# Patient Record
Sex: FEMALE | Race: WHITE | NOT HISPANIC OR LATINO | Employment: FULL TIME | URBAN - METROPOLITAN AREA
[De-identification: names, ages, dates, MRNs, and addresses within clinical notes are randomized per-mention and may not be internally consistent; named-entity substitution may affect disease eponyms.]

---

## 2017-01-20 ENCOUNTER — TELEPHONE (OUTPATIENT)
Dept: OBGYN | Facility: CLINIC | Age: 22
End: 2017-01-20

## 2017-01-20 NOTE — TELEPHONE ENCOUNTER
"Spoke to Patient.      \"I had Sinus tachycardia and an irregular heartbeat. I was seen at the  for that... I was last seen about a year ago-maybe 9 months ago?..\"     I asked patient when she last saw Cardiologist or contacted them and she stated: \"I don't know-isn't it in my chart?..\" (Last seen by Cardiology on 8- per chart review)    \"It is more my BP that they monitored that I am concerned about..\" \"No one does anything about my BP or my Pulse and it is constantly racing today-I feel like I am going to pass out daily and my heart is racing and I shouldn't always have to say that I have this and I constantly have to bring this up. I shouldn't have an elevated BP when I am PG... I am frustrated because I am always seeing a different Doctor every time I am in.. What should I do?... \"    Patient has not been seen in clinic since 12-20-16 for Prenatal evaluation.     \"If I go into Pre term labor because of Preeclampsia-it's all on you guys-\"    Patient was very anxious. Advised if she is having racing heart symptoms and feeling like she is going to pass out, she needs to be seen now and advised to be seen now in ER.     Patient verbalized understanding. Lia Peck RN      "

## 2017-01-20 NOTE — TELEPHONE ENCOUNTER
"Reason for call:  Patient reporting a symptom    Symptom or request: States \"I can feel that my BP is high, my heart is racing, I have a heart condition, nobody has brought it up at my appt's and it just feels like it is getting worse\" 29 wks pregnant.  States she checked BP over the weekend it was like 142/91 palse was 109.    Duration (how long have symptoms been present): on and off because she states she has a heart condition in general - gotten worse over the last 3 weeks, almost every day now.    Have you been treated for this before?     Additional comments:     Phone Number patient can be reached at:  Home number on file [phone] (home)    Best Time:  any    Can we leave a detailed message on this number:  YES    Call taken on 1/20/2017 at 12:41 PM by Yudy Boyle    "

## 2017-01-27 PROBLEM — Z34.80 PRENATAL CARE, SUBSEQUENT PREGNANCY: Status: ACTIVE | Noted: 2017-01-27

## 2017-01-30 ENCOUNTER — PRENATAL OFFICE VISIT (OUTPATIENT)
Dept: OBGYN | Facility: CLINIC | Age: 22
End: 2017-01-30
Payer: COMMERCIAL

## 2017-01-30 VITALS
HEART RATE: 113 BPM | SYSTOLIC BLOOD PRESSURE: 126 MMHG | BODY MASS INDEX: 27.62 KG/M2 | WEIGHT: 176 LBS | DIASTOLIC BLOOD PRESSURE: 75 MMHG | HEIGHT: 67 IN

## 2017-01-30 DIAGNOSIS — E55.9 VITAMIN D DEFICIENCY: ICD-10-CM

## 2017-01-30 DIAGNOSIS — Z34.03 PRENATAL CARE, FIRST PREGNANCY, THIRD TRIMESTER: Primary | ICD-10-CM

## 2017-01-30 DIAGNOSIS — F41.9 ANXIETY: ICD-10-CM

## 2017-01-30 DIAGNOSIS — K21.9 GASTROESOPHAGEAL REFLUX DISEASE, ESOPHAGITIS PRESENCE NOT SPECIFIED: ICD-10-CM

## 2017-01-30 PROCEDURE — 99207 ZZC PRENATAL VISIT: CPT | Performed by: OBSTETRICS & GYNECOLOGY

## 2017-01-30 RX ORDER — CHOLECALCIFEROL (VITAMIN D3) 50 MCG
2000 TABLET ORAL DAILY
Qty: 100 TABLET | Refills: 3 | Status: SHIPPED | OUTPATIENT
Start: 2017-01-30 | End: 2018-04-05

## 2017-01-30 RX ORDER — FLUOXETINE 10 MG/1
10 CAPSULE ORAL DAILY
Qty: 30 CAPSULE | Refills: 1 | Status: SHIPPED | OUTPATIENT
Start: 2017-01-30 | End: 2017-03-20

## 2017-01-30 ASSESSMENT — ANXIETY QUESTIONNAIRES
IF YOU CHECKED OFF ANY PROBLEMS ON THIS QUESTIONNAIRE, HOW DIFFICULT HAVE THESE PROBLEMS MADE IT FOR YOU TO DO YOUR WORK, TAKE CARE OF THINGS AT HOME, OR GET ALONG WITH OTHER PEOPLE: VERY DIFFICULT
1. FEELING NERVOUS, ANXIOUS, OR ON EDGE: NEARLY EVERY DAY
5. BEING SO RESTLESS THAT IT IS HARD TO SIT STILL: NEARLY EVERY DAY
3. WORRYING TOO MUCH ABOUT DIFFERENT THINGS: MORE THAN HALF THE DAYS
2. NOT BEING ABLE TO STOP OR CONTROL WORRYING: MORE THAN HALF THE DAYS
6. BECOMING EASILY ANNOYED OR IRRITABLE: MORE THAN HALF THE DAYS
7. FEELING AFRAID AS IF SOMETHING AWFUL MIGHT HAPPEN: SEVERAL DAYS
GAD7 TOTAL SCORE: 16

## 2017-01-30 ASSESSMENT — PATIENT HEALTH QUESTIONNAIRE - PHQ9: 5. POOR APPETITE OR OVEREATING: NEARLY EVERY DAY

## 2017-01-30 NOTE — PROGRESS NOTES
"CC: Here for routine prenatal visit @ 30w0d   HPI: + FM, no ctx, no LOF, no VB.  Having terrible heartburn and increased anxiety.  Cannot tolerate tums.  Atarax made her sleepy but she felt hungover.  Would like to try what her sister is taking (prozac)    PE: /75 mmHg  Pulse 113  Ht 5' 6.5\" (1.689 m)  Wt 176 lb (79.833 kg)  BMI 27.98 kg/m2  LMP 07/04/2016  Breastfeeding? No   See OB flowsheet    A/P G1 @ 30w0d normal pregnancy    1. Routine prenatal care  2. Anxiety: discussed trial of SSRI to treat underlying problem.  Has had bad side effects in the past with buspar, celexa, and Zoloft.  Amenable to trial of prozac.  Advised against benzodiazepines in pregnancy.  3. GERD: discussed trial of H2 blocker.  Patient amenable  4. Hx vitamin D deficiency: would like level checked.  just finished her 50,000 U per week.  Advised 2000 IU per day    RTC 2 weeks.      Sol Wooten M.D.    "

## 2017-01-31 ENCOUNTER — CARE COORDINATION (OUTPATIENT)
Dept: CARE COORDINATION | Facility: CLINIC | Age: 22
End: 2017-01-31

## 2017-01-31 ASSESSMENT — ANXIETY QUESTIONNAIRES: GAD7 TOTAL SCORE: 16

## 2017-01-31 NOTE — PROGRESS NOTES
Clinic Care Coordination Contact  Care Team Conversations    Pt reporting she is doing ok.  She moved into her permanent home in [place] and will need to update the new county of her transition.  Encouraged her to also call [facility] to notify as they can assist with transition as well.      Pt had no needs and did ask for the [facility] phone number to be emailed to her.  Her email address is [email or web address].    Pt was ok with closing to care coordination as she felt she was doing good and had no concerns.  Reminded pt she can call  at any time with questions/concerns.     MAME Jamil, Clinic Care Coordinator 1/31/2017   9:51 AM  [phone]

## 2017-02-15 ENCOUNTER — HOSPITAL ENCOUNTER (OUTPATIENT)
Dept: ULTRASOUND IMAGING | Facility: CLINIC | Age: 22
Discharge: HOME | End: 2017-02-15
Attending: OBSTETRICS & GYNECOLOGY | Admitting: OBSTETRICS & GYNECOLOGY
Payer: COMMERCIAL

## 2017-02-15 ENCOUNTER — PRENATAL OFFICE VISIT (OUTPATIENT)
Dept: OBGYN | Facility: CLINIC | Age: 22
End: 2017-02-15
Payer: COMMERCIAL

## 2017-02-15 VITALS
HEART RATE: 97 BPM | SYSTOLIC BLOOD PRESSURE: 140 MMHG | DIASTOLIC BLOOD PRESSURE: 72 MMHG | WEIGHT: 182 LBS | HEIGHT: 67 IN | BODY MASS INDEX: 28.56 KG/M2

## 2017-02-15 DIAGNOSIS — Z34.03 PRENATAL CARE, FIRST PREGNANCY, THIRD TRIMESTER: ICD-10-CM

## 2017-02-15 DIAGNOSIS — O16.3 ELEVATED BLOOD PRESSURE AFFECTING PREGNANCY IN THIRD TRIMESTER, ANTEPARTUM: ICD-10-CM

## 2017-02-15 DIAGNOSIS — Z34.03 PRENATAL CARE, FIRST PREGNANCY, THIRD TRIMESTER: Primary | ICD-10-CM

## 2017-02-15 DIAGNOSIS — O47.03 PRETERM UTERINE CONTRACTIONS IN THIRD TRIMESTER, ANTEPARTUM: ICD-10-CM

## 2017-02-15 LAB
ALT SERPL W P-5'-P-CCNC: 16 U/L (ref 0–50)
AST SERPL W P-5'-P-CCNC: 15 U/L (ref 0–45)
CREAT SERPL-MCNC: 0.56 MG/DL (ref 0.52–1.04)
CREAT UR-MCNC: 46 MG/DL
ERYTHROCYTE [DISTWIDTH] IN BLOOD BY AUTOMATED COUNT: 12 % (ref 10–15)
FIBRONECTIN FETAL VAG QL: NORMAL
GFR SERPL CREATININE-BSD FRML MDRD: NORMAL ML/MIN/1.7M2
HCT VFR BLD AUTO: 36 % (ref 35–47)
HGB BLD-MCNC: 11.8 G/DL (ref 11.7–15.7)
MCH RBC QN AUTO: 29 PG (ref 26.5–33)
MCHC RBC AUTO-ENTMCNC: 32.8 G/DL (ref 31.5–36.5)
MCV RBC AUTO: 89 FL (ref 78–100)
PLATELET # BLD AUTO: 321 10E9/L (ref 150–450)
PROT UR-MCNC: <0.05 G/L
PROT/CREAT 24H UR: NORMAL G/G CR (ref 0–0.2)
RBC # BLD AUTO: 4.07 10E12/L (ref 3.8–5.2)
URATE SERPL-MCNC: 3.3 MG/DL (ref 2.6–6)
WBC # BLD AUTO: 16.7 10E9/L (ref 4–11)

## 2017-02-15 PROCEDURE — 84550 ASSAY OF BLOOD/URIC ACID: CPT | Performed by: OBSTETRICS & GYNECOLOGY

## 2017-02-15 PROCEDURE — 82731 ASSAY OF FETAL FIBRONECTIN: CPT | Performed by: OBSTETRICS & GYNECOLOGY

## 2017-02-15 PROCEDURE — 84156 ASSAY OF PROTEIN URINE: CPT | Performed by: OBSTETRICS & GYNECOLOGY

## 2017-02-15 PROCEDURE — 84450 TRANSFERASE (AST) (SGOT): CPT | Performed by: OBSTETRICS & GYNECOLOGY

## 2017-02-15 PROCEDURE — 59025 FETAL NON-STRESS TEST: CPT | Performed by: OBSTETRICS & GYNECOLOGY

## 2017-02-15 PROCEDURE — 84460 ALANINE AMINO (ALT) (SGPT): CPT | Performed by: OBSTETRICS & GYNECOLOGY

## 2017-02-15 PROCEDURE — 85027 COMPLETE CBC AUTOMATED: CPT | Performed by: OBSTETRICS & GYNECOLOGY

## 2017-02-15 PROCEDURE — 36415 COLL VENOUS BLD VENIPUNCTURE: CPT | Performed by: OBSTETRICS & GYNECOLOGY

## 2017-02-15 PROCEDURE — 76819 FETAL BIOPHYS PROFIL W/O NST: CPT

## 2017-02-15 PROCEDURE — 82565 ASSAY OF CREATININE: CPT | Performed by: OBSTETRICS & GYNECOLOGY

## 2017-02-15 PROCEDURE — 99207 ZZC PRENATAL VISIT: CPT | Performed by: OBSTETRICS & GYNECOLOGY

## 2017-02-15 NOTE — PROGRESS NOTES
"CC: Here for routine prenatal visit @ 32w2d   HPI: + FM, no LOF, no VB.   Having cramping.  Denies recent intercourse.  Known hx of single horn uterus    PE: /72 (BP Location: Right arm, Patient Position: Chair, Cuff Size: Adult Regular)  Pulse 97  Ht 5' 6.5\" (1.689 m)  Wt 182 lb (82.6 kg)  LMP 2016  Breastfeeding? No  BMI 28.94 kg/m2   See OB flowsheet    Cervix visually closed.  Minimal discharge.  FFN obtained    A/P G1 @ 32w2d normal pregnancy, elevated BP in pregnancy,  contractions    1. Routine prenatal care  2. BP: plan PIH labs.  Discussed need to perform growth scans, BPPs and reassess weekly.  Plan BPP today with labwork.   3.  contractions: FFN pending.  Cervix reassuring.  Reviewed  labor precautions.     RTC 1 week    Sol Wootne M.D.    "

## 2017-02-15 NOTE — PROGRESS NOTES
Patient returned to clinic  BPP 6/8, pts off for fetal breathing.   Reactive NST in clinic  Fetal fibronectin return negative.   HELLP labs pending.     Shaista Bedolla MD  [facility]

## 2017-02-15 NOTE — NURSING NOTE
"Chief Complaint   Patient presents with     Prenatal Care     experiencing some cramping       Initial /72 (BP Location: Right arm, Patient Position: Chair, Cuff Size: Adult Regular)  Pulse 97  Ht 5' 6.5\" (1.689 m)  Wt 182 lb (82.6 kg)  LMP 07/04/2016  Breastfeeding? No  BMI 28.94 kg/m2 Estimated body mass index is 28.94 kg/(m^2) as calculated from the following:    Height as of this encounter: 5' 6.5\" (1.689 m).    Weight as of this encounter: 182 lb (82.6 kg).  Medication Reconciliation: complete   Sasha Conley, MATILDE      "

## 2017-02-15 NOTE — MR AVS SNAPSHOT
After Visit Summary   2/15/2017    Zacarias Adam    MRN: [id number]           Patient Information     Date Of Birth          1995        Visit Information        Provider Department      2/15/2017 2:45 PM Sol Wooten MD [facility]        Today's Diagnoses     Prenatal care, first pregnancy, third trimester    -  1     uterine contractions in third trimester, antepartum        Elevated blood pressure affecting pregnancy in third trimester, antepartum           Follow-ups after your visit        Your next 10 appointments already scheduled     Mar 03, 2017  3:30 PM CST   ESTABLISHED PRENATAL with Shaista Bedolla MD   [facility]    [address] [place] [address]   [phone]            Mar 17, 2017  3:30 PM CDT   ESTABLISHED PRENATAL with Shaista Bedolla MD   [facility] ([facility]    [address] [place] [address]   [phone]            Mar 24, 2017  3:00 PM CDT   ESTABLISHED PRENATAL with Sol Wooten MD   [facility] ([facility])    [address] [place] [address]   [phone]            Mar 31, 2017  3:00 PM CDT   ESTABLISHED PRENATAL with Sol Wooten MD   [facility] ([facility])    [address] [place] [address]   [phone]              Future tests that were ordered for you today     Open Standing Orders        Priority Remaining Interval Expires Ordered    US Fetal Biophys Prof w/o Non Stress Test Routine 6/6  2/15/2018 2/15/2017          Open Future Orders        Priority Expected Expires Ordered    US OB Ltd One Or More Fetus FU/Repeat Routine 2017 2/15/2018 2/15/2017    US Fetal Biophys Prof w/o Non Stress Test STAT 2017 2/15/2018 2/15/2017            Who to contact     If you have questions or need follow up information about  "today's clinic visit or your schedule please contact [facility] directly at [phone].  Normal or non-critical lab and imaging results will be communicated to you by MyChart, letter or phone within 4 business days after the clinic has received the results. If you do not hear from us within 7 days, please contact the clinic through [employer] or phone. If you have a critical or abnormal lab result, we will notify you by phone as soon as possible.  Submit refill requests through [employer] or call your pharmacy and they will forward the refill request to us. Please allow 3 business days for your refill to be completed.          Additional Information About Your Visit        [employer] Information     [employer] lets you send messages to your doctor, view your test results, renew your prescriptions, schedule appointments and more. To sign up, go to [email or web address] . Click on \"Log in\" on the left side of the screen, which will take you to the Welcome page. Then click on \"Sign up Now\" on the right side of the page.     You will be asked to enter the access code listed below, as well as some personal information. Please follow the directions to create your username and password.     Your access code is: [email or web address]  Expires: 2017  5:10 PM     Your access code will  in 90 days. If you need help or a new code, please call your [place] clinic or [phone].        Care EveryWhere ID     This is your Care EveryWhere ID. This could be used by other organizations to access your [place] medical records  [id number]        Your Vitals Were     Pulse Height Last Period Breastfeeding? BMI (Body Mass Index)       97 5' 6.5\" (1.689 m) 2016 No 28.94 kg/m2        Blood Pressure from Last 3 Encounters:   02/15/17 140/72   17 126/75   17 130/74    Weight from Last 3 Encounters:   02/15/17 182 lb (82.6 kg)   17 176 lb (79.8 kg)   16 169 lb (76.7 kg)              We Performed the " Following     ALT     AST     CBC with platelets     Creatinine     Fetal fibronectin     Protein  random urine     Uric acid        Primary Care Provider Office Phone # Fax #    Yudi Sauceda [phone]       [facility] [address] [address]        Thank you!     Thank you for choosing [facility]  for your care. Our goal is always to provide you with excellent care. Hearing back from our patients is one way we can continue to improve our services. Please take a few minutes to complete the written survey that you may receive in the mail after your visit with us. Thank you!             Your Updated Medication List - Protect others around you: Learn how to safely use, store and throw away your medicines at www.disposemymeds.org.          This list is accurate as of: 2/15/17  3:59 PM.  Always use your most recent med list.                   Brand Name Dispense Instructions for use    albuterol 108 (90 BASE) MCG/ACT Inhaler    VENTOLIN HFA    2 Inhaler    Inhale 2 puffs into the lungs every 4 hours as needed       doxylamine 25 MG Tabs tablet    UNISOM    14 each    Take 1 tablet (25 mg) by mouth At Bedtime       FLUoxetine 10 MG capsule    PROzac    30 capsule    Take 1 capsule (10 mg) by mouth daily       hydrOXYzine 50 MG tablet    ATARAX    30 tablet    Take 1-2 tablets ( mg) by mouth every 6 hours as needed for itching       ranitidine 150 MG tablet    ZANTAC    60 tablet    Take 1 tablet (150 mg) by mouth 2 times daily       vitamin D 2000 UNITS tablet     100 tablet    Take 2,000 Units by mouth daily

## 2017-02-17 ENCOUNTER — TELEPHONE (OUTPATIENT)
Dept: OBGYN | Facility: CLINIC | Age: 22
End: 2017-02-17

## 2017-02-17 NOTE — TELEPHONE ENCOUNTER
Patient notified of normal lab results per Dr. Wooten.  Patient reports understanding.    Johnna Fairbanks   Ob[facility]Gyn Clinic  RN

## 2017-02-17 NOTE — TELEPHONE ENCOUNTER
Reason for Call:  Request for results:    Name of test or procedure: lab    Date of test of procedure: 2/15    Location of the test or procedure: [place]    OK to leave the result message on voice mail or with a family member? YES    Phone number Patient can be reached at:  Home number on file [phone] (home)    Additional comments: pt calling for results    Call taken on 2/17/2017 at 1:59 PM by Jaylyn Preston

## 2017-02-22 ENCOUNTER — HOSPITAL ENCOUNTER (OUTPATIENT)
Dept: ULTRASOUND IMAGING | Facility: CLINIC | Age: 22
Discharge: HOME | End: 2017-02-22
Attending: OBSTETRICS & GYNECOLOGY | Admitting: OBSTETRICS & GYNECOLOGY
Payer: COMMERCIAL

## 2017-02-22 ENCOUNTER — PRENATAL OFFICE VISIT (OUTPATIENT)
Dept: OBGYN | Facility: CLINIC | Age: 22
End: 2017-02-22
Payer: COMMERCIAL

## 2017-02-22 VITALS
SYSTOLIC BLOOD PRESSURE: 129 MMHG | BODY MASS INDEX: 28.88 KG/M2 | HEIGHT: 67 IN | DIASTOLIC BLOOD PRESSURE: 75 MMHG | WEIGHT: 184 LBS | HEART RATE: 108 BPM

## 2017-02-22 DIAGNOSIS — Z34.03 PRENATAL CARE, FIRST PREGNANCY, THIRD TRIMESTER: Primary | ICD-10-CM

## 2017-02-22 DIAGNOSIS — O16.3 ELEVATED BLOOD PRESSURE AFFECTING PREGNANCY IN THIRD TRIMESTER, ANTEPARTUM: ICD-10-CM

## 2017-02-22 DIAGNOSIS — Q51.9 CONGENITAL UTERINE ANOMALY: ICD-10-CM

## 2017-02-22 PROCEDURE — 76819 FETAL BIOPHYS PROFIL W/O NST: CPT

## 2017-02-22 PROCEDURE — 99207 ZZC PRENATAL VISIT: CPT | Performed by: OBSTETRICS & GYNECOLOGY

## 2017-02-22 PROCEDURE — 59025 FETAL NON-STRESS TEST: CPT | Performed by: OBSTETRICS & GYNECOLOGY

## 2017-02-22 NOTE — PROGRESS NOTES
"CC; prenatal visit  Uterine anomaly-pregnancy in left horn  Elevated blood pressure  S:  Feels pelvic aches. No ctx.  She was evaluated last week.  No headaches/visual changes.  Good fm.  No lof/vb/dc.    /75 (BP Location: Left arm, Patient Position: Chair, Cuff Size: Adult Regular)  Pulse 108  Ht 5' 6.5\" (1.689 m)  Wt 184 lb (83.5 kg)  LMP 2016  BMI 29.25 kg/m2  Prior labs reviewed-normal  FFN negative  Gestational age by current ultrasound measurement: 34 weeks and 1 day,  corresponding to an FLEX of 2017.     Gestational age based on the reported previously established due date:  33 weeks and 2 days, corresponding to an FLEX of 4/10/2017.     Estimated fetal weight: 2,183 grams, corresponding to the 60th  percentile based on the reported previously established due date.          IMPRESSION:   1. Single live intrauterine pregnancy of 34 weeks and 1 day gestation  by current ultrasound measurement. Fetal growth is 6 days more  advanced than what is expected from the reported previously  established due date.  2. Estimated fetal weight is at the 60th percentile.  3. Biophysical profile score is 6/8 (fetal breathing movement scores  are 0/2).     SMITA MARIE MD  A/P BPP 6/8-NST  FFN negative, no ctx- labor precautions  Uterine anomaly-good growth, vertex.    F/u 1 week  BP normal today with normal labs  Routine precautions  Had declined the tdap and flu vaccine prior and still declines  Amanda Lauren MD    "

## 2017-02-22 NOTE — MR AVS SNAPSHOT
After Visit Summary   2/22/2017    Zacarias Adam    MRN: [id number]           Patient Information     Date Of Birth          1995        Visit Information        Provider Department      2/22/2017 9:45 AM Amanda Lauren MD [facility]        Today's Diagnoses     Prenatal care, first pregnancy, third trimester    -  1    Congenital uterine anomaly           Follow-ups after your visit        Your next 10 appointments already scheduled     Mar 01, 2017  2:15 PM CST   ESTABLISHED PRENATAL with Moises Hays MD   [facility]    [address] [address]   [phone]            Mar 01, 2017  3:30 PM CST   US FETAL BIOPHYS PROFILE W/O NON STRESS TEST with WYUS1   [facility]ound ([facility])    [address] [address]   [phone]           Please bring a list of your medicines (including vitamins, minerals and over-the-counter drugs). Also, tell your doctor about any allergies you may have. Wear comfortable clothes and leave your valuables at home.  If you re less than 20 weeks drink four 8-ounce glasses of fluid an hour before your exam. If you need to empty your bladder before your exam, try to release only a little urine. Then, drink another glass of fluid.  You may have up to two family members in the exam room. If you bring a small child, an adult must be there to care for him or her.  Please call the Imaging Department at your exam site with any questions.            Mar 08, 2017  2:30 PM CST   ESTABLISHED PRENATAL with Moises Hays MD   [facility]    [address] [address]   [phone]            Mar 08, 2017  3:30 PM CST   US FETAL BIOPHYS PROFILE W/O NON STRESS TEST with WYUS2   [facility]ound ([facility])    [address]  [address]   [phone]           Please bring a list of your medicines (including vitamins, minerals and over-the-counter drugs). Also, tell your doctor about any allergies you may have. Wear comfortable clothes and leave your valuables at home.  If you re less than 20 weeks drink four 8-ounce glasses of fluid an hour before your exam. If you need to empty your bladder before your exam, try to release only a little urine. Then, drink another glass of fluid.  You may have up to two family members in the exam room. If you bring a small child, an adult must be there to care for him or her.  Please call the Imaging Department at your exam site with any questions.            Mar 15, 2017  3:00 PM CDT   ESTABLISHED PRENATAL with Moises Hays MD   [facility] ([facility]    [address] [address]   [phone]            Mar 15, 2017  3:30 PM CDT   US FETAL BIOPHYS PROFILE W/O NON STRESS TEST with WYUS2   [facility]ound ([facility])    [address] [address]   [phone]           Please bring a list of your medicines (including vitamins, minerals and over-the-counter drugs). Also, tell your doctor about any allergies you may have. Wear comfortable clothes and leave your valuables at home.  If you re less than 20 weeks drink four 8-ounce glasses of fluid an hour before your exam. If you need to empty your bladder before your exam, try to release only a little urine. Then, drink another glass of fluid.  You may have up to two family members in the exam room. If you bring a small child, an adult must be there to care for him or her.  Please call the Imaging Department at your exam site with any questions.            Mar 24, 2017  2:00 PM CDT   US FETAL BIOPHYS PROFILE W/O NON STRESS TEST with WYUS1   [facility] Ultrasound ([facility])    [address] [address]    [phone]           Please bring a list of your medicines (including vitamins, minerals and over-the-counter drugs). Also, tell your doctor about any allergies you may have. Wear comfortable clothes and leave your valuables at home.  If you re less than 20 weeks drink four 8-ounce glasses of fluid an hour before your exam. If you need to empty your bladder before your exam, try to release only a little urine. Then, drink another glass of fluid.  You may have up to two family members in the exam room. If you bring a small child, an adult must be there to care for him or her.  Please call the Imaging Department at your exam site with any questions.            Mar 24, 2017  3:00 PM CDT   ESTABLISHED PRENATAL with Sol Wooten MD   [facility]    [address] [address] [phone]            Mar 31, 2017  2:00 PM CDT   US FETAL BIOPHYS PROFILE W/O NON STRESS TEST with WYUS[address] [place] [address] Ultrasound ([facility])    [address] [address] [phone]           Please bring a list of your medicines (including vitamins, minerals and over-the-counter drugs). Also, tell your doctor about any allergies you may have. Wear comfortable clothes and leave your valuables at home.  If you re less than 20 weeks drink four 8-ounce glasses of fluid an hour before your exam. If you need to empty your bladder before your exam, try to release only a little urine. Then, drink another glass of fluid.  You may have up to two family members in the exam room. If you bring a small child, an adult must be there to care for him or her.  Please call the Imaging Department at your exam site with any questions.            Mar 31, 2017  3:00 PM CDT   ESTABLISHED PRENATAL with Sol Wooetn MD   [facility] ([facility])    [address] [address] [phone]             "  Future tests that were ordered for you today     Open Future Orders        Priority Expected Expires Ordered    US OB Ltd One Or More Fetus FU/Repeat Routine 2017            Who to contact     If you have questions or need follow up information about today's clinic visit or your schedule please contact [facility] directly at [phone].  Normal or non-critical lab and imaging results will be communicated to you by MyChart, letter or phone within 4 business days after the clinic has received the results. If you do not hear from us within 7 days, please contact the clinic through [employer] or phone. If you have a critical or abnormal lab result, we will notify you by phone as soon as possible.  Submit refill requests through [facility] or call your pharmacy and they will forward the refill request to us. Please allow 3 business days for your refill to be completed.          Additional Information About Your Visit        [employer] Information     [facility] lets you send messages to your doctor, view your test results, renew your prescriptions, schedule appointments and more. To sign up, go to [email or web address] . Click on \"Log in\" on the left side of the screen, which will take you to the Welcome page. Then click on \"Sign up Now\" on the right side of the page.     You will be asked to enter the access code listed below, as well as some personal information. Please follow the directions to create your username and password.     Your access code is: [email or web address]  Expires: 2017  5:10 PM     Your access code will  in 90 days. If you need help or a new code, please call your [place] clinic or [phone].        Care EveryWhere ID     This is your Care EveryWhere ID. This could be used by other organizations to access your [place] medical records  [id number]        Your Vitals Were     Pulse Height Last Period BMI (Body Mass Index)          108 5' 6.5\" (1.689 m) " 07/04/2016 29.25 kg/m2         Blood Pressure from Last 3 Encounters:   02/22/17 129/75   02/15/17 140/72   01/30/17 126/75    Weight from Last 3 Encounters:   02/22/17 184 lb (83.5 kg)   02/15/17 182 lb (82.6 kg)   01/30/17 176 lb (79.8 kg)              We Performed the Following     FETAL NON-STRESS TEST        Primary Care Provider Office Phone # Fax #    Yudi Sauceda [phone]       [facility] [address] [address]        Thank you!     Thank you for choosing [facility]  for your care. Our goal is always to provide you with excellent care. Hearing back from our patients is one way we can continue to improve our services. Please take a few minutes to complete the written survey that you may receive in the mail after your visit with us. Thank you!             Your Updated Medication List - Protect others around you: Learn how to safely use, store and throw away your medicines at www.disposemymeds.org.          This list is accurate as of: 2/22/17 10:50 AM.  Always use your most recent med list.                   Brand Name Dispense Instructions for use    albuterol 108 (90 BASE) MCG/ACT Inhaler    VENTOLIN HFA    2 Inhaler    Inhale 2 puffs into the lungs every 4 hours as needed       doxylamine 25 MG Tabs tablet    UNISOM    14 each    Take 1 tablet (25 mg) by mouth At Bedtime       FLUoxetine 10 MG capsule    PROzac    30 capsule    Take 1 capsule (10 mg) by mouth daily       hydrOXYzine 50 MG tablet    ATARAX    30 tablet    Take 1-2 tablets ( mg) by mouth every 6 hours as needed for itching       ranitidine 150 MG tablet    ZANTAC    60 tablet    Take 1 tablet (150 mg) by mouth 2 times daily       vitamin D 2000 UNITS tablet     100 tablet    Take 2,000 Units by mouth daily

## 2017-02-22 NOTE — NURSING NOTE
"Chief Complaint   Patient presents with     Prenatal Care     Pelvic pressure and sharp pains       Initial /75 (BP Location: Left arm, Patient Position: Chair, Cuff Size: Adult Regular)  Pulse 108  Ht 5' 6.5\" (1.689 m)  Wt 184 lb (83.5 kg)  LMP 07/04/2016  BMI 29.25 kg/m2 Estimated body mass index is 29.25 kg/(m^2) as calculated from the following:    Height as of this encounter: 5' 6.5\" (1.689 m).    Weight as of this encounter: 184 lb (83.5 kg).  Medication Reconciliation: complete     Genet Lindsay LPN      "

## 2017-02-27 ENCOUNTER — HOSPITAL ENCOUNTER (OUTPATIENT)
Facility: CLINIC | Age: 22
Discharge: HOME | End: 2017-02-27
Attending: OBSTETRICS & GYNECOLOGY | Admitting: OBSTETRICS & GYNECOLOGY
Payer: COMMERCIAL

## 2017-02-27 VITALS
SYSTOLIC BLOOD PRESSURE: 136 MMHG | HEART RATE: 101 BPM | RESPIRATION RATE: 16 BRPM | DIASTOLIC BLOOD PRESSURE: 80 MMHG | TEMPERATURE: 98.3 F

## 2017-02-27 PROBLEM — Z36.89 ENCOUNTER FOR TRIAGE IN PREGNANT PATIENT: Status: ACTIVE | Noted: 2017-02-27

## 2017-02-27 LAB
ALBUMIN UR-MCNC: NEGATIVE MG/DL
APPEARANCE UR: CLEAR
BILIRUB UR QL STRIP: NEGATIVE
COLOR UR AUTO: ABNORMAL
GLUCOSE UR STRIP-MCNC: NEGATIVE MG/DL
HGB UR QL STRIP: NEGATIVE
KETONES UR STRIP-MCNC: NEGATIVE MG/DL
LEUKOCYTE ESTERASE UR QL STRIP: NEGATIVE
MUCOUS THREADS #/AREA URNS LPF: PRESENT /LPF
NITRATE UR QL: NEGATIVE
PH UR STRIP: 6 PH (ref 5–7)
RBC #/AREA URNS AUTO: <1 /HPF (ref 0–2)
SP GR UR STRIP: 1.01 (ref 1–1.03)
SQUAMOUS #/AREA URNS AUTO: 2 /HPF (ref 0–1)
URN SPEC COLLECT METH UR: ABNORMAL
UROBILINOGEN UR STRIP-MCNC: NORMAL MG/DL (ref 0–2)
WBC #/AREA URNS AUTO: 3 /HPF (ref 0–2)

## 2017-02-27 PROCEDURE — 81001 URINALYSIS AUTO W/SCOPE: CPT | Performed by: OBSTETRICS & GYNECOLOGY

## 2017-02-27 PROCEDURE — 99213 OFFICE O/P EST LOW 20 MIN: CPT

## 2017-02-27 PROCEDURE — 99214 OFFICE O/P EST MOD 30 MIN: CPT

## 2017-02-27 NOTE — DISCHARGE INSTRUCTIONS
Discharge Instructions for Undelivered Patients  [facility] Phone # [phone]     Diet:  * Drink 8 to 12 glasses of liquids (milk, juice, water) every day  * You may eat meals and snacks.    Activity:  * Count fetal kicks every day (see handout).  * Call your doctor or nurse midwife if your baby is moving less than usual.    Call your provider if you notice:  * Swelling in your face or increased swelling in your hands or legs.  * Headaches that are not relieved by Tylenol (acetaminophen).  * Changes in your vision (blurring; seeing spots or stars).  * Nausea (sick to your stomach) and vomiting (throwing up).  * Weight gain of 5 pounds per week.  * Heartburn that doesn't go away.  * Signs of bladder infection: Pain when you urinate (use the toilet), needing to go more often or more urgently.  * The bag of ponce (membrane) breaks, or you notice leaking in your underwear.  * Bright red blood in your underwear.  * Abdominal (lower belly) or stomach pain.  * For first baby: Contractions (tightenings) less than 5 minutes apart for one hour or more.  * Second (plus) baby: Contractions (tightenings) less than 10 minutes apart and getting stronger.  * Increase or change in vaginal discharge (note the color and amount).

## 2017-02-27 NOTE — PLAN OF CARE
Problem: Goal Outcome Summary  Goal: Goal Outcome Summary  Outcome: Improving  Pt arrived ambulatory at appx 1430.  Pt placed in Triage room #1.  Pt orientated to room, call light in reach.  Pt in gown and on EFM.  Pt states +FM, denies LOF, HA, epigastric pain, nausea, vomiting and visual disturbances.  Pt stats she has been having regular abdominal tightening for the last few weeks and more recently starting this past Friday.  Pt states she is unsure if they are actual contractions or dave moralez.  Pt states with contractions she occasionally feels sharp pain down from her lower abdomen to her vagina/toward her urethra area.  Pt advised she just voided.       Orders placed,  Pt will provide urine sample when able to void.

## 2017-02-27 NOTE — PROGRESS NOTES
S: Discharge from triage  A: Vital Signs  Temp: 98.3  F (36.8  C)  Temp src: Oral  Resp: 16  Pulse: 101  BP: 136/80  Cervical Exam  Dilation: Closed  Station: -3  Cervical Consistency: medium  Cervical Position: Posterior  FHR  Monitor: External US  Variability: Moderate  Baseline Rate (Fetus A): 130 bpm  Baseline Classification: Normal  Accelerations: Present  Decelerations: None  FHTs are category 1. Strip reviewed by Nara Raymond RN.  Uterine Activity  Monitor: [place]  Contraction Frequency (minutes): irritability, occassional  Contraction Quality: Cramps  Resting Tone Palpated: Soft  Pain/Comfort  Pain Location: Abdomen  Pain Orientation: Lower  Pain Descriptors: Sharp  Admission on 2017   Component Date Value     Color Urine 2017 Light Yellow      Appearance Urine 2017 Clear      Glucose Urine 2017 Negative      Bilirubin Urine 2017 Negative      Ketones Urine 2017 Negative      Specific Gravity Urine 2017 1.010      Blood Urine 2017 Negative      pH Urine 2017 6.0      Protein Albumin Urine 2017 Negative      Urobilinogen mg/dL 2017 Normal      Nitrite Urine 2017 Negative      Leukocyte Esterase Urine 2017 Negative      Source 2017 Midstream Urine      WBC Urine 2017 3*     RBC Urine 2017 <1      Squamous Epithelial /HPF* 2017 2*     Mucous Urine 2017 Present*     Dr. CALI Hays informed of above by Nara GARRIDO RN and discharge order received.   R: Plan includes:  labor instuctions given Follow up clinic appointment: as scheduled Patient instructed to report any recurrence of above concerns to her primary care provider during clinic hours or [facility] at any other time. Patient verbalized understanding of education and agreement with plan. Agrees to call for any problems, questions or concerns.  Discharged undelivered via ambulatory  in stable condition with all belongings. Accompanied by  self .

## 2017-02-27 NOTE — PROGRESS NOTES
Pt discharged home ambulatory at 1630 hours.  Discharge instructions reviewed.  Abdominal binder obtained and given/applied to pt.  Questions answered.

## 2017-02-27 NOTE — IP AVS SNAPSHOT
[facility]    [address] [address]    Phone:  [phone]    Fax:  [phone]                                       After Visit Summary   2/27/2017    Zacarias Adam    MRN: [id number]           After Visit Summary Signature Page     I have received my discharge instructions, and my questions have been answered. I have discussed any challenges I see with this plan with the nurse or doctor.    ..........................................................................................................................................  Patient/Patient Representative Signature      ..........................................................................................................................................  Patient Representative Print Name and Relationship to Patient    ..................................................               ................................................  Date                                            Time    ..........................................................................................................................................  Reviewed by Signature/Title    ...................................................              ..............................................  Date                                                            Time

## 2017-02-27 NOTE — IP AVS SNAPSHOT
MRN:[id number]                      After Visit Summary   2/27/2017    Zacarias Adam    MRN: [id number]           Thank you!     Thank you for choosing [facility] for your care. Our goal is always to provide you with excellent care. Hearing back from our patients is one way we can continue to improve our services. Please take a few minutes to complete the written survey that you may receive in the mail after you visit with us. Thank you!        Patient Information     Date Of Birth          1995        About your hospital stay     You were admitted on:  February 27, 2017 You last received care in the:  [facility]    You were discharged on:  February 27, 2017       Who to Call     For medical emergencies, please call 911.  For non-urgent questions about your medical care, please call your primary care provider or clinic, [phone]          Attending Provider     Provider Specialty    Shaista Bedolla MD OB/Gyn    Moises Hays MD OB/Gyn       Primary Care Provider Office Phone # Fax #    Yudi Anshul Sauceda [phone]       [address] [address]        Your next 10 appointments already scheduled     Mar 01, 2017  2:15 PM CST   [facility]ABL[facility]    [address] [facility] [id number] [phone]            Mar 01, 2017  3:30 PM CST   US FETAL BIOPHYS PROFILE W/O NON STRESS TEST with [address] Ultrasound ([facility])    [address] [facility] [address] [phone]           Please bring a list of your medicines (including vitamins, minerals and over-the-counter drugs). Also, tell your doctor about any allergies you may have. Wear comfortable clothes and leave your valuables at home.  If you re less than 20 weeks drink four 8-ounce glasses of fluid an hour before  your exam. If you need to empty your bladder before your exam, try to release only a little urine. Then, drink another glass of fluid.  You may have up to two family members in the exam room. If you bring a small child, an adult must be there to care for him or her.  Please call the Imaging Department at your exam site with any questions.            Mar 08, 2017  2:30 PM CST   ESTABLISHED PRENATAL with Moises Hays MD   [facility]    [address] [facility] [address]   [phone]            Mar 08, 2017  3:30 PM CST   US FETAL BIOPHYS PROFILE W/O NON STRESS TEST with [address] Ultrasound ([facility])    [address] [facility] [address]   [phone]           Please bring a list of your medicines (including vitamins, minerals and over-the-counter drugs). Also, tell your doctor about any allergies you may have. Wear comfortable clothes and leave your valuables at home.  If you re less than 20 weeks drink four 8-ounce glasses of fluid an hour before your exam. If you need to empty your bladder before your exam, try to release only a little urine. Then, drink another glass of fluid.  You may have up to two family members in the exam room. If you bring a small child, an adult must be there to care for him or her.  Please call the Imaging Department at your exam site with any questions.            Mar 15, 2017  3:00 PM CDT   ESTABLISHED PRENATAL with Moises Hays MD   [facility]    [address] [facility] [address]   [phone]            Mar 15, 2017  3:30 PM CDT   US FETAL BIOPHYS PROFILE W/O NON STRESS TEST with [address] Ultrasound ([facility])    [address] [facility] [address]   [phone]           Please bring a list of your medicines (including vitamins, minerals and over-the-counter drugs). Also,  tell your doctor about any allergies you may have. Wear comfortable clothes and leave your valuables at home.  If you re less than 20 weeks drink four 8-ounce glasses of fluid an hour before your exam. If you need to empty your bladder before your exam, try to release only a little urine. Then, drink another glass of fluid.  You may have up to two family members in the exam room. If you bring a small child, an adult must be there to care for him or her.  Please call the Imaging Department at your exam site with any questions.            Mar 24, 2017  2:00 PM CDT   US FETAL BIOPHYS PROFILE W/O NON STRESS TEST with WYUS1   [facility] Ultrasound ([facility])    [address] [facility] [address]   [phone]           Please bring a list of your medicines (including vitamins, minerals and over-the-counter drugs). Also, tell your doctor about any allergies you may have. Wear comfortable clothes and leave your valuables at home.  If you re less than 20 weeks drink four 8-ounce glasses of fluid an hour before your exam. If you need to empty your bladder before your exam, try to release only a little urine. Then, drink another glass of fluid.  You may have up to two family members in the exam room. If you bring a small child, an adult must be there to care for him or her.  Please call the Imaging Department at your exam site with any questions.            Mar 24, 2017  3:00 PM CDT   ESTABLISHED PRENATAL with Sol Wooten MD   [facility]    [address] [facility] [address]   [phone]            Mar 31, 2017  2:00 PM CDT   US FETAL BIOPHYS PROFILE W/O NON STRESS TEST with WYUS1   [facility]ound ([facility])    [address] [facility] [address]   [phone]           Please bring a list of your medicines (including vitamins, minerals and over-the-counter drugs). Also, tell your doctor about  any allergies you may have. Wear comfortable clothes and leave your valuables at home.  If you re less than 20 weeks drink four 8-ounce glasses of fluid an hour before your exam. If you need to empty your bladder before your exam, try to release only a little urine. Then, drink another glass of fluid.  You may have up to two family members in the exam room. If you bring a small child, an adult must be there to care for him or her.  Please call the Imaging Department at your exam site with any questions.            Mar 31, 2017  3:00 PM CDT   ESTABLISHED PRENATAL with Sol Wooten MD   [facility]    [address] [facility] [address] [phone]              Further instructions from your care team       Discharge Instructions for Undelivered Patients  [facility] Phone # [phone]     Diet:  * Drink 8 to 12 glasses of liquids (milk, juice, water) every day  * You may eat meals and snacks.    Activity:  * Count fetal kicks every day (see handout).  * Call your doctor or nurse midwife if your baby is moving less than usual.    Call your provider if you notice:  * Swelling in your face or increased swelling in your hands or legs.  * Headaches that are not relieved by Tylenol (acetaminophen).  * Changes in your vision (blurring; seeing spots or stars).  * Nausea (sick to your stomach) and vomiting (throwing up).  * Weight gain of 5 pounds per week.  * Heartburn that doesn't go away.  * Signs of bladder infection: Pain when you urinate (use the toilet), needing to go more often or more urgently.  * The bag of ponce (membrane) breaks, or you notice leaking in your underwear.  * Bright red blood in your underwear.  * Abdominal (lower belly) or stomach pain.  * For first baby: Contractions (tightenings) less than 5 minutes apart for one hour or more.  * Second (plus) baby: Contractions (tightenings) less than 10 minutes apart and getting stronger.  * Increase  "or change in vaginal discharge (note the color and amount).          Pending Results     No orders found from 2017 to 2017.            Admission Information     Date & Time Provider Department Dept. Phone    2017 [facility] [phone]      Your Vitals Were     Blood Pressure Pulse Temperature Respirations Last Period       136/80 101 98.3  F (36.8  C) (Oral) 16 2016       MyChart Information     [employer] lets you send messages to your doctor, view your test results, renew your prescriptions, schedule appointments and more. To sign up, go to [email or web address] . Click on \"Log in\" on the left side of the screen, which will take you to the Welcome page. Then click on \"Sign up Now\" on the right side of the page.     You will be asked to enter the access code listed below, as well as some personal information. Please follow the directions to create your username and password.     Your access code is: [email or web address]  Expires: 2017  5:10 PM     Your access code will  in 90 days. If you need help or a new code, please call your [facility] clinic or [phone].        Care EveryWhere ID     This is your Care EveryWhere ID. This could be used by other organizations to access your [facility] medical records  [id number]           Review of your medicines      UNREVIEWED medicines. Ask your doctor about these medicines        Dose / Directions    albuterol 108 (90 BASE) MCG/ACT Inhaler   Commonly known as:  VENTOLIN HFA   Used for:  Cough        Dose:  2 puff   Inhale 2 puffs into the lungs every 4 hours as needed   Quantity:  2 Inhaler   Refills:  1       doxylamine 25 MG Tabs tablet   Commonly known as:  UNISOM   Used for:  Anxiety        Dose:  25 mg   Take 1 tablet (25 mg) by mouth At Bedtime   Quantity:  14 each   Refills:  0       FLUoxetine 10 MG capsule   Commonly known as:  PROzac   Used for:  Anxiety        Dose:  10 mg   Take 1 capsule (10 mg) " by mouth daily   Quantity:  30 capsule   Refills:  1       hydrOXYzine 50 MG tablet   Commonly known as:  ATARAX   Used for:  Anxiety        Dose:   mg   Take 1-2 tablets ( mg) by mouth every 6 hours as needed for itching   Quantity:  30 tablet   Refills:  1       ranitidine 150 MG tablet   Commonly known as:  ZANTAC   Used for:  Gastroesophageal reflux disease, esophagitis presence not specified        Dose:  150 mg   Take 1 tablet (150 mg) by mouth 2 times daily   Quantity:  60 tablet   Refills:  1       vitamin D 2000 UNITS tablet   Used for:  Vitamin D deficiency        Dose:  2000 Units   Take 2,000 Units by mouth daily   Quantity:  100 tablet   Refills:  3                Protect others around you: Learn how to safely use, store and throw away your medicines at www.disposemymeds.org.             Medication List: This is a list of all your medications and when to take them. Check marks below indicate your daily home schedule. Keep this list as a reference.      Medications           Morning Afternoon Evening Bedtime As Needed    albuterol 108 (90 BASE) MCG/ACT Inhaler   Commonly known as:  VENTOLIN HFA   Inhale 2 puffs into the lungs every 4 hours as needed                                doxylamine 25 MG Tabs tablet   Commonly known as:  UNISOM   Take 1 tablet (25 mg) by mouth At Bedtime                                FLUoxetine 10 MG capsule   Commonly known as:  PROzac   Take 1 capsule (10 mg) by mouth daily                                hydrOXYzine 50 MG tablet   Commonly known as:  ATARAX   Take 1-2 tablets ( mg) by mouth every 6 hours as needed for itching                                ranitidine 150 MG tablet   Commonly known as:  ZANTAC   Take 1 tablet (150 mg) by mouth 2 times daily                                vitamin D 2000 UNITS tablet   Take 2,000 Units by mouth daily                                          More Information        False Labor    If your pregnancy is at 37 weeks  or longer and you are having contractions that are not true labor, you are having false labor. This means that it is not time yet to give birth to your baby.  True labor contractions can start unevenly but soon take on a regular pattern. As time goes on, the contractions will get stronger. Also, the intervals between the contractions will get shorter. Even at the onset, these contractions last at least 30 seconds and may increase to a minute. True labor contractions often start in the back and then move to the front.  False labor contractions can be strong, frequent, and painful, but there is no regular pattern. The intensity can vary from strong to mild to strong again. False labor contractions are most often felt in the front. While true labor contractions don t stop no matter what you are doing, false labor contractions may stop on their own or when you rest or move around.  False labor contractions might make you feel anxious or lose sleep. However, they don t mean that you are sick or that anything is wrong with your baby. You don t need to take any medicine for false labor.  Sometimes, it may be too hard to tell false labor from true labor. In such cases, you may need to have a vaginal exam. This allows your healthcare provider to check for changes in the cervix that only occur with true labor.  Home care    It may help to drink plenty of water and take warm baths. Do what you can ahead of time to prepare for giving birth so you ll have less to worry about later.    Keep a record of your contractions. Write down what time each one starts and how long it lasts. A stopwatch is helpful. Look for the pattern of regularly spaced out contractions with a gradual increase in the time each one lasts.    Don t be embarrassed about going to the hospital with a  false alarm.  Think of it as good practice for the real thing.    Follow-up care  Follow up with your healthcare provider, or as advised. If you are very worried,  confused, can t eat or sleep, or have questions about your health or pregnancy, schedule an appointment with your provider.  When to seek medical advice  Call your healthcare provider right away if any of these occur:    You are fewer than 37 weeks along in your pregnancy and you re having contractions.    You have contractions that are regular, getting longer, stronger, and closer together.    Your water breaks.    You have vaginal bleeding.    You feel a decrease in your baby s movement or any other unusual changes.     You re not sure if you are having false or true labor.       7439-7125 [employer]. [address] [place] [address]. All rights reserved. This information is not intended as a substitute for professional medical care. Always follow your healthcare professional's instructions.                Understanding  Labor  Going into labor before your 37th week of pregnancy is called  labor.  labor can cause your baby to be born too soon early. This can lead to a number of health problems that may affect your baby.     Before labor, the cervix is thick and closed.       In  labor, the cervix begins to efface (thin) and dilate (open).      Symptoms of  labor  If you believe you re having  labor, get medical help right away. Contractions alone don t mean you re in  labor. What matters more are changes in your cervix (the lower end of the uterus). Symptoms of  labor include:    Four or more contractions per hour    Strong contractions    Constant menstrual-like cramping    Low-back pain    Mucous or bloody vaginal discharge    Bleeding or spotting in the second or third trimester     A pelvic exam can help your doctor find out whether your cervix has thinned and opened.   Evaluating  labor  Your health care provider will try to find out whether you re in  labor or whether you re just having contractions. He or she may  watch you for a few hours. The following tests may be done:    Pelvic exam to see if your cervix has effaced (thinned) and dilated (opened)    Uterine activity monitoring to detect contractions    Fetal monitoring to check the health of your baby    Ultrasound to check your baby s size and position    Amniocentesis to check how mature your baby s lungs are  Caring for yourself at home  If you have  contractions, but your cervix is still thick and closed, your doctor may ask you to do the following at home:    Drink plenty of water.    Do fewer activities.    Rest in bed on your side.    Avoid intercourse and nipple stimulation.  When to call your health care provider  Call your health care provider if you notice any of these:    Four or more contractions per hour    Bag of water breaks    Bleeding or spotting   If you need hospital care   labor often requires that you have hospital care and complete bed rest. You may have an IV (intravenous) line to get fluids. You may be given pills or injections to help prevent contractions. Finally, you may receive medication (corticosteroids) that helps your baby s lungs mature more rapidly.  Are you at risk?  Any pregnant woman can have  labor. It may start for no reason. But these risk factors can increase your chances:    Past  labor or past early birth    Smoking, drug, or alcohol use during pregnancy    Multiple fetuses (twins or more)    Problems with the shape of the uterus    Bleeding during the pregnancy  The dangers of  birth  A baby born too soon may have health problems. This is because the baby didn t have enough time to mature. Some of the risks for your baby include:    Not breastfeeding well    Having immature lungs    Bleeding in the brain    Dying  Reaching term  Your goal is to get as close to term as you can before giving birth. The closer you get to term, the higher your chance of having a healthy baby. Work with your  health care provider. Together, you can take steps that may keep you from giving birth too early.    1819-0630 [employer]. [address] [place] [address], [place] [address]. All rights reserved. This information is not intended as a substitute for professional medical care. Always follow your healthcare professional's instructions.                Premature Labor    Premature labor (also called   labor ) is when symptoms of labor occur before 37 weeks of pregnancy. (This is 3 weeks before your due date.) Premature labor can lead to premature delivery. This means giving birth to your baby early. Babies need at least 37 weeks of pregnancy for all the organs to develop normally. The earlier the delivery, the greater the risks to the baby.  In most cases, the cause of premature labor is unknown. But certain factors may make the problem more likely. These include:    History of premature labor with other pregnancies    Smoking    Alcohol or substance abuse    Low pre-pregnancy weight or weight gain during pregnancy    Short time period between pregnancies    Being pregnant with twins, triplets, or more    History of certain types of surgery on the cervix or uterus    Having a short cervix    Certain infections  There are a number of other risk factors. Ask your healthcare provider to help you understand the risk factors specific to your case. Then find out what you can do to control or reduce them.  Contractions are one of the main signs of premature labor. A contraction is different from cramping. It may feel painful and the belly (abdomen) may get hard. It can last from a few seconds to a few minutes. Some women may feel only a sense of pressure in the belly, thighs, rectum, or vagina. Some may feel only the hardening of the uterus without pain or pressure. Or there may be a constant pain the lower back, which spreads forward toward the belly.    Premature labor can often be treated with medicines. A  hospital stay will likely be needed. If labor is stopped successfully and you and your baby are both healthy, you may be discharged to continue care at home.  Home care    Ask your provider any questions you have. Be certain you understand how to care for yourself at home. Also follow all recommendations given by your healthcare providers.    Learn the signs of premature labor. Watch for these signs when you get home.    Limit or restrict activities as advised. This may include stopping certain physical activities and cutting back hours at work.    Avoid doing any strenuous work. Ask family and friends for help with tasks and support at home, if needed.    Don t smoke, drink alcohol, or use other harmful substances.    Take steps to reduce stress.    Report any unusual symptoms to your provider.  Follow-up care  Follow up with your healthcare provider, or as directed. Weekly visits with your provider may be needed.  When to seek medical advice  Call your healthcare provider right away if any of these occur:    Regular or frequent contractions, whether they are painful or not    Pressure in the pelvis    Pressure in the lower belly or mild cramping in your belly with or without diarrhea    Constant low, dull backache    Gush or slow leaking of water from your vagina    Change in vaginal discharge (watery, mucus, or bloody)    Any vaginal bleeding    Decreased movement of your baby    9376-8520 [employer]. [address] [place] [address], [place] [address]. All rights reserved. This information is not intended as a substitute for professional medical care. Always follow your healthcare professional's instructions.

## 2017-02-27 NOTE — PROGRESS NOTES
S:Patient presents due to  cramping, pelvic pressure and UTI symptoms.  B:34w0d   Allergies: Crabs [crustaceans]; Nuts; Soybean oil; Vicodin [hydrocodone-acetaminophen]; Chicken allergy; Morphine; Soy allergy; Tomato; and Wheat  A:Vital Signs  Temp: 98.3  F (36.8  C)  Temp src: Oral  Resp: 16  Pulse: 101  BP: 136/80     FHR  Monitor: External US  FHTs are category 1.  Uterine Activity  Monitor: [employer]  Pain/Comfort  Pain Location: Abdomen  Pain Orientation: Lower  Pain Descriptors: Sharp  No visits with results within 1 Day(s) from this visit.  Latest known visit with results is:    Prenatal Office Visit on 02/15/2017   Component Date Value     Fetal Fibronectin 02/15/2017                      Value:Negative   After 24 weeks gestation, a negative fetal fibronectin is reported to be a   predictor of low term risk for  labor in the next 14 days       Protein Random Urine 02/15/2017 <0.05      Protein Total Urine g/gr* 02/15/2017 Unable to calculate due to low value      Uric Acid 02/15/2017 3.3      AST 02/15/2017 15      ALT 02/15/2017 16      Creatinine 02/15/2017 0.56      GFR Estimate 02/15/2017                      Value:>90  Non  GFR Calc       GFR Estimate If Black 02/15/2017                      Value:>90   GFR Calc       WBC 02/15/2017 16.7*     RBC Count 02/15/2017 4.07      Hemoglobin 02/15/2017 11.8      Hematocrit 02/15/2017 36.0      MCV 02/15/2017 89      MCH 02/15/2017 29.0      MCHC 02/15/2017 32.8      RDW 02/15/2017 12.0      Platelet Count 02/15/2017 321      Creatinine Urine 02/15/2017 46      Dr. CALI Hays informed of above and orders received.  Plan includes; Encourage po fluids and Labs . Reviewed with patient and she agrees with plan.   Bill of Rights given & questions discussed?: Yes (declined)  Patient and/or the legally designated decision maker (if patient lacks capacity) provided and informed about Honoring Choices-Your Rights handout?:  Yes    Prenatal Breastfeeding Education Toolkit provided for patient to review,helping her to make an informed decision on a feeding choice for her baby. Patient accepted. Questions answered.    Oriented to room and call light.

## 2017-03-01 ENCOUNTER — PRENATAL OFFICE VISIT (OUTPATIENT)
Dept: OBGYN | Facility: CLINIC | Age: 22
End: 2017-03-01
Payer: COMMERCIAL

## 2017-03-01 ENCOUNTER — HOSPITAL ENCOUNTER (OUTPATIENT)
Dept: ULTRASOUND IMAGING | Facility: CLINIC | Age: 22
Discharge: HOME | End: 2017-03-01
Attending: OBSTETRICS & GYNECOLOGY | Admitting: OBSTETRICS & GYNECOLOGY
Payer: COMMERCIAL

## 2017-03-01 VITALS
DIASTOLIC BLOOD PRESSURE: 76 MMHG | BODY MASS INDEX: 29.57 KG/M2 | HEIGHT: 67 IN | SYSTOLIC BLOOD PRESSURE: 131 MMHG | WEIGHT: 188.4 LBS | HEART RATE: 99 BPM

## 2017-03-01 DIAGNOSIS — O16.3 ELEVATED BLOOD PRESSURE AFFECTING PREGNANCY IN THIRD TRIMESTER, ANTEPARTUM: ICD-10-CM

## 2017-03-01 DIAGNOSIS — Z34.03 PRENATAL CARE, FIRST PREGNANCY, THIRD TRIMESTER: Primary | ICD-10-CM

## 2017-03-01 DIAGNOSIS — G56.01 CARPAL TUNNEL SYNDROME OF RIGHT WRIST: ICD-10-CM

## 2017-03-01 PROCEDURE — 99207 ZZC PRENATAL VISIT: CPT | Performed by: OBSTETRICS & GYNECOLOGY

## 2017-03-01 PROCEDURE — 59025 FETAL NON-STRESS TEST: CPT | Performed by: OBSTETRICS & GYNECOLOGY

## 2017-03-01 PROCEDURE — 76819 FETAL BIOPHYS PROFIL W/O NST: CPT

## 2017-03-01 NOTE — PROGRESS NOTES
"CC: Prenatal visit    S: seen [facility] with contractions on Monday \  UA negative for protein  She is having weekly BPP  Noted increased LE edema over the last 2 days  Mild HA without visual changes    O: /85 (BP Location: Right arm, Patient Position: Chair, Cuff Size: Adult Large)  Pulse 118  Ht 5' 6.5\" (1.689 m)  Wt 188 lb 6.4 oz (85.5 kg)  LMP 07/04/2016  BMI 29.95 kg/m2  See above table  DTR- 1+/4+  Edema 1+ (LE)    A: IUP @ 34+2 week EGA  Elevated BP  New onset of LE edema  Weight gain 4 # over the last week  Right carpal tunnel  P RTC 1 weeks  NST  BPP  Pre eclampsia precautions  Moises Hays      "

## 2017-03-01 NOTE — MR AVS SNAPSHOT
After Visit Summary   3/1/2017    Zacarias Adam    MRN: [id number]           Patient Information     Date Of Birth          1995        Visit Information        Provider Department      3/1/2017 2:15 PM Moises Hays MD [facility]        Today's Diagnoses     Prenatal care, first pregnancy, third trimester    -  1    Carpal tunnel syndrome of right wrist        Elevated blood pressure affecting pregnancy in third trimester, antepartum           Follow-ups after your visit        Follow-up notes from your care team     Return in about 1 week (around 3/8/2017).      Your next 10 appointments already scheduled     Mar 01, 2017  3:30 PM CST   US FETAL BIOPHYS PROFILE W/O NON STRESS TEST with WYUS1   [facility] Ultrasound ([facility])    [address] [address] [phone]           Please bring a list of your medicines (including vitamins, minerals and over-the-counter drugs). Also, tell your doctor about any allergies you may have. Wear comfortable clothes and leave your valuables at home.  If you re less than 20 weeks drink four 8-ounce glasses of fluid an hour before your exam. If you need to empty your bladder before your exam, try to release only a little urine. Then, drink another glass of fluid.  You may have up to two family members in the exam room. If you bring a small child, an adult must be there to care for him or her.  Please call the Imaging Department at your exam site with any questions.            Mar 08, 2017  2:30 PM CST   ESTABLISHED PRENATAL with Moises Hays MD   [facility] ([facility])    [address] [address]   [phone]            Mar 08, 2017  3:30 PM CST   US FETAL BIOPHYS PROFILE W/O NON STRESS TEST with WYUS2   [facility] Ultrasound ([facility])    [address] [address] [phone]           Please  bring a list of your medicines (including vitamins, minerals and over-the-counter drugs). Also, tell your doctor about any allergies you may have. Wear comfortable clothes and leave your valuables at home.  If you re less than 20 weeks drink four 8-ounce glasses of fluid an hour before your exam. If you need to empty your bladder before your exam, try to release only a little urine. Then, drink another glass of fluid.  You may have up to two family members in the exam room. If you bring a small child, an adult must be there to care for him or her.  Please call the Imaging Department at your exam site with any questions.            Mar 15, 2017  3:00 PM CDT   ESTABLISHED PRENATAL with Moises Hays MD   [facility] ([facility])    [address] [address]   [phone]            Mar 15, 2017  3:30 PM CDT   US FETAL BIOPHYS PROFILE W/O NON STRESS TEST with WYUS2   [facility] ([facility])    [address] [address]   [phone]           Please bring a list of your medicines (including vitamins, minerals and over-the-counter drugs). Also, tell your doctor about any allergies you may have. Wear comfortable clothes and leave your valuables at home.  If you re less than 20 weeks drink four 8-ounce glasses of fluid an hour before your exam. If you need to empty your bladder before your exam, try to release only a little urine. Then, drink another glass of fluid.  You may have up to two family members in the exam room. If you bring a small child, an adult must be there to care for him or her.  Please call the Imaging Department at your exam site with any questions.            Mar 24, 2017  2:00 PM CDT   US FETAL BIOPHYS PROFILE W/O NON STRESS TEST with WYUS1   [facility] ([facility])    [address] [address]   [phone]           Please bring a list of  your medicines (including vitamins, minerals and over-the-counter drugs). Also, tell your doctor about any allergies you may have. Wear comfortable clothes and leave your valuables at home.  If you re less than 20 weeks drink four 8-ounce glasses of fluid an hour before your exam. If you need to empty your bladder before your exam, try to release only a little urine. Then, drink another glass of fluid.  You may have up to two family members in the exam room. If you bring a small child, an adult must be there to care for him or her.  Please call the Imaging Department at your exam site with any questions.            Mar 24, 2017  3:00 PM CDT   ESTABLISHED PRENATAL with Sol Wooten MD   [facility] ([facility])    [address] [address]   [phone]            Mar 31, 2017  2:00 PM CDT   US FETAL BIOPHYS PROFILE W/O NON STRESS TEST with [address] Ultrasound ([facility])    [address] [address]   [phone]           Please bring a list of your medicines (including vitamins, minerals and over-the-counter drugs). Also, tell your doctor about any allergies you may have. Wear comfortable clothes and leave your valuables at home.  If you re less than 20 weeks drink four 8-ounce glasses of fluid an hour before your exam. If you need to empty your bladder before your exam, try to release only a little urine. Then, drink another glass of fluid.  You may have up to two family members in the exam room. If you bring a small child, an adult must be there to care for him or her.  Please call the Imaging Department at your exam site with any questions.            Mar 31, 2017  3:00 PM CDT   ESTABLISHED PRENATAL with Sol Wooten MD   [facility] ([facility])    [address] [address]   [phone]            Apr 05, 2017  3:30 PM CDT   US FETAL BIOPHYS PROFILE  "W/O NON STRESS TEST with WYUS2   [facility] Ultrasound ([facility])    [address]  [facility] [address] [phone]           Please bring a list of your medicines (including vitamins, minerals and over-the-counter drugs). Also, tell your doctor about any allergies you may have. Wear comfortable clothes and leave your valuables at home.  If you re less than 20 weeks drink four 8-ounce glasses of fluid an hour before your exam. If you need to empty your bladder before your exam, try to release only a little urine. Then, drink another glass of fluid.  You may have up to two family members in the exam room. If you bring a small child, an adult must be there to care for him or her.  Please call the Imaging Department at your exam site with any questions.              Who to contact     If you have questions or need follow up information about today's clinic visit or your schedule please contact [facility] directly at [phone].  Normal or non-critical lab and imaging results will be communicated to you by [employer], letter or phone within 4 business days after the clinic has received the results. If you do not hear from us within 7 days, please contact the clinic through Public[employer] or phone. If you have a critical or abnormal lab result, we will notify you by phone as soon as possible.  Submit refill requests through NutraMed or call your pharmacy and they will forward the refill request to us. Please allow 3 business days for your refill to be completed.          Additional Information About Your Visit        N[employer]raMed Information     N[employer] lets you send messages to your doctor, view your test results, renew your prescriptions, schedule appointments and more. To sign up, go to [email or web address] . Click on \"Log in\" on the left side of the screen, which will take you to the Welcome page. Then click on \"Sign up Now\" on the right side of the page.     You will be asked " "to enter the access code listed below, as well as some personal information. Please follow the directions to create your username and password.     Your access code is: [email or web address]  Expires: 2017  5:10 PM     Your access code will  in 90 days. If you need help or a new code, please call your [place] clinic or [phone].        Care EveryWhere ID     This is your Care EveryWhere ID. This could be used by other organizations to access your [place] medical records  [id number]        Your Vitals Were     Pulse Height Last Period BMI (Body Mass Index)          118 5' 6.5\" (1.689 m) 2016 29.95 kg/m2         Blood Pressure from Last 3 Encounters:   17 135/85   17 136/80   17 129/75    Weight from Last 3 Encounters:   17 188 lb 6.4 oz (85.5 kg)   17 184 lb (83.5 kg)   02/15/17 182 lb (82.6 kg)              We Performed the Following     FETAL NON-STRESS TEST          Today's Medication Changes          These changes are accurate as of: 3/1/17  2:49 PM.  If you have any questions, ask your nurse or doctor.               Stop taking these medicines if you haven't already. Please contact your care team if you have questions.     doxylamine 25 MG Tabs tablet   Commonly known as:  UNISOM   Stopped by:  Moises Hays MD                    Primary Care Provider Office Phone # Fax #    Yudi Sauceda [phone]       [facility] [address] [place] [address]        Thank you!     Thank you for choosing [facility]  for your care. Our goal is always to provide you with excellent care. Hearing back from our patients is one way we can continue to improve our services. Please take a few minutes to complete the written survey that you may receive in the mail after your visit with us. Thank you!             Your Updated Medication List - Protect others around you: Learn how to safely use, store and throw away your " medicines at www.disposemymeds.org.          This list is accurate as of: 3/1/17  2:49 PM.  Always use your most recent med list.                   Brand Name Dispense Instructions for use    albuterol 108 (90 BASE) MCG/ACT Inhaler    VENTOLIN HFA    2 Inhaler    Inhale 2 puffs into the lungs every 4 hours as needed       FLUoxetine 10 MG capsule    PROzac    30 capsule    Take 1 capsule (10 mg) by mouth daily       hydrOXYzine 50 MG tablet    ATARAX    30 tablet    Take 1-2 tablets ( mg) by mouth every 6 hours as needed for itching       ranitidine 150 MG tablet    ZANTAC    60 tablet    Take 1 tablet (150 mg) by mouth 2 times daily       vitamin D 2000 UNITS tablet     100 tablet    Take 2,000 Units by mouth daily

## 2017-03-01 NOTE — NURSING NOTE
"Initial /85 (BP Location: Right arm, Patient Position: Chair, Cuff Size: Adult Large)  Pulse 118  Ht 5' 6.5\" (1.689 m)  Wt 188 lb 6.4 oz (85.5 kg)  LMP 07/04/2016  BMI 29.95 kg/m2 Estimated body mass index is 29.95 kg/(m^2) as calculated from the following:    Height as of this encounter: 5' 6.5\" (1.689 m).    Weight as of this encounter: 188 lb 6.4 oz (85.5 kg). .      "

## 2017-03-06 ENCOUNTER — PRENATAL OFFICE VISIT (OUTPATIENT)
Dept: OBGYN | Facility: CLINIC | Age: 22
End: 2017-03-06
Payer: COMMERCIAL

## 2017-03-06 VITALS
DIASTOLIC BLOOD PRESSURE: 77 MMHG | WEIGHT: 187 LBS | BODY MASS INDEX: 29.35 KG/M2 | HEIGHT: 67 IN | HEART RATE: 116 BPM | SYSTOLIC BLOOD PRESSURE: 145 MMHG

## 2017-03-06 DIAGNOSIS — Z34.03 PRENATAL CARE, FIRST PREGNANCY, THIRD TRIMESTER: Primary | ICD-10-CM

## 2017-03-06 DIAGNOSIS — O16.3 ELEVATED BLOOD PRESSURE AFFECTING PREGNANCY IN THIRD TRIMESTER, ANTEPARTUM: ICD-10-CM

## 2017-03-06 PROCEDURE — 99207 ZZC PRENATAL VISIT: CPT | Performed by: OBSTETRICS & GYNECOLOGY

## 2017-03-06 NOTE — MR AVS SNAPSHOT
After Visit Summary   3/6/2017    Zacarias Adam    MRN: [id number]           Patient Information     Date Of Birth          1995        Visit Information        Provider Department      3/6/2017 2:30 PM Moises Hays MD [facility]        Today's Diagnoses     Prenatal care, first pregnancy, third trimester    -  1    Elevated blood pressure affecting pregnancy in third trimester, antepartum          Care Instructions    Return in one week.        Follow-ups after your visit        Follow-up notes from your care team     Return in about 1 week (around 3/13/2017).      Your next 10 appointments already scheduled     Mar 08, 2017  3:30 PM CST   US FETAL BIOPHYS PROFILE W/O NON STRESS TEST with [address] Ultrasound ([facility])    [address] [address]   [phone]           Please bring a list of your medicines (including vitamins, minerals and over-the-counter drugs). Also, tell your doctor about any allergies you may have. Wear comfortable clothes and leave your valuables at home.  If you re less than 20 weeks drink four 8-ounce glasses of fluid an hour before your exam. If you need to empty your bladder before your exam, try to release only a little urine. Then, drink another glass of fluid.  You may have up to two family members in the exam room. If you bring a small child, an adult must be there to care for him or her.  Please call the Imaging Department at your exam site with any questions.            Mar 15, 2017  3:00 PM CDT   ESTABLISHED PRENATAL with Moises Hays MD   [facility] ([facility])    [address] [address]   [phone]            Mar 15, 2017  3:30 PM CDT   US FETAL BIOPHYS PROFILE W/O NON STRESS TEST with WYUS2   [facility] Ultrasound ([facility])    [address] [address]   [phone]            Please bring a list of your medicines (including vitamins, minerals and over-the-counter drugs). Also, tell your doctor about any allergies you may have. Wear comfortable clothes and leave your valuables at home.  If you re less than 20 weeks drink four 8-ounce glasses of fluid an hour before your exam. If you need to empty your bladder before your exam, try to release only a little urine. Then, drink another glass of fluid.  You may have up to two family members in the exam room. If you bring a small child, an adult must be there to care for him or her.  Please call the Imaging Department at your exam site with any questions.            Mar 24, 2017  2:00 PM CDT   US FETAL BIOPHYS PROFILE W/O NON STRESS TEST with WYUS1   [facility] Ultrasound ([facility])    [address] [facility] [address] [phone]           Please bring a list of your medicines (including vitamins, minerals and over-the-counter drugs). Also, tell your doctor about any allergies you may have. Wear comfortable clothes and leave your valuables at home.  If you re less than 20 weeks drink four 8-ounce glasses of fluid an hour before your exam. If you need to empty your bladder before your exam, try to release only a little urine. Then, drink another glass of fluid.  You may have up to two family members in the exam room. If you bring a small child, an adult must be there to care for him or her.  Please call the Imaging Department at your exam site with any questions.            Mar 24, 2017  3:00 PM CDT   ESTABLISHED PRENATAL with Sol Wooten MD   [facility]    [address] [facility] [address]   [phone]            Mar 31, 2017  2:00 PM CDT   US FETAL BIOPHYS PROFILE W/O NON STRESS TEST with WYUS1   [facility] Ultrasound ([facility])    [address] [facility] [address] [facility] [address] [phone]           Please bring a list of  your medicines (including vitamins, minerals and over-the-counter drugs). Also, tell your doctor about any allergies you may have. Wear comfortable clothes and leave your valuables at home.  If you re less than 20 weeks drink four 8-ounce glasses of fluid an hour before your exam. If you need to empty your bladder before your exam, try to release only a little urine. Then, drink another glass of fluid.  You may have up to two family members in the exam room. If you bring a small child, an adult must be there to care for him or her.  Please call the Imaging Department at your exam site with any questions.            Mar 31, 2017  3:00 PM CDT   ESTABLISHED PRENATAL with Sol Wooten MD   [facility]    [address] [facility] [address] [phone]            Apr 05, 2017  3:30 PM CDT   US FETAL BIOPHYS PROFILE W/O NON STRESS TEST with [address] Ultrasound ([facility])    [address] [facility] [address] [phone]           Please bring a list of your medicines (including vitamins, minerals and over-the-counter drugs). Also, tell your doctor about any allergies you may have. Wear comfortable clothes and leave your valuables at home.  If you re less than 20 weeks drink four 8-ounce glasses of fluid an hour before your exam. If you need to empty your bladder before your exam, try to release only a little urine. Then, drink another glass of fluid.  You may have up to two family members in the exam room. If you bring a small child, an adult must be there to care for him or her.  Please call the Imaging Department at your exam site with any questions.              Who to contact     If you have questions or need follow up information about today's clinic visit or your schedule please contact [facility] directly at [phone].  Normal or non-critical lab and imaging results will be communicated to you  "by [employer], letter or phone within 4 business days after the clinic has received the results. If you do not hear from us within 7 days, please contact the clinic through [employer] or phone. If you have a critical or abnormal lab result, we will notify you by phone as soon as possible.  Submit refill requests through PlexPress or call your pharmacy and they will forward the refill request to us. Please allow 3 business days for your refill to be completed.          Additional Information About Your Visit        [employer] AdventuresBackus HospitalJaman Information     P[employer]Press lets you send messages to your doctor, view your test results, renew your prescriptions, schedule appointments and more. To sign up, go to [email or web address] . Click on \"Log in\" on the left side of the screen, which will take you to the Welcome page. Then click on \"Sign up Now\" on the right side of the page.     You will be asked to enter the access code listed below, as well as some personal information. Please follow the directions to create your username and password.     Your access code is: [email or web address]  Expires: 2017  5:10 PM     Your access code will  in 90 days. If you need help or a new code, please call your [place] clinic or [phone].        Care EveryWhere ID     This is your Care EveryWhere ID. This could be used by other organizations to access your [place] medical records  [id number]        Your Vitals Were     Pulse Height Last Period BMI (Body Mass Index)          116 5' 6.5\" (1.689 m) 2016 29.73 kg/m2         Blood Pressure from Last 3 Encounters:   17 145/77   17 131/76   17 136/80    Weight from Last 3 Encounters:   17 187 lb (84.8 kg)   17 188 lb 6.4 oz (85.5 kg)   17 184 lb (83.5 kg)              Today, you had the following     No orders found for display       Primary Care Provider Office Phone # Fax #    Yudi Sauceda [phone]       [place] [address] " [address] [place] [address]        Thank you!     Thank you for choosing [facility]  for your care. Our goal is always to provide you with excellent care. Hearing back from our patients is one way we can continue to improve our services. Please take a few minutes to complete the written survey that you may receive in the mail after your visit with us. Thank you!             Your Updated Medication List - Protect others around you: Learn how to safely use, store and throw away your medicines at www.disposemymeds.org.          This list is accurate as of: 3/6/17  3:48 PM.  Always use your most recent med list.                   Brand Name Dispense Instructions for use    albuterol 108 (90 BASE) MCG/ACT Inhaler    VENTOLIN HFA    2 Inhaler    Inhale 2 puffs into the lungs every 4 hours as needed       FLUoxetine 10 MG capsule    PROzac    30 capsule    Take 1 capsule (10 mg) by mouth daily       hydrOXYzine 50 MG tablet    ATARAX    30 tablet    Take 1-2 tablets ( mg) by mouth every 6 hours as needed for itching       ranitidine 150 MG tablet    ZANTAC    60 tablet    Take 1 tablet (150 mg) by mouth 2 times daily       vitamin D 2000 UNITS tablet     100 tablet    Take 2,000 Units by mouth daily

## 2017-03-06 NOTE — PROGRESS NOTES
"CC: Prenatal visit    S: Having persistent LE edema   Feels like the baby has dropped  She has had dena occasional HA no visual changes  No LOF  NO Vb  Some increased DC  O: /77 (BP Location: Right arm, Patient Position: Chair, Cuff Size: Adult Large)  Pulse 116  Ht 5' 6.5\" (1.689 m)  Wt 187 lb (84.8 kg)  LMP 2016  BMI 29.73 kg/m2  See above table    A: IUP @ 35 week EGA  Elevated BP   No evidence of pre eclampsia    P RTC 1 weeks   labor and pre eclampsia precautions  Moises Hays      "

## 2017-03-06 NOTE — NURSING NOTE
"Initial /77 (BP Location: Right arm, Patient Position: Chair, Cuff Size: Adult Large)  Pulse 116  Ht 5' 6.5\" (1.689 m)  Wt 187 lb (84.8 kg)  LMP 07/04/2016  BMI 29.73 kg/m2 Estimated body mass index is 29.73 kg/(m^2) as calculated from the following:    Height as of this encounter: 5' 6.5\" (1.689 m).    Weight as of this encounter: 187 lb (84.8 kg). .      "

## 2017-03-08 ENCOUNTER — HOSPITAL ENCOUNTER (OUTPATIENT)
Facility: CLINIC | Age: 22
Discharge: HOME | End: 2017-03-08
Attending: OBSTETRICS & GYNECOLOGY | Admitting: OBSTETRICS & GYNECOLOGY
Payer: COMMERCIAL

## 2017-03-08 ENCOUNTER — HOSPITAL ENCOUNTER (OUTPATIENT)
Dept: ULTRASOUND IMAGING | Facility: CLINIC | Age: 22
Discharge: HOME | End: 2017-03-08
Attending: OBSTETRICS & GYNECOLOGY | Admitting: OBSTETRICS & GYNECOLOGY
Payer: COMMERCIAL

## 2017-03-08 VITALS — DIASTOLIC BLOOD PRESSURE: 78 MMHG | RESPIRATION RATE: 20 BRPM | SYSTOLIC BLOOD PRESSURE: 128 MMHG | HEART RATE: 118 BPM

## 2017-03-08 DIAGNOSIS — O16.3 ELEVATED BLOOD PRESSURE AFFECTING PREGNANCY IN THIRD TRIMESTER, ANTEPARTUM: ICD-10-CM

## 2017-03-08 PROBLEM — Z34.00 SUPERVISION OF NORMAL FIRST PREGNANCY: Status: ACTIVE | Noted: 2017-03-08

## 2017-03-08 LAB
ALBUMIN UR-MCNC: NEGATIVE MG/DL
APPEARANCE UR: CLEAR
BILIRUB UR QL STRIP: NEGATIVE
COLOR UR AUTO: YELLOW
GLUCOSE UR STRIP-MCNC: NEGATIVE MG/DL
HGB UR QL STRIP: NEGATIVE
KETONES UR STRIP-MCNC: NEGATIVE MG/DL
LEUKOCYTE ESTERASE UR QL STRIP: NEGATIVE
NITRATE UR QL: NEGATIVE
PH UR STRIP: 6.5 PH (ref 5–7)
SP GR UR STRIP: 1.01 (ref 1–1.03)
URN SPEC COLLECT METH UR: NORMAL
UROBILINOGEN UR STRIP-MCNC: NORMAL MG/DL (ref 0–2)

## 2017-03-08 PROCEDURE — 99214 OFFICE O/P EST MOD 30 MIN: CPT

## 2017-03-08 PROCEDURE — 59025 FETAL NON-STRESS TEST: CPT

## 2017-03-08 PROCEDURE — 87653 STREP B DNA AMP PROBE: CPT | Performed by: OBSTETRICS & GYNECOLOGY

## 2017-03-08 PROCEDURE — 81003 URINALYSIS AUTO W/O SCOPE: CPT | Performed by: OBSTETRICS & GYNECOLOGY

## 2017-03-08 RX ORDER — ONDANSETRON 2 MG/ML
4 INJECTION INTRAMUSCULAR; INTRAVENOUS EVERY 6 HOURS PRN
Status: DISCONTINUED | OUTPATIENT
Start: 2017-03-08 | End: 2017-03-08 | Stop reason: HOSPADM

## 2017-03-08 NOTE — PROVIDER NOTIFICATION
03/08/17 1345   Provider Notification   Provider Name/Title Dr [place]   Method of Notification Phone   Request Evaluate - Remote   Notification Reason Patient Arrived;Status Update;Other (Comment)   Pt in to rule out PTL.  Occassional contractions, palpate moderate.  NST reactive. Do SVE and GBS swab.  Ok to bump up US if possible.  Discharge if SVE ok

## 2017-03-08 NOTE — IP AVS SNAPSHOT
MRN:[id number]                      After Visit Summary   3/8/2017    Zacarias Adam    MRN: [id number]           Thank you!     Thank you for choosing [facility] for your care. Our goal is always to provide you with excellent care. Hearing back from our patients is one way we can continue to improve our services. Please take a few minutes to complete the written survey that you may receive in the mail after you visit with us. Thank you!        Patient Information     Date Of Birth          1995        About your hospital stay     You were admitted on:  March 8, 2017 You last received care in the:  [facility]    You were discharged on:  March 8, 2017       Who to Call     For medical emergencies, please call 911.  For non-urgent questions about your medical care, please call your primary care provider or clinic, [phone]          Attending Provider     Provider Specialty    Kacie Boston MD OB/Gyn       Primary Care Provider Office Phone # Fax #    Yudi Sauceda [phone]       [facility] [address] [place] [id number]        Your next 10 appointments already scheduled     Mar 08, 2017  3:30 PM CST   US FETAL BIOPHYS PROFILE W/O NON STRESS TEST with [address] Ultrasound ([facility])    [address] [facility] [address] [phone]           Please bring a list of your medicines (including vitamins, minerals and over-the-counter drugs). Also, tell your doctor about any allergies you may have. Wear comfortable clothes and leave your valuables at home.  If you re less than 20 weeks drink four 8-ounce glasses of fluid an hour before your exam. If you need to empty your bladder before your exam, try to release only a little urine. Then, drink another glass of fluid.  You may have up to two family members in the exam room. If you bring a small child, an adult must be there to care for  him or her.  Please call the Imaging Department at your exam site with any questions.            Mar 15, 2017  3:00 PM CDT   ESTABLISHED PRENATAL with Moises Hays MD   [facility] ([facility]    [address] [facility] [address]   [phone]            Mar 15, 2017  3:30 PM CDT   US FETAL BIOPHYS PROFILE W/O NON STRESS TEST with WYUS2   [facility] Ultrasound ([facility])    [address] [facility] [address]   [phone]           Please bring a list of your medicines (including vitamins, minerals and over-the-counter drugs). Also, tell your doctor about any allergies you may have. Wear comfortable clothes and leave your valuables at home.  If you re less than 20 weeks drink four 8-ounce glasses of fluid an hour before your exam. If you need to empty your bladder before your exam, try to release only a little urine. Then, drink another glass of fluid.  You may have up to two family members in the exam room. If you bring a small child, an adult must be there to care for him or her.  Please call the Imaging Department at your exam site with any questions.            Mar 24, 2017  2:00 PM CDT   US FETAL BIOPHYS PROFILE W/O NON STRESS TEST with WYUS1   [facility] Ultrasound ([facility])    [address] [facility] [address]   [phone]           Please bring a list of your medicines (including vitamins, minerals and over-the-counter drugs). Also, tell your doctor about any allergies you may have. Wear comfortable clothes and leave your valuables at home.  If you re less than 20 weeks drink four 8-ounce glasses of fluid an hour before your exam. If you need to empty your bladder before your exam, try to release only a little urine. Then, drink another glass of fluid.  You may have up to two family members in the exam room. If you bring a small child, an adult must be there to care for him or her.  Please  call the Imaging Department at your exam site with any questions.            Mar 24, 2017  3:00 PM CDT   ESTABLISHED PRENATAL with Sol Wooten MD   [facility] ([facility])    [address] [facility] [address]   [phone]            Mar 31, 2017  2:00 PM CDT   US FETAL BIOPHYS PROFILE W/O NON STRESS TEST with WYUS1   [facility]trasound ([facility])    [address] [facility] [address]   [phone]           Please bring a list of your medicines (including vitamins, minerals and over-the-counter drugs). Also, tell your doctor about any allergies you may have. Wear comfortable clothes and leave your valuables at home.  If you re less than 20 weeks drink four 8-ounce glasses of fluid an hour before your exam. If you need to empty your bladder before your exam, try to release only a little urine. Then, drink another glass of fluid.  You may have up to two family members in the exam room. If you bring a small child, an adult must be there to care for him or her.  Please call the Imaging Department at your exam site with any questions.            Mar 31, 2017  3:00 PM CDT   ESTABLISHED PRENATAL with Sol Wooten MD   [facility] ([facility])    [address] [facility] [address]   [phone]            Apr 05, 2017  3:30 PM CDT   US FETAL BIOPHYS PROFILE W/O NON STRESS TEST with WYUS2   [facility]ound ([facility])    [address] [facility] [address]   [phone]           Please bring a list of your medicines (including vitamins, minerals and over-the-counter drugs). Also, tell your doctor about any allergies you may have. Wear comfortable clothes and leave your valuables at home.  If you re less than 20 weeks drink four 8-ounce glasses of fluid an hour before your exam. If you need to empty your bladder before your exam, try to release only a little  urine. Then, drink another glass of fluid.  You may have up to two family members in the exam room. If you bring a small child, an adult must be there to care for him or her.  Please call the Imaging Department at your exam site with any questions.              Further instructions from your care team       Discharge Instruction for Undelivered Patients      You were seen for: Labor Assessment  We Consulted:Dr Boston  You had (Test or Medicine):UA, and GBS+     Diet:   Drink 8 to 12 glasses of liquids (milk, juice, water) every day.  You may eat meals and snacks.     Activity:  Rest the pelvic area. No sex. Do not stimulate breasts or nipples.  Count fetal kicks everyday (see handout)  Call your doctor or nurse midwife if your baby is moving less than usual.     Call your provider if you notice:  Swelling in your face or increased swelling in your hands or legs.  Headaches that are not relieved by Tylenol (acetaminophen).  Changes in your vision (blurring: seeing spots or stars.)  Nausea (sick to your stomach) and vomiting (throwing up).   Weight gain of 5 pounds or more per week.  Heartburn that doesn't go away.  Signs of bladder infection: pain when you urinate (use the toilet), need to go more often and more urgently.  The bag of ponce (rupture of membranes) breaks, or you notice leaking in your underwear.  Bright red blood in your underwear.  Abdominal (lower belly) or stomach pain.  For first baby: Contractions (tightening) less than 5 minutes apart for one hour or more.  Second (plus) baby: Contractions (tightening) less than 10 minutes apart and getting stronger.  *If less than 34 weeks: Contractions (tightenings) more than 6 times in one hour.  Increase or change in vaginal discharge (note the color and amount)  Other: ***    Follow-up:  As scheduled in the clinic    If you have concerns/questions after returning home, contact the nurse triage line [phone] or your primary care clinic during clinic hours  ", or the [facility] at [phone].        Pending Results     Date and Time Order Name Status Description    3/8/2017 1457 US Fetal Biophys Prof w/o Non Stress Test In process     3/8/2017 1358 Group B strep PCR In process             Admission Information     Date & Time Provider Department Dept. Phone    3/8/2017 Kacie Boston MD [facility] [phone]      Your Vitals Were     Blood Pressure Pulse Respirations Last Period          132/77 118 20 2016        [facility]viary Information     [facility]ary lets you send messages to your doctor, view your test results, renew your prescriptions, schedule appointments and more. To sign up, go to [email or web address] . Click on \"Log in\" on the left side of the screen, which will take you to the Welcome page. Then click on \"Sign up Now\" on the right side of the page.     You will be asked to enter the access code listed below, as well as some personal information. Please follow the directions to create your username and password.     Your access code is: [email or web address]  Expires: 2017  5:10 PM     Your access code will  in 90 days. If you need help or a new code, please call your [place] clinic or [phone].        Care EveryWhere ID     This is your Care EveryWhere ID. This could be used by other organizations to access your [place] medical records  [id number]           Review of your medicines      UNREVIEWED medicines. Ask your doctor about these medicines        Dose / Directions    albuterol 108 (90 BASE) MCG/ACT Inhaler   Commonly known as:  VENTOLIN HFA   Used for:  Cough        Dose:  2 puff   Inhale 2 puffs into the lungs every 4 hours as needed   Quantity:  2 Inhaler   Refills:  1       FLUoxetine 10 MG capsule   Commonly known as:  PROzac   Used for:  Anxiety        Dose:  10 mg   Take 1 capsule (10 mg) by mouth daily   Quantity:  30 capsule   Refills:  1       hydrOXYzine 50 MG tablet   Commonly known as:  ATARAX   Used for:  " Anxiety        Dose:   mg   Take 1-2 tablets ( mg) by mouth every 6 hours as needed for itching   Quantity:  30 tablet   Refills:  1       ranitidine 150 MG tablet   Commonly known as:  ZANTAC   Used for:  Gastroesophageal reflux disease, esophagitis presence not specified        Dose:  150 mg   Take 1 tablet (150 mg) by mouth 2 times daily   Quantity:  60 tablet   Refills:  1       vitamin D 2000 UNITS tablet   Used for:  Vitamin D deficiency        Dose:  2000 Units   Take 2,000 Units by mouth daily   Quantity:  100 tablet   Refills:  3                Protect others around you: Learn how to safely use, store and throw away your medicines at www.disposemymeds.org.             Medication List: This is a list of all your medications and when to take them. Check marks below indicate your daily home schedule. Keep this list as a reference.      Medications           Morning Afternoon Evening Bedtime As Needed    albuterol 108 (90 BASE) MCG/ACT Inhaler   Commonly known as:  VENTOLIN HFA   Inhale 2 puffs into the lungs every 4 hours as needed                                FLUoxetine 10 MG capsule   Commonly known as:  PROzac   Take 1 capsule (10 mg) by mouth daily                                hydrOXYzine 50 MG tablet   Commonly known as:  ATARAX   Take 1-2 tablets ( mg) by mouth every 6 hours as needed for itching                                ranitidine 150 MG tablet   Commonly known as:  ZANTAC   Take 1 tablet (150 mg) by mouth 2 times daily                                vitamin D 2000 UNITS tablet   Take 2,000 Units by mouth daily

## 2017-03-08 NOTE — PROGRESS NOTES
" 35+2 [facility] the [facility] to rule out PTL.  Has been having low back cramping and lots of pressure \"all the way from the top to my bottom\" .  Also, reports several \"BM's today and it seems kind of odd\".  Has a headache but refuses to take tylenol \"unless I have to\".  No LOF.  No VB.  +FM.  Placed on EFM.  Oriented already to it.  FHT's in the low 140's.  Continue to monitor.    "

## 2017-03-08 NOTE — IP AVS SNAPSHOT
[facility]    [address] [facility] [address]    Phone:  [phone]    Fax:  [phone]                                       After Visit Summary   3/8/2017    Zacarias Adam    MRN: [id number]           After Visit Summary Signature Page     I have received my discharge instructions, and my questions have been answered. I have discussed any challenges I see with this plan with the nurse or doctor.    ..........................................................................................................................................  Patient/Patient Representative Signature      ..........................................................................................................................................  Patient Representative Print Name and Relationship to Patient    ..................................................               ................................................  Date                                            Time    ..........................................................................................................................................  Reviewed by Signature/Title    ...................................................              ..............................................  Date                                                            Time

## 2017-03-08 NOTE — PROGRESS NOTES
Pt's UA was normal.  Discharged to US for BPP that was prescheduled today.  Reviewed with pt the reasons to notify the [facility] ie: VB, LOF, decreased fetal movement or a regular contraction pattern of less than 5 min apart. Pt verbalized understanding. Pt was able to ambulate to the ultrasound department accompanied by NST.  Copy of AVS sent to diagnostics.

## 2017-03-08 NOTE — DISCHARGE INSTRUCTIONS
Discharge Instruction for Undelivered Patients      You were seen for: Labor Assessment  We Consulted:Dr Boston  You had (Test or Medicine):UA, and GBS+     Diet:   Drink 8 to 12 glasses of liquids (milk, juice, water) every day.  You may eat meals and snacks.     Activity:  Rest the pelvic area. No sex. Do not stimulate breasts or nipples.  Count fetal kicks everyday (see handout)  Call your doctor or nurse midwife if your baby is moving less than usual.     Call your provider if you notice:  Swelling in your face or increased swelling in your hands or legs.  Headaches that are not relieved by Tylenol (acetaminophen).  Changes in your vision (blurring: seeing spots or stars.)  Nausea (sick to your stomach) and vomiting (throwing up).   Weight gain of 5 pounds or more per week.  Heartburn that doesn't go away.  Signs of bladder infection: pain when you urinate (use the toilet), need to go more often and more urgently.  The bag of ponce (rupture of membranes) breaks, or you notice leaking in your underwear.  Bright red blood in your underwear.  Abdominal (lower belly) or stomach pain.  For first baby: Contractions (tightening) less than 5 minutes apart for one hour or more.  Second (plus) baby: Contractions (tightening) less than 10 minutes apart and getting stronger.  *If less than 34 weeks: Contractions (tightenings) more than 6 times in one hour.  Increase or change in vaginal discharge (note the color and amount)  Other: ***    Follow-up:  As scheduled in the clinic    If you have concerns/questions after returning home, contact the nurse triage line [phone] or your primary care clinic during clinic hours , or the [facility] at [phone].

## 2017-03-09 ENCOUNTER — TELEPHONE (OUTPATIENT)
Dept: OBGYN | Facility: CLINIC | Age: 22
End: 2017-03-09

## 2017-03-09 LAB
GP B STREP DNA SPEC QL NAA+PROBE: NORMAL
SPECIMEN SOURCE: NORMAL

## 2017-03-09 NOTE — TELEPHONE ENCOUNTER
"Called and spoke with pt regarding note below.     S-(situation): 35 wks 3 days, seen yesterday in OB for same complaint. Pt having increase in pelvic and hip pain (all over my body), increases each day, feels \"weak\" in her legs, and having SOA with intense pain. Is having increased pain regardless of what she is doing.     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    A-(assessment): Denies urinary difficulties, has decrease in contractions from yesterdays visit, has had decrease in fetal movement for the last week, but \"he is very active\" per pt. Denies bleeding, fluid or discharge. Afebrile.     R-(recommendations): Verbally understands when she needs to be seen in OB emergently. Will inform MD and await recommendations.    Please review and advise.   Arlet GONZALES RN  [facility]ty [employer]      "

## 2017-03-09 NOTE — TELEPHONE ENCOUNTER
Reason for call:  Patient reporting a symptom    Symptom or request: 35 wks and 3-4 days.  Having a lot of hip and pelvic pain (states can only carry him on her left side)  Can't walk or sit at her desk.  Has been having mild contractions, staged at a -1, pain unbearable.    Duration (how long have symptoms been present): 2 wks - gets worse every single day.    Have you been treated for this before? No    Additional comments:     Phone Number patient can be reached at:  Home number on file [phone] (home)    Best Time:  Any- states she feels ok to wait for a call back from the nurse.    Can we leave a detailed message on this number:  YES    Call taken on 3/9/2017 at 9:54 AM by Yudy Boyle

## 2017-03-09 NOTE — TELEPHONE ENCOUNTER
Unfortunately, aches and pains of pregnancy are very normal at this point of pregnancy.  Recommend ice/heat for areas of discomfort.  Tylenol as well.  Warm showers with stretching afterwards.      If she wants work note, we can do that, but that does not mean she will get paid for her time off work.      Sol Wooten M.D.

## 2017-03-09 NOTE — TELEPHONE ENCOUNTER
Called and spoke with pt regarding note below.  No further questions or concerns. Will call if in need of work note.   Arlet GONZALES RN  [employer]ty [employer]

## 2017-03-14 ENCOUNTER — HOSPITAL ENCOUNTER (OUTPATIENT)
Facility: CLINIC | Age: 22
Discharge: HOME | End: 2017-03-14
Attending: OBSTETRICS & GYNECOLOGY | Admitting: OBSTETRICS & GYNECOLOGY
Payer: COMMERCIAL

## 2017-03-14 ENCOUNTER — PRENATAL OFFICE VISIT (OUTPATIENT)
Dept: OBGYN | Facility: CLINIC | Age: 22
End: 2017-03-14
Payer: COMMERCIAL

## 2017-03-14 VITALS
WEIGHT: 190 LBS | DIASTOLIC BLOOD PRESSURE: 83 MMHG | BODY MASS INDEX: 30.21 KG/M2 | HEART RATE: 117 BPM | SYSTOLIC BLOOD PRESSURE: 162 MMHG

## 2017-03-14 VITALS
TEMPERATURE: 98.4 F | HEART RATE: 102 BPM | RESPIRATION RATE: 16 BRPM | DIASTOLIC BLOOD PRESSURE: 81 MMHG | SYSTOLIC BLOOD PRESSURE: 124 MMHG

## 2017-03-14 DIAGNOSIS — O16.3 ELEVATED BLOOD PRESSURE AFFECTING PREGNANCY IN THIRD TRIMESTER, ANTEPARTUM: ICD-10-CM

## 2017-03-14 DIAGNOSIS — Z34.03 ENCOUNTER FOR SUPERVISION OF NORMAL FIRST PREGNANCY IN THIRD TRIMESTER: Primary | ICD-10-CM

## 2017-03-14 PROBLEM — O13.3 PREGNANCY-INDUCED HYPERTENSION IN THIRD TRIMESTER: Status: ACTIVE | Noted: 2017-03-14

## 2017-03-14 LAB
ALT SERPL W P-5'-P-CCNC: 14 U/L (ref 0–50)
AST SERPL W P-5'-P-CCNC: 13 U/L (ref 0–45)
CREAT SERPL-MCNC: 0.51 MG/DL (ref 0.52–1.04)
CREAT UR-MCNC: 192 MG/DL
ERYTHROCYTE [DISTWIDTH] IN BLOOD BY AUTOMATED COUNT: 12.9 % (ref 10–15)
GFR SERPL CREATININE-BSD FRML MDRD: ABNORMAL ML/MIN/1.7M2
HCT VFR BLD AUTO: 31.6 % (ref 35–47)
HGB BLD-MCNC: 10.2 G/DL (ref 11.7–15.7)
MCH RBC QN AUTO: 27.7 PG (ref 26.5–33)
MCHC RBC AUTO-ENTMCNC: 32.3 G/DL (ref 31.5–36.5)
MCV RBC AUTO: 86 FL (ref 78–100)
PLATELET # BLD AUTO: 302 10E9/L (ref 150–450)
PROT UR-MCNC: 0.31 G/L
PROT/CREAT 24H UR: 0.16 G/G CR (ref 0–0.2)
RBC # BLD AUTO: 3.68 10E12/L (ref 3.8–5.2)
URATE SERPL-MCNC: 4.4 MG/DL (ref 2.6–6)
WBC # BLD AUTO: 11.7 10E9/L (ref 4–11)

## 2017-03-14 PROCEDURE — 84156 ASSAY OF PROTEIN URINE: CPT | Performed by: OBSTETRICS & GYNECOLOGY

## 2017-03-14 PROCEDURE — 99207 ZZC PRENATAL VISIT: CPT | Performed by: OBSTETRICS & GYNECOLOGY

## 2017-03-14 PROCEDURE — 84450 TRANSFERASE (AST) (SGOT): CPT | Performed by: OBSTETRICS & GYNECOLOGY

## 2017-03-14 PROCEDURE — 82565 ASSAY OF CREATININE: CPT | Performed by: OBSTETRICS & GYNECOLOGY

## 2017-03-14 PROCEDURE — 99214 OFFICE O/P EST MOD 30 MIN: CPT

## 2017-03-14 PROCEDURE — 84550 ASSAY OF BLOOD/URIC ACID: CPT | Performed by: OBSTETRICS & GYNECOLOGY

## 2017-03-14 PROCEDURE — 84460 ALANINE AMINO (ALT) (SGPT): CPT | Performed by: OBSTETRICS & GYNECOLOGY

## 2017-03-14 PROCEDURE — 36415 COLL VENOUS BLD VENIPUNCTURE: CPT | Performed by: OBSTETRICS & GYNECOLOGY

## 2017-03-14 PROCEDURE — 85027 COMPLETE CBC AUTOMATED: CPT | Performed by: OBSTETRICS & GYNECOLOGY

## 2017-03-14 NOTE — MR AVS SNAPSHOT
After Visit Summary   3/14/2017    Zacarias Adam    MRN: [id number]           Patient Information     Date Of Birth          1995        Visit Information        Provider Department      3/14/2017 11:15 AM Shaista Bedolla MD [facility]        Today's Diagnoses     Encounter for supervision of normal first pregnancy in third trimester    -  1    Elevated blood pressure affecting pregnancy in third trimester, antepartum           Follow-ups after your visit        Your next 10 appointments already scheduled     Mar 15, 2017  3:30 PM CDT   US FETAL BIOPHYS PROFILE W/O NON STRESS TEST with WYUS2   [facility] ([facility])    [address] [address]   [phone]           Please bring a list of your medicines (including vitamins, minerals and over-the-counter drugs). Also, tell your doctor about any allergies you may have. Wear comfortable clothes and leave your valuables at home.  If you re less than 20 weeks drink four 8-ounce glasses of fluid an hour before your exam. If you need to empty your bladder before your exam, try to release only a little urine. Then, drink another glass of fluid.  You may have up to two family members in the exam room. If you bring a small child, an adult must be there to care for him or her.  Please call the Imaging Department at your exam site with any questions.            Mar 24, 2017  2:00 PM CDT   US FETAL BIOPHYS PROFILE W/O NON STRESS TEST with WYUS1   [facility] ([facility])    [address] [address]   [phone]           Please bring a list of your medicines (including vitamins, minerals and over-the-counter drugs). Also, tell your doctor about any allergies you may have. Wear comfortable clothes and leave your valuables at home.  If you re less than 20 weeks drink four 8-ounce glasses of fluid an hour before your exam. If you need  to empty your bladder before your exam, try to release only a little urine. Then, drink another glass of fluid.  You may have up to two family members in the exam room. If you bring a small child, an adult must be there to care for him or her.  Please call the Imaging Department at your exam site with any questions.            Mar 24, 2017  3:00 PM CDT   ESTABLISHED PRENATAL with Sol Wooten MD   [facility] ([facility]    [address] [address]   [phone]            Mar 31, 2017  2:00 PM CDT   US FETAL BIOPHYS PROFILE W/O NON STRESS TEST with WYUS1   [facility] ([facility])    [address] [address]   [phone]           Please bring a list of your medicines (including vitamins, minerals and over-the-counter drugs). Also, tell your doctor about any allergies you may have. Wear comfortable clothes and leave your valuables at home.  If you re less than 20 weeks drink four 8-ounce glasses of fluid an hour before your exam. If you need to empty your bladder before your exam, try to release only a little urine. Then, drink another glass of fluid.  You may have up to two family members in the exam room. If you bring a small child, an adult must be there to care for him or her.  Please call the Imaging Department at your exam site with any questions.            Mar 31, 2017  3:00 PM CDT   ESTABLISHED PRENATAL with Sol Wooten MD   [facility]    [address] [address]   [phone]            Apr 05, 2017  3:30 PM CDT   US FETAL BIOPHYS PROFILE W/O NON STRESS TEST with WYUS2   [facility] ([facility])    [address] [address]   [phone]           Please bring a list of your medicines (including vitamins, minerals and over-the-counter drugs). Also, tell your doctor about any  "allergies you may have. Wear comfortable clothes and leave your valuables at home.  If you re less than 20 weeks drink four 8-ounce glasses of fluid an hour before your exam. If you need to empty your bladder before your exam, try to release only a little urine. Then, drink another glass of fluid.  You may have up to two family members in the exam room. If you bring a small child, an adult must be there to care for him or her.  Please call the Imaging Department at your exam site with any questions.              Who to contact     If you have questions or need follow up information about today's clinic visit or your schedule please contact [facility] directly at [phone].  Normal or non-critical lab and imaging results will be communicated to you by [employer]hart, letter or phone within 4 business days after the clinic has received the results. If you do not hear from us within 7 days, please contact the clinic through [employer]hart or phone. If you have a critical or abnormal lab result, we will notify you by phone as soon as possible.  Submit refill requests through [employer] or call your pharmacy and they will forward the refill request to us. Please allow 3 business days for your refill to be completed.          Additional Information About Your Visit        [employer] Information     [employer] lets you send messages to your doctor, view your test results, renew your prescriptions, schedule appointments and more. To sign up, go to [email or web address] . Click on \"Log in\" on the left side of the screen, which will take you to the Welcome page. Then click on \"Sign up Now\" on the right side of the page.     You will be asked to enter the access code listed below, as well as some personal information. Please follow the directions to create your username and password.     Your access code is: [email or web address]  Expires: 2017  6:10 PM     Your access code will  in 90 days. If you need help or a new code, " please call your [facility] clinic or [phone].        Care EveryWhere ID     This is your Care EveryWhere ID. This could be used by other organizations to access your [facility] medical records  [id number]        Your Vitals Were     Pulse Last Period Breastfeeding? BMI (Body Mass Index)          117 07/04/2016 No 30.21 kg/m2         Blood Pressure from Last 3 Encounters:   03/14/17 162/83   03/08/17 128/78   03/06/17 145/77    Weight from Last 3 Encounters:   03/14/17 190 lb (86.2 kg)   03/06/17 187 lb (84.8 kg)   03/01/17 188 lb 6.4 oz (85.5 kg)              We Performed the Following     Protein  random urine        Primary Care Provider Office Phone # Fax #    Yudi Sauceda [phone]       [facility] [address] [address]        Thank you!     Thank you for choosing [facility]  for your care. Our goal is always to provide you with excellent care. Hearing back from our patients is one way we can continue to improve our services. Please take a few minutes to complete the written survey that you may receive in the mail after your visit with us. Thank you!             Your Updated Medication List - Protect others around you: Learn how to safely use, store and throw away your medicines at www.disposemymeds.org.          This list is accurate as of: 3/14/17 11:56 AM.  Always use your most recent med list.                   Brand Name Dispense Instructions for use    albuterol 108 (90 BASE) MCG/ACT Inhaler    VENTOLIN HFA    2 Inhaler    Inhale 2 puffs into the lungs every 4 hours as needed       FLUoxetine 10 MG capsule    PROzac    30 capsule    Take 1 capsule (10 mg) by mouth daily       hydrOXYzine 50 MG tablet    ATARAX    30 tablet    Take 1-2 tablets ( mg) by mouth every 6 hours as needed for itching       ranitidine 150 MG tablet    ZANTAC    60 tablet    Take 1 tablet (150 mg) by mouth 2 times daily       vitamin D 2000 UNITS  tablet     100 tablet    Take 2,000 Units by mouth daily

## 2017-03-14 NOTE — IP AVS SNAPSHOT
[facility]    [address] [address]    Phone:  [phone]    Fax:  [phone]                                       After Visit Summary   3/14/2017    Zacarias Adam    MRN: [id number]           After Visit Summary Signature Page     I have received my discharge instructions, and my questions have been answered. I have discussed any challenges I see with this plan with the nurse or doctor.    ..........................................................................................................................................  Patient/Patient Representative Signature      ..........................................................................................................................................  Patient Representative Print Name and Relationship to Patient    ..................................................               ................................................  Date                                            Time    ..........................................................................................................................................  Reviewed by Signature/Title    ...................................................              ..............................................  Date                                                            Time

## 2017-03-14 NOTE — NURSING NOTE
"Initial /90 (BP Location: Right arm, Patient Position: Chair, Cuff Size: Adult Large)  Pulse 117  Wt 190 lb (86.2 kg)  LMP 07/04/2016  Breastfeeding? No  BMI 30.21 kg/m2 Estimated body mass index is 30.21 kg/(m^2) as calculated from the following:    Height as of 3/6/17: 5' 6.5\" (1.689 m).    Weight as of this encounter: 190 lb (86.2 kg). .    Heaven Blankenship    "

## 2017-03-14 NOTE — LETTER
[facility]  [address] [address]  Phone: [phone]  Fax: [phone]     March 14, 2017      Zacarias Adam  [address] [address]        To whom it may concern,    Zacarias Adam was seen in our clinic and hospital today on Tuesday 3/14/2017 for medical reasons related to her pregnancy. Given this medical reason, it is advised that she be off of work until further notice.        Sincerely,          Shaista Bedolla MD  [facility]

## 2017-03-14 NOTE — PROGRESS NOTES
Doing well.   Feels miserable. C/o of low pelvic pressure and pain especially when she is seated. C/o worsening lower and upper extremity swelling.  Does endorse headache that is pressure like; Tylenol PO given her mild improvement making it not as intense  Does report one brief episode of bright spots in her left eye    Denies VB, LOF. +FM  /90 (BP Location: Right arm, Patient Position: Chair, Cuff Size: Adult Large)  Pulse 117  Wt 190 lb (86.2 kg)  LMP 2016  Breastfeeding? No  BMI 30.21 kg/m2   /83 (BP Location: Right arm, Patient Position: Chair, Cuff Size: Adult Large)  Pulse 117  Wt 190 lb (86.2 kg)  LMP 2016  Breastfeeding? No  BMI 30.21 kg/m2  General Appearance: NAD  Abdomen: Gravid, NT  Refer to flow sheet above.   A/P: 21 year old  at 36w1d  -- reviewed GBS negative status  -- reviewed BPP  from US performed on 3/8  -- gHTN: based on persistent mild range BPs over the span of several visits; she did demonstrate  one severe range BP reading today on repeat; will send to birth center for continued blood pressure monitoring; if BPs remain in the severe range, then treat with IV antihypertensives and induce; if BPs remain mild range, then defer induction for 37 weeks gestation; HELLP labs ordered  -- labor precautions reviewed  RTC in 1 week    Shaista Bedolla MD  [facility]

## 2017-03-14 NOTE — DISCHARGE INSTRUCTIONS
Discharge Instructions for Undelivered Patients    Diet:    Drink 8 to 12 glasses of liquids (milk, juice, water) every day.    You may eat meals and snacks.        Activity:            Count fetal kicks every day.    Call your doctor if your baby is moving less than usual.    Medicines:    My care team has reviewed my medicines with me.    My care team has given me a list of my medicines.    My care team has prescribed a new medicine. They have either sent it home with me or ordered it from the pharmacy.    Call your provider if you notice:    Swelling in your face or increased swelling in your hands or legs.    Headaches that are not relieved bu Tylenol (acetaminophen).    Changes in your vision (blurring; seeing spots or stars).    Nausea (sick to your stomach) and vomiting (throwing up).    Weight gain of 5 pounds per week.    Heartburn that doesn't go away.    Signs of bladder infection: pain when you urinate (use the toilet), needing to go more often or more urgently.    The bag of ponce (membranes) breaks, or you notice leaking in your underwear.    Bright red blood in your underwear.    Abdominal (lower belly) or stomach pain.    For first baby: Contractions (tightenings) less than 5 minutes apart for one hour or more.    For Second (plus) baby: Contractions (tightenings) less than 10 minutes apart and getting stronger.    Increase or change in vaginal discharge (note the color and amount).        Follow up with your provider:  Call for appointment on Monday

## 2017-03-15 ENCOUNTER — HOSPITAL ENCOUNTER (OUTPATIENT)
Dept: ULTRASOUND IMAGING | Facility: CLINIC | Age: 22
Discharge: HOME | End: 2017-03-15
Attending: OBSTETRICS & GYNECOLOGY | Admitting: OBSTETRICS & GYNECOLOGY
Payer: COMMERCIAL

## 2017-03-15 ENCOUNTER — HOSPITAL ENCOUNTER (OUTPATIENT)
Facility: CLINIC | Age: 22
Discharge: HOME | End: 2017-03-15
Attending: OBSTETRICS & GYNECOLOGY | Admitting: OBSTETRICS & GYNECOLOGY
Payer: COMMERCIAL

## 2017-03-15 ENCOUNTER — TELEPHONE (OUTPATIENT)
Dept: OBGYN | Facility: CLINIC | Age: 22
End: 2017-03-15

## 2017-03-15 VITALS
TEMPERATURE: 98.4 F | DIASTOLIC BLOOD PRESSURE: 83 MMHG | HEART RATE: 113 BPM | WEIGHT: 189 LBS | RESPIRATION RATE: 16 BRPM | SYSTOLIC BLOOD PRESSURE: 139 MMHG | BODY MASS INDEX: 30.05 KG/M2

## 2017-03-15 DIAGNOSIS — O16.3 ELEVATED BLOOD PRESSURE AFFECTING PREGNANCY IN THIRD TRIMESTER, ANTEPARTUM: ICD-10-CM

## 2017-03-15 PROBLEM — I10 ESSENTIAL HYPERTENSION: Status: ACTIVE | Noted: 2017-03-15

## 2017-03-15 PROCEDURE — 76819 FETAL BIOPHYS PROFIL W/O NST: CPT

## 2017-03-15 RX ORDER — ONDANSETRON 2 MG/ML
4 INJECTION INTRAMUSCULAR; INTRAVENOUS EVERY 6 HOURS PRN
Status: DISCONTINUED | OUTPATIENT
Start: 2017-03-15 | End: 2017-03-15 | Stop reason: HOSPADM

## 2017-03-15 NOTE — IP AVS SNAPSHOT
MRN:[id number]                      After Visit Summary   3/15/2017    Zacarias Adam    MRN: [id number]           Thank you!     Thank you for choosing [facility] for your care. Our goal is always to provide you with excellent care. Hearing back from our patients is one way we can continue to improve our services. Please take a few minutes to complete the written survey that you may receive in the mail after you visit with us. Thank you!        Patient Information     Date Of Birth          1995        About your hospital stay     You were admitted on:  March 15, 2017 You last received care in the:  [facility]    You were discharged on:  March 15, 2017       Who to Call     For medical emergencies, please call 911.  For non-urgent questions about your medical care, please call your primary care provider or clinic, [phone]          Attending Provider     Provider Specialty    Kacie Boston MD OB/Gyn       Primary Care Provider Office Phone # Fax #    Yudi Sauceda [phone]       [facility] [address] [place] [address]        Your next 10 appointments already scheduled     Mar 20, 2017 10:15 AM CDT   ESTABLISHED PRENATAL with Shaista Bedolla MD   [facility] ([facility])    [address] [facility] [address] [phone]            Mar 24, 2017  2:00 PM CDT   US FETAL BIOPHYS PROFILE W/O NON STRESS TEST with [address] Ultrasound ([facility])    [address] [facility] [address]   [phone]           Please bring a list of your medicines (including vitamins, minerals and over-the-counter drugs). Also, tell your doctor about any allergies you may have. Wear comfortable clothes and leave your valuables at home.  If you re less than 20 weeks drink four 8-ounce glasses of fluid an hour before your exam. If you need to empty your  bladder before your exam, try to release only a little urine. Then, drink another glass of fluid.  You may have up to two family members in the exam room. If you bring a small child, an adult must be there to care for him or her.  Please call the Imaging Department at your exam site with any questions.            Mar 24, 2017  3:00 PM CDT   ESTABLISHED PRENATAL with Sol Wooten MD   [facility] ([facility])    [address] [facility] [address]   [phone]            Mar 31, 2017  2:00 PM CDT   US FETAL BIOPHYS PROFILE W/O NON STRESS TEST with WYUS1   [facility] Ultrasound ([facility])    [address] [facility] [address]   [phone]           Please bring a list of your medicines (including vitamins, minerals and over-the-counter drugs). Also, tell your doctor about any allergies you may have. Wear comfortable clothes and leave your valuables at home.  If you re less than 20 weeks drink four 8-ounce glasses of fluid an hour before your exam. If you need to empty your bladder before your exam, try to release only a little urine. Then, drink another glass of fluid.  You may have up to two family members in the exam room. If you bring a small child, an adult must be there to care for him or her.  Please call the Imaging Department at your exam site with any questions.            Mar 31, 2017  3:00 PM CDT   ESTABLISHED PRENATAL with Sol Wooten MD   [facility] ([facility])    [address] [facility] [address]   [phone]            Apr 05, 2017  3:30 PM CDT   US FETAL BIOPHYS PROFILE W/O NON STRESS TEST with WYUS2   [facility] Ultrasound ([facility])    [address] [facility] [address]   [phone]           Please bring a list of your medicines (including vitamins, minerals and over-the-counter drugs). Also, tell your doctor about any allergies you may  "have. Wear comfortable clothes and leave your valuables at home.  If you re less than 20 weeks drink four 8-ounce glasses of fluid an hour before your exam. If you need to empty your bladder before your exam, try to release only a little urine. Then, drink another glass of fluid.  You may have up to two family members in the exam room. If you bring a small child, an adult must be there to care for him or her.  Please call the Imaging Department at your exam site with any questions.              Further instructions from your care team       Follow up with Dr Goss 3/16 or 3/17     Call with concerns.     Pending Results     Date and Time Order Name Status Description    3/15/2017 1510 US Fetal Biophys Prof w/o Non Stress Test In process             Admission Information     Date & Time Provider Department Dept. Phone    3/15/2017 [facility] [phone]      Your Vitals Were     Blood Pressure Pulse Temperature Respirations Weight Last Period    139/83 113 98.4  F (36.9  C) (Oral) 16 85.7 kg (189 lb) 2016    BMI (Body Mass Index)                   30.05 kg/m2           MyChart Information     [employer] lets you send messages to your doctor, view your test results, renew your prescriptions, schedule appointments and more. To sign up, go to [email or web address] . Click on \"Log in\" on the left side of the screen, which will take you to the Welcome page. Then click on \"Sign up Now\" on the right side of the page.     You will be asked to enter the access code listed below, as well as some personal information. Please follow the directions to create your username and password.     Your access code is: [email or web address]  Expires: 2017  6:10 PM     Your access code will  in 90 days. If you need help or a new code, please call your [facility] or [phone].        Care EveryWhere ID     This is your Care EveryWhere ID. This could be used by other organizations to " access your [facility] medical records  [id number]           Review of your medicines      UNREVIEWED medicines. Ask your doctor about these medicines        Dose / Directions    albuterol 108 (90 BASE) MCG/ACT Inhaler   Commonly known as:  VENTOLIN HFA   Used for:  Cough        Dose:  2 puff   Inhale 2 puffs into the lungs every 4 hours as needed   Quantity:  2 Inhaler   Refills:  1       FLUoxetine 10 MG capsule   Commonly known as:  PROzac   Used for:  Anxiety        Dose:  10 mg   Take 1 capsule (10 mg) by mouth daily   Quantity:  30 capsule   Refills:  1       hydrOXYzine 50 MG tablet   Commonly known as:  ATARAX   Used for:  Anxiety        Dose:   mg   Take 1-2 tablets ( mg) by mouth every 6 hours as needed for itching   Quantity:  30 tablet   Refills:  1       ranitidine 150 MG tablet   Commonly known as:  ZANTAC   Used for:  Gastroesophageal reflux disease, esophagitis presence not specified        Dose:  150 mg   Take 1 tablet (150 mg) by mouth 2 times daily   Quantity:  60 tablet   Refills:  1       vitamin D 2000 UNITS tablet   Used for:  Vitamin D deficiency        Dose:  2000 Units   Take 2,000 Units by mouth daily   Quantity:  100 tablet   Refills:  3                Protect others around you: Learn how to safely use, store and throw away your medicines at www.disposemymeds.org.             Medication List: This is a list of all your medications and when to take them. Check marks below indicate your daily home schedule. Keep this list as a reference.      Medications           Morning Afternoon Evening Bedtime As Needed    albuterol 108 (90 BASE) MCG/ACT Inhaler   Commonly known as:  VENTOLIN HFA   Inhale 2 puffs into the lungs every 4 hours as needed                                FLUoxetine 10 MG capsule   Commonly known as:  PROzac   Take 1 capsule (10 mg) by mouth daily                                hydrOXYzine 50 MG tablet   Commonly known as:  ATARAX   Take 1-2 tablets ( mg) by  mouth every 6 hours as needed for itching                                ranitidine 150 MG tablet   Commonly known as:  ZANTAC   Take 1 tablet (150 mg) by mouth 2 times daily                                vitamin D 2000 UNITS tablet   Take 2,000 Units by mouth daily                                          More Information        Understanding Preeclampsia  Preeclampsia is pregnancy-related hypertension that develops after 20 weeks' gestation. It can lead to health risks for you and your baby. No one knows what causes preeclampsia. But it is known that the only cure is delivery.     Your blood pressure will be monitored regularly throughout your pregnancy to help check for preeclampsia.   Signs and symptoms  A common sign of preeclampsia is high blood pressure. Other signs and symptoms include:    Rapid weight gain    Protein in your urine    Headache    Abdominal pain on your right side    Vision problems (flashes or spots)    Edema (swelling) in your face or hands (this also commonly happens near the end of normal pregnancies, even without preeclampsia)  Tests you may have  Your doctor will want to check your blood pressure throughout your pregnancy. If your blood pressure is high, you may have the following tests:    Urine tests to look for protein    Blood tests to confirm preeclampsia    Fetal monitoring to ensure that your baby is healthy  Treating preeclampsia  A daily low dose of aspirin may be prescribed to those at risk for preeclampsia. Preeclampsia almost always ends soon after you give birth. Until then, your health care provider can help manage your condition. If your symptoms are mild, you may need bed rest at home. If your symptoms are severe, you will be hospitalized. Hospital treatment includes:    Complete bed rest to help control blood pressure    Magnesium IV (intravenous) drip during labor to prevent seizures    Induced labor or surgical delivery by  section  When to call your health  care provider  Call your health care provider if swelling, weight gain, or other symptoms come on quickly or are severe. Some cases of preeclampsia are more severe than others. Your signs and symptoms also may change or worsen as you get closer to your due date.  Who s at risk?  Preeclampsia can occur in any pregnant woman. Factors that increase the risk include:    Previous pregnancies. Preeclampsia, intrauterine growth retardation (IUGR),   birth, placental abruption, or fetal death    Medical history of mother. Diabetes, high blood pressure, obesity, kidney disease, autoimmune disease (for example lupus), or family history of preeclampsia    Current pregnancy. First pregnancy, multiple fetuses, over the age of 40 years, or in vitro fertilization  Dangers of preeclampsia  If not treated, preeclampsia can cause problems for you and your baby. The placenta (organ that nourishes your baby) may tear away from the uterine wall. This can lead to fetal distress (the baby is at risk for health problems) and premature delivery. Preeclampsia can also cause these health problems:    Kidney failure or other organ damage    Seizures    Stroke  Once you give birth  In most cases, preeclampsia goes away on its own soon after you give birth. Within days of delivery, your blood pressure, swelling, and other signs should decrease.    4583-1007 [employer]. [address] [place] [address], [place] [address]. All rights reserved. This information is not intended as a substitute for professional medical care. Always follow your healthcare professional's instructions.

## 2017-03-15 NOTE — PROGRESS NOTES
Updated Dr Boston on pt's status.  BPP 8/8.  Reactive NST.  Orders to dc pt to home & follow up 3/16 or 3/17 with Dr Moseley.

## 2017-03-15 NOTE — TELEPHONE ENCOUNTER
Spoke with patient on the phone.  Patient reports experiencing more swelling today and having tingling in her hands and fingers.  Patient reports she is having a more difficult time with breathing today.  Concerned with BP.    Patient in US now for BPP.  Advised patient to [facility] ([phone]) for further evaluation after US.    [facility] ([phone]) notified.    Johnna Fairbanks   Ob/Gyn Clinic  RN

## 2017-03-15 NOTE — TELEPHONE ENCOUNTER
Reason for Call:  Other     Detailed comments: Pt is being monitored currently in L&D - was told she needs to be seen 03/16 or 03/17 - Dr. Noonan told her she should be double-booked if no appt available.     Phone Number Patient can be reached at: Home number on file [phone] (home)    Best Time: Any    Can we leave a detailed message on this number? YES    Call taken on 3/15/2017 at 4:53 PM by Denise Behrendt

## 2017-03-15 NOTE — IP AVS SNAPSHOT
[facility]    [address] [facility] [address]    Phone:  [phone]    Fax:  [phone]                                       After Visit Summary   3/15/2017    Zacarias Adam    MRN: [id number]           After Visit Summary Signature Page     I have received my discharge instructions, and my questions have been answered. I have discussed any challenges I see with this plan with the nurse or doctor.    ..........................................................................................................................................  Patient/Patient Representative Signature      ..........................................................................................................................................  Patient Representative Print Name and Relationship to Patient    ..................................................               ................................................  Date                                            Time    ..........................................................................................................................................  Reviewed by Signature/Title    ...................................................              ..............................................  Date                                                            Time

## 2017-03-15 NOTE — TELEPHONE ENCOUNTER
Reason for Call:  Other - 36 weeks 3 days    Detailed comments: Pt was seen yesterday. She has had high BP. She is scheduled to be induced on Monday, 3/20. Today she is having a hard time breathing, tingling in her fingers, increased swelling, and some cramping. Dr. Noonan told her to call if having any of these sxs.     She is scheduled for an ULS today at 3:30 - will be checking in shortly    Phone Number Patient can be reached at: Cell number on file:    Telephone Information:   Mobile [phone]       Best Time: Any    Can we leave a detailed message on this number? YES    Call taken on 3/15/2017 at 2:57 PM by Denise Behrendt

## 2017-03-15 NOTE — PROGRESS NOTES
Pt arrived to ob unit after BPP for BP check per Dr Cruz.  /83.  Put pt on Eum/Us.  Pt states same issues from yesterday which include Swelling & numb/tingly fingers & occl dizziness.  Denies ha or visual disturbances.  Trace dependant edema noted.  Will monitor pt & update Dr Boston on pt's status.

## 2017-03-16 ENCOUNTER — TELEPHONE (OUTPATIENT)
Dept: OBGYN | Facility: CLINIC | Age: 22
End: 2017-03-16

## 2017-03-16 ENCOUNTER — PRENATAL OFFICE VISIT (OUTPATIENT)
Dept: OBGYN | Facility: CLINIC | Age: 22
End: 2017-03-16
Payer: COMMERCIAL

## 2017-03-16 VITALS
SYSTOLIC BLOOD PRESSURE: 148 MMHG | OXYGEN SATURATION: 98 % | HEIGHT: 67 IN | WEIGHT: 193 LBS | HEART RATE: 139 BPM | TEMPERATURE: 98 F | BODY MASS INDEX: 30.29 KG/M2 | DIASTOLIC BLOOD PRESSURE: 88 MMHG

## 2017-03-16 DIAGNOSIS — I10 ESSENTIAL HYPERTENSION: ICD-10-CM

## 2017-03-16 DIAGNOSIS — Z34.03 PRENATAL CARE, FIRST PREGNANCY, THIRD TRIMESTER: Primary | ICD-10-CM

## 2017-03-16 DIAGNOSIS — Z34.00 SUPERVISION OF NORMAL FIRST PREGNANCY: Primary | ICD-10-CM

## 2017-03-16 PROBLEM — O13.3 PREGNANCY-INDUCED HYPERTENSION IN THIRD TRIMESTER: Status: RESOLVED | Noted: 2017-03-14 | Resolved: 2017-03-16

## 2017-03-16 PROCEDURE — 99207 ZZC PRENATAL VISIT: CPT | Performed by: OBSTETRICS & GYNECOLOGY

## 2017-03-16 NOTE — MR AVS SNAPSHOT
After Visit Summary   3/16/2017    Zacarias Adam    MRN: [id number]           Patient Information     Date Of Birth          1995        Visit Information        Provider Department      3/16/2017 1:00 PM Shaista Bedolla MD [facility]        Today's Diagnoses     Prenatal care, first pregnancy, third trimester    -  1    Essential hypertension           Follow-ups after your visit        Your next 10 appointments already scheduled     Mar 20, 2017 10:15 AM CDT   ESTABLISHED PRENATAL with Shaista Bedolla MD   [facility] ([facility])    [address] [facility] [address]   [phone]            Mar 24, 2017  2:00 PM CDT   US FETAL BIOPHYS PROFILE W/O NON STRESS TEST with [address] [place] [address] Ultrasound ([facility] [place] [facility])    [address] [facility] [address]   [phone]           Please bring a list of your medicines (including vitamins, minerals and over-the-counter drugs). Also, tell your doctor about any allergies you may have. Wear comfortable clothes and leave your valuables at home.  If you re less than 20 weeks drink four 8-ounce glasses of fluid an hour before your exam. If you need to empty your bladder before your exam, try to release only a little urine. Then, drink another glass of fluid.  You may have up to two family members in the exam room. If you bring a small child, an adult must be there to care for him or her.  Please call the Imaging Department at your exam site with any questions.            Mar 24, 2017  3:00 PM CDT   ESTABLISHED PRENATAL with Sol Wooten MD   [facility] ([facility])    [address] [facility] [address]   [phone]            Mar 31, 2017  2:00 PM CDT   US FETAL BIOPHYS PROFILE W/O NON STRESS TEST with [address] [place] [address] Ultrasound ([facility] [place] [facility])    [address] [facility] [address]    [phone]           Please bring a list of your medicines (including vitamins, minerals and over-the-counter drugs). Also, tell your doctor about any allergies you may have. Wear comfortable clothes and leave your valuables at home.  If you re less than 20 weeks drink four 8-ounce glasses of fluid an hour before your exam. If you need to empty your bladder before your exam, try to release only a little urine. Then, drink another glass of fluid.  You may have up to two family members in the exam room. If you bring a small child, an adult must be there to care for him or her.  Please call the Imaging Department at your exam site with any questions.            Mar 31, 2017  3:00 PM CDT   ESTABLISHED PRENATAL with Sol Wooten MD   [facility] ([facility])    [address] [facility] [id number] [phone]            Apr 05, 2017  3:30 PM CDT   US FETAL BIOPHYS PROFILE W/O NON STRESS TEST with [address] Ultrasound ([facility])    [address] [facility] [address] [phone]           Please bring a list of your medicines (including vitamins, minerals and over-the-counter drugs). Also, tell your doctor about any allergies you may have. Wear comfortable clothes and leave your valuables at home.  If you re less than 20 weeks drink four 8-ounce glasses of fluid an hour before your exam. If you need to empty your bladder before your exam, try to release only a little urine. Then, drink another glass of fluid.  You may have up to two family members in the exam room. If you bring a small child, an adult must be there to care for him or her.  Please call the Imaging Department at your exam site with any questions.              Who to contact     If you have questions or need follow up information about today's clinic visit or your schedule please contact [facility] directly at [phone].  Normal or non-critical lab  "and imaging results will be communicated to you by MyChart, letter or phone within 4 business days after the clinic has received the results. If you do not hear from us within 7 days, please contact the clinic through [employer] or phone. If you have a critical or abnormal lab result, we will notify you by phone as soon as possible.  Submit refill requests through [employer] or call your pharmacy and they will forward the refill request to us. Please allow 3 business days for your refill to be completed.          Additional Information About Your Visit        [email or web address] Information     [employer] lets you send messages to your doctor, view your test results, renew your prescriptions, schedule appointments and more. To sign up, go to [email or web address] . Click on \"Log in\" on the left side of the screen, which will take you to the Welcome page. Then click on \"Sign up Now\" on the right side of the page.     You will be asked to enter the access code listed below, as well as some personal information. Please follow the directions to create your username and password.     Your access code is: [email or web address]  Expires: 2017  6:10 PM     Your access code will  in 90 days. If you need help or a new code, please call your [facility] clinic or [phone].        Care EveryWhere ID     This is your Care EveryWhere ID. This could be used by other organizations to access your [facility] medical records  [id number]        Your Vitals Were     Pulse Temperature Height Last Period Pulse Oximetry Breastfeeding?    139 98  F (36.7  C) 5' 7\" (1.702 m) 2016 98% Unknown    BMI (Body Mass Index)                   30.23 kg/m2            Blood Pressure from Last 3 Encounters:   17 148/88   03/15/17 139/83   17 124/81    Weight from Last 3 Encounters:   17 193 lb (87.5 kg)   03/15/17 189 lb (85.7 kg)   17 190 lb (86.2 kg)              Today, you had the following     No orders found for display       Primary " Care Provider Office Phone # Fax #    Yudi Sauceda [phone]       [facility] [address] [place] [address]        Thank you!     Thank you for choosing [facility]  for your care. Our goal is always to provide you with excellent care. Hearing back from our patients is one way we can continue to improve our services. Please take a few minutes to complete the written survey that you may receive in the mail after your visit with us. Thank you!             Your Updated Medication List - Protect others around you: Learn how to safely use, store and throw away your medicines at www.disposemymeds.org.          This list is accurate as of: 3/16/17  1:21 PM.  Always use your most recent med list.                   Brand Name Dispense Instructions for use    albuterol 108 (90 BASE) MCG/ACT Inhaler    VENTOLIN HFA    2 Inhaler    Inhale 2 puffs into the lungs every 4 hours as needed       FLUoxetine 10 MG capsule    PROzac    30 capsule    Take 1 capsule (10 mg) by mouth daily       hydrOXYzine 50 MG tablet    ATARAX    30 tablet    Take 1-2 tablets ( mg) by mouth every 6 hours as needed for itching       ranitidine 150 MG tablet    ZANTAC    60 tablet    Take 1 tablet (150 mg) by mouth 2 times daily       vitamin D 2000 UNITS tablet     100 tablet    Take 2,000 Units by mouth daily

## 2017-03-16 NOTE — PROGRESS NOTES
Normal protein creatinine urine. Will review at next follow-up visit.   Will also discuss that upon review of her medical records that she appears to be a chronic hypertensive. Problem list is updated.     Shaista Bedolla MD  [facility]

## 2017-03-16 NOTE — TELEPHONE ENCOUNTER
Pt calling to get a breast pump order.  Pt would like to get it here at the supply store.    Please advise -    Alicia Olmedoton  [facility] [place]

## 2017-03-16 NOTE — PROGRESS NOTES
"Doing well.   Denies VB, ctx, LOF. +FM  /88 (BP Location: Left arm, Patient Position: Chair, Cuff Size: Adult Large)  Pulse 139  Temp 98  F (36.7  C)  Ht 5' 7\" (1.702 m)  Wt 193 lb (87.5 kg)  LMP 2016  SpO2 98%  Breastfeeding? Unknown  BMI 30.23 kg/m2  General Appearance: NAD  Abdomen: Gravid, NT  Refer to flow sheet above.   A/P: 21 year old  at 36w3d  -- reviewed BP flowsheets from prior to pregnancy and she has had hypertensive BPs; as such, she is a chronic hypertensive on no meds; IOL recommended between 38-39 weeks gestation; informed patient of this diagnosis; pre-E precautions reviewed  -- BPP scheduled for Friday 3/24  -- labor precautions reviewed  RTC in 1 week    Shaista Bedolla MD  [facility]            "

## 2017-03-16 NOTE — TELEPHONE ENCOUNTER
Reason for Call:  Other call back    Detailed comments: Pt states Dr. Noonan discussed that she could have induction for next Monday 3-20-17 -at 37 weeks - now postponing to 38 wks - not yet scheduled or moved.  States Dr. Noonan took her out of work as of March 14 due to high bp.  - states we will be receiving request form disability looking to confirm reason for being taking out of work early.  This is just a FYI - no need to contact pt back.    Phone Number Patient can be reached at: Home number on file [phone] (home)    Best Time: any    Can we leave a detailed message on this number? YES    Call taken on 3/16/2017 at 1:27 PM by Yudy Boyle

## 2017-03-20 ENCOUNTER — PRENATAL OFFICE VISIT (OUTPATIENT)
Dept: OBGYN | Facility: CLINIC | Age: 22
End: 2017-03-20
Payer: COMMERCIAL

## 2017-03-20 VITALS
SYSTOLIC BLOOD PRESSURE: 140 MMHG | HEART RATE: 110 BPM | BODY MASS INDEX: 29.82 KG/M2 | HEIGHT: 67 IN | DIASTOLIC BLOOD PRESSURE: 91 MMHG | WEIGHT: 190 LBS

## 2017-03-20 DIAGNOSIS — F41.9 ANXIETY: ICD-10-CM

## 2017-03-20 DIAGNOSIS — Z34.03 PRENATAL CARE, FIRST PREGNANCY, THIRD TRIMESTER: Primary | ICD-10-CM

## 2017-03-20 PROCEDURE — 99207 ZZC PRENATAL VISIT: CPT | Performed by: OBSTETRICS & GYNECOLOGY

## 2017-03-20 RX ORDER — LIDOCAINE 40 MG/G
CREAM TOPICAL
Status: CANCELLED | OUTPATIENT
Start: 2017-03-20

## 2017-03-20 RX ORDER — HYDROXYZINE HYDROCHLORIDE 50 MG/1
50-100 TABLET, FILM COATED ORAL EVERY 6 HOURS PRN
Qty: 30 TABLET | Refills: 1 | Status: ON HOLD | OUTPATIENT
Start: 2017-03-20 | End: 2017-03-28

## 2017-03-20 RX ORDER — OXYTOCIN/0.9 % SODIUM CHLORIDE 30/500 ML
1-24 PLASTIC BAG, INJECTION (ML) INTRAVENOUS CONTINUOUS
Status: CANCELLED | OUTPATIENT
Start: 2017-03-20

## 2017-03-20 RX ORDER — FLUOXETINE 10 MG/1
10 CAPSULE ORAL DAILY
Qty: 30 CAPSULE | Refills: 1 | Status: SHIPPED | OUTPATIENT
Start: 2017-03-20 | End: 2018-04-05

## 2017-03-20 NOTE — NURSING NOTE
"Initial BP (!) 140/91 (BP Location: Right arm, Patient Position: Chair, Cuff Size: Adult Large)  Pulse 110  Ht 5' 7\" (1.702 m)  Wt 190 lb (86.2 kg)  LMP 07/04/2016  BMI 29.76 kg/m2 Estimated body mass index is 29.76 kg/(m^2) as calculated from the following:    Height as of this encounter: 5' 7\" (1.702 m).    Weight as of this encounter: 190 lb (86.2 kg). .      "

## 2017-03-20 NOTE — MR AVS SNAPSHOT
After Visit Summary   3/20/2017    Zacarias Adam    MRN: [id number]           Patient Information     Date Of Birth          1995        Visit Information        Provider Department      3/20/2017 10:15 AM Shaista Bedolla MD [facility]        Today's Diagnoses     Prenatal care, first pregnancy, third trimester    -  1    Anxiety           Follow-ups after your visit        Your next 10 appointments already scheduled     Mar 24, 2017  1:15 PM CDT   US OB SINGLE FOLLOW UP REPEAT with WYUS2   [facility] Ultrasound ([facility])    [address] [address]   [phone]           Please bring a list of your medicines (including vitamins, minerals and over-the-counter drugs). Also, tell your doctor about any allergies you may have. Wear comfortable clothes and leave your valuables at home.  If you re less than 20 weeks drink four 8-ounce glasses of fluid an hour before your exam. If you need to empty your bladder before your exam, try to release only a little urine. Then, drink another glass of fluid.  You may have up to two family members in the exam room. If you bring a small child, an adult must be there to care for him or her.  Please call the Imaging Department at your exam site with any questions.            Mar 24, 2017  2:00 PM CDT   US FETAL BIOPHYS PROFILE W/O NON STRESS TEST with WYUS1   [facility] Ultrasound ([facility])    [address] [address]   [phone]           Please bring a list of your medicines (including vitamins, minerals and over-the-counter drugs). Also, tell your doctor about any allergies you may have. Wear comfortable clothes and leave your valuables at home.  If you re less than 20 weeks drink four 8-ounce glasses of fluid an hour before your exam. If you need to empty your bladder before your exam, try to release only a little urine. Then, drink another glass of  fluid.  You may have up to two family members in the exam room. If you bring a small child, an adult must be there to care for him or her.  Please call the Imaging Department at your exam site with any questions.            Mar 24, 2017  3:00 PM CDT   ESTABLISHED PRENATAL with Sol Wooten MD   [facility]    [address] [facility] [address]   [phone]            Mar 31, 2017  2:00 PM CDT   US FETAL BIOPHYS PROFILE W/O NON STRESS TEST with WYUS1   [facility] Ultrasound ([facility])    [address] [address] [facility] [address] [address]   [phone]           Please bring a list of your medicines (including vitamins, minerals and over-the-counter drugs). Also, tell your doctor about any allergies you may have. Wear comfortable clothes and leave your valuables at home.  If you re less than 20 weeks drink four 8-ounce glasses of fluid an hour before your exam. If you need to empty your bladder before your exam, try to release only a little urine. Then, drink another glass of fluid.  You may have up to two family members in the exam room. If you bring a small child, an adult must be there to care for him or her.  Please call the Imaging Department at your exam site with any questions.            Mar 31, 2017  3:00 PM CDT   ESTABLISHED PRENATAL with Sol Wooten MD   [facility]    [address] [facility] [address]   [phone]            Apr 05, 2017  3:30 PM CDT   US FETAL BIOPHYS PROFILE W/O NON STRESS TEST with WYUS2   [facility]trasound ([facility])    [address] [facility] [address]   [phone]           Please bring a list of your medicines (including vitamins, minerals and over-the-counter drugs). Also, tell your doctor about any allergies you may have. Wear comfortable clothes and leave your valuables at home.  If you re less than 20  "weeks drink four 8-ounce glasses of fluid an hour before your exam. If you need to empty your bladder before your exam, try to release only a little urine. Then, drink another glass of fluid.  You may have up to two family members in the exam room. If you bring a small child, an adult must be there to care for him or her.  Please call the Imaging Department at your exam site with any questions.              Future tests that were ordered for you today     Open Future Orders        Priority Expected Expires Ordered    US OB Single Follow Up Repeat Routine 6/18/2017 3/20/2018 3/20/2017            Who to contact     If you have questions or need follow up information about today's clinic visit or your schedule please contact [facility] directly at [phone].  Normal or non-critical lab and imaging results will be communicated to you by MyChart, letter or phone within 4 business days after the clinic has received the results. If you do not hear from us within 7 days, please contact the clinic through [employer] or phone. If you have a critical or abnormal lab result, we will notify you by phone as soon as possible.  Submit refill requests through [employer] or call your pharmacy and they will forward the refill request to us. Please allow 3 business days for your refill to be completed.          Additional Information About Your Visit        [employer] Information     [employer] lets you send messages to your doctor, view your test results, renew your prescriptions, schedule appointments and more. To sign up, go to [email or web address] . Click on \"Log in\" on the left side of the screen, which will take you to the Welcome page. Then click on \"Sign up Now\" on the right side of the page.     You will be asked to enter the access code listed below, as well as some personal information. Please follow the directions to create your username and password.     Your access code is: [email or web address]  Expires: 4/27/2017  6:10 " "PM     Your access code will  in 90 days. If you need help or a new code, please call your [place] clinic or [phone].        Care EveryWhere ID     This is your Care EveryWhere ID. This could be used by other organizations to access your [place] medical records  [id number]        Your Vitals Were     Pulse Height Last Period BMI (Body Mass Index)          110 5' 7\" (1.702 m) 2016 29.76 kg/m2         Blood Pressure from Last 3 Encounters:   17 (!) 140/91   17 148/88   03/15/17 139/83    Weight from Last 3 Encounters:   17 190 lb (86.2 kg)   17 193 lb (87.5 kg)   03/15/17 189 lb (85.7 kg)                 Where to get your medicines      These medications were sent to [facility] [address] [place], [place] [address] [facility] AT [address]  [address] [facility] [address]     Phone:  [phone]     FLUoxetine 10 MG capsule    hydrOXYzine 50 MG tablet          Primary Care Provider Office Phone # Fax #    Yudi Sauceda [phone]       [facility] [address] [place] [address]        Thank you!     Thank you for choosing [facility]  for your care. Our goal is always to provide you with excellent care. Hearing back from our patients is one way we can continue to improve our services. Please take a few minutes to complete the written survey that you may receive in the mail after your visit with us. Thank you!             Your Updated Medication List - Protect others around you: Learn how to safely use, store and throw away your medicines at www.disposemymeds.org.          This list is accurate as of: 3/20/17 10:34 AM.  Always use your most recent med list.                   Brand Name Dispense Instructions for use    albuterol 108 (90 BASE) MCG/ACT Inhaler    VENTOLIN HFA    2 Inhaler    Inhale 2 puffs into the lungs every 4 hours as needed       FLUoxetine 10 MG capsule    PROzac "    30 capsule    Take 1 capsule (10 mg) by mouth daily       hydrOXYzine 50 MG tablet    ATARAX    30 tablet    Take 1-2 tablets ( mg) by mouth every 6 hours as needed for itching       order for DME     1 Device    Equipment being ordered: Breast Pump       ranitidine 150 MG tablet    ZANTAC    60 tablet    Take 1 tablet (150 mg) by mouth 2 times daily       vitamin D 2000 UNITS tablet     100 tablet    Take 2,000 Units by mouth daily

## 2017-03-20 NOTE — PROGRESS NOTES
"Doing well.   Reports more bladder pressure since she has been using the yoga ball this past week.   Denies VB, ctx, LOF. +FM  BP (!) 140/91 (BP Location: Right arm, Patient Position: Chair, Cuff Size: Adult Large)  Pulse 110  Ht 5' 7\" (1.702 m)  Wt 190 lb (86.2 kg)  LMP 2016  BMI 29.76 kg/m2  General Appearance: NAD  Abdomen: Gravid, NT  Refer to flow sheet above.   A/P: 21 year old  at 37w0d  -- vistaril and Prozac refilled as per patient request  -- chronic HTN with no medications: Discussed IOL 38-39 weeks depending on balance between cervix favorable/unfavorable status and control of BP; patient elects to proceed with IOL at 38 weeks regardless given her discomfort with pregnancy; BPP scheduled for this Friday 3/24; will order growth US with it  -- IOL scheduled for 3/26 PM at 1700 with cervidil at 37w6d; Discussed with Zacarias Adam, the following; indications; the agents and methods of labor augmentation, including risks, benefits, and alternative approaches; and the possible need for  birth. EFW is AGA. The Labor Induction:what you need to know information sheet was made available to her. Questions and concerns were addressed and patient agrees to above if necessary during the course of her labor.  -- labor precautions reviewed    Shaista Bedolla MD  [facility]            "

## 2017-03-24 ENCOUNTER — PRENATAL OFFICE VISIT (OUTPATIENT)
Dept: OBGYN | Facility: CLINIC | Age: 22
End: 2017-03-24
Payer: COMMERCIAL

## 2017-03-24 ENCOUNTER — HOSPITAL ENCOUNTER (OUTPATIENT)
Dept: ULTRASOUND IMAGING | Facility: CLINIC | Age: 22
Discharge: HOME | End: 2017-03-24
Attending: OBSTETRICS & GYNECOLOGY | Admitting: OBSTETRICS & GYNECOLOGY
Payer: COMMERCIAL

## 2017-03-24 VITALS
WEIGHT: 192.8 LBS | HEART RATE: 111 BPM | BODY MASS INDEX: 30.26 KG/M2 | SYSTOLIC BLOOD PRESSURE: 137 MMHG | HEIGHT: 67 IN | DIASTOLIC BLOOD PRESSURE: 84 MMHG

## 2017-03-24 DIAGNOSIS — Z34.03 PRENATAL CARE, FIRST PREGNANCY, THIRD TRIMESTER: ICD-10-CM

## 2017-03-24 DIAGNOSIS — Z34.03 ENCOUNTER FOR SUPERVISION OF NORMAL FIRST PREGNANCY IN THIRD TRIMESTER: Primary | ICD-10-CM

## 2017-03-24 DIAGNOSIS — J30.89 SEASONAL ALLERGIC RHINITIS DUE TO OTHER ALLERGIC TRIGGER: ICD-10-CM

## 2017-03-24 DIAGNOSIS — I10 ESSENTIAL HYPERTENSION: ICD-10-CM

## 2017-03-24 PROBLEM — K64.4 EXTERNAL HEMORRHOIDS: Status: ACTIVE | Noted: 2017-03-24

## 2017-03-24 PROCEDURE — 99207 ZZC PRENATAL VISIT: CPT | Performed by: OBSTETRICS & GYNECOLOGY

## 2017-03-24 PROCEDURE — 59426 ANTEPARTUM CARE ONLY: CPT | Performed by: OBSTETRICS & GYNECOLOGY

## 2017-03-24 PROCEDURE — 76819 FETAL BIOPHYS PROFIL W/O NST: CPT

## 2017-03-24 PROCEDURE — 59025 FETAL NON-STRESS TEST: CPT | Performed by: OBSTETRICS & GYNECOLOGY

## 2017-03-24 RX ORDER — CETIRIZINE HYDROCHLORIDE 10 MG/1
10 TABLET ORAL EVERY EVENING
Qty: 90 TABLET | Refills: 1 | Status: SHIPPED | OUTPATIENT
Start: 2017-03-24 | End: 2017-05-12

## 2017-03-24 NOTE — MR AVS SNAPSHOT
After Visit Summary   3/24/2017    Zacarias Adam    MRN: [id number]           Patient Information     Date Of Birth          1995        Visit Information        Provider Department      3/24/2017 3:00 PM Sol Wooten MD [facility]        Today's Diagnoses     Encounter for supervision of normal first pregnancy in third trimester    -  1    Seasonal allergic rhinitis due to other allergic trigger           Follow-ups after your visit        Your next 10 appointments already scheduled     Mar 31, 2017  2:00 PM CDT   US FETAL BIOPHYS PROFILE W/O NON STRESS TEST with WYUS1   [facility]trasound ([facility])    [address] [facility] [address]   [phone]           Please bring a list of your medicines (including vitamins, minerals and over-the-counter drugs). Also, tell your doctor about any allergies you may have. Wear comfortable clothes and leave your valuables at home.  If you re less than 20 weeks drink four 8-ounce glasses of fluid an hour before your exam. If you need to empty your bladder before your exam, try to release only a little urine. Then, drink another glass of fluid.  You may have up to two family members in the exam room. If you bring a small child, an adult must be there to care for him or her.  Please call the Imaging Department at your exam site with any questions.            Mar 31, 2017  3:00 PM CDT   ESTABLISHED PRENATAL with Sol Wooten MD   [facility] ([facility])    [address] [facility] [address]   [phone]            Apr 05, 2017  3:30 PM CDT   US FETAL BIOPHYS PROFILE W/O NON STRESS TEST with WYUS2   [facility]trasound ([facility])    [address] [facility] [address]   [phone]           Please bring a list of your medicines (including vitamins, minerals and over-the-counter drugs). Also, tell your doctor about any  "allergies you may have. Wear comfortable clothes and leave your valuables at home.  If you re less than 20 weeks drink four 8-ounce glasses of fluid an hour before your exam. If you need to empty your bladder before your exam, try to release only a little urine. Then, drink another glass of fluid.  You may have up to two family members in the exam room. If you bring a small child, an adult must be there to care for him or her.  Please call the Imaging Department at your exam site with any questions.              Who to contact     If you have questions or need follow up information about today's clinic visit or your schedule please contact [facility] directly at [phone].  Normal or non-critical lab and imaging results will be communicated to you by [employer], letter or phone within 4 business days after the clinic has received the results. If you do not hear from us within 7 days, please contact the clinic through [employer] or phone. If you have a critical or abnormal lab result, we will notify you by phone as soon as possible.  Submit refill requests through [employer] or call your pharmacy and they will forward the refill request to us. Please allow 3 business days for your refill to be completed.          Additional Information About Your Visit        [employer] Information     [employer] lets you send messages to your doctor, view your test results, renew your prescriptions, schedule appointments and more. To sign up, go to [email or web address] . Click on \"Log in\" on the left side of the screen, which will take you to the Welcome page. Then click on \"Sign up Now\" on the right side of the page.     You will be asked to enter the access code listed below, as well as some personal information. Please follow the directions to create your username and password.     Your access code is: [email or web address]  Expires: 2017  6:10 PM     Your access code will  in 90 days. If you need help or a new code, " "please call your [place] clinic or [phone].        Care EveryWhere ID     This is your Care EveryWhere ID. This could be used by other organizations to access your [place] medical records  [id number]        Your Vitals Were     Pulse Height Last Period BMI (Body Mass Index)          111 5' 6.5\" (1.689 m) 07/04/2016 30.65 kg/m2         Blood Pressure from Last 3 Encounters:   03/24/17 137/84   03/20/17 (!) 140/91   03/16/17 148/88    Weight from Last 3 Encounters:   03/24/17 192 lb 12.8 oz (87.5 kg)   03/20/17 190 lb (86.2 kg)   03/16/17 193 lb (87.5 kg)              We Performed the Following     FETAL NON-STRESS TEST          Today's Medication Changes          These changes are accurate as of: 3/24/17  3:20 PM.  If you have any questions, ask your nurse or doctor.               Start taking these medicines.        Dose/Directions    cetirizine 10 MG tablet   Commonly known as:  zyrTEC   Used for:  Seasonal allergic rhinitis due to other allergic trigger   Started by:  Sol Wooten MD        Dose:  10 mg   Take 1 tablet (10 mg) by mouth every evening   Quantity:  90 tablet   Refills:  1            Where to get your medicines      These medications were sent to [facility] [address] [place] [address] AT [facility] OF [address] [place] [address]  [address] [facility] [address]     Phone:  [phone]     cetirizine 10 MG tablet                Primary Care Provider Office Phone # Fax #    Yudi Sauceda [phone]       [facility] [address] [place] [address]        Thank you!     Thank you for choosing [facility]  for your care. Our goal is always to provide you with excellent care. Hearing back from our patients is one way we can continue to improve our services. Please take a few minutes to complete the written survey that you may receive in the mail after your visit with us. Thank you!             Your Updated " Medication List - Protect others around you: Learn how to safely use, store and throw away your medicines at www.disposemymeds.org.          This list is accurate as of: 3/24/17  3:20 PM.  Always use your most recent med list.                   Brand Name Dispense Instructions for use    albuterol 108 (90 BASE) MCG/ACT Inhaler    VENTOLIN HFA    2 Inhaler    Inhale 2 puffs into the lungs every 4 hours as needed       cetirizine 10 MG tablet    zyrTEC    90 tablet    Take 1 tablet (10 mg) by mouth every evening       FLUoxetine 10 MG capsule    PROzac    30 capsule    Take 1 capsule (10 mg) by mouth daily       hydrOXYzine 50 MG tablet    ATARAX    30 tablet    Take 1-2 tablets ( mg) by mouth every 6 hours as needed for itching       order for DME     1 Device    Equipment being ordered: Breast Pump       ranitidine 150 MG tablet    ZANTAC    60 tablet    Take 1 tablet (150 mg) by mouth 2 times daily       vitamin D 2000 UNITS tablet     100 tablet    Take 2,000 Units by mouth daily

## 2017-03-24 NOTE — PROGRESS NOTES
"CC: Here for routine prenatal visit @ 37w4d   HPI: + FM, no ctx, no LOF, no VB.  No complaints.     PE: /84 (BP Location: Right arm, Patient Position: Chair, Cuff Size: Adult Large)  Pulse 111  Ht 5' 6.5\" (1.689 m)  Wt 192 lb 12.8 oz (87.5 kg)  LMP 07/04/2016  BMI 30.65 kg/m2   See OB flowsheet    BPP 8/10 (6/8 with NST reactive)    A/P G1 @ 37w4d normal pregnancy, cHTN    1. Routine prenatal care  2. CHTN: continue with BPPs.  NST to complete BPP score today.    RTC 1 week    Sol Wooten M.D.    "

## 2017-03-24 NOTE — NURSING NOTE
"Initial /84 (BP Location: Right arm, Patient Position: Chair, Cuff Size: Adult Large)  Pulse 111  Ht 5' 6.5\" (1.689 m)  Wt 192 lb 12.8 oz (87.5 kg)  LMP 07/04/2016  BMI 30.65 kg/m2 Estimated body mass index is 30.65 kg/(m^2) as calculated from the following:    Height as of this encounter: 5' 6.5\" (1.689 m).    Weight as of this encounter: 192 lb 12.8 oz (87.5 kg). .      "

## 2017-03-26 ENCOUNTER — HOSPITAL ENCOUNTER (INPATIENT)
Facility: CLINIC | Age: 22
LOS: 3 days | Discharge: HOME | End: 2017-03-29
Attending: OBSTETRICS & GYNECOLOGY | Admitting: OBSTETRICS & GYNECOLOGY
Payer: COMMERCIAL

## 2017-03-26 PROCEDURE — 25000132 ZZH RX MED GY IP 250 OP 250 PS 637: Performed by: OBSTETRICS & GYNECOLOGY

## 2017-03-26 PROCEDURE — 12000027 ZZH R&B OB

## 2017-03-26 PROCEDURE — 3E0P7GC INTRODUCTION OF OTHER THERAPEUTIC SUBSTANCE INTO FEMALE REPRODUCTIVE, VIA NATURAL OR ARTIFICIAL OPENING: ICD-10-PCS | Performed by: OBSTETRICS & GYNECOLOGY

## 2017-03-26 RX ORDER — HYDROXYZINE HYDROCHLORIDE 50 MG/1
100 TABLET, FILM COATED ORAL
Status: DISCONTINUED | OUTPATIENT
Start: 2017-03-26 | End: 2017-03-26

## 2017-03-26 RX ORDER — HYDROXYZINE HYDROCHLORIDE 25 MG/1
25 TABLET, FILM COATED ORAL EVERY 6 HOURS PRN
Status: DISCONTINUED | OUTPATIENT
Start: 2017-03-26 | End: 2017-03-26 | Stop reason: CLARIF

## 2017-03-26 RX ORDER — HYDROXYZINE HYDROCHLORIDE 25 MG/1
25-50 TABLET, FILM COATED ORAL EVERY 6 HOURS PRN
Status: DISCONTINUED | OUTPATIENT
Start: 2017-03-26 | End: 2017-03-26

## 2017-03-26 RX ORDER — HYDROXYZINE HYDROCHLORIDE 50 MG/1
50-100 TABLET, FILM COATED ORAL
Status: DISCONTINUED | OUTPATIENT
Start: 2017-03-26 | End: 2017-03-28

## 2017-03-26 RX ORDER — HYDROXYZINE HYDROCHLORIDE 50 MG/1
50 TABLET, FILM COATED ORAL EVERY 6 HOURS PRN
Status: DISCONTINUED | OUTPATIENT
Start: 2017-03-26 | End: 2017-03-26 | Stop reason: CLARIF

## 2017-03-26 RX ORDER — OXYTOCIN/0.9 % SODIUM CHLORIDE 30/500 ML
1-24 PLASTIC BAG, INJECTION (ML) INTRAVENOUS CONTINUOUS
Status: DISCONTINUED | OUTPATIENT
Start: 2017-03-26 | End: 2017-03-28 | Stop reason: CLARIF

## 2017-03-26 RX ORDER — FLUOXETINE 10 MG/1
10 CAPSULE ORAL DAILY
Status: DISCONTINUED | OUTPATIENT
Start: 2017-03-26 | End: 2017-03-29 | Stop reason: HOSPADM

## 2017-03-26 RX ORDER — LIDOCAINE 40 MG/G
CREAM TOPICAL
Status: DISCONTINUED | OUTPATIENT
Start: 2017-03-26 | End: 2017-03-28 | Stop reason: CLARIF

## 2017-03-26 RX ORDER — HYDROXYZINE HYDROCHLORIDE 50 MG/1
50-100 TABLET, FILM COATED ORAL
Status: DISCONTINUED | OUTPATIENT
Start: 2017-03-26 | End: 2017-03-26 | Stop reason: CLARIF

## 2017-03-26 RX ORDER — HYDROXYZINE HYDROCHLORIDE 25 MG/1
25-50 TABLET, FILM COATED ORAL EVERY 6 HOURS PRN
Status: DISCONTINUED | OUTPATIENT
Start: 2017-03-26 | End: 2017-03-28

## 2017-03-26 RX ADMIN — FLUOXETINE 10 MG: 10 CAPSULE ORAL at 19:39

## 2017-03-26 RX ADMIN — DINOPROSTONE 10 MG: 10 INSERT VAGINAL at 18:53

## 2017-03-26 RX ADMIN — HYDROXYZINE HYDROCHLORIDE 100 MG: 50 TABLET, FILM COATED ORAL at 22:25

## 2017-03-26 NOTE — IP AVS SNAPSHOT
[facility]    [address] [facility] [address]    Phone:  [phone]    Fax:  [phone]                                       After Visit Summary   3/26/2017    Zacarias Adam    MRN: [id number]           After Visit Summary Signature Page     I have received my discharge instructions, and my questions have been answered. I have discussed any challenges I see with this plan with the nurse or doctor.    ..........................................................................................................................................  Patient/Patient Representative Signature      ..........................................................................................................................................  Patient Representative Print Name and Relationship to Patient    ..................................................               ................................................  Date                                            Time    ..........................................................................................................................................  Reviewed by Signature/Title    ...................................................              ..............................................  Date                                                            Time

## 2017-03-26 NOTE — IP AVS SNAPSHOT
MRN:[id number]                      After Visit Summary   3/26/2017    Zacarias Adam    MRN: [id number]           Thank you!     Thank you for choosing [facility] for your care. Our goal is always to provide you with excellent care. Hearing back from our patients is one way we can continue to improve our services. Please take a few minutes to complete the written survey that you may receive in the mail after you visit with us. Thank you!        Patient Information     Date Of Birth          1995        About your hospital stay     You were admitted on:  March 26, 2017 You last received care in the:  [facility]    You were discharged on:  March 29, 2017       Who to Call     For medical emergencies, please call 911.  For non-urgent questions about your medical care, please call your primary care provider or clinic, [phone]          Attending Provider     Provider Kacie Cosme MD OB/Gyn    Shaista Bedolla MD OB/Gyn    Sol Wooten MD OB/Gyn       Primary Care Provider Office Phone # Fax #    Yudi Anshul Sauceda [phone]       [facility] [address] [place] [id number]        Further instructions from your care team       Postpartum Vaginal Delivery Instructions    Activity       Ask family and friends for help when you need it.    Do not place anything in your vagina for 6 weeks.    You are not restricted on other activities, but take it easy for a few weeks to allow your body to recover from delivery.  You are able to do any activities you feel up to that point.    No driving until you have stopped taking your pain medications (usually two weeks after delivery).     Call your health care provider if you have any of these symptoms:       Increased pain, swelling, redness, or fluid around your stiches from an episiotomy or perineal tear.    A fever above 100.4 F (38 C) with or without chills when  "placing a thermometer under your tongue.    You soak a sanitary pad with blood within 1 hour, or you see blood clots larger than a golf ball.    Bleeding that lasts more than 6 weeks.    Vaginal discharge that smells bad.    Severe pain, cramping or tenderness in your lower belly area.    A need to urinate more frequently (use the toilet more often), more urgently (use the toilet very quickly), or it burns when you urinate.    Nausea and vomiting.    Redness, swelling or pain around a vein in your leg.    Problems breastfeeding or a red or painful area on your breast.    Chest pain and cough or are gasping for air.    Problems coping with sadness, anxiety, or depression.  If you have any concerns about hurting yourself or the baby, call your provider immediately.     You have questions or concerns after you return home.     Keep your hands clean:  Always wash your hands before touching your perineal area and stitches.  This helps reduce your risk of infection.  If your hands aren't dirty, you may use an alcohol hand-rub to clean your hands. Keep your nails clean and short.        Pending Results     No orders found from 3/24/2017 to 3/27/2017.            Admission Information     Date & Time Provider Department Dept. Phone    3/26/2017 Sol Wooten MD [facility] [phone]      Your Vitals Were     Blood Pressure Pulse Temperature Respirations Last Period Pulse Oximetry    137/85 98 98  F (36.7  C) (Oral) 16 07/04/2016 99%      MyChart Information     [employer] lets you send messages to your doctor, view your test results, renew your prescriptions, schedule appointments and more. To sign up, go to [email or web address] . Click on \"Log in\" on the left side of the screen, which will take you to the Welcome page. Then click on \"Sign up Now\" on the right side of the page.     You will be asked to enter the access code listed below, as well as some personal information. Please follow the " directions to create your username and password.     Your access code is: [email or web address]  Expires: 2017  6:10 PM     Your access code will  in 90 days. If you need help or a new code, please call your [facility] clinic or [phone].        Care EveryWhere ID     This is your Care EveryWhere ID. This could be used by other organizations to access your [facility] medical records  [id number]           Review of your medicines      UNREVIEWED medicines. Ask your doctor about these medicines        Dose / Directions    albuterol 108 (90 BASE) MCG/ACT Inhaler   Commonly known as:  VENTOLIN HFA   Used for:  Cough        Dose:  2 puff   Inhale 2 puffs into the lungs every 4 hours as needed   Quantity:  2 Inhaler   Refills:  1       cetirizine 10 MG tablet   Commonly known as:  zyrTEC   Used for:  Seasonal allergic rhinitis due to other allergic trigger        Dose:  10 mg   Take 1 tablet (10 mg) by mouth every evening   Quantity:  90 tablet   Refills:  1       FLUoxetine 10 MG capsule   Commonly known as:  PROzac   Used for:  Anxiety        Dose:  10 mg   Take 1 capsule (10 mg) by mouth daily   Quantity:  30 capsule   Refills:  1       vitamin D 2000 UNITS tablet   Used for:  Vitamin D deficiency        Dose:  2000 Units   Take 2,000 Units by mouth daily   Quantity:  100 tablet   Refills:  3         CONTINUE these medicines which have NOT CHANGED        Dose / Directions    order for DME   Used for:  Supervision of normal first pregnancy        Equipment being ordered: Breast Pump   Quantity:  1 Device   Refills:  0                Protect others around you: Learn how to safely use, store and throw away your medicines at www.disposemymeds.org.             Medication List: This is a list of all your medications and when to take them. Check marks below indicate your daily home schedule. Keep this list as a reference.      Medications           Morning Afternoon Evening Bedtime As Needed    albuterol 108 (90 BASE)  MCG/ACT Inhaler   Commonly known as:  VENTOLIN HFA   Inhale 2 puffs into the lungs every 4 hours as needed                                cetirizine 10 MG tablet   Commonly known as:  zyrTEC   Take 1 tablet (10 mg) by mouth every evening                                FLUoxetine 10 MG capsule   Commonly known as:  PROzac   Take 1 capsule (10 mg) by mouth daily   Last time this was given:  10 mg on 3/28/2017 11:06 PM                                order for DME   Equipment being ordered: Breast Pump                                vitamin D 2000 UNITS tablet   Take 2,000 Units by mouth daily

## 2017-03-26 NOTE — PROGRESS NOTES
Pt arrives ambulatory with support person present at 1700.  Pt placed in room, orientated to room, call light in reach.  Assessment wnl, vss.  Pt advised +FM, occassional contractions.  Pt denies LOF, HA, visual disturbances, nausea, vomiting.  Plan:  Cervidil per induction order

## 2017-03-27 ENCOUNTER — TELEPHONE (OUTPATIENT)
Dept: OBGYN | Facility: CLINIC | Age: 22
End: 2017-03-27

## 2017-03-27 ENCOUNTER — ANESTHESIA (OUTPATIENT)
Dept: OBGYN | Facility: CLINIC | Age: 22
End: 2017-03-27
Payer: COMMERCIAL

## 2017-03-27 ENCOUNTER — ANESTHESIA EVENT (OUTPATIENT)
Dept: OBGYN | Facility: CLINIC | Age: 22
End: 2017-03-27
Payer: COMMERCIAL

## 2017-03-27 PROBLEM — O13.9 PREGNANCY INDUCED HYPERTENSION: Status: ACTIVE | Noted: 2017-03-27

## 2017-03-27 LAB
ABO + RH BLD: NORMAL
ABO + RH BLD: NORMAL
SPECIMEN EXP DATE BLD: NORMAL

## 2017-03-27 PROCEDURE — 36415 COLL VENOUS BLD VENIPUNCTURE: CPT | Performed by: OBSTETRICS & GYNECOLOGY

## 2017-03-27 PROCEDURE — 86780 TREPONEMA PALLIDUM: CPT | Performed by: OBSTETRICS & GYNECOLOGY

## 2017-03-27 PROCEDURE — 12000031 ZZH R&B OB CRITICAL

## 2017-03-27 PROCEDURE — 25000125 ZZHC RX 250: Performed by: NURSE ANESTHETIST, CERTIFIED REGISTERED

## 2017-03-27 PROCEDURE — 10907ZC DRAINAGE OF AMNIOTIC FLUID, THERAPEUTIC FROM PRODUCTS OF CONCEPTION, VIA NATURAL OR ARTIFICIAL OPENING: ICD-10-PCS | Performed by: OBSTETRICS & GYNECOLOGY

## 2017-03-27 PROCEDURE — 86901 BLOOD TYPING SEROLOGIC RH(D): CPT | Performed by: OBSTETRICS & GYNECOLOGY

## 2017-03-27 PROCEDURE — 25800025 ZZH RX 258: Performed by: OBSTETRICS & GYNECOLOGY

## 2017-03-27 PROCEDURE — 37000011 ZZH ANESTHESIA WARD SERVICE: Performed by: NURSE ANESTHETIST, CERTIFIED REGISTERED

## 2017-03-27 PROCEDURE — 59025 FETAL NON-STRESS TEST: CPT | Mod: 26 | Performed by: OBSTETRICS & GYNECOLOGY

## 2017-03-27 PROCEDURE — 3E0R3CZ INTRODUCTION OF REGIONAL ANESTHETIC INTO SPINAL CANAL, PERCUTANEOUS APPROACH: ICD-10-PCS | Performed by: NURSE ANESTHETIST, CERTIFIED REGISTERED

## 2017-03-27 PROCEDURE — 86900 BLOOD TYPING SEROLOGIC ABO: CPT | Performed by: OBSTETRICS & GYNECOLOGY

## 2017-03-27 PROCEDURE — 40000671 ZZH STATISTIC ANESTHESIA CASE

## 2017-03-27 PROCEDURE — 25000128 H RX IP 250 OP 636: Performed by: NURSE ANESTHETIST, CERTIFIED REGISTERED

## 2017-03-27 PROCEDURE — 00HU33Z INSERTION OF INFUSION DEVICE INTO SPINAL CANAL, PERCUTANEOUS APPROACH: ICD-10-PCS | Performed by: NURSE ANESTHETIST, CERTIFIED REGISTERED

## 2017-03-27 PROCEDURE — 25000132 ZZH RX MED GY IP 250 OP 250 PS 637: Performed by: OBSTETRICS & GYNECOLOGY

## 2017-03-27 PROCEDURE — 25000125 ZZHC RX 250: Performed by: OBSTETRICS & GYNECOLOGY

## 2017-03-27 PROCEDURE — 25800025 ZZH RX 258: Performed by: NURSE ANESTHETIST, CERTIFIED REGISTERED

## 2017-03-27 RX ORDER — NALOXONE HYDROCHLORIDE 0.4 MG/ML
.1-.4 INJECTION, SOLUTION INTRAMUSCULAR; INTRAVENOUS; SUBCUTANEOUS
Status: DISCONTINUED | OUTPATIENT
Start: 2017-03-27 | End: 2017-03-27

## 2017-03-27 RX ORDER — ACETAMINOPHEN 325 MG/1
650 TABLET ORAL EVERY 4 HOURS PRN
Status: DISCONTINUED | OUTPATIENT
Start: 2017-03-27 | End: 2017-03-28

## 2017-03-27 RX ORDER — ONDANSETRON 4 MG/1
4 TABLET, ORALLY DISINTEGRATING ORAL EVERY 6 HOURS PRN
Status: DISCONTINUED | OUTPATIENT
Start: 2017-03-27 | End: 2017-03-28 | Stop reason: CLARIF

## 2017-03-27 RX ORDER — SODIUM CHLORIDE, SODIUM LACTATE, POTASSIUM CHLORIDE, CALCIUM CHLORIDE 600; 310; 30; 20 MG/100ML; MG/100ML; MG/100ML; MG/100ML
INJECTION, SOLUTION INTRAVENOUS CONTINUOUS
Status: DISCONTINUED | OUTPATIENT
Start: 2017-03-27 | End: 2017-03-28

## 2017-03-27 RX ORDER — HYDROMORPHONE HYDROCHLORIDE 2 MG/1
2 TABLET ORAL EVERY 6 HOURS PRN
Status: DISCONTINUED | OUTPATIENT
Start: 2017-03-27 | End: 2017-03-28

## 2017-03-27 RX ORDER — IBUPROFEN 800 MG/1
800 TABLET, FILM COATED ORAL
Status: COMPLETED | OUTPATIENT
Start: 2017-03-27 | End: 2017-03-28

## 2017-03-27 RX ORDER — ONDANSETRON 2 MG/ML
4 INJECTION INTRAMUSCULAR; INTRAVENOUS EVERY 6 HOURS PRN
Status: DISCONTINUED | OUTPATIENT
Start: 2017-03-27 | End: 2017-03-28

## 2017-03-27 RX ORDER — OXYTOCIN/0.9 % SODIUM CHLORIDE 30/500 ML
100-340 PLASTIC BAG, INJECTION (ML) INTRAVENOUS CONTINUOUS PRN
Status: COMPLETED | OUTPATIENT
Start: 2017-03-27 | End: 2017-03-28

## 2017-03-27 RX ORDER — NALOXONE HYDROCHLORIDE 0.4 MG/ML
.1-.4 INJECTION, SOLUTION INTRAMUSCULAR; INTRAVENOUS; SUBCUTANEOUS
Status: DISCONTINUED | OUTPATIENT
Start: 2017-03-27 | End: 2017-03-28 | Stop reason: CLARIF

## 2017-03-27 RX ORDER — DIPHENHYDRAMINE HYDROCHLORIDE 50 MG/ML
12.5-25 INJECTION INTRAMUSCULAR; INTRAVENOUS EVERY 4 HOURS PRN
Status: DISCONTINUED | OUTPATIENT
Start: 2017-03-27 | End: 2017-03-28 | Stop reason: CLARIF

## 2017-03-27 RX ORDER — OXYTOCIN 10 [USP'U]/ML
10 INJECTION, SOLUTION INTRAMUSCULAR; INTRAVENOUS
Status: DISCONTINUED | OUTPATIENT
Start: 2017-03-27 | End: 2017-03-28

## 2017-03-27 RX ORDER — FENTANYL CITRATE 50 UG/ML
50-100 INJECTION, SOLUTION INTRAMUSCULAR; INTRAVENOUS
Status: DISCONTINUED | OUTPATIENT
Start: 2017-03-27 | End: 2017-03-28 | Stop reason: CLARIF

## 2017-03-27 RX ORDER — LIDOCAINE HYDROCHLORIDE 10 MG/ML
INJECTION, SOLUTION INFILTRATION; PERINEURAL PRN
Status: DISCONTINUED | OUTPATIENT
Start: 2017-03-27 | End: 2017-03-29

## 2017-03-27 RX ORDER — METHYLERGONOVINE MALEATE 0.2 MG/ML
200 INJECTION INTRAVENOUS
Status: DISCONTINUED | OUTPATIENT
Start: 2017-03-27 | End: 2017-03-28 | Stop reason: CLARIF

## 2017-03-27 RX ORDER — CARBOPROST TROMETHAMINE 250 UG/ML
250 INJECTION, SOLUTION INTRAMUSCULAR
Status: DISCONTINUED | OUTPATIENT
Start: 2017-03-27 | End: 2017-03-28

## 2017-03-27 RX ORDER — ONDANSETRON 2 MG/ML
4 INJECTION INTRAMUSCULAR; INTRAVENOUS EVERY 6 HOURS PRN
Status: DISCONTINUED | OUTPATIENT
Start: 2017-03-27 | End: 2017-03-28 | Stop reason: CLARIF

## 2017-03-27 RX ORDER — SODIUM CHLORIDE, SODIUM LACTATE, POTASSIUM CHLORIDE, CALCIUM CHLORIDE 600; 310; 30; 20 MG/100ML; MG/100ML; MG/100ML; MG/100ML
INJECTION, SOLUTION INTRAVENOUS CONTINUOUS
Status: DISCONTINUED | OUTPATIENT
Start: 2017-03-27 | End: 2017-03-28 | Stop reason: CLARIF

## 2017-03-27 RX ORDER — EPHEDRINE SULFATE 50 MG/ML
5 INJECTION, SOLUTION INTRAMUSCULAR; INTRAVENOUS; SUBCUTANEOUS
Status: DISCONTINUED | OUTPATIENT
Start: 2017-03-27 | End: 2017-03-28 | Stop reason: CLARIF

## 2017-03-27 RX ORDER — FENTANYL CITRATE 50 UG/ML
INJECTION, SOLUTION INTRAMUSCULAR; INTRAVENOUS PRN
Status: DISCONTINUED | OUTPATIENT
Start: 2017-03-27 | End: 2017-03-29

## 2017-03-27 RX ORDER — LIDOCAINE HYDROCHLORIDE AND EPINEPHRINE 15; 5 MG/ML; UG/ML
INJECTION, SOLUTION EPIDURAL PRN
Status: DISCONTINUED | OUTPATIENT
Start: 2017-03-27 | End: 2017-03-29

## 2017-03-27 RX ADMIN — LIDOCAINE HYDROCHLORIDE,EPINEPHRINE BITARTRATE 30 MG: 15; .005 INJECTION, SOLUTION EPIDURAL; INFILTRATION; INTRACAUDAL; PERINEURAL at 17:22

## 2017-03-27 RX ADMIN — FENTANYL CITRATE 100 MCG: 50 INJECTION, SOLUTION INTRAMUSCULAR; INTRAVENOUS at 17:20

## 2017-03-27 RX ADMIN — HYDROMORPHONE HYDROCHLORIDE 2 MG: 2 TABLET ORAL at 00:52

## 2017-03-27 RX ADMIN — RANITIDINE HYDROCHLORIDE 150 MG: 150 TABLET, FILM COATED ORAL at 10:54

## 2017-03-27 RX ADMIN — OXYTOCIN-SODIUM CHLORIDE 0.9% IV SOLN 30 UNIT/500ML 1 MILLI-UNITS/MIN: 30-0.9/5 SOLUTION at 08:09

## 2017-03-27 RX ADMIN — FENTANYL CITRATE 100 MCG: 50 INJECTION INTRAMUSCULAR; INTRAVENOUS at 09:48

## 2017-03-27 RX ADMIN — HYDROXYZINE HYDROCHLORIDE 25 MG: 25 TABLET ORAL at 17:49

## 2017-03-27 RX ADMIN — FENTANYL CITRATE 50 MCG: 50 INJECTION INTRAMUSCULAR; INTRAVENOUS at 08:25

## 2017-03-27 RX ADMIN — ACETAMINOPHEN 650 MG: 325 TABLET, FILM COATED ORAL at 21:36

## 2017-03-27 RX ADMIN — FENTANYL CITRATE 100 MCG: 50 INJECTION INTRAMUSCULAR; INTRAVENOUS at 11:53

## 2017-03-27 RX ADMIN — SODIUM CHLORIDE, POTASSIUM CHLORIDE, SODIUM LACTATE AND CALCIUM CHLORIDE 1000 ML: 600; 310; 30; 20 INJECTION, SOLUTION INTRAVENOUS at 17:03

## 2017-03-27 RX ADMIN — LIDOCAINE HYDROCHLORIDE 60 MG: 10 INJECTION, SOLUTION INFILTRATION; PERINEURAL at 16:01

## 2017-03-27 RX ADMIN — Medication 4 ML/HR: at 17:24

## 2017-03-27 RX ADMIN — SODIUM BICARBONATE 2 MEQ: 84 INJECTION, SOLUTION INTRAVENOUS at 16:01

## 2017-03-27 RX ADMIN — LIDOCAINE HYDROCHLORIDE,EPINEPHRINE BITARTRATE 45 MG: 15; .005 INJECTION, SOLUTION EPIDURAL; INFILTRATION; INTRACAUDAL; PERINEURAL at 17:20

## 2017-03-27 RX ADMIN — ACETAMINOPHEN 650 MG: 325 TABLET, FILM COATED ORAL at 15:32

## 2017-03-27 RX ADMIN — FLUOXETINE 10 MG: 10 CAPSULE ORAL at 10:54

## 2017-03-27 RX ADMIN — SODIUM CHLORIDE, POTASSIUM CHLORIDE, SODIUM LACTATE AND CALCIUM CHLORIDE: 600; 310; 30; 20 INJECTION, SOLUTION INTRAVENOUS at 08:05

## 2017-03-27 ASSESSMENT — LIFESTYLE VARIABLES: TOBACCO_USE: 1

## 2017-03-27 NOTE — PLAN OF CARE
Problem: Individualization  Goal: Patient Preferences  Outcome: Improving  Pt made a small cervical change. 1.5/80/-1. Rivas is now a 8. Pt sister is here to support her through the labor and delivery process. Pt declined a shower before starting pitocin. Call light in reach. Will cont to monitor.
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
## 2017-03-27 NOTE — TELEPHONE ENCOUNTER
Forms were signed and faxed then sent to be scanned and copy at .    Alicia Espinosa  Clinic [facility]

## 2017-03-27 NOTE — PROGRESS NOTES
Pt resting comfortably at this time.  Pt states she is starting to feel intermittent cramps near her back.  Category 1 tracing, will continue to monitor.

## 2017-03-27 NOTE — PLAN OF CARE
Problem: Individualization  Goal: Patient Preferences  Outcome: Improving  Pt up to the bathroom. States she was able to fall asleep after the dilaudid. VSS and afebrile. Call light in reach. Encouraged pt to try and sleep, pt states she will try. Will cont to monitor.

## 2017-03-27 NOTE — PROGRESS NOTES
Pt feeling anxious and having pain in back and upper abdomen with contractions.  Pt encouraged to change position frequently.  100mg vistaril given per order and pt request for sleep/anxiety.

## 2017-03-27 NOTE — ANESTHESIA PROCEDURE NOTES
Peripheral nerve/Neuraxial procedure note : epidural catheter  Pre-Procedure  Performed by  ALEXSANDRA RAINES   Location: OB    Procedure Times:3/27/2017 3:51 PM and 3/27/2017 4:15 PM  Pre-Anesthestic Checklist: patient identified, IV checked, risks and benefits discussed, informed consent, monitors and equipment checked, pre-op evaluation and at physician/surgeon's request    Timeout  Correct Patient: Yes   Correct Procedure: Yes   Correct Site: Yes   Correct Laterality: N/A   Correct Position: Yes   Site Marked: N/A   .   Procedure Documentation    Diagnosis:pain.    Procedure:    Epidural catheter.  Insertion Site:L3-4  (midline approach) Injection technique: LORT saline   Local skin infiltrated with 6 mL of 1% lidocaine.       Patient Prep;mask, sterile gloves, patient draped.  .  Needle: Touhy needle (17 G. 3.5 in). # of attempts: 1. # of redirects:.  Spinal Needle: . . . Catheter: 19 G . .  Catheter threaded easily  4 cm epidural space.  .   .    Assessment/Narrative  Paresthesias: No.  .  .  Aspiration negative for heme or CSF  . Test dose of 3 mL lidocaine 1.5% w/ 1:200,000 epinephrine at 17:20.  Test dose negative for signs of intravascular, subdural or intrathecal injection. Comments:  9181-placement of epidural catheter. Dosing TBD    1715-VAS pain score prior to epidural: 8    VAS pain score after epidural:0    Pt. Tolerated well, FHR stable.

## 2017-03-27 NOTE — PLAN OF CARE
Problem: Labor (Cervical Ripen, Induct, Augment) (Adult,Obstetrics,Pediatric)  Goal: Signs and Symptoms of Listed Potential Problems Will be Absent or Manageable (Labor)  Signs and symptoms of listed potential problems will be absent or manageable by discharge/transition of care (reference Labor (Cervical Ripen, Induct, Augment) (Adult,Obstetrics,Pediatric) CPG).   Epidural test dose given.
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
## 2017-03-27 NOTE — PLAN OF CARE
Problem: Labor (Cervical Ripen, Induct, Augment) (Adult,Obstetrics,Pediatric)  Goal: Signs and Symptoms of Listed Potential Problems Will be Absent or Manageable (Labor)  Signs and symptoms of listed potential problems will be absent or manageable by discharge/transition of care (reference Labor (Cervical Ripen, Induct, Augment) (Adult,Obstetrics,Pediatric) CPG).   Outcome: No Change  D: Vss, afebrile.FHT's are reassuring and reactive Pt states labor discomfort is manageable at the moment. Pt medicated with Fentanyl earlier in the day for discomfort and to promote rest. Pt has slept most of the day.P: Continue with oxytocin IOL. Pt may have epidural when requested.

## 2017-03-27 NOTE — PLAN OF CARE
Problem: Individualization  Goal: Patient Preferences  Outcome: Improving  Pt complaining of pain in her back from the contractions. Pt is asking for something more than vistaril for pain. Talked with Dr. Boston and received and order for PO 2mg of dilaudid every 6hrs. Pt declines a warm pack. Med given. Will cont to monitor.

## 2017-03-27 NOTE — ANESTHESIA PREPROCEDURE EVALUATION
Anesthesia Evaluation       history and physical reviewed . Pt has had prior anesthetic. Type: General and MAC    No history of anesthetic complications          ROS/MED HX    ENT/Pulmonary:     (+)tobacco use, Past use Intermittent asthma Treatment: Inhaler prn,  , . .    Neurologic:  - neg neurologic ROS   (+)migraines, other neuro fibromyalgia    Cardiovascular: Comment: IOL r/t HTN    (+) hypertension----. : . . . :. .       METS/Exercise Tolerance:     Hematologic:         Musculoskeletal:         GI/Hepatic:     (+) GERD Asymptomatic on medication,       Renal/Genitourinary:         Endo:     (+) Obesity, .      Psychiatric:     (+) psychiatric history other (comment) and anxiety      Infectious Disease:         Malignancy:         Other:    (+) Possibly pregnant C-spine cleared: N/A, H/O Chronic Pain,H/O chronic opiod use , no other significant disability                    Physical Exam  Normal systems: cardiovascular, pulmonary and dental    Airway   Mallampati: II  TM distance: > 3 FB  Neck ROM: full  Mouth opening: > 3 cm    Dental     Cardiovascular       Pulmonary           neg OB ROS                 Anesthesia Plan      History & Physical Review      ASA Status:  .  OB Epidural Asa: 2            Postoperative Care      Consents  Anesthetic plan, risks, benefits and alternatives discussed with:  Patient..                          .

## 2017-03-27 NOTE — TELEPHONE ENCOUNTER
Reason for Call:  Form, our goal is to have forms completed with 72 hours, however, some forms may require a visit or additional information.    Type of letter, form or note:  disability    Who is the form from?: Insurance comp    Where did the form come from: form was faxed in    What clinic location was the form placed at?: [facility]    Where the form was placed: Given to physician    What number is listed as a contact on the form?: [phone]         Additional comments: na      Call taken on 3/27/2017 at 10:48 AM by Alicia Espinosa

## 2017-03-27 NOTE — PLAN OF CARE
Problem: Labor (Cervical Ripen, Induct, Augment) (Adult,Obstetrics,Pediatric)  Goal: Signs and Symptoms of Listed Potential Problems Will be Absent or Manageable (Labor)  Signs and symptoms of listed potential problems will be absent or manageable by discharge/transition of care (reference Labor (Cervical Ripen, Induct, Augment) (Adult,Obstetrics,Pediatric) CPG).   Dr Bedolla at bedside and AROM performed at 1630 with small amount of clear, odorless fluid. Patient having increased pain afterward and small amount of bloody show.

## 2017-03-28 LAB — T PALLIDUM IGG+IGM SER QL: NEGATIVE

## 2017-03-28 PROCEDURE — 72200001 ZZH LABOR CARE VAGINAL DELIVERY SINGLE

## 2017-03-28 PROCEDURE — 25000132 ZZH RX MED GY IP 250 OP 250 PS 637: Performed by: OBSTETRICS & GYNECOLOGY

## 2017-03-28 PROCEDURE — 25000128 H RX IP 250 OP 636

## 2017-03-28 PROCEDURE — 25000128 H RX IP 250 OP 636: Performed by: NURSE ANESTHETIST, CERTIFIED REGISTERED

## 2017-03-28 PROCEDURE — 12000031 ZZH R&B OB CRITICAL

## 2017-03-28 PROCEDURE — 59410 OBSTETRICAL CARE: CPT | Performed by: OBSTETRICS & GYNECOLOGY

## 2017-03-28 PROCEDURE — 25000125 ZZHC RX 250: Performed by: OBSTETRICS & GYNECOLOGY

## 2017-03-28 PROCEDURE — 0HQ9XZZ REPAIR PERINEUM SKIN, EXTERNAL APPROACH: ICD-10-PCS | Performed by: OBSTETRICS & GYNECOLOGY

## 2017-03-28 PROCEDURE — 25800025 ZZH RX 258: Performed by: OBSTETRICS & GYNECOLOGY

## 2017-03-28 RX ORDER — ACETAMINOPHEN 325 MG/1
650 TABLET ORAL EVERY 4 HOURS PRN
Status: DISCONTINUED | OUTPATIENT
Start: 2017-03-28 | End: 2017-03-29 | Stop reason: HOSPADM

## 2017-03-28 RX ORDER — OXYTOCIN/0.9 % SODIUM CHLORIDE 30/500 ML
340 PLASTIC BAG, INJECTION (ML) INTRAVENOUS CONTINUOUS PRN
Status: DISCONTINUED | OUTPATIENT
Start: 2017-03-28 | End: 2017-03-29 | Stop reason: HOSPADM

## 2017-03-28 RX ORDER — AMOXICILLIN 250 MG
1-2 CAPSULE ORAL 2 TIMES DAILY
Status: DISCONTINUED | OUTPATIENT
Start: 2017-03-28 | End: 2017-03-29 | Stop reason: HOSPADM

## 2017-03-28 RX ORDER — LANOLIN 100 %
OINTMENT (GRAM) TOPICAL
Status: DISCONTINUED | OUTPATIENT
Start: 2017-03-28 | End: 2017-03-29 | Stop reason: HOSPADM

## 2017-03-28 RX ORDER — HYDROCORTISONE 2.5 %
CREAM (GRAM) TOPICAL 3 TIMES DAILY PRN
Status: DISCONTINUED | OUTPATIENT
Start: 2017-03-28 | End: 2017-03-29 | Stop reason: HOSPADM

## 2017-03-28 RX ORDER — NALOXONE HYDROCHLORIDE 0.4 MG/ML
.1-.4 INJECTION, SOLUTION INTRAMUSCULAR; INTRAVENOUS; SUBCUTANEOUS
Status: DISCONTINUED | OUTPATIENT
Start: 2017-03-28 | End: 2017-03-29 | Stop reason: HOSPADM

## 2017-03-28 RX ORDER — OXYTOCIN 10 [USP'U]/ML
10 INJECTION, SOLUTION INTRAMUSCULAR; INTRAVENOUS
Status: DISCONTINUED | OUTPATIENT
Start: 2017-03-28 | End: 2017-03-29 | Stop reason: HOSPADM

## 2017-03-28 RX ORDER — HYDROMORPHONE HYDROCHLORIDE 2 MG/1
2-4 TABLET ORAL
Status: DISCONTINUED | OUTPATIENT
Start: 2017-03-28 | End: 2017-03-29 | Stop reason: HOSPADM

## 2017-03-28 RX ORDER — MISOPROSTOL 200 UG/1
400 TABLET ORAL
Status: DISCONTINUED | OUTPATIENT
Start: 2017-03-28 | End: 2017-03-29 | Stop reason: HOSPADM

## 2017-03-28 RX ORDER — HYDROMORPHONE HYDROCHLORIDE 1 MG/ML
INJECTION, SOLUTION INTRAMUSCULAR; INTRAVENOUS; SUBCUTANEOUS
Status: COMPLETED
Start: 2017-03-28 | End: 2017-03-28

## 2017-03-28 RX ORDER — BISACODYL 10 MG
10 SUPPOSITORY, RECTAL RECTAL DAILY PRN
Status: DISCONTINUED | OUTPATIENT
Start: 2017-03-30 | End: 2017-03-29 | Stop reason: HOSPADM

## 2017-03-28 RX ORDER — OXYTOCIN/0.9 % SODIUM CHLORIDE 30/500 ML
100 PLASTIC BAG, INJECTION (ML) INTRAVENOUS CONTINUOUS
Status: DISCONTINUED | OUTPATIENT
Start: 2017-03-28 | End: 2017-03-29 | Stop reason: HOSPADM

## 2017-03-28 RX ORDER — IBUPROFEN 400 MG/1
400-800 TABLET, FILM COATED ORAL EVERY 6 HOURS PRN
Status: DISCONTINUED | OUTPATIENT
Start: 2017-03-28 | End: 2017-03-29 | Stop reason: HOSPADM

## 2017-03-28 RX ORDER — OXYCODONE HYDROCHLORIDE 5 MG/1
5-10 TABLET ORAL
Status: DISCONTINUED | OUTPATIENT
Start: 2017-03-28 | End: 2017-03-28

## 2017-03-28 RX ADMIN — SODIUM CHLORIDE, POTASSIUM CHLORIDE, SODIUM LACTATE AND CALCIUM CHLORIDE: 600; 310; 30; 20 INJECTION, SOLUTION INTRAVENOUS at 01:40

## 2017-03-28 RX ADMIN — Medication: at 08:05

## 2017-03-28 RX ADMIN — IBUPROFEN 800 MG: 800 TABLET ORAL at 11:36

## 2017-03-28 RX ADMIN — ACETAMINOPHEN 650 MG: 325 TABLET, FILM COATED ORAL at 01:41

## 2017-03-28 RX ADMIN — ACETAMINOPHEN 650 MG: 325 TABLET, FILM COATED ORAL at 20:01

## 2017-03-28 RX ADMIN — IBUPROFEN 800 MG: 400 TABLET ORAL at 17:55

## 2017-03-28 RX ADMIN — SENNOSIDES AND DOCUSATE SODIUM 1 TABLET: 8.6; 5 TABLET ORAL at 20:01

## 2017-03-28 RX ADMIN — FLUOXETINE 10 MG: 10 CAPSULE ORAL at 23:06

## 2017-03-28 RX ADMIN — HYDROMORPHONE HYDROCHLORIDE 2 MG: 2 TABLET ORAL at 21:07

## 2017-03-28 RX ADMIN — HYDROMORPHONE HYDROCHLORIDE 0.5 MG: 1 INJECTION, SOLUTION INTRAMUSCULAR; INTRAVENOUS; SUBCUTANEOUS at 10:42

## 2017-03-28 RX ADMIN — ACETAMINOPHEN 650 MG: 325 TABLET, FILM COATED ORAL at 07:37

## 2017-03-28 RX ADMIN — ONDANSETRON 4 MG: 2 INJECTION INTRAMUSCULAR; INTRAVENOUS at 01:29

## 2017-03-28 RX ADMIN — OXYTOCIN-SODIUM CHLORIDE 0.9% IV SOLN 30 UNIT/500ML 340 ML/HR: 30-0.9/5 SOLUTION at 11:22

## 2017-03-28 RX ADMIN — ACETAMINOPHEN 650 MG: 325 TABLET, FILM COATED ORAL at 14:06

## 2017-03-28 RX ADMIN — MISOPROSTOL 800 MCG: 200 TABLET ORAL at 10:33

## 2017-03-28 RX ADMIN — HYDROXYZINE HYDROCHLORIDE 50 MG: 25 TABLET ORAL at 00:58

## 2017-03-28 NOTE — PROVIDER NOTIFICATION
03/27/17 2030   Cervical Exam   Dilation 5   Effacement (%) 95   Station -1   Patient denies pain with epidural in place. Having contractions every 2-3 min and lasting  sec. FHT category 1, baseline 130. Patient turning side to side with peanut ball in place. Patient c/o anxiety at times, 1 dose vistaril given with some relief. Patient states she feels shaky and light headed like her blood sugar is low. Has been sipping on juice and chicken broth and now feeling less light headed. Mother and sister at bedside and supportive.

## 2017-03-28 NOTE — PROGRESS NOTES
Comfortable with epidural.  Starting to push  /78  Pulse 94  Temp 98.4  F (36.9  C) (Oral)  Resp 18  LMP 07/04/2016  SpO2 99%  TOCO: q 2-4 Pit @ 11  FHT: 140 Tier 1  SVE: c/c/+1 per RN    A/P 38w1d IOL cHTN, Stage 2    1. Begin pushing efforts as tolerated    Sol Wooten M.D.

## 2017-03-28 NOTE — PLAN OF CARE
Problem: Labor (Cervical Ripen, Induct, Augment) (Adult,Obstetrics,Pediatric)  Goal: Signs and Symptoms of Listed Potential Problems Will be Absent or Manageable (Labor)  Signs and symptoms of listed potential problems will be absent or manageable by discharge/transition of care (reference Labor (Cervical Ripen, Induct, Augment) (Adult,Obstetrics,Pediatric) CPG).   Outcome: Improving  Patient began to feel pressure at 0715 today.  Began to push at 0835.  Pushing epidural button.  FHR tracing continues to be reassuring  Category 1

## 2017-03-28 NOTE — L&D DELIVERY NOTE
21 year old  presented @ 38w1d to [facility] for IOL due to cHTN.     Stage 1: s/p cervidil and pitocin induction.  Normal labor progress .  Epidural for analgesia  Stage 2: (2h20m) excellent maternal effort.     of viable male, 7#2, Apgars 6/9  Placenta with 3vc: manual removal.  Uterus swept.  Mild bogginess of the lower uterine segment--misoprostol 800 mcg NM x 1  Lacerations: left primo-urethral into the left labia minora repaired with 3-0 vicryl  Mother and infant stable.    Sol Wooten M.D.    Delivery Summary    Zacarias Adam MRN# [id number]   Age: 21 year old YOB: 1995       Labor Event Times    Labor onset date:  3/27/17 Onset time:  10:40 PM   Dilation complete date:  3/28/17 Complete time:   8:00 AM   Start pushing date/time:  3/28/2017 0835            Labor Length    1st Stage (hrs):  9 (min):  20   2nd Stage (hrs):  2 (min):  20   3rd Stage (hrs):  0 (min):  7      Labor Events     labor?:  No   Labor Type:  Induction   Predominate monitoring during 1st stage:  continuous electronic fetal monitoring      Antibiotics received during labor?:  No      Rupture identifier:  Rupture 1   Rupture date/time: 3/27/17 1630   Rupture type:  Artificial Rupture of Membranes   Fluid color:  Clear      Induction:  Cervidil, Oxytocin, AROM   Induction date/time:     Cervical ripening date/time: 3/26/17    Indications for induction:  Hypertension      Delivery/Placenta Date and Time    Delivery Date:  3/28/17 Delivery Time:  10:20 AM   Placenta Date/Time:  3/28/2017 10:27 AM   Oxytocin given at the time of delivery:  after delivery of baby      Vaginal Counts    Initial count performed by 2 team members:   Two Team Members   DOMINICK Scott RN          [employer]   Initial counts 2  5    Added to count  1     Final counts 2 1 5       Placed during labor Accounted for at the end of labor   No No   No NA   Yes Yes      Final count performed by 2 team members:   Two Team  "Members   Sugar Villalpando         Final count correct?:  Yes         Apgars    Living status:  Yes    1 Minute 5 Minute 10 Minute 15 Minute 20 Minute   Skin color: 1  1       Heart rate: 2  2       Reflex irritability: 2  2       Muscle tone: 1  2       Respiratory effort: 0  2       Total: 6  9          Apgars assigned by:  ARCENIO BELL      Cord    Vessels:  3 Vessels Complications:  None   Cord Blood Disposition:  Lab Gases Sent?:  No         [place] Resuscitation    Methods:  Palomo Puff, NCPAP       Care at Delivery:  [place] for less than 30 seconds followed by 60 seconds of cpap   Output in Delivery Room:  Stool      [place] Measurements    Weight:  114.3 oz Length:  19\"   Head circumference:  0.343 m       Skin to Skin and Feeding Plan    Skin to skin initiation date/time: 3/28/17 1045   Skin to skin with:  Mother   Skin to skin end date/time: 3/28/17 1130   Reason skin to skin not initiated:  [place] Acuity   How do you plan to feed your baby:  Breastfeeding      Labor Events and Shoulder Dystocia    Fetal Tracing Prior to Delivery:  Category 1   Shoulder dystocia present?:  Neg            Delivery (Maternal) (Provider to Complete) (127590)    Episiotomy:  None   Perineal lacerations:  None    Periurethral laceration:  left Repaired?:  Yes   Vaginal laceration?:  No    Cervical laceration?:  No    Vaginal delivery est. blood loss (mL):  100         Mother's Information  Mother: Zacarias Adam #[id number]    Start of Mother's Information     IO Blood Loss  17 2240 - 17 1546    Mom's I/O Activity            End of Mother's Information  Mother: Zacarias Adam #[id number]            Delivery - Provider to Complete ([id number])    Delivering clinician:  JAQUELIN ORTIZ   Attempted Delivery Types (Choose all that apply):  Spontaneous Vaginal Delivery   Delivery Type (Choose the 1 that will go to the Birth History):  Vaginal, Spontaneous Delivery                     Other personnel:   Provider Role "   ARCENIO BELL Delivery Nurse   COTY PULIDO Charge Nurse            Placenta    Delayed Cord Clamping:  Done   Date/Time:  3/28/2017 10:27 AM   Removal:  Manual Removal   Comments:  avulsed cord.  needed misoprostol 800 mcg pr x 1   Disposition:  Hospital disposal      Anesthesia    Method:  Epidural   Cervical dilation at placement:  0-3         Presentation and Position    Presentation:  Vertex                    Sol Wooten MD

## 2017-03-28 NOTE — PROVIDER NOTIFICATION
03/28/17 0130   Provider Notification   Provider Name/Title Dr. Bedolla   Method of Notification In Department   Notification Reason Status Update     Pt 6-7cm, 85% effaced and 0 station, slight caput noted. Pt repositioned on right side with peanut ball. Category 1 FHT currently 130 moderate variability +accels and no decels noted, pt continues to contract every 3-4 minutes lasting 60-90 seconds palpating moderate. Pitocin continues to infuse, just turned up to 6mu. Pt is comfortable with her epidural, but she does have a persistent headache that started yesterday. Pt has been receiving tylenol for her headache. Vistaril and zofran given. Pt also had some jello, popsicle and more water along with a cool cloth for her head. Will reasses and keep MD updated.

## 2017-03-28 NOTE — PLAN OF CARE
Problem: Goal Outcome Summary  Goal: Goal Outcome Summary  Outcome: Improving  Pt's headache is now gone from the caffeine. Vitals remain stable. Pitocin up to 11 mu, pt SVE now 8-9cm, 95% effaced and +1 station. Category 1 FHT pt continues to contract every 2.5-4.5 minutes lasting 60 -110 seconds palpating moderate. Pt is comfortable with her epidural, she is having some lower back and perineum pressure. Pt has been pushing her epidural PCA button most of this shift.

## 2017-03-28 NOTE — PLAN OF CARE
Problem: Goal Outcome Summary  Goal: Goal Outcome Summary  Outcome: Improving  Pt resting comfortably and sleeping, declines turning at this point. Headache is gone after previous interventions. This writer continues to increase pitocin as able.

## 2017-03-28 NOTE — PLAN OF CARE
Problem: Labor (Cervical Ripen, Induct, Augment) (Adult,Obstetrics,Pediatric)  Goal: Signs and Symptoms of Listed Potential Problems Will be Absent or Manageable (Labor)  Signs and symptoms of listed potential problems will be absent or manageable by discharge/transition of care (reference Labor (Cervical Ripen, Induct, Augment) (Adult,Obstetrics,Pediatric) CPG).   Outcome: Completed Date Met:  03/28/17  Patient delivered vaginally today at 1020  She received cytotec rectally after delivery for heavy bleeding  Her uterus has remained firm and flow has been minimal.was was transferred to room 2048 today at 1230.  Providing skin to skin and is planning to breast feed her baby.  Understands log

## 2017-03-29 VITALS
SYSTOLIC BLOOD PRESSURE: 137 MMHG | OXYGEN SATURATION: 99 % | TEMPERATURE: 98 F | DIASTOLIC BLOOD PRESSURE: 85 MMHG | HEART RATE: 98 BPM | RESPIRATION RATE: 16 BRPM

## 2017-03-29 PROBLEM — D62 ACUTE BLOOD LOSS ANEMIA: Status: ACTIVE | Noted: 2017-03-29

## 2017-03-29 LAB — HGB BLD-MCNC: 8 G/DL (ref 11.7–15.7)

## 2017-03-29 PROCEDURE — 36415 COLL VENOUS BLD VENIPUNCTURE: CPT | Performed by: OBSTETRICS & GYNECOLOGY

## 2017-03-29 PROCEDURE — 85018 HEMOGLOBIN: CPT | Performed by: OBSTETRICS & GYNECOLOGY

## 2017-03-29 PROCEDURE — 25000132 ZZH RX MED GY IP 250 OP 250 PS 637: Performed by: OBSTETRICS & GYNECOLOGY

## 2017-03-29 RX ORDER — FERROUS GLUCONATE 324(38)MG
324 TABLET ORAL
Status: DISCONTINUED | OUTPATIENT
Start: 2017-03-29 | End: 2017-03-29 | Stop reason: HOSPADM

## 2017-03-29 RX ADMIN — FERROUS GLUCONATE 324 MG: 324 TABLET ORAL at 11:55

## 2017-03-29 RX ADMIN — IBUPROFEN 800 MG: 400 TABLET ORAL at 08:14

## 2017-03-29 RX ADMIN — SENNOSIDES AND DOCUSATE SODIUM 1 TABLET: 8.6; 5 TABLET ORAL at 08:14

## 2017-03-29 RX ADMIN — IBUPROFEN 800 MG: 400 TABLET ORAL at 00:54

## 2017-03-29 RX ADMIN — IBUPROFEN 800 MG: 400 TABLET ORAL at 14:55

## 2017-03-29 NOTE — PLAN OF CARE
Problem: Postpartum, Vaginal Delivery (Adult)  Goal: Signs and Symptoms of Listed Potential Problems Will be Absent or Manageable (Postpartum, Vaginal Delivery)  Signs and symptoms of listed potential problems will be absent or manageable by discharge/transition of care (reference Postpartum, Vaginal Delivery (Adult) CPG).   Outcome: Improving  Wants to go home  Education complete

## 2017-03-29 NOTE — PLAN OF CARE
Problem: Goal Outcome Summary  Goal: Goal Outcome Summary  Outcome: Adequate for Discharge Date Met:  03/29/17  Patient discharged per ambulatory with infant in car seat. Mother verified that her band matches her infant's band by comparing the infant's  MR#.  Discharge instructions given. Encouraged to call for any problems, questions or concerns.

## 2017-03-29 NOTE — PLAN OF CARE
Problem: Postpartum, Vaginal Delivery (Adult)  Goal: Signs and Symptoms of Listed Potential Problems Will be Absent or Manageable (Postpartum, Vaginal Delivery)  Signs and symptoms of listed potential problems will be absent or manageable by discharge/transition of care (reference Postpartum, Vaginal Delivery (Adult) CPG).   Outcome: Improving  Patient reporting that she is light headed and does not feel well today at 1000.  She has been worried baby has not nursed well yet and feels light headed and then her anxiety makes it worse.  She feels that she would benefit from taking an iron pill and increasing the stool softeners to 2 BID as this help her heal quicker and she will better be able to care for her baby  Also is overwhelmed and would benefit from someone watching her baby so she can rest  Iron pill ordered,discussed foods for higher iron  Family here now will hold baby so she can rest.  Encouragement provided  Will continue to support emotional needs and education

## 2017-03-29 NOTE — LACTATION NOTE
This note was copied from a baby's chart.  Mother requests to be discharged tonight. Tsb at 24 hours is in low intermediate risk. Baby still having difficulty latching or staying latched at breast.  Discussed with mother the importance of the frequency of feedings and milk removal.  Mother is to attempt to feed baby every 1-3 hours, if baby unable to latch, supplement with EBM. Hand out with amounts given to mother and she has been removing increasing amounts of colostrum.  Mother to record feedings on feeding log and bring to clinic tomorrow.

## 2017-03-29 NOTE — PLAN OF CARE
Problem: Postpartum, Vaginal Delivery (Adult)  Goal: Signs and Symptoms of Listed Potential Problems Will be Absent or Manageable (Postpartum, Vaginal Delivery)  Signs and symptoms of listed potential problems will be absent or manageable by discharge/transition of care (reference Postpartum, Vaginal Delivery (Adult) CPG).   Data: Vital signs within normal limits. Postpartum checks within normal limits - see flow record. Patient eating and drinking normally. Patient able to empty bladder independently and is up ambulating. No apparent signs of infection. Perineum healing well. Patient performing self cares and is able to care for infant.  Action: Patient medicated during the shift for pain and cramping. See MAR. Patient reassessed within 1 hour after each medication and pain was slightly improved - patient reluctant to take narcotic pain medication but was willing to take PO dilaudid, order placed. Patient has had bad reactions to other narcotics in the past including oxycodone and vicodin. Patient education done about  cares, breastfeeding,  expectations, pain management. See flow record. Infant having breastfeeding difficulties, is sleepy and not latching well. Mother advised to start pumping/hand expressing and was able to express 1cc of colostrum and fed to infant. Patient becoming teary eyed at times and feeling anxious. Emotional support offered.   Response: Positive attachment behaviors observed with infant. Support persons 1 present.   Plan: Anticipate discharge on 3/30/17.

## 2017-03-29 NOTE — PROGRESS NOTES
PPD # 1    S: pain cotrnolled, voiding freely.  O: /83  Pulse 98  Temp 98.2  F (36.8  C) (Oral)  Resp 18  LMP 2016  SpO2 99%  Breastfeeding? Unknown   NAD  Abd: soft, nontender, fundus firm  Ext: calves nontender    Hemoglobin   Date Value Ref Range Status   2017 8.0 (L) 11.7 - 15.7 g/dL Final     A/P PPD # 1 s/p , acute on chronic anemia    Routine PP care.  Encouraged high iron diet.     Sol Wooten M.D.

## 2017-03-29 NOTE — PLAN OF CARE
Problem: Breastfeeding (Adult)  Intervention: Support Exclusive Breastfeeding Success  VSS. Pain well controlled with oral medications. Patient had some nausea this shift which improved when eating snack. Patient breastfeeding baby with moderate assistance from staff. Baby will latch but only for a few sucks. Mom pumping after breastfeeding attempts and finger feeding baby. Encouraging mom to sleep when baby sleeps this shift. Mom is attentive and loving to baby.

## 2017-03-29 NOTE — LACTATION NOTE
This note was copied from a baby's chart.  Assisted with positioning and latch.  Baby still sleepy, able to get shallows latches.  Mother able to hand express drops at breast.  Baby has voided and stooled, weight loss at 4%. Will continue to attempt feedings every 2-3 hours and on cue, and hand express and dropper feed if baby unable to latch. Will continue to assist as needed.

## 2017-04-04 ENCOUNTER — TELEPHONE (OUTPATIENT)
Dept: OBGYN | Facility: CLINIC | Age: 22
End: 2017-04-04

## 2017-04-04 NOTE — DISCHARGE SUMMARY
[facility] Discharge Summary    Zacarias Adam MRN# [id number]   Age: 21 year old YOB: 1995     Date of Admission:  3/26/2017  Date of Discharge::  2017  Admitting Physician:  Sol Wooten MD  Discharge Physician:  Sol Wooten MD     Home clinic: [facility]          Admission Diagnoses:   Intrauterine pregnancy at 38w1d  weeks gestation  Chronic hypertension          Discharge Diagnosis:   Normal spontaneous vaginal delivery  Intrauterine pregnancy at 38w1d  weeks gestation  Chronic hypertension          Procedures:   Procedure(s): No additional procedures performed       No other procedures performed during this admission           Medications Prior to Admission:     No prescriptions prior to admission.             Discharge Medications:     Discharge Medication List as of 3/29/2017  4:03 PM      CONTINUE these medications which have NOT CHANGED    Details   cetirizine (ZYRTEC) 10 MG tablet Take 1 tablet (10 mg) by mouth every evening, Disp-90 tablet, R-1, E-Prescribe      FLUoxetine (PROZAC) 10 MG capsule Take 1 capsule (10 mg) by mouth daily, Disp-30 capsule, R-1, E-Prescribe      order for DME Equipment being ordered: Breast PumpDisp-1 Device, R-0, Local Print      Cholecalciferol (VITAMIN D) 2000 UNITS tablet Take 2,000 Units by mouth daily, Disp-100 tablet, R-3, E-Prescribe      albuterol (VENTOLIN HFA) 108 (90 BASE) MCG/ACT inhaler Inhale 2 puffs into the lungs every 4 hours as needed, Disp-2 Inhaler, R-1, E-Prescribe                   Consultations:   No consultations were requested during this admission          Brief History of Labor:   21 year old  presented @ 38w1d to [facility] for IOL due to cHTN.      Stage 1: s/p cervidil and pitocin induction. Normal labor progress . Epidural for analgesia  Stage 2: (2h20m) excellent maternal effort.      of viable male, 7#2, Apgars 6/9  Placenta with 3vc: manual removal. Uterus swept. Mild bogginess of the lower  uterine segment--misoprostol 800 mcg MA x 1  Lacerations: left rpimo-urethral into the left labia minora repaired with 3-0 vicryl  Mother and infant stable.           Hospital Course:   The patient's hospital course was unremarkable.  On discharge, her pain was well controlled. Vaginal bleeding is similar to peak menstrual flow.  Voiding without difficulty.  Ambulating well and tolerating a normal diet.  No fever.  Breastfeeding well.  Infant is stable.  No bowel movement yet.*  She was discharged on post-partum day #1.    Post-partum hemoglobin:   Hemoglobin   Date Value Ref Range Status   03/29/2017 8.0 (L) 11.7 - 15.7 g/dL Final             Discharge Instructions and Follow-Up:   Discharge diet: Regular   Discharge activity: Activity as tolerated   Discharge follow-up: Follow up with OB clinic in 6 weeks   Wound care: Drink plenty of fluids  Ice to area for comfort           Discharge Disposition:   Discharged to home      Attestation:  I have reviewed today's vital signs, notes, medications, labs and imaging.    Sol Wooten MD

## 2017-04-05 ENCOUNTER — OFFICE VISIT (OUTPATIENT)
Dept: OBGYN | Facility: CLINIC | Age: 22
End: 2017-04-05
Payer: COMMERCIAL

## 2017-04-05 VITALS
DIASTOLIC BLOOD PRESSURE: 75 MMHG | SYSTOLIC BLOOD PRESSURE: 137 MMHG | HEIGHT: 67 IN | TEMPERATURE: 98.6 F | WEIGHT: 173.8 LBS | HEART RATE: 111 BPM | BODY MASS INDEX: 27.28 KG/M2

## 2017-04-05 DIAGNOSIS — K64.4 EXTERNAL HEMORRHOIDS: Primary | ICD-10-CM

## 2017-04-05 PROCEDURE — 99207 ZZC NO BILLABLE SERVICE THIS VISIT: CPT | Performed by: OBSTETRICS & GYNECOLOGY

## 2017-04-05 NOTE — MR AVS SNAPSHOT
"              After Visit Summary   4/5/2017    Zacarias Adam    MRN: [id number]           Patient Information     Date Of Birth          1995        Visit Information        Provider Department      4/5/2017 1:30 PM Sol Wooten MD [facility]        Today's Diagnoses     External hemorrhoids    -  1       Follow-ups after your visit        Your next 10 appointments already scheduled     May 12, 2017  1:30 PM CDT   Post Partum with Sol Wooten MD   [facility]    [address] [address] [phone]              Who to contact     If you have questions or need follow up information about today's clinic visit or your schedule please contact [facility] directly at [phone].  Normal or non-critical lab and imaging results will be communicated to you by MyChart, letter or phone within 4 business days after the clinic has received the results. If you do not hear from us within 7 days, please contact the clinic through MyChart or phone. If you have a critical or abnormal lab result, we will notify you by phone as soon as possible.  Submit refill requests through [employer] or call your pharmacy and they will forward the refill request to us. Please allow 3 business days for your refill to be completed.          Additional Information About Your Visit        MyChart Information     [employer] lets you send messages to your doctor, view your test results, renew your prescriptions, schedule appointments and more. To sign up, go to [email or web address] . Click on \"Log in\" on the left side of the screen, which will take you to the Welcome page. Then click on \"Sign up Now\" on the right side of the page.     You will be asked to enter the access code listed below, as well as some personal information. Please follow the directions to create your username and password.     Your access code is: [email or web address]  Expires: " "2017  6:10 PM     Your access code will  in 90 days. If you need help or a new code, please call your [facility] or [phone].        Care EveryWhere ID     This is your Care EveryWhere ID. This could be used by other organizations to access your [place] medical records  [id number]        Your Vitals Were     Pulse Temperature Height Last Period Breastfeeding? BMI (Body Mass Index)    111 98.6  F (37  C) (Oral) 5' 6.5\" (1.689 m) 2016 Yes 27.63 kg/m2       Blood Pressure from Last 3 Encounters:   17 137/75   17 137/85   17 137/84    Weight from Last 3 Encounters:   17 173 lb 12.8 oz (78.8 kg)   17 192 lb 12.8 oz (87.5 kg)   17 190 lb (86.2 kg)              Today, you had the following     No orders found for display         Today's Medication Changes          These changes are accurate as of: 17  2:01 PM.  If you have any questions, ask your nurse or doctor.               Start taking these medicines.        Dose/Directions    hydrocortisone 2.5 % cream   Commonly known as:  ANUSOL-HC   Used for:  External hemorrhoids   Started by:  Sol Wooten MD        Place rectally 2 times daily   Quantity:  30 g   Refills:  0            Where to get your medicines      These medications were sent to [facility] [address] [place] [address] AT [facility] OF [address] [place] [address]  [address] [facility] [address]     Phone:  [phone]     hydrocortisone 2.5 % cream                Primary Care Provider Office Phone # Fax #    Yudi Sauceda [phone]       [address] [address]        Thank you!     Thank you for choosing [facility]  for your care. Our goal is always to provide you with excellent care. Hearing back from our patients is one way we can continue to improve our services. Please take a few minutes to complete the written survey that you " may receive in the mail after your visit with us. Thank you!             Your Updated Medication List - Protect others around you: Learn how to safely use, store and throw away your medicines at www.disposemymeds.org.          This list is accurate as of: 4/5/17  2:01 PM.  Always use your most recent med list.                   Brand Name Dispense Instructions for use    albuterol 108 (90 BASE) MCG/ACT Inhaler    VENTOLIN HFA    2 Inhaler    Inhale 2 puffs into the lungs every 4 hours as needed       cetirizine 10 MG tablet    zyrTEC    90 tablet    Take 1 tablet (10 mg) by mouth every evening       FLUoxetine 10 MG capsule    PROzac    30 capsule    Take 1 capsule (10 mg) by mouth daily       hydrocortisone 2.5 % cream    ANUSOL-HC    30 g    Place rectally 2 times daily       order for DME     1 Device    Equipment being ordered: Breast Pump       vitamin D 2000 UNITS tablet     100 tablet    Take 2,000 Units by mouth daily

## 2017-04-05 NOTE — Clinical Note
Please don't charge for this visit.  I only prescribed hemorrhoid cream, which I would have over the phone.

## 2017-04-05 NOTE — NURSING NOTE
"Chief Complaint   Patient presents with     Post Partum Exam     hemorroids, delievered 3/28/17       Initial /75 (BP Location: Left arm, Patient Position: Chair, Cuff Size: Adult Regular)  Pulse 111  Temp 98.6  F (37  C) (Oral)  Ht 5' 6.5\" (1.689 m)  Wt 173 lb 12.8 oz (78.8 kg)  LMP 07/04/2016  Breastfeeding? Yes  BMI 27.63 kg/m2 Estimated body mass index is 27.63 kg/(m^2) as calculated from the following:    Height as of this encounter: 5' 6.5\" (1.689 m).    Weight as of this encounter: 173 lb 12.8 oz (78.8 kg).  Medication Reconciliation: complete   Sasha Conley, [facility]      "

## 2017-04-06 ASSESSMENT — PATIENT HEALTH QUESTIONNAIRE - PHQ9: SUM OF ALL RESPONSES TO PHQ QUESTIONS 1-9: 1

## 2017-05-12 ENCOUNTER — PRENATAL OFFICE VISIT (OUTPATIENT)
Dept: OBGYN | Facility: CLINIC | Age: 22
End: 2017-05-12
Payer: COMMERCIAL

## 2017-05-12 VITALS
HEIGHT: 67 IN | BODY MASS INDEX: 26.53 KG/M2 | DIASTOLIC BLOOD PRESSURE: 72 MMHG | HEART RATE: 102 BPM | SYSTOLIC BLOOD PRESSURE: 134 MMHG | WEIGHT: 169 LBS

## 2017-05-12 PROBLEM — O13.9 PREGNANCY INDUCED HYPERTENSION: Status: RESOLVED | Noted: 2017-03-27 | Resolved: 2017-05-12

## 2017-05-12 PROBLEM — D62 ACUTE BLOOD LOSS ANEMIA: Status: RESOLVED | Noted: 2017-03-29 | Resolved: 2017-05-12

## 2017-05-12 PROBLEM — G56.01 CARPAL TUNNEL SYNDROME OF RIGHT WRIST: Status: RESOLVED | Noted: 2017-03-01 | Resolved: 2017-05-12

## 2017-05-12 PROCEDURE — 99207 ZZC POST PARTUM EXAM: CPT | Performed by: OBSTETRICS & GYNECOLOGY

## 2017-05-12 NOTE — NURSING NOTE
"Initial /72 (BP Location: Left arm, Patient Position: Chair, Cuff Size: Adult Regular)  Pulse 102  Ht 5' 6.5\" (1.689 m)  Wt 169 lb (76.7 kg)  LMP 07/04/2016  Breastfeeding? Yes  BMI 26.87 kg/m2 Estimated body mass index is 26.87 kg/(m^2) as calculated from the following:    Height as of this encounter: 5' 6.5\" (1.689 m).    Weight as of this encounter: 169 lb (76.7 kg). .    Heaven Blankenship    "

## 2017-05-12 NOTE — MR AVS SNAPSHOT
"              After Visit Summary   2017    Zacarias Adam    MRN: [id number]           Patient Information     Date Of Birth          1995        Visit Information        Provider Department      2017 1:30 PM Sol Wooten MD [facility]        Today's Diagnoses     Routine postpartum follow-up    -  1       Follow-ups after your visit        Who to contact     If you have questions or need follow up information about today's clinic visit or your schedule please contact [facility] directly at [phone].  Normal or non-critical lab and imaging results will be communicated to you by Hakiahart, letter or phone within 4 business days after the clinic has received the results. If you do not hear from us within 7 days, please contact the clinic through Hakiahart or phone. If you have a critical or abnormal lab result, we will notify you by phone as soon as possible.  Submit refill requests through [employer] or call your pharmacy and they will forward the refill request to us. Please allow 3 business days for your refill to be completed.          Additional Information About Your Visit        MyChart Information     [employer] lets you send messages to your doctor, view your test results, renew your prescriptions, schedule appointments and more. To sign up, go to [email or web address] . Click on \"Log in\" on the left side of the screen, which will take you to the Welcome page. Then click on \"Sign up Now\" on the right side of the page.     You will be asked to enter the access code listed below, as well as some personal information. Please follow the directions to create your username and password.     Your access code is: [email or web address]  Expires: 8/10/2017  2:01 PM     Your access code will  in 90 days. If you need help or a new code, please call your [facility] or [phone].        Care EveryWhere ID     This is your Care EveryWhere ID. This could be used by other " "organizations to access your [place] medical records  [id number]        Your Vitals Were     Pulse Height Last Period Breastfeeding? BMI (Body Mass Index)       102 5' 6.5\" (1.689 m) 07/04/2016 Yes 26.87 kg/m2        Blood Pressure from Last 3 Encounters:   05/12/17 134/72   04/05/17 137/75   03/29/17 137/85    Weight from Last 3 Encounters:   05/12/17 169 lb (76.7 kg)   04/05/17 173 lb 12.8 oz (78.8 kg)   03/24/17 192 lb 12.8 oz (87.5 kg)              Today, you had the following     No orders found for display       Primary Care Provider Office Phone # Fax #    Yudi Sauceda [phone]       [facility] [address] [place] [address]        Thank you!     Thank you for choosing [facility]  for your care. Our goal is always to provide you with excellent care. Hearing back from our patients is one way we can continue to improve our services. Please take a few minutes to complete the written survey that you may receive in the mail after your visit with us. Thank you!             Your Updated Medication List - Protect others around you: Learn how to safely use, store and throw away your medicines at www.disposemymeds.org.          This list is accurate as of: 5/12/17  2:01 PM.  Always use your most recent med list.                   Brand Name Dispense Instructions for use    albuterol 108 (90 BASE) MCG/ACT Inhaler    VENTOLIN HFA    2 Inhaler    Inhale 2 puffs into the lungs every 4 hours as needed       FLUoxetine 10 MG capsule    PROzac    30 capsule    Take 1 capsule (10 mg) by mouth daily       hydrocortisone 2.5 % cream    ANUSOL-HC    30 g    Place rectally 2 times daily       order for DME     1 Device    Equipment being ordered: Breast Pump       vitamin D 2000 UNITS tablet     100 tablet    Take 2,000 Units by mouth daily         "
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
## 2017-05-13 ASSESSMENT — PATIENT HEALTH QUESTIONNAIRE - PHQ9: SUM OF ALL RESPONSES TO PHQ QUESTIONS 1-9: 1

## 2017-05-19 ENCOUNTER — TELEPHONE (OUTPATIENT)
Dept: OBGYN | Facility: CLINIC | Age: 22
End: 2017-05-19

## 2017-05-19 DIAGNOSIS — Z30.011 BCP (BIRTH CONTROL PILLS) INITIATION: Primary | ICD-10-CM

## 2017-05-19 DIAGNOSIS — N80.9 ENDOMETRIOSIS: ICD-10-CM

## 2017-05-19 NOTE — TELEPHONE ENCOUNTER
Pt calling to get a RX for birth control.  Pt recently delivered and at her 6 week postpartum visit she was undecided what she wanted to do for birth control and she would like the pills.    Please advise -    Alicia Espinosa  Clinic Station

## 2017-05-19 NOTE — TELEPHONE ENCOUNTER
Medication ordered for patient.  I used what she was on before.  It was previously discontinued due to her migraines, but I do not normally discontinue oral contraceptive pills due to migraine history.  If patient desires different oral contraceptive pills from the one I prescribed, let me know    Sol Wooten M.D.

## 2017-05-22 NOTE — TELEPHONE ENCOUNTER
Called only phone number listed and message indicates the mail box is full.      Carol Ann Rice  [facility] RN

## 2017-05-23 NOTE — TELEPHONE ENCOUNTER
Pt notified of below.  Pt reports understanding.  Pt does not have further questions or concerns.    Johnna Fairbanks   [facility]  RN

## 2017-09-16 ENCOUNTER — HEALTH MAINTENANCE LETTER (OUTPATIENT)
Age: 22
End: 2017-09-16

## 2017-11-27 ENCOUNTER — HOSPITAL ENCOUNTER (EMERGENCY)
Facility: CLINIC | Age: 22
Discharge: HOME | End: 2017-11-27
Attending: PHYSICIAN ASSISTANT | Admitting: PHYSICIAN ASSISTANT

## 2017-11-27 VITALS
HEART RATE: 109 BPM | SYSTOLIC BLOOD PRESSURE: 143 MMHG | DIASTOLIC BLOOD PRESSURE: 92 MMHG | OXYGEN SATURATION: 99 % | TEMPERATURE: 99.1 F

## 2017-11-27 DIAGNOSIS — J06.9 VIRAL URI WITH COUGH: ICD-10-CM

## 2017-11-27 DIAGNOSIS — R07.0 THROAT PAIN: ICD-10-CM

## 2017-11-27 LAB
INTERNAL QC OK POCT: YES
S PYO AG THROAT QL IA.RAPID: NEGATIVE

## 2017-11-27 PROCEDURE — 87880 STREP A ASSAY W/OPTIC: CPT | Performed by: PHYSICIAN ASSISTANT

## 2017-11-27 PROCEDURE — 87081 CULTURE SCREEN ONLY: CPT | Performed by: PHYSICIAN ASSISTANT

## 2017-11-27 PROCEDURE — 99213 OFFICE O/P EST LOW 20 MIN: CPT | Performed by: PHYSICIAN ASSISTANT

## 2017-11-27 PROCEDURE — 99213 OFFICE O/P EST LOW 20 MIN: CPT

## 2017-11-27 ASSESSMENT — ENCOUNTER SYMPTOMS
ACTIVITY CHANGE: 0
WHEEZING: 0
EYE PAIN: 0
NECK STIFFNESS: 0
CONSTIPATION: 0
SORE THROAT: 1
APPETITE CHANGE: 1
MYALGIAS: 1
COUGH: 1
NAUSEA: 0
ABDOMINAL PAIN: 0
PALPITATIONS: 0
CHILLS: 1
HEADACHES: 1
SINUS PAIN: 1
DIARRHEA: 0
NECK PAIN: 0
FATIGUE: 1
FEVER: 0

## 2017-11-27 NOTE — DISCHARGE INSTRUCTIONS
Reassurance given to patient.  No evidence for bacterial etiology.  Patient informed to rest, warm compresses over sinuses as needed, stay hydrated, cool humidifier, salt water gargles.  Lots of rest and fluids.  Tylenol or ibuprofen as needed.  OTC cough/cold product of patient's choice PRN and fluids and rest  Throat culture pending.   Return if any worsening symptoms or if not improving.  Go to Emergency Room if SOB, chest pain, painful breathing, worst headache of life, active abdominal pain, or fevers > 104F occur.     Patient voiced understanding of instructions given.
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
## 2017-11-27 NOTE — ED AVS SNAPSHOT
[facility] Emergency Department    [address] [address]    Phone:  [phone]    Fax:  [phone]                                       Zacarias Adam   MRN: [id number]    Department:  [facility] Emergency Department   Date of Visit:  11/27/2017           After Visit Summary Signature Page     I have received my discharge instructions, and my questions have been answered. I have discussed any challenges I see with this plan with the nurse or doctor.    ..........................................................................................................................................  Patient/Patient Representative Signature      ..........................................................................................................................................  Patient Representative Print Name and Relationship to Patient    ..................................................               ................................................  Date                                            Time    ..........................................................................................................................................  Reviewed by Signature/Title    ...................................................              ..............................................  Date                                                            Time

## 2017-11-27 NOTE — ED AVS SNAPSHOT
[facility] Emergency Department    [address] [address]    Phone:  [phone]    Fax:  [phone]                                       Zacarias Adam   MRN: [id number]    Department:  [facility] Emergency Department   Date of Visit:  11/27/2017           Patient Information     Date Of Birth          1995        Your diagnoses for this visit were:     Viral URI with cough     Throat pain        You were seen by Jaquelin Muñiz PA-C.      Follow-up Information     Please follow up.    Why:  As needed, If symptoms worsen        Discharge Instructions       Reassurance given to patient.  No evidence for bacterial etiology.  Patient informed to rest, warm compresses over sinuses as needed, stay hydrated, cool humidifier, salt water gargles.  Lots of rest and fluids.  Tylenol or ibuprofen as needed.  OTC cough/cold product of patient's choice PRN and fluids and rest  Throat culture pending.   Return if any worsening symptoms or if not improving.  Go to Emergency Room if SOB, chest pain, painful breathing, worst headache of life, active abdominal pain, or fevers > 104F occur.     Patient voiced understanding of instructions given.         24 Hour Appointment Hotline       To make an appointment at any [facility], call [phone] ([phone]). If you don't have a family doctor or clinic, we will help you find one. [place] clinics are conveniently located to serve the needs of you and your family.             Review of your medicines      Our records show that you are taking the medicines listed below. If these are incorrect, please call your family doctor or clinic.        Dose / Directions Last dose taken    albuterol 108 (90 BASE) MCG/ACT Inhaler   Commonly known as:  VENTOLIN HFA   Dose:  2 puff   Quantity:  2 Inhaler        Inhale 2 puffs into the lungs every 4 hours as needed   Refills:  1        FLUoxetine 10 MG capsule   Commonly known as:  PROzac   Dose:   10 mg   Quantity:  30 capsule        Take 1 capsule (10 mg) by mouth daily   Refills:  1        hydrocortisone 2.5 % cream   Commonly known as:  ANUSOL-HC   Quantity:  30 g        Place rectally 2 times daily   Refills:  0        norgestrel-ethinyl estradiol 0.3-30 MG-MCG per tablet   Commonly known as:  LO/OVRAL   Dose:  1 tablet   Quantity:  84 tablet        Take 1 tablet by mouth daily Take continuously without taking placebo week   Refills:  3        order for DME   Quantity:  1 Device        Equipment being ordered: Breast Pump   Refills:  0        vitamin D 2000 UNITS tablet   Dose:  2000 Units   Quantity:  100 tablet        Take 2,000 Units by mouth daily   Refills:  3                Procedures and tests performed during your visit     Beta strep group A r/o culture    Rapid strep group A screen POCT      Orders Needing Specimen Collection     None      Pending Results     No orders found from 11/25/2017 to 11/28/2017.            Pending Culture Results     No orders found from 11/25/2017 to 11/28/2017.            Pending Results Instructions     If you had any lab results that were not finalized at the time of your Discharge, you can call the ED Lab Result RN at [phone]. You will be contacted by this team for any positive Lab results or changes in treatment. The nurses are available 7 days a week from 10A to 6:30P.  You can leave a message 24 hours per day and they will return your call.        Test Results From Your Hospital Stay        11/27/2017  5:27 PM      Component Results     Component Value Ref Range & Units Status    Rapid Strep A Screen NEGATIVE neg Final    Internal QC OK Yes  Final                Thank you for choosing [facility]       Thank you for choosing [facility] for your care. Our goal is always to provide you with excellent care. Hearing back from our patients is one way we can continue to improve our services. Please take a few minutes to complete the written survey that you may  "receive in the mail after you visit with us. Thank you!        [employer] Information     [employer] lets you send messages to your doctor, view your test results, renew your prescriptions, schedule appointments and more. To sign up, go to [email or web address] . Click on \"Log in\" on the left side of the screen, which will take you to the Welcome page. Then click on \"Sign up Now\" on the right side of the page.     You will be asked to enter the access code listed below, as well as some personal information. Please follow the directions to create your username and password.     Your access code is: [email or web address]  Expires: 2018  5:48 PM     Your access code will  in 90 days. If you need help or a new code, please call your [place] clinic or [phone].        Care EveryWhere ID     This is your Care EveryWhere ID. This could be used by other organizations to access your [place] medical records  [id number]        Equal Access to Services     [facility] : Hadii aad ku hadasho Sojenaroali, waaxda luqadaha, qaybta kaalmada adeegyadestini, flora gr . So [place] [phone].    ATENCIÓN: Si habla español, tiene a aleman disposición servicios gratuitos de asistencia lingüística. Llame al [phone].    We comply with applicable federal civil rights laws and [place] laws. We do not discriminate on the basis of race, color, national origin, age, disability, sex, sexual orientation, or gender identity.            After Visit Summary       This is your record. Keep this with you and show to your community pharmacist(s) and doctor(s) at your next visit.                  "

## 2017-11-29 LAB
BACTERIA SPEC CULT: NORMAL
SPECIMEN SOURCE: NORMAL

## 2018-04-05 ENCOUNTER — APPOINTMENT (OUTPATIENT)
Dept: OBGYN | Facility: CLINIC | Age: 23
End: 2018-04-05

## 2018-04-05 ENCOUNTER — PRENATAL OFFICE VISIT (OUTPATIENT)
Dept: OBGYN | Facility: CLINIC | Age: 23
End: 2018-04-05

## 2018-04-05 DIAGNOSIS — Z34.80 PRENATAL CARE, SUBSEQUENT PREGNANCY: Primary | ICD-10-CM

## 2018-04-05 PROCEDURE — 99207 ZZC NO CHARGE NURSE ONLY: CPT | Performed by: OBSTETRICS & GYNECOLOGY

## 2018-04-05 RX ORDER — PRENATAL VIT/IRON FUM/FOLIC AC 27MG-0.8MG
1 TABLET ORAL DAILY
COMMUNITY
Start: 2018-03-29 | End: 2018-11-21

## 2018-04-05 NOTE — MR AVS SNAPSHOT
"              After Visit Summary   4/5/2018    Zacarias Adam    MRN: [id number]           Patient Information     Date Of Birth          1995        Visit Information        Provider Department      4/5/2018 6:14 PM Shaista Bedolla MD [facility]        Today's Diagnoses     Prenatal care, subsequent pregnancy    -  1       Follow-ups after your visit        Your next 10 appointments already scheduled     Apr 20, 2018 10:00 AM CDT   New Prenatal with Shaista Bedolla MD, [facility] 1   [facility] ([facility])    [address] [address]   [phone]              Who to contact     If you have questions or need follow up information about today's clinic visit or your schedule please contact [facility] directly at [phone].  Normal or non-critical lab and imaging results will be communicated to you by MyChart, letter or phone within 4 business days after the clinic has received the results. If you do not hear from us within 7 days, please contact the clinic through MyChart or phone. If you have a critical or abnormal lab result, we will notify you by phone as soon as possible.  Submit refill requests through Fridge or call your pharmacy and they will forward the refill request to us. Please allow 3 business days for your refill to be completed.          Additional Information About Your Visit        MyChart Information     Fridge lets you send messages to your doctor, view your test results, renew your prescriptions, schedule appointments and more. To sign up, go to [email or web address] . Click on \"Log in\" on the left side of the screen, which will take you to the Welcome page. Then click on \"Sign up Now\" on the right side of the page.     You will be asked to enter the access code listed below, as well as some personal information. Please follow the directions to create your username and password.   "   Your access code is: [email or web address]  Expires: 2018  6:41 PM     Your access code will  in 90 days. If you need help or a new code, please call your [place] clinic or [phone].        Care EveryWhere ID     This is your Care EveryWhere ID. This could be used by other organizations to access your [place] medical records  [id number]        Your Vitals Were     Last Period                   2018            Blood Pressure from Last 3 Encounters:   17 (!) 143/92   17 134/72   17 137/75    Weight from Last 3 Encounters:   17 76.7 kg (169 lb)   17 78.8 kg (173 lb 12.8 oz)   17 87.5 kg (192 lb 12.8 oz)              Today, you had the following     No orders found for display       Primary Care Provider Office Phone # Fax #    Yudi Sauceda [phone] [address] [place], [place] [id number]        Equal Access to Services     [facility]: Hadii aad ku hadasho Soomaali, waaxda luqadaha, qaybta kaalmada adeegyadestini, flora gr . So [facility] [phone].    ATENCIÓN: Si habla español, tiene a aleman disposición servicios gratuitos de asistencia lingüística. Llame al [phone].    We comply with applicable federal civil rights laws and [place] laws. We do not discriminate on the basis of race, color, national origin, age, disability, sex, sexual orientation, or gender identity.            Thank you!     Thank you for choosing [facility]  for your care. Our goal is always to provide you with excellent care. Hearing back from our patients is one way we can continue to improve our services. Please take a few minutes to complete the written survey that you may receive in the mail after your visit with us. Thank you!             Your Updated Medication List - Protect others around you: Learn how to safely use, store and throw away your medicines at www.disposemymeds.org.          This list is accurate as of  4/5/18  6:41 PM.  Always use your most recent med list.                   Brand Name Dispense Instructions for use Diagnosis    prenatal multivitamin plus iron 27-0.8 MG Tabs per tablet      Take 1 tablet by mouth daily

## 2018-04-25 ENCOUNTER — HOSPITAL ENCOUNTER (EMERGENCY)
Facility: CLINIC | Age: 23
Discharge: HOME | End: 2018-04-25
Attending: NURSE PRACTITIONER | Admitting: NURSE PRACTITIONER
Payer: MEDICAID

## 2018-04-25 VITALS
RESPIRATION RATE: 16 BRPM | TEMPERATURE: 98 F | WEIGHT: 177 LBS | OXYGEN SATURATION: 99 % | SYSTOLIC BLOOD PRESSURE: 114 MMHG | HEIGHT: 67 IN | DIASTOLIC BLOOD PRESSURE: 65 MMHG | BODY MASS INDEX: 27.78 KG/M2

## 2018-04-25 DIAGNOSIS — R55 NEAR SYNCOPE: ICD-10-CM

## 2018-04-25 LAB
ALBUMIN SERPL-MCNC: 4 G/DL (ref 3.4–5)
ALBUMIN UR-MCNC: NEGATIVE MG/DL
ALP SERPL-CCNC: 85 U/L (ref 40–150)
ALT SERPL W P-5'-P-CCNC: 22 U/L (ref 0–50)
ANION GAP SERPL CALCULATED.3IONS-SCNC: 6 MMOL/L (ref 3–14)
APPEARANCE UR: CLEAR
AST SERPL W P-5'-P-CCNC: 9 U/L (ref 0–45)
BASOPHILS # BLD AUTO: 0 10E9/L (ref 0–0.2)
BASOPHILS NFR BLD AUTO: 0.2 %
BILIRUB SERPL-MCNC: 0.2 MG/DL (ref 0.2–1.3)
BILIRUB UR QL STRIP: NEGATIVE
BUN SERPL-MCNC: 9 MG/DL (ref 7–30)
CALCIUM SERPL-MCNC: 8.8 MG/DL (ref 8.5–10.1)
CHLORIDE SERPL-SCNC: 104 MMOL/L (ref 94–109)
CO2 SERPL-SCNC: 27 MMOL/L (ref 20–32)
COLOR UR AUTO: NORMAL
CREAT SERPL-MCNC: 0.53 MG/DL (ref 0.52–1.04)
DIFFERENTIAL METHOD BLD: ABNORMAL
EOSINOPHIL # BLD AUTO: 0.1 10E9/L (ref 0–0.7)
EOSINOPHIL NFR BLD AUTO: 0.4 %
ERYTHROCYTE [DISTWIDTH] IN BLOOD BY AUTOMATED COUNT: 15 % (ref 10–15)
GFR SERPL CREATININE-BSD FRML MDRD: >90 ML/MIN/1.7M2
GLUCOSE SERPL-MCNC: 84 MG/DL (ref 70–99)
GLUCOSE UR STRIP-MCNC: NEGATIVE MG/DL
HCT VFR BLD AUTO: 37.4 % (ref 35–47)
HGB BLD-MCNC: 12.6 G/DL (ref 11.7–15.7)
HGB UR QL STRIP: NEGATIVE
IMM GRANULOCYTES # BLD: 0.1 10E9/L (ref 0–0.4)
IMM GRANULOCYTES NFR BLD: 0.6 %
KETONES UR STRIP-MCNC: NEGATIVE MG/DL
LEUKOCYTE ESTERASE UR QL STRIP: NEGATIVE
LYMPHOCYTES # BLD AUTO: 2.2 10E9/L (ref 0.8–5.3)
LYMPHOCYTES NFR BLD AUTO: 16.2 %
MCH RBC QN AUTO: 27.9 PG (ref 26.5–33)
MCHC RBC AUTO-ENTMCNC: 33.7 G/DL (ref 31.5–36.5)
MCV RBC AUTO: 83 FL (ref 78–100)
MONOCYTES # BLD AUTO: 0.7 10E9/L (ref 0–1.3)
MONOCYTES NFR BLD AUTO: 5 %
NEUTROPHILS # BLD AUTO: 10.5 10E9/L (ref 1.6–8.3)
NEUTROPHILS NFR BLD AUTO: 77.6 %
NITRATE UR QL: NEGATIVE
PH UR STRIP: 6 PH (ref 5–7)
PLATELET # BLD AUTO: 296 10E9/L (ref 150–450)
POTASSIUM SERPL-SCNC: 3.8 MMOL/L (ref 3.4–5.3)
PROT SERPL-MCNC: 7.4 G/DL (ref 6.8–8.8)
RBC # BLD AUTO: 4.52 10E12/L (ref 3.8–5.2)
SODIUM SERPL-SCNC: 137 MMOL/L (ref 133–144)
SOURCE: NORMAL
SP GR UR STRIP: 1 (ref 1–1.03)
UROBILINOGEN UR STRIP-MCNC: 0 MG/DL (ref 0–2)
WBC # BLD AUTO: 13.5 10E9/L (ref 4–11)

## 2018-04-25 PROCEDURE — 81003 URINALYSIS AUTO W/O SCOPE: CPT | Performed by: NURSE PRACTITIONER

## 2018-04-25 PROCEDURE — 85025 COMPLETE CBC W/AUTO DIFF WBC: CPT | Performed by: NURSE PRACTITIONER

## 2018-04-25 PROCEDURE — 80053 COMPREHEN METABOLIC PANEL: CPT | Performed by: NURSE PRACTITIONER

## 2018-04-25 PROCEDURE — 93010 ELECTROCARDIOGRAM REPORT: CPT | Mod: Z6 | Performed by: NURSE PRACTITIONER

## 2018-04-25 PROCEDURE — 96374 THER/PROPH/DIAG INJ IV PUSH: CPT | Performed by: NURSE PRACTITIONER

## 2018-04-25 PROCEDURE — 25000128 H RX IP 250 OP 636: Performed by: NURSE PRACTITIONER

## 2018-04-25 PROCEDURE — 96361 HYDRATE IV INFUSION ADD-ON: CPT | Performed by: NURSE PRACTITIONER

## 2018-04-25 PROCEDURE — 93005 ELECTROCARDIOGRAM TRACING: CPT | Performed by: NURSE PRACTITIONER

## 2018-04-25 PROCEDURE — 99284 EMERGENCY DEPT VISIT MOD MDM: CPT | Mod: 25 | Performed by: NURSE PRACTITIONER

## 2018-04-25 RX ORDER — ONDANSETRON 2 MG/ML
4 INJECTION INTRAMUSCULAR; INTRAVENOUS ONCE
Status: COMPLETED | OUTPATIENT
Start: 2018-04-25 | End: 2018-04-25

## 2018-04-25 RX ADMIN — ONDANSETRON 4 MG: 2 INJECTION, SOLUTION INTRAMUSCULAR; INTRAVENOUS at 15:33

## 2018-04-25 RX ADMIN — SODIUM CHLORIDE, POTASSIUM CHLORIDE, SODIUM LACTATE AND CALCIUM CHLORIDE 1000 ML: 600; 310; 30; 20 INJECTION, SOLUTION INTRAVENOUS at 15:34

## 2018-04-25 ASSESSMENT — ENCOUNTER SYMPTOMS
DYSURIA: 0
BACK PAIN: 0
DIZZINESS: 1
LIGHT-HEADEDNESS: 1
CONSTIPATION: 0
MYALGIAS: 0
ABDOMINAL PAIN: 0
FATIGUE: 0
NAUSEA: 1
COUGH: 0
APPETITE CHANGE: 1
HEADACHES: 1
FEVER: 0
VOMITING: 1
SHORTNESS OF BREATH: 0
FREQUENCY: 0
DIARRHEA: 0
CHILLS: 0

## 2018-04-25 NOTE — ED AVS SNAPSHOT
[facility]    [address] [place] [address]    Phone:  [phone]    Fax:  [phone]                                       Zacarias Adam   MRN: [id number]    Department:  [facility]   Date of Visit:  4/25/2018           Patient Information     Date Of Birth          1995        Your diagnoses for this visit were:     Near syncope        You were seen by Estefani Mason APRN CNP.      Follow-up Information     Follow up with Yudi Sauceda MD.    Specialty:  Family Practice    Why:  As needed    Contact information:    [address] [place] [address]  [phone]          Discharge Instructions         Follow-up with your regular doctor as scheduled next Thursday for recheck.  Stay well hydrated.  Change positions slowly.  Dizziness or Fainting During Pregnancy  Feeling dizzy or faint is very common during pregnancy. It generally does not mean something is wrong. It is most common during the first trimester, but it can happen anytime during pregnancy. Dizziness and fainting (syncope) are often caused by a drop in blood pressure. This is from the hormones released during pregnancy that relax the body s blood vessels. Too little blood is then pumped up to the brain. When this happens, you lose consciousness (faint). Fainting is not dangerous to you or your baby unless you fall and hurt yourself.     If you must stand for long periods, compression stockings can help keep your blood moving.   Home care  To help prevent dizziness and fainting:    When you get up from sitting or lying down, do so slowly. Clench and release your leg muscles before and during standing.    Avoid standing for too long. If you must stand for prolonged periods of time, wear compression stockings. These are special stockings that help keep blood flowing toward the heart. Most medical supply stores sell these stockings.    Avoid long breaks between  meals. Keep snacks handy in case you get hungry.    Avoid hot showers or baths. Use warm water. Be careful to stand up from a bath slowly.    After the first trimester, avoid lying on your back.    Try to eat every few hours. Instead of eating 3 larger meals a day, try eating 6 smaller meals. Snack on healthy foods that contain some protein like nuts and peanut butter.    If you have been told you are anemic, eating iron-rich foods may be helpful.  If you feel dizzy:    Sit or lie down. If you sit, put your head between your knees. If you lie down, try to get your feet higher than your head.    Take deep breaths. Breathe out slowly.    Move toward fresh or circulating air.    Loosen tight clothing.    Do not eat or drink anything while you feel dizzy  Follow-up care  Follow up with your healthcare provider, or as advised. Arrange for prenatal care if you haven t already. Go to all your prenatal appointments. Get prenatal tests as instructed.  When to seek medical advice  Call your healthcare provider right away if you have any of the following symptoms. These may mean a more severe cause for dizziness or fainting:    Vaginal bleeding    Pain in your abdomen    Less movement of the baby than usual    Unusually pale skin    Dizziness or fainting with blurred vision, headaches, confusion, palpitations, or fast heartbeat    You have fainted and hurt yourself or fallen hard on your abdomen  Date Last Reviewed: 6/1/2016 2000-2017 [employer]. [address] [place] [address]. All rights reserved. This information is not intended as a substitute for professional medical care. Always follow your healthcare professional's instructions.          Your next 10 appointments already scheduled     May 03, 2018  1:30 PM CDT   New Prenatal with Shaista Bedolla MD, [facility]    [address] [facility] [address]   [phone]               24 Hour Appointment Hotline       To make an appointment at any [facility], call [phone] ([phone]). If you don't have a family doctor or clinic, we will help you find one. [facility] clinics are conveniently located to serve the needs of you and your family.             Review of your medicines      Our records show that you are taking the medicines listed below. If these are incorrect, please call your family doctor or clinic.        Dose / Directions Last dose taken    prenatal multivitamin plus iron 27-0.8 MG Tabs per tablet   Dose:  1 tablet        Take 1 tablet by mouth daily   Refills:  0                Procedures and tests performed during your visit     CBC with platelets differential    Comprehensive metabolic panel    EKG 12 lead    Orthostatic blood pressure and pulse    UA reflex to Microscopic      Orders Needing Specimen Collection     None      Pending Results     No orders found from 4/23/2018 to 4/26/2018.            Pending Culture Results     No orders found from 4/23/2018 to 4/26/2018.            Pending Results Instructions     If you had any lab results that were not finalized at the time of your Discharge, you can call the ED Lab Result RN at [phone]. You will be contacted by this team for any positive Lab results or changes in treatment. The nurses are available 7 days a week from 10A to 6:30P.  You can leave a message 24 hours per day and they will return your call.        Test Results From Your Hospital Stay        4/25/2018  4:15 PM      Component Results     Component Value Ref Range & Units Status    Sodium 137 133 - 144 mmol/L Final    Potassium 3.8 3.4 - 5.3 mmol/L Final    Chloride 104 94 - 109 mmol/L Final    Carbon Dioxide 27 20 - 32 mmol/L Final    Anion Gap 6 3 - 14 mmol/L Final    Glucose 84 70 - 99 mg/dL Final    Urea Nitrogen 9 7 - 30 mg/dL Final    Creatinine 0.53 0.52 - 1.04 mg/dL Final    GFR Estimate >90 >60 mL/min/1.7m2 Final    Non   GFR Calc    GFR Estimate If Black >90 >60 mL/min/1.7m2 Final    African American GFR Calc    Calcium 8.8 8.5 - 10.1 mg/dL Final    Bilirubin Total 0.2 0.2 - 1.3 mg/dL Final    Albumin 4.0 3.4 - 5.0 g/dL Final    Protein Total 7.4 6.8 - 8.8 g/dL Final    Alkaline Phosphatase 85 40 - 150 U/L Final    ALT 22 0 - 50 U/L Final    AST 9 0 - 45 U/L Final         4/25/2018  4:02 PM      Component Results     Component Value Ref Range & Units Status    WBC 13.5 (H) 4.0 - 11.0 10e9/L Final    RBC Count 4.52 3.8 - 5.2 10e12/L Final    Hemoglobin 12.6 11.7 - 15.7 g/dL Final    Hematocrit 37.4 35.0 - 47.0 % Final    MCV 83 78 - 100 fl Final    MCH 27.9 26.5 - 33.0 pg Final    MCHC 33.7 31.5 - 36.5 g/dL Final    RDW 15.0 10.0 - 15.0 % Final    Platelet Count 296 150 - 450 10e9/L Final    Diff Method Automated Method  Final    % Neutrophils 77.6 % Final    % Lymphocytes 16.2 % Final    % Monocytes 5.0 % Final    % Eosinophils 0.4 % Final    % Basophils 0.2 % Final    % Immature Granulocytes 0.6 % Final    Absolute Neutrophil 10.5 (H) 1.6 - 8.3 10e9/L Final    Absolute Lymphocytes 2.2 0.8 - 5.3 10e9/L Final    Absolute Monocytes 0.7 0.0 - 1.3 10e9/L Final    Absolute Eosinophils 0.1 0.0 - 0.7 10e9/L Final    Absolute Basophils 0.0 0.0 - 0.2 10e9/L Final    Abs Immature Granulocytes 0.1 0 - 0.4 10e9/L Final         4/25/2018  4:40 PM      Component Results     Component Value Ref Range & Units Status    Color Urine Straw  Final    Appearance Urine Clear  Final    Glucose Urine Negative NEG^Negative mg/dL Final    Bilirubin Urine Negative NEG^Negative Final    Ketones Urine Negative NEG^Negative mg/dL Final    Specific Gravity Urine 1.003 1.003 - 1.035 Final    Blood Urine Negative NEG^Negative Final    pH Urine 6.0 5.0 - 7.0 pH Final    Protein Albumin Urine Negative NEG^Negative mg/dL Final    Urobilinogen mg/dL 0.0 0.0 - 2.0 mg/dL Final    Nitrite Urine Negative NEG^Negative Final    Leukocyte Esterase Urine  "Negative NEG^Negative Final    Source Midstream Urine  Final                Thank you for choosing [facility]       Thank you for choosing [facility] for your care. Our goal is always to provide you with excellent care. Hearing back from our patients is one way we can continue to improve our services. Please take a few minutes to complete the written survey that you may receive in the mail after you visit with us. Thank you!        [email or web address] Information     [employer] lets you send messages to your doctor, view your test results, renew your prescriptions, schedule appointments and more. To sign up, go to [email or web address] . Click on \"Log in\" on the left side of the screen, which will take you to the Welcome page. Then click on \"Sign up Now\" on the right side of the page.     You will be asked to enter the access code listed below, as well as some personal information. Please follow the directions to create your username and password.     Your access code is: [email or web address]  Expires: 2018  6:41 PM     Your access code will  in 90 days. If you need help or a new code, please call your [place] clinic or [phone].        Care EveryWhere ID     This is your Care EveryWhere ID. This could be used by other organizations to access your [facility] medical records  [id number]        Equal Access to Services     [facility]: Hadii alton patelo Solaila, waaxda luqadaha, qaybta kaalmada adebritta, flora álvarez. So [facility] [phone].    ATENCIÓN: Si habla español, tiene a aleman disposición servicios gratuitos de asistencia lingüística. Llame al [phone].    We comply with applicable federal civil rights laws and [place] laws. We do not discriminate on the basis of race, color, national origin, age, disability, sex, sexual orientation, or gender identity.            After Visit Summary       This is your record. Keep this with you and show to your community pharmacist(s) and doctor(s) " at your next visit.

## 2018-04-25 NOTE — ED AVS SNAPSHOT
[facility]    [address] [facility] [address]    Phone:  [phone]    Fax:  [phone]                                       Zacarias Adam   MRN: [id number]    Department:  [facility]   Date of Visit:  4/25/2018           After Visit Summary Signature Page     I have received my discharge instructions, and my questions have been answered. I have discussed any challenges I see with this plan with the nurse or doctor.    ..........................................................................................................................................  Patient/Patient Representative Signature      ..........................................................................................................................................  Patient Representative Print Name and Relationship to Patient    ..................................................               ................................................  Date                                            Time    ..........................................................................................................................................  Reviewed by Signature/Title    ...................................................              ..............................................  Date                                                            Time

## 2018-04-25 NOTE — ED NOTES
Pt is 8 weeks pregnant, having morning sickness off and on with pregnancy.  Ate half of an omelette this morning along with juice around 0930.  1.5 hour ago, pt felt hearing go out, everything went black.  Able to sit down, felt like she passed out briefly, pt alone at the time.  Pt having mild nausea now.  Mild headache.  Wondering if she is slightly dehydrated.  Feeling exhausted after episode.  Has history of sinus tachycardia.
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
## 2018-04-25 NOTE — DISCHARGE INSTRUCTIONS
Follow-up with your regular doctor as scheduled next Thursday for recheck.  Stay well hydrated.  Change positions slowly.  Dizziness or Fainting During Pregnancy  Feeling dizzy or faint is very common during pregnancy. It generally does not mean something is wrong. It is most common during the first trimester, but it can happen anytime during pregnancy. Dizziness and fainting (syncope) are often caused by a drop in blood pressure. This is from the hormones released during pregnancy that relax the body s blood vessels. Too little blood is then pumped up to the brain. When this happens, you lose consciousness (faint). Fainting is not dangerous to you or your baby unless you fall and hurt yourself.     If you must stand for long periods, compression stockings can help keep your blood moving.   Home care  To help prevent dizziness and fainting:    When you get up from sitting or lying down, do so slowly. Clench and release your leg muscles before and during standing.    Avoid standing for too long. If you must stand for prolonged periods of time, wear compression stockings. These are special stockings that help keep blood flowing toward the heart. Most medical supply stores sell these stockings.    Avoid long breaks between meals. Keep snacks handy in case you get hungry.    Avoid hot showers or baths. Use warm water. Be careful to stand up from a bath slowly.    After the first trimester, avoid lying on your back.    Try to eat every few hours. Instead of eating 3 larger meals a day, try eating 6 smaller meals. Snack on healthy foods that contain some protein like nuts and peanut butter.    If you have been told you are anemic, eating iron-rich foods may be helpful.  If you feel dizzy:    Sit or lie down. If you sit, put your head between your knees. If you lie down, try to get your feet higher than your head.    Take deep breaths. Breathe out slowly.    Move toward fresh or circulating air.    Loosen tight  clothing.    Do not eat or drink anything while you feel dizzy  Follow-up care  Follow up with your healthcare provider, or as advised. Arrange for prenatal care if you haven t already. Go to all your prenatal appointments. Get prenatal tests as instructed.  When to seek medical advice  Call your healthcare provider right away if you have any of the following symptoms. These may mean a more severe cause for dizziness or fainting:    Vaginal bleeding    Pain in your abdomen    Less movement of the baby than usual    Unusually pale skin    Dizziness or fainting with blurred vision, headaches, confusion, palpitations, or fast heartbeat    You have fainted and hurt yourself or fallen hard on your abdomen  Date Last Reviewed: 6/1/2016 2000-2017 [employer]. [address] [place] [address], [place] [address]. All rights reserved. This information is not intended as a substitute for professional medical care. Always follow your healthcare professional's instructions.

## 2018-05-03 ENCOUNTER — PRENATAL OFFICE VISIT (OUTPATIENT)
Dept: OBGYN | Facility: CLINIC | Age: 23
End: 2018-05-03
Payer: MEDICAID

## 2018-05-03 VITALS
BODY MASS INDEX: 28.04 KG/M2 | TEMPERATURE: 98.7 F | HEART RATE: 111 BPM | WEIGHT: 179 LBS | SYSTOLIC BLOOD PRESSURE: 139 MMHG | DIASTOLIC BLOOD PRESSURE: 78 MMHG

## 2018-05-03 DIAGNOSIS — Z34.81 PRENATAL CARE, SUBSEQUENT PREGNANCY IN FIRST TRIMESTER: Primary | ICD-10-CM

## 2018-05-03 LAB
ABO + RH BLD: NORMAL
ABO + RH BLD: NORMAL
ALBUMIN UR-MCNC: NEGATIVE MG/DL
APPEARANCE UR: CLEAR
BILIRUB UR QL STRIP: NEGATIVE
BLD GP AB SCN SERPL QL: NORMAL
BLOOD BANK CMNT PATIENT-IMP: NORMAL
COLOR UR AUTO: YELLOW
ERYTHROCYTE [DISTWIDTH] IN BLOOD BY AUTOMATED COUNT: 15 % (ref 10–15)
GLUCOSE UR STRIP-MCNC: NEGATIVE MG/DL
HCT VFR BLD AUTO: 37 % (ref 35–47)
HGB BLD-MCNC: 12.3 G/DL (ref 11.7–15.7)
HGB UR QL STRIP: NEGATIVE
KETONES UR STRIP-MCNC: NEGATIVE MG/DL
LEUKOCYTE ESTERASE UR QL STRIP: NEGATIVE
MCH RBC QN AUTO: 28.6 PG (ref 26.5–33)
MCHC RBC AUTO-ENTMCNC: 33.2 G/DL (ref 31.5–36.5)
MCV RBC AUTO: 86 FL (ref 78–100)
NITRATE UR QL: NEGATIVE
PH UR STRIP: 6 PH (ref 5–7)
PLATELET # BLD AUTO: 243 10E9/L (ref 150–450)
RBC # BLD AUTO: 4.3 10E12/L (ref 3.8–5.2)
SOURCE: NORMAL
SP GR UR STRIP: >1.03 (ref 1–1.03)
SPECIMEN EXP DATE BLD: NORMAL
UROBILINOGEN UR STRIP-ACNC: 1 EU/DL (ref 0.2–1)
WBC # BLD AUTO: 11.8 10E9/L (ref 4–11)

## 2018-05-03 PROCEDURE — 86762 RUBELLA ANTIBODY: CPT | Performed by: OBSTETRICS & GYNECOLOGY

## 2018-05-03 PROCEDURE — 87591 N.GONORRHOEAE DNA AMP PROB: CPT | Performed by: OBSTETRICS & GYNECOLOGY

## 2018-05-03 PROCEDURE — 87086 URINE CULTURE/COLONY COUNT: CPT | Performed by: OBSTETRICS & GYNECOLOGY

## 2018-05-03 PROCEDURE — 86901 BLOOD TYPING SEROLOGIC RH(D): CPT | Performed by: OBSTETRICS & GYNECOLOGY

## 2018-05-03 PROCEDURE — 85027 COMPLETE CBC AUTOMATED: CPT | Performed by: OBSTETRICS & GYNECOLOGY

## 2018-05-03 PROCEDURE — 87491 CHLMYD TRACH DNA AMP PROBE: CPT | Performed by: OBSTETRICS & GYNECOLOGY

## 2018-05-03 PROCEDURE — 87389 HIV-1 AG W/HIV-1&-2 AB AG IA: CPT | Performed by: OBSTETRICS & GYNECOLOGY

## 2018-05-03 PROCEDURE — 76817 TRANSVAGINAL US OBSTETRIC: CPT | Performed by: OBSTETRICS & GYNECOLOGY

## 2018-05-03 PROCEDURE — 86900 BLOOD TYPING SEROLOGIC ABO: CPT | Performed by: OBSTETRICS & GYNECOLOGY

## 2018-05-03 PROCEDURE — 86780 TREPONEMA PALLIDUM: CPT | Performed by: OBSTETRICS & GYNECOLOGY

## 2018-05-03 PROCEDURE — G0145 SCR C/V CYTO,THINLAYER,RESCR: HCPCS | Performed by: OBSTETRICS & GYNECOLOGY

## 2018-05-03 PROCEDURE — 86850 RBC ANTIBODY SCREEN: CPT | Performed by: OBSTETRICS & GYNECOLOGY

## 2018-05-03 PROCEDURE — 99207 ZZC FIRST OB VISIT: CPT | Performed by: OBSTETRICS & GYNECOLOGY

## 2018-05-03 PROCEDURE — 81003 URINALYSIS AUTO W/O SCOPE: CPT | Performed by: OBSTETRICS & GYNECOLOGY

## 2018-05-03 PROCEDURE — 36415 COLL VENOUS BLD VENIPUNCTURE: CPT | Performed by: OBSTETRICS & GYNECOLOGY

## 2018-05-03 PROCEDURE — 87340 HEPATITIS B SURFACE AG IA: CPT | Performed by: OBSTETRICS & GYNECOLOGY
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
## 2018-05-03 NOTE — NURSING NOTE
"Initial /78 (BP Location: Left arm, Patient Position: Chair, Cuff Size: Adult Regular)  Pulse 111  Temp 98.7  F (37.1  C) (Tympanic)  Wt 179 lb (81.2 kg)  LMP 02/25/2018  Breastfeeding? No  BMI 28.04 kg/m2 Estimated body mass index is 28.04 kg/(m^2) as calculated from the following:    Height as of 4/25/18: 5' 7\" (1.702 m).    Weight as of this encounter: 179 lb (81.2 kg). .    Heaven Blankenship    "

## 2018-05-03 NOTE — LETTER
May 9, 2018      Zacarias Adam  [address] [facility] [address]    Dear ,      I am happy to inform you that your recent cervical cancer screening test (PAP smear) was normal.      Preventative screenings such as this help to ensure your health for years to come. You should repeat a pap smear in 3 years, unless otherwise directed.      You will still need to return to the clinic every year for your annual exam and other preventive tests.     Please contact the clinic at [phone] if you have further questions.       Sincerely,      Shaista Bedolla MD/haether

## 2018-05-03 NOTE — MR AVS SNAPSHOT
"              After Visit Summary   5/3/2018    Zacarias Adam    MRN: [id number]           Patient Information     Date Of Birth          1995        Visit Information        Provider Department      5/3/2018 1:30 PM Shaista Bedolla MD; [facility]        Today's Diagnoses     Prenatal care, subsequent pregnancy in first trimester    -  1       Follow-ups after your visit        Your next 10 appointments already scheduled     May 31, 2018  9:15 AM CDT   ESTABLISHED PRENATAL with Shaista Bedolla MD   [facility] ([facility]    [address] [address]   [phone]              Who to contact     If you have questions or need follow up information about today's clinic visit or your schedule please contact [facility] directly at [phone].  Normal or non-critical lab and imaging results will be communicated to you by MyChart, letter or phone within 4 business days after the clinic has received the results. If you do not hear from us within 7 days, please contact the clinic through MyChart or phone. If you have a critical or abnormal lab result, we will notify you by phone as soon as possible.  Submit refill requests through BeanJockey or call your pharmacy and they will forward the refill request to us. Please allow 3 business days for your refill to be completed.          Additional Information About Your Visit        MyChart Information     BeanJockey lets you send messages to your doctor, view your test results, renew your prescriptions, schedule appointments and more. To sign up, go to [email or web address] . Click on \"Log in\" on the left side of the screen, which will take you to the Welcome page. Then click on \"Sign up Now\" on the right side of the page.     You will be asked to enter the access code listed below, as well as some personal information. Please follow the directions to create your " username and password.     Your access code is: [email or web address]  Expires: 2018  6:41 PM     Your access code will  in 90 days. If you need help or a new code, please call your [place] clinic or [phone].        Care EveryWhere ID     This is your Care EveryWhere ID. This could be used by other organizations to access your [place] medical records  [id number]        Your Vitals Were     Pulse Temperature Last Period Breastfeeding? BMI (Body Mass Index)       111 98.7  F (37.1  C) (Tympanic) 2018 No 28.04 kg/m2        Blood Pressure from Last 3 Encounters:   18 139/78   18 114/65   17 (!) 143/92    Weight from Last 3 Encounters:   18 179 lb (81.2 kg)   18 177 lb (80.3 kg)   17 169 lb (76.7 kg)              We Performed the Following     *UA reflex to Microscopic     ABO/Rh type and screen     CBC with platelets     Chlamydia trachomatis PCR     Hepatitis B surface antigen     HIV Antigen Antibody Combo     Neisseria gonorrhoeae PCR     Pap imaged thin layer screen only - recommended age 21 - 24 years     Rubella Antibody IgG Quantitative     Treponema Abs w Reflex to RPR and Titer     Urine Culture Aerobic Bacterial     US OB <14 Weeks w Transvaginal Single        Primary Care Provider Office Phone # Fax #    Yudi Anshul Sauceda [phone] [address] [place] [address]        Equal Access to Services     BRIDGETTE LOPEZ AH: Hadii aad ku hadasho Soomaali, waaxda luqadaha, qaybta kaalmada adamyadestini, waxay allan gr . So [facility] [phone].    ATENCIÓN: Si habla español, tiene a aleman disposición servicios gratuitos de asistencia lingüística. Llame al [phone].    We comply with applicable federal civil rights laws and [place] laws. We do not discriminate on the basis of race, color, national origin, age, disability, sex, sexual orientation, or gender identity.            Thank you!     Thank you for choosing  [facility]  for your care. Our goal is always to provide you with excellent care. Hearing back from our patients is one way we can continue to improve our services. Please take a few minutes to complete the written survey that you may receive in the mail after your visit with us. Thank you!             Your Updated Medication List - Protect others around you: Learn how to safely use, store and throw away your medicines at www.disposemymeds.org.          This list is accurate as of 5/3/18  2:00 PM.  Always use your most recent med list.                   Brand Name Dispense Instructions for use Diagnosis    prenatal multivitamin plus iron 27-0.8 MG Tabs per tablet      Take 1 tablet by mouth daily

## 2018-05-04 LAB
BACTERIA SPEC CULT: NO GROWTH
C TRACH DNA SPEC QL NAA+PROBE: NEGATIVE
HBV SURFACE AG SERPL QL IA: NONREACTIVE
HIV 1+2 AB+HIV1 P24 AG SERPL QL IA: NONREACTIVE
Lab: NORMAL
N GONORRHOEA DNA SPEC QL NAA+PROBE: NEGATIVE
RUBV IGG SERPL IA-ACNC: 15 IU/ML
SPECIMEN SOURCE: NORMAL
T PALLIDUM AB SER QL: NONREACTIVE

## 2018-05-07 LAB
COPATH REPORT: NORMAL
PAP: NORMAL

## 2018-05-07 NOTE — PROGRESS NOTES
New prenatal OB labs are normal. Pap smear is still pending.     Shaista Bedolla MD  [facility]

## 2018-05-21 ENCOUNTER — PRENATAL OFFICE VISIT (OUTPATIENT)
Dept: OBGYN | Facility: CLINIC | Age: 23
End: 2018-05-21
Payer: MEDICAID

## 2018-05-21 VITALS
DIASTOLIC BLOOD PRESSURE: 71 MMHG | SYSTOLIC BLOOD PRESSURE: 133 MMHG | BODY MASS INDEX: 27.47 KG/M2 | WEIGHT: 175 LBS | RESPIRATION RATE: 18 BRPM | TEMPERATURE: 99 F | HEART RATE: 84 BPM | HEIGHT: 67 IN

## 2018-05-21 DIAGNOSIS — Z34.81 PRENATAL CARE, SUBSEQUENT PREGNANCY IN FIRST TRIMESTER: Primary | ICD-10-CM

## 2018-05-21 DIAGNOSIS — I10 ESSENTIAL HYPERTENSION: ICD-10-CM

## 2018-05-21 LAB
ALT SERPL W P-5'-P-CCNC: 13 U/L (ref 0–50)
AST SERPL W P-5'-P-CCNC: 11 U/L (ref 0–45)
CREAT SERPL-MCNC: 0.56 MG/DL (ref 0.52–1.04)
ERYTHROCYTE [DISTWIDTH] IN BLOOD BY AUTOMATED COUNT: 14.4 % (ref 10–15)
GFR SERPL CREATININE-BSD FRML MDRD: >90 ML/MIN/1.7M2
HCT VFR BLD AUTO: 34.5 % (ref 35–47)
HGB BLD-MCNC: 11.9 G/DL (ref 11.7–15.7)
MCH RBC QN AUTO: 29.5 PG (ref 26.5–33)
MCHC RBC AUTO-ENTMCNC: 34.5 G/DL (ref 31.5–36.5)
MCV RBC AUTO: 85 FL (ref 78–100)
PLATELET # BLD AUTO: 271 10E9/L (ref 150–450)
RBC # BLD AUTO: 4.04 10E12/L (ref 3.8–5.2)
URATE SERPL-MCNC: 3.7 MG/DL (ref 2.6–6)
WBC # BLD AUTO: 9.8 10E9/L (ref 4–11)

## 2018-05-21 PROCEDURE — 84450 TRANSFERASE (AST) (SGOT): CPT | Performed by: OBSTETRICS & GYNECOLOGY

## 2018-05-21 PROCEDURE — 84460 ALANINE AMINO (ALT) (SGPT): CPT | Performed by: OBSTETRICS & GYNECOLOGY

## 2018-05-21 PROCEDURE — 36415 COLL VENOUS BLD VENIPUNCTURE: CPT | Performed by: OBSTETRICS & GYNECOLOGY

## 2018-05-21 PROCEDURE — 82565 ASSAY OF CREATININE: CPT | Performed by: OBSTETRICS & GYNECOLOGY

## 2018-05-21 PROCEDURE — 99207 ZZC PRENATAL VISIT: CPT | Performed by: OBSTETRICS & GYNECOLOGY

## 2018-05-21 PROCEDURE — 85027 COMPLETE CBC AUTOMATED: CPT | Performed by: OBSTETRICS & GYNECOLOGY

## 2018-05-21 PROCEDURE — 84550 ASSAY OF BLOOD/URIC ACID: CPT | Performed by: OBSTETRICS & GYNECOLOGY

## 2018-05-21 NOTE — MR AVS SNAPSHOT
"              After Visit Summary   5/21/2018    Zacarias Adam    MRN: [id number]           Patient Information     Date Of Birth          1995        Visit Information        Provider Department      5/21/2018 3:30 PM Shaista Bedolla MD [facility]        Today's Diagnoses     Prenatal care, subsequent pregnancy in first trimester    -  1    Essential hypertension           Follow-ups after your visit        Your next 10 appointments already scheduled     May 21, 2018  3:30 PM CDT   ESTABLISHED PRENATAL with Shaista Bedolla MD   [facility] ([facility]    [address] [address]   [phone]            Jun 19, 2018  3:45 PM CDT   ESTABLISHED PRENATAL with Shaista Bedolla MD   [facility] ([facility]    [address] [address]   [phone]              Who to contact     If you have questions or need follow up information about today's clinic visit or your schedule please contact [facility] directly at [phone].  Normal or non-critical lab and imaging results will be communicated to you by MyChart, letter or phone within 4 business days after the clinic has received the results. If you do not hear from us within 7 days, please contact the clinic through [employer]ion Solutions[employer] or phone. If you have a critical or abnormal lab result, we will notify you by phone as soon as possible.  Submit refill requests through MyVR or call your pharmacy and they will forward the refill request to us. Please allow 3 business days for your refill to be completed.          Additional Information About Your Visit        MyChart Information     MyVR lets you send messages to your doctor, view your test results, renew your prescriptions, schedule appointments and more. To sign up, go to [email or web address] . Click on \"Log in\" on the left side of the screen, which will take " "you to the Welcome page. Then click on \"Sign up Now\" on the right side of the page.     You will be asked to enter the access code listed below, as well as some personal information. Please follow the directions to create your username and password.     Your access code is: [email or web address]  Expires: 2018  6:41 PM     Your access code will  in 90 days. If you need help or a new code, please call your [place] clinic or [phone].        Care EveryWhere ID     This is your Care EveryWhere ID. This could be used by other organizations to access your [place] medical records  [id number]        Your Vitals Were     Pulse Temperature Respirations Height Last Period BMI (Body Mass Index)    84 99  F (37.2  C) (Tympanic) 18 5' 7\" (1.702 m) 2018 27.41 kg/m2       Blood Pressure from Last 3 Encounters:   18 133/71   18 139/78   18 114/65    Weight from Last 3 Encounters:   18 175 lb (79.4 kg)   18 179 lb (81.2 kg)   18 177 lb (80.3 kg)              We Performed the Following     ALT     AST     CBC with platelets     Creatinine     Uric acid        Primary Care Provider Office Phone # Fax #    Yudi Sauceda [phone] [address] [place], [place] [address]        Equal Access to Services     TARA LOPEZ AH: Hadii alton patelo Solaila, waaxda luqadaha, qaybta kaalmada neri, flora álvarez. So [facility] [phone].    ATENCIÓN: Si habla español, tiene a aleman disposición servicios gratuitos de asistencia lingüística. Llqueta al [phone].    We comply with applicable federal civil rights laws and [place] laws. We do not discriminate on the basis of race, color, national origin, age, disability, sex, sexual orientation, or gender identity.            Thank you!     Thank you for choosing [facility]  for your care. Our goal is always to provide you with excellent care. Hearing back from our patients is " one way we can continue to improve our services. Please take a few minutes to complete the written survey that you may receive in the mail after your visit with us. Thank you!             Your Updated Medication List - Protect others around you: Learn how to safely use, store and throw away your medicines at www.disposemymeds.org.          This list is accurate as of 5/21/18  3:29 PM.  Always use your most recent med list.                   Brand Name Dispense Instructions for use Diagnosis    prenatal multivitamin plus iron 27-0.8 MG Tabs per tablet      Take 1 tablet by mouth daily

## 2018-05-21 NOTE — PROGRESS NOTES
"Doing well.  No complaints.   Denies VB, cramping.   /71 (BP Location: Right arm, Patient Position: Chair, Cuff Size: Adult Small)  Pulse 84  Temp 99  F (37.2  C) (Tympanic)  Resp 18  Ht 5' 7\" (1.702 m)  Wt 175 lb (79.4 kg)  LMP 2018  BMI 27.41 kg/m2  General Appearance: NAD  Abdomen: Gravid, NT  Refer to flow sheet above.   A/P: 22 year old  at 12w1d  -- chronic HTN: baseline labs ordered; she will defer urine protein assessment for next visit as she can not void at the moment  -- reviewed normal prenatal OB labs  -- SAB precautions reviewed  RTC in 4 weeks    Shaista Bedolla MD  [facility]            "

## 2018-05-22 NOTE — PROGRESS NOTES
Baseline HELLP labs are normal.   Urine protein testing will be collected next visit to complete baseline HELLP testing for history of chronic hypertension.     Shaista Bedolla MD  [facility]

## 2018-06-19 ENCOUNTER — PRENATAL OFFICE VISIT (OUTPATIENT)
Dept: OBGYN | Facility: CLINIC | Age: 23
End: 2018-06-19

## 2018-06-19 VITALS
BODY MASS INDEX: 26.94 KG/M2 | SYSTOLIC BLOOD PRESSURE: 119 MMHG | WEIGHT: 172 LBS | TEMPERATURE: 98.6 F | DIASTOLIC BLOOD PRESSURE: 66 MMHG | HEART RATE: 86 BPM

## 2018-06-19 DIAGNOSIS — Z34.82 PRENATAL CARE, SUBSEQUENT PREGNANCY IN SECOND TRIMESTER: Primary | ICD-10-CM

## 2018-06-19 PROCEDURE — 99207 ZZC PRENATAL VISIT: CPT | Performed by: OBSTETRICS & GYNECOLOGY

## 2018-06-19 NOTE — PROGRESS NOTES
Doing well.   Concerned about lack of weight gain; had been 187 lbs at the beginning of the pregnancy.   Denies VB.  Reports occasional mild cramps but thinks it is attributed to stretching.   /66 (BP Location: Left arm, Patient Position: Chair, Cuff Size: Adult Large)  Pulse 86  Temp 98.6  F (37  C) (Tympanic)  Wt 172 lb (78 kg)  LMP 2018  BMI 26.94 kg/m2  General Appearance: NAD  Abdomen: Gravid, NT  Refer to flow sheet above.   A/P: 22 year old  at 16w2d  -- concerns addressed above, reassurance provided  -- fetal anatomy US ordered  -- reviewed normal HELLP serology labs; encouraged protein urine testing which patient will provide next visit  -- SAB precautions reviewed  RTC in 4 weeks    Shaista Bedolla MD  [facility]

## 2018-06-19 NOTE — MR AVS SNAPSHOT
After Visit Summary   6/19/2018    Zacarias Adam    MRN: [id number]           Patient Information     Date Of Birth          1995        Visit Information        Provider Department      6/19/2018 3:45 PM Shaista Bedolla MD [facility]        Today's Diagnoses     Prenatal care, subsequent pregnancy in second trimester    -  1       Follow-ups after your visit        Your next 10 appointments already scheduled     Jul 20, 2018  7:30 AM CDT   US OB > 14 WEEKS COMPLETE SINGLE with WYUS1   [facility] ([facility])    [address] [address]   [phone]           Please bring a list of your medicines (including vitamins, minerals and over-the-counter drugs). Also, tell your doctor about any allergies you may have. Wear comfortable clothes and leave your valuables at home.  If you re less than 20 weeks drink four 8-ounce glasses of fluid an hour before your exam. If you need to empty your bladder before your exam, try to release only a little urine. Then, drink another glass of fluid.  You may have up to two family members in the exam room. If you bring a small child, an adult must be there to care for him or her.  Please call the Imaging Department at your exam site with any questions.              Future tests that were ordered for you today     Open Future Orders        Priority Expected Expires Ordered    Protein  random urine with Creat Ratio Routine 6/19/2018 6/2/2019 6/19/2018    US OB > 14 Weeks Complete Single Routine  6/19/2019 6/19/2018            Who to contact     If you have questions or need follow up information about today's clinic visit or your schedule please contact [facility] directly at [phone].  Normal or non-critical lab and imaging results will be communicated to you by MyChart, letter or phone within 4 business days after the clinic has received the results. If you do not hear from  "us within 7 days, please contact the clinic through Azubu or phone. If you have a critical or abnormal lab result, we will notify you by phone as soon as possible.  Submit refill requests through Azubu or call your pharmacy and they will forward the refill request to us. Please allow 3 business days for your refill to be completed.          Additional Information About Your Visit        OneView CommerceharAm[employer]oon Information     Azubu lets you send messages to your doctor, view your test results, renew your prescriptions, schedule appointments and more. To sign up, go to [email or web address] . Click on \"Log in\" on the left side of the screen, which will take you to the Welcome page. Then click on \"Sign up Now\" on the right side of the page.     You will be asked to enter the access code listed below, as well as some personal information. Please follow the directions to create your username and password.     Your access code is: [email or web address]  Expires: 2018  6:41 PM     Your access code will  in 90 days. If you need help or a new code, please call your [place] clinic or [phone].        Care EveryWhere ID     This is your Care EveryWhere ID. This could be used by other organizations to access your [place] medical records  [id number]        Your Vitals Were     Pulse Temperature Last Period BMI (Body Mass Index)          86 98.6  F (37  C) (Tympanic) 2018 26.94 kg/m2         Blood Pressure from Last 3 Encounters:   18 119/66   18 133/71   18 139/78    Weight from Last 3 Encounters:   18 172 lb (78 kg)   18 175 lb (79.4 kg)   18 179 lb (81.2 kg)               Primary Care Provider Office Phone # Fax #    Yudi Sauceda [phone] [address] [address]        Equal Access to Services     BRIDGETTE LOPEZ AH: Bendeicto Kyle, bárbara lee, flora eddyn ah. So [facility] " [phone].    ATENCIÓN: Si raúl eckert, tiene a aleman disposición servicios gratuitos de asistencia lingüística. Elise al [phone].    We comply with applicable federal civil rights laws and [place] laws. We do not discriminate on the basis of race, color, national origin, age, disability, sex, sexual orientation, or gender identity.            Thank you!     Thank you for choosing [facility]  for your care. Our goal is always to provide you with excellent care. Hearing back from our patients is one way we can continue to improve our services. Please take a few minutes to complete the written survey that you may receive in the mail after your visit with us. Thank you!             Your Updated Medication List - Protect others around you: Learn how to safely use, store and throw away your medicines at www.disposemymeds.org.          This list is accurate as of 6/19/18  3:50 PM.  Always use your most recent med list.                   Brand Name Dispense Instructions for use Diagnosis    prenatal multivitamin plus iron 27-0.8 MG Tabs per tablet      Take 1 tablet by mouth daily

## 2018-07-20 ENCOUNTER — HOSPITAL ENCOUNTER (OUTPATIENT)
Dept: ULTRASOUND IMAGING | Facility: CLINIC | Age: 23
Discharge: HOME | End: 2018-07-20
Attending: OBSTETRICS & GYNECOLOGY | Admitting: OBSTETRICS & GYNECOLOGY
Payer: MEDICAID

## 2018-07-20 DIAGNOSIS — Z34.82 PRENATAL CARE, SUBSEQUENT PREGNANCY IN SECOND TRIMESTER: ICD-10-CM

## 2018-07-20 PROCEDURE — 76805 OB US >/= 14 WKS SNGL FETUS: CPT

## 2018-07-20 NOTE — PROGRESS NOTES
Normal fetal anatomy ultrasound.   Venous lake seen. No clinical significance given no hx of .   Will review at next follow-up visit.     Shaista Bedolla MD  [facility]

## 2018-07-26 ENCOUNTER — PRENATAL OFFICE VISIT (OUTPATIENT)
Dept: OBGYN | Facility: CLINIC | Age: 23
End: 2018-07-26
Payer: MEDICAID

## 2018-07-26 VITALS
DIASTOLIC BLOOD PRESSURE: 76 MMHG | SYSTOLIC BLOOD PRESSURE: 136 MMHG | WEIGHT: 176 LBS | BODY MASS INDEX: 27.57 KG/M2 | TEMPERATURE: 98.3 F | HEART RATE: 89 BPM

## 2018-07-26 DIAGNOSIS — Z34.82 PRENATAL CARE, SUBSEQUENT PREGNANCY IN SECOND TRIMESTER: Primary | ICD-10-CM

## 2018-07-26 PROCEDURE — 99207 ZZC PRENATAL VISIT: CPT | Performed by: OBSTETRICS & GYNECOLOGY

## 2018-07-26 PROCEDURE — 59425 ANTEPARTUM CARE ONLY: CPT | Performed by: OBSTETRICS & GYNECOLOGY

## 2018-07-31 ENCOUNTER — HOSPITAL ENCOUNTER (OUTPATIENT)
Dept: ULTRASOUND IMAGING | Facility: CLINIC | Age: 23
Discharge: HOME | End: 2018-07-31
Attending: OBSTETRICS & GYNECOLOGY | Admitting: OBSTETRICS & GYNECOLOGY
Payer: MEDICAID

## 2018-07-31 ENCOUNTER — OFFICE VISIT (OUTPATIENT)
Dept: MATERNAL FETAL MEDICINE | Facility: CLINIC | Age: 23
End: 2018-07-31
Attending: OBSTETRICS & GYNECOLOGY
Payer: MEDICAID

## 2018-07-31 ENCOUNTER — PRE VISIT (OUTPATIENT)
Dept: MATERNAL FETAL MEDICINE | Facility: CLINIC | Age: 23
End: 2018-07-31

## 2018-07-31 DIAGNOSIS — Z36.3 SCREENING, ANTENATAL, FOR MALFORMATION BY ULTRASOUND: ICD-10-CM

## 2018-07-31 DIAGNOSIS — O43.109: Primary | ICD-10-CM

## 2018-07-31 DIAGNOSIS — O26.90 PREGNANCY RELATED CONDITION, UNSPECIFIED TRIMESTER: ICD-10-CM

## 2018-07-31 PROCEDURE — 76811 OB US DETAILED SNGL FETUS: CPT

## 2018-07-31 NOTE — PROGRESS NOTES
Please refer to ultrasound report under 'Imaging' Studies of 'Chart Review' tabs.    Ramone Hernandez M.D.

## 2018-08-02 ENCOUNTER — OFFICE VISIT (OUTPATIENT)
Dept: FAMILY MEDICINE | Facility: CLINIC | Age: 23
End: 2018-08-02
Payer: MEDICAID

## 2018-08-02 VITALS
OXYGEN SATURATION: 99 % | BODY MASS INDEX: 28 KG/M2 | TEMPERATURE: 99.2 F | RESPIRATION RATE: 16 BRPM | HEART RATE: 97 BPM | WEIGHT: 178.8 LBS | DIASTOLIC BLOOD PRESSURE: 74 MMHG | SYSTOLIC BLOOD PRESSURE: 122 MMHG

## 2018-08-02 DIAGNOSIS — J06.9 UPPER RESPIRATORY TRACT INFECTION, UNSPECIFIED TYPE: ICD-10-CM

## 2018-08-02 DIAGNOSIS — R07.0 THROAT PAIN: Primary | ICD-10-CM

## 2018-08-02 DIAGNOSIS — Z34.82 PRENATAL CARE, SUBSEQUENT PREGNANCY IN SECOND TRIMESTER: ICD-10-CM

## 2018-08-02 LAB
DEPRECATED S PYO AG THROAT QL EIA: NORMAL
SPECIMEN SOURCE: NORMAL

## 2018-08-02 PROCEDURE — 87880 STREP A ASSAY W/OPTIC: CPT | Performed by: NURSE PRACTITIONER

## 2018-08-02 PROCEDURE — 99213 OFFICE O/P EST LOW 20 MIN: CPT | Performed by: NURSE PRACTITIONER

## 2018-08-02 PROCEDURE — 87081 CULTURE SCREEN ONLY: CPT | Performed by: NURSE PRACTITIONER

## 2018-08-02 ASSESSMENT — PAIN SCALES - GENERAL: PAINLEVEL: SEVERE PAIN (7)

## 2018-08-02 NOTE — LETTER
August 3, 2018      Zacarias Adam  [address] [address]              The results of your recent throat culture were negative.  If you have any further questions or concerns please contact the clinic          Sincerely,        Crissy Irwin NP/nadira

## 2018-08-02 NOTE — NURSING NOTE
Chief Complaint   Patient presents with     Pharyngitis     Started last night, throat feels swollen, painful, slight cough     Padmini S, CMA

## 2018-08-02 NOTE — PATIENT INSTRUCTIONS
Tylenol 325-650 mg every 6 hours as needed for sore throat, body aches and fever.    Can also take Benadryl 25 mg as needed for congestion with this illness.    FOLLOW UP with OB/GYN as scheduled or sooner if pregnancy related concerns arise.    Return to clinic or ED/UC if you develop high fevers, or worsening symptoms.    Crissy Irwin, FNP                 Sore Throat (Pharyngitis)            What is a sore throat?   When your child complains that his throat is sore, it is usually a symptom of an illness, such as a cold. When you look at the throat with a light, it will be bright red. Children too young to talk may have a sore throat if they refuse to eat or begin to cry during feedings.   What is the cause?   Most sore throats are caused by viruses and are part of a cold. About 10% of sore throats are caused by strep bacteria.   Tonsillitis (temporary swelling and redness of the tonsils) usually occurs with any throat infection, viral or bacterial. Swollen tonsils do not have any special meaning.   Children who sleep with their mouths open often wake up in the morning with a dry mouth and sore throat. It feels better within an hour of having something to drink. Use a humidifier to help prevent this problem.   Children with a postnasal drip from draining sinuses often have a sore throat from the secretions or from clearing their throat often.   How long does it last?   Sore throats caused by viral illnesses usually last 4 or 5?days.   A sore throat caused by Strep will start feeling better soon after being treated with antibiotics. After a child has been taking medicine for strep for 24?hours, strep is no longer contagious. Your child can then return to day care or school if his fever is gone and he's feeling better. Your child must take all of the antibiotic even if he is feeling better. If your child doesn't take all of the medicine, the sore throat could come back.   Why do a rapid Strep test or  throat culture?   A throat culture or rapid Strep test is the only way to know whether a sore throat is caused by Strep bacteria or a virus. Without treatment, a strep throat has a small risk for acute rheumatic fever. Rheumatic fever is a complication of strep infections that can lead to permanent damage to the valves of the heart. The Strep test is not urgent, however, since treating a strep infection within 7?days of when it begins can prevent rheumatic fever.   A Strep test is not necessary if your child's sore throat is part of a cold AND the main symptom is croup, hoarseness, or a cough, unless the sore throat lasts more than 5 days.   Rapid strep tests are helpful only when their results are positive. If they are negative, a throat culture is usually performed to  the 10% of strep infections that the rapid tests miss. Avoid rapid strep tests performed in shopping malls or at home because they tend to be inaccurate.   How can I take care of my child?   Throat pain relief Children over age 1 can sip warm chicken broth or apple juice. Children over age 6 can suck on hard candy (butterscotch seems to be a soothing flavor) or lollipops. Children over 8 years old can also gargle with warm salt water (1/4 teaspoon of salt per glass).   Diet A sore throat can make some foods hard to swallow. Provide your child with a diet of soft foods for a few days if he prefers it. Cold drinks and milkshakes are especially good. Do not give your child salty or spicy foods or citrus fruits.   Fever and pain relief Give your child acetaminophen (Tylenol) or ibuprofen (Advil) for the sore throat or for a fever over 102?F (39?C).   Common mistakes in treating sore throat   Avoid expensive throat sprays or throat lozenges. Not only are they no more effective than hard candy, but many also contain an ingredient (benzocaine) that may cause an allergic reaction.   Do not use leftover antibiotics from siblings or friends. Leftover  "antibiotics should be thrown out because they deteriorate faster than other drugs. Also, antibiotics help only strep throats. They have no effect on viruses, and they can cause harm. They also make it difficult to find out what is wrong if your child becomes sicker.   Don't allow anyone to smoke around children.   When should I call my child's healthcare provider?   Call IMMEDIATELY if:   Your child is drooling or having great difficulty swallowing.   Your child is having trouble breathing.   Your child is acting very sick.   Call during office hours:   To make an appointment for a Strep test for any other child who has had a sore throat for more than 48 hours (especially if the child also has a fever without any symptoms of a cold).     Published by [employer].  This content is reviewed periodically and is subject to change as new health information becomes available. The information is intended to inform and educate and is not a replacement for medical evaluation, advice, diagnosis or treatment by a healthcare professional.   Written by ORLIN Burden MD, author of \"Your Child's Health,\" [employer].   ? 2010 [employer] and/or its affiliates. All Rights Reserved.   Copyright   [employer] 2011        "

## 2018-08-02 NOTE — PROGRESS NOTES
SUBJECTIVE:   Zacarias Adam is a 23 year old female who presents to clinic today for the following health issues:      ENT Symptoms             Symptoms: cc Present Absent Comment   Fever/Chills  x     Fatigue  x     Muscle Aches   x    Eye Irritation  x  itching   Sneezing   x    Nasal Anthony/Drg  x     Sinus Pressure/Pain   x    Loss of smell   x    Dental pain   x    Sore Throat x      Swollen Glands  x     Ear Pain/Fullness  x     Cough  x     Wheeze   x    Chest Pain   x    Shortness of breath   x    Rash   x    Other  x  Loose stools     Symptom duration:  started last night   Symptom severity:  Getting worse   Treatments tried:  none   Contacts:  none     Low grade fevers.  Patient is 22 weeks pregnant.  No nausea/vomiting   No other complications or pregnancy concerns.    Problem list and histories reviewed & adjusted, as indicated.  Additional history: as documented    Patient Active Problem List   Diagnosis     Congenital uterine anomaly     Endometriosis     Mild persistent asthma     Food allergy     Allergic rhinitis     PTSD (post-traumatic stress disorder)     Fibromyalgia     Chronic low back pain     Migraine aura without headache (migraine equivalents)     Anxiety     Vitamin D deficiency     Essential hypertension     External hemorrhoids     Prenatal care, subsequent pregnancy     Past Surgical History:   Procedure Laterality Date     ARTHROSCOPY KNEE RT/LT  12/09    right     [facility] LAPAROSCOPY, SURGICAL, ABDOMEN, PERITONEUM & OMENTUM; DX W/ OR W/O SPECIMEN(S)  10/20/10    Adhesiolysis, cautery of endometriosis--Dr. Donald     LAPAROSCOPIC APPENDECTOMY  4/10/2014    Procedure: LAPAROSCOPIC APPENDECTOMY;;  Surgeon: Simone Morales MD;  Location: [facility]     LAPAROSCOPIC LYSIS ADHESIONS  6/1/2011    [facility]--Dr. 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       Social History   Substance Use Topics     Smoking status: Former Smoker     Packs/day: 0.50     Types: Cigarettes     Smokeless tobacco: Never Used      Comment: down to 1 cig/day with pregnancy     Alcohol use 0.0 oz/week     0 Standard drinks or equivalent per week      Comment: occasional- quit with pregnancy     Family History   Problem Relation Age of Onset     Allergies Mother      Depression Mother      Thyroid Disease Mother      Respiratory Mother      asthma     Hypertension Mother      Depression Father      Migraines Father      Depression Sister      Coronary Artery Disease Paternal Grandfather      MI     Diabetes Maternal Grandfather          Current Outpatient Prescriptions   Medication Sig Dispense Refill     Prenatal Vit-Fe Fumarate-FA (PRENATAL MULTIVITAMIN PLUS IRON) 27-0.8 MG TABS per tablet Take 1 tablet by mouth daily       Allergies   Allergen Reactions     Crabs [Crustaceans] Hives and Swelling            Nuts Hives and Swelling     angioedema     Soybean Oil Other (See Comments)     Vicodin [Hydrocodone-Acetaminophen] Other (See Comments) and Itching     Becomes agitated.  OK with tramadol     Chicken Allergy GI Disturbance     And turkey     Morphine Anxiety     Soy Allergy GI Disturbance     Tomato GI Disturbance     Wheat GI Disturbance     Recent Labs   Lab Test  05/21/18   1529  04/25/18   1530  03/14/17   1215   01/13/15   1357   ALT  13  22  14   < >  30   CR  0.56  0.53  0.51*   < >  0.76   GFRESTIMATED  >90  >90  >90  Non African American GFR Calc     < >  >90  Non  GFR Calc     GFRESTBLACK  >90  >90  >90  African American GFR Calc     < >  >90   GFR Calc     POTASSIUM   --   3.8   --    --   4.1   TSH   --    --    --    --   3.07    < > = values in this interval not displayed.      BP Readings from Last 3  Encounters:   08/02/18 122/74   07/26/18 136/76   06/19/18 119/66    Wt Readings from Last 3 Encounters:   08/02/18 178 lb 12.8 oz (81.1 kg)   07/26/18 176 lb (79.8 kg)   06/19/18 172 lb (78 kg)                  Labs reviewed in EPIC    Reviewed and updated as needed this visit by clinical staff  Tobacco  Allergies  Meds  Med Hx  Surg Hx  Fam Hx  Soc Hx      Reviewed and updated as needed this visit by Provider         ROS:  Constitutional, HEENT, cardiovascular, pulmonary, GI, , musculoskeletal, neuro, skin, endocrine and psych systems are negative, except as otherwise noted.    OBJECTIVE:     /74  Pulse 97  Temp 99.2  F (37.3  C) (Tympanic)  Resp 16  Wt 178 lb 12.8 oz (81.1 kg)  LMP 02/25/2018  SpO2 99%  BMI 28 kg/m2  Body mass index is 28 kg/(m^2).  GENERAL: healthy, alert and no distress  HENT: normal cephalic/atraumatic, right ear: clear effusion, left ear: clear effusion, nose and mouth without ulcers or lesions, nasal mucosa edematous , oropharynx clear, oral mucous membranes moist and tonsillar erythema  NECK: bilateral anterior cervical adenopathy, no asymmetry, masses, or scars and thyroid normal to palpation  RESP: lungs clear to auscultation - no rales, rhonchi or wheezes  CV: regular rate and rhythm, normal S1 S2, no S3 or S4, no murmur, click or rub, no peripheral edema and peripheral pulses strong  ABDOMEN: soft, nontender, no hepatosplenomegaly, no masses and bowel sounds normal  MS: no gross musculoskeletal defects noted, no edema    Diagnostic Test Results:  Results for orders placed or performed in visit on 08/02/18 (from the past 24 hour(s))   Strep, Rapid Screen   Result Value Ref Range    Specimen Description Throat     Rapid Strep A Screen       NEGATIVE: No Group A streptococcal antigen detected by immunoassay, await culture report.       ASSESSMENT/PLAN:     1. Throat pain   Most  Likely viral - rapid negative.  See AVS    - Strep, Rapid Screen  - Beta strep group A  culture    2. Upper respiratory tract infection, unspecified type   See AVS    3. Prenatal care, subsequent pregnancy in second trimester  Follow up with OB/GYN if needed        Patient Instructions           Tylenol 325-650 mg every 6 hours as needed for sore throat, body aches and fever.    Can also take Benadryl 25 mg as needed for congestion with this illness.    FOLLOW UP with OB/GYN as scheduled or sooner if pregnancy related concerns arise.    Return to clinic or ED/UC if you develop high fevers, or worsening symptoms.    Crissy Gleyovany, FNP                 Sore Throat (Pharyngitis)            What is a sore throat?   When your child complains that his throat is sore, it is usually a symptom of an illness, such as a cold. When you look at the throat with a light, it will be bright red. Children too young to talk may have a sore throat if they refuse to eat or begin to cry during feedings.   What is the cause?   Most sore throats are caused by viruses and are part of a cold. About 10% of sore throats are caused by strep bacteria.   Tonsillitis (temporary swelling and redness of the tonsils) usually occurs with any throat infection, viral or bacterial. Swollen tonsils do not have any special meaning.   Children who sleep with their mouths open often wake up in the morning with a dry mouth and sore throat. It feels better within an hour of having something to drink. Use a humidifier to help prevent this problem.   Children with a postnasal drip from draining sinuses often have a sore throat from the secretions or from clearing their throat often.   How long does it last?   Sore throats caused by viral illnesses usually last 4 or 5?days.   A sore throat caused by Strep will start feeling better soon after being treated with antibiotics. After a child has been taking medicine for strep for 24?hours, strep is no longer contagious. Your child can then return to day care or school if his fever is gone and he's  feeling better. Your child must take all of the antibiotic even if he is feeling better. If your child doesn't take all of the medicine, the sore throat could come back.   Why do a rapid Strep test or throat culture?   A throat culture or rapid Strep test is the only way to know whether a sore throat is caused by Strep bacteria or a virus. Without treatment, a strep throat has a small risk for acute rheumatic fever. Rheumatic fever is a complication of strep infections that can lead to permanent damage to the valves of the heart. The Strep test is not urgent, however, since treating a strep infection within 7?days of when it begins can prevent rheumatic fever.   A Strep test is not necessary if your child's sore throat is part of a cold AND the main symptom is croup, hoarseness, or a cough, unless the sore throat lasts more than 5 days.   Rapid strep tests are helpful only when their results are positive. If they are negative, a throat culture is usually performed to  the 10% of strep infections that the rapid tests miss. Avoid rapid strep tests performed in shopping malls or at home because they tend to be inaccurate.   How can I take care of my child?   Throat pain relief Children over age 1 can sip warm chicken broth or apple juice. Children over age 6 can suck on hard candy (butterscotch seems to be a soothing flavor) or lollipops. Children over 8 years old can also gargle with warm salt water (1/4 teaspoon of salt per glass).   Diet A sore throat can make some foods hard to swallow. Provide your child with a diet of soft foods for a few days if he prefers it. Cold drinks and milkshakes are especially good. Do not give your child salty or spicy foods or citrus fruits.   Fever and pain relief Give your child acetaminophen (Tylenol) or ibuprofen (Advil) for the sore throat or for a fever over 102?F (39?C).   Common mistakes in treating sore throat   Avoid expensive throat sprays or throat lozenges. Not only  "are they no more effective than hard candy, but many also contain an ingredient (benzocaine) that may cause an allergic reaction.   Do not use leftover antibiotics from siblings or friends. Leftover antibiotics should be thrown out because they deteriorate faster than other drugs. Also, antibiotics help only strep throats. They have no effect on viruses, and they can cause harm. They also make it difficult to find out what is wrong if your child becomes sicker.   Don't allow anyone to smoke around children.   When should I call my child's healthcare provider?   Call IMMEDIATELY if:   Your child is drooling or having great difficulty swallowing.   Your child is having trouble breathing.   Your child is acting very sick.   Call during office hours:   To make an appointment for a Strep test for any other child who has had a sore throat for more than 48 hours (especially if the child also has a fever without any symptoms of a cold).     Published by [employer].  This content is reviewed periodically and is subject to change as new health information becomes available. The information is intended to inform and educate and is not a replacement for medical evaluation, advice, diagnosis or treatment by a healthcare professional.   Written by ORLIN Burden MD, author of \"Your Child's Health,\" [employer].   ? 2010 [employer] and/or its affiliates. All Rights Reserved.   Copyright   [employer] 2011            Crissy Irwin NP  [facility]    "

## 2018-08-02 NOTE — LETTER
[facility]  [address] [address]  Phone: [phone]    August 2, 2018        Zacarias Adam  [address] [place] [id number]          To whom it may concern:    RE: Zacarias Adam    Patient was seen and treated today at our clinic and missed work 8/2/2018      Please contact me for questions or concerns.      Sincerely,        Crissy Irwin NP

## 2018-08-02 NOTE — MR AVS SNAPSHOT
After Visit Summary   8/2/2018    Zacarias Adam    MRN: [id number]           Patient Information     Date Of Birth          1995        Visit Information        Provider Department      8/2/2018 11:20 AM Crissy Irwin NP [facility]        Today's Diagnoses     Throat pain    -  1      Care Instructions            Tylenol 325-650 mg every 6 hours as needed for sore throat, body aches and fever.    Can also take Benadryl 25 mg as needed for congestion with this illness.    FOLLOW UP with OB/GYN as scheduled or sooner if pregnancy related concerns arise.    Return to clinic or ED/UC if you develop high fevers, or worsening symptoms.    JERMAINE Sanchez                 Sore Throat (Pharyngitis)            What is a sore throat?   When your child complains that his throat is sore, it is usually a symptom of an illness, such as a cold. When you look at the throat with a light, it will be bright red. Children too young to talk may have a sore throat if they refuse to eat or begin to cry during feedings.   What is the cause?   Most sore throats are caused by viruses and are part of a cold. About 10% of sore throats are caused by strep bacteria.   Tonsillitis (temporary swelling and redness of the tonsils) usually occurs with any throat infection, viral or bacterial. Swollen tonsils do not have any special meaning.   Children who sleep with their mouths open often wake up in the morning with a dry mouth and sore throat. It feels better within an hour of having something to drink. Use a humidifier to help prevent this problem.   Children with a postnasal drip from draining sinuses often have a sore throat from the secretions or from clearing their throat often.   How long does it last?   Sore throats caused by viral illnesses usually last 4 or 5?days.   A sore throat caused by Strep will start feeling better soon after being treated with antibiotics. After a child has been taking  medicine for strep for 24?hours, strep is no longer contagious. Your child can then return to day care or school if his fever is gone and he's feeling better. Your child must take all of the antibiotic even if he is feeling better. If your child doesn't take all of the medicine, the sore throat could come back.   Why do a rapid Strep test or throat culture?   A throat culture or rapid Strep test is the only way to know whether a sore throat is caused by Strep bacteria or a virus. Without treatment, a strep throat has a small risk for acute rheumatic fever. Rheumatic fever is a complication of strep infections that can lead to permanent damage to the valves of the heart. The Strep test is not urgent, however, since treating a strep infection within 7?days of when it begins can prevent rheumatic fever.   A Strep test is not necessary if your child's sore throat is part of a cold AND the main symptom is croup, hoarseness, or a cough, unless the sore throat lasts more than 5 days.   Rapid strep tests are helpful only when their results are positive. If they are negative, a throat culture is usually performed to  the 10% of strep infections that the rapid tests miss. Avoid rapid strep tests performed in shopping malls or at home because they tend to be inaccurate.   How can I take care of my child?   Throat pain relief Children over age 1 can sip warm chicken broth or apple juice. Children over age 6 can suck on hard candy (butterscotch seems to be a soothing flavor) or lollipops. Children over 8 years old can also gargle with warm salt water (1/4 teaspoon of salt per glass).   Diet A sore throat can make some foods hard to swallow. Provide your child with a diet of soft foods for a few days if he prefers it. Cold drinks and milkshakes are especially good. Do not give your child salty or spicy foods or citrus fruits.   Fever and pain relief Give your child acetaminophen (Tylenol) or ibuprofen (Advil) for the sore  "throat or for a fever over 102?F (39?C).   Common mistakes in treating sore throat   Avoid expensive throat sprays or throat lozenges. Not only are they no more effective than hard candy, but many also contain an ingredient (benzocaine) that may cause an allergic reaction.   Do not use leftover antibiotics from siblings or friends. Leftover antibiotics should be thrown out because they deteriorate faster than other drugs. Also, antibiotics help only strep throats. They have no effect on viruses, and they can cause harm. They also make it difficult to find out what is wrong if your child becomes sicker.   Don't allow anyone to smoke around children.   When should I call my child's healthcare provider?   Call IMMEDIATELY if:   Your child is drooling or having great difficulty swallowing.   Your child is having trouble breathing.   Your child is acting very sick.   Call during office hours:   To make an appointment for a Strep test for any other child who has had a sore throat for more than 48 hours (especially if the child also has a fever without any symptoms of a cold).     Published by [employer].  This content is reviewed periodically and is subject to change as new health information becomes available. The information is intended to inform and educate and is not a replacement for medical evaluation, advice, diagnosis or treatment by a healthcare professional.   Written by ORLIN Burden MD, author of \"Your Child's Health,\" [employer].   ? 2010 [employer] and/or its affiliates. All Rights Reserved.   Copyright   [employer] 2011                Follow-ups after your visit        Your next 10 appointments already scheduled     Aug 15, 2018  9:15 AM CDT   LAB with [facility]   [facility]    [address] [address]   [phone]           Please do not eat 10-12 hours before your appointment if you are coming in fasting for labs on lipids, " cholesterol, or glucose (sugar). This does not apply to pregnant women. Water, hot tea and black coffee (with nothing added) are okay. Do not drink other fluids, diet soda or chew gum.            Aug 15, 2018  9:30 AM CDT   ESTABLISHED PRENATAL with Wendie Johnson MD   [facility]    [address] [address]   [phone]            Sep 26, 2018  8:45 AM CDT   MFM US COMPRE SINGLE F/U with [id number]   [facility])    [address]  [facility] [address] [phone]           Wear comfortable clothes and leave your valuables at home.            Sep 26, 2018  9:15 AM CDT   Radiology MD with UR ELANA MENDOZA   [facility]    [address]  [facility] [address]   [phone]           Please arrive at the time given for your first appointment. This visit is used internally to schedule the physician's time during your ultrasound.              Who to contact     If you have questions or need follow up information about today's clinic visit or your schedule please contact [facility] directly at [phone].  Normal or non-critical lab and imaging results will be communicated to you by MyChart, letter or phone within 4 business days after the clinic has received the results. If you do not hear from us within 7 days, please contact the clinic through MyChart or phone. If you have a critical or abnormal lab result, we will notify you by phone as soon as possible.  Submit refill requests through [employer] or call your pharmacy and they will forward the refill request to us. Please allow 3 business days for your refill to be completed.          Additional Information About Your Visit        Care EveryWhere ID     This is your Care EveryWhere ID. This could be  used by other organizations to access your [place] medical records  [id number]        Your Vitals Were     Pulse Temperature Respirations Last Period Pulse Oximetry BMI (Body Mass Index)    97 99.2  F (37.3  C) (Tympanic) 16 02/25/2018 99% 28 kg/m2       Blood Pressure from Last 3 Encounters:   08/02/18 122/74   07/26/18 136/76   06/19/18 119/66    Weight from Last 3 Encounters:   08/02/18 178 lb 12.8 oz (81.1 kg)   07/26/18 176 lb (79.8 kg)   06/19/18 172 lb (78 kg)              We Performed the Following     Beta strep group A culture     Strep, Rapid Screen        Primary Care Provider Office Phone # Fax #    Yudi Sauceda [phone] [address] [place] [address]        Equal Access to Services     [facility] : Hadshirley Kyle, wanat lee, qayboleg kaalmadestini he, flora gr . So [facility] [phone].    ATENCIÓN: Si habla español, tiene a aleman disposición servicios gratuitos de asistencia lingüística. Llame al [phone].    We comply with applicable federal civil rights laws and [place] laws. We do not discriminate on the basis of race, color, national origin, age, disability, sex, sexual orientation, or gender identity.            Thank you!     Thank you for choosing [facility]  for your care. Our goal is always to provide you with excellent care. Hearing back from our patients is one way we can continue to improve our services. Please take a few minutes to complete the written survey that you may receive in the mail after your visit with us. Thank you!             Your Updated Medication List - Protect others around you: Learn how to safely use, store and throw away your medicines at www.disposemymeds.org.          This list is accurate as of 8/2/18 11:48 AM.  Always use your most recent med list.                   Brand Name Dispense Instructions for use Diagnosis    prenatal multivitamin plus iron  27-0.8 MG Tabs per tablet      Take 1 tablet by mouth daily

## 2018-08-03 LAB
BACTERIA SPEC CULT: NORMAL
SPECIMEN SOURCE: NORMAL

## 2018-08-06 ENCOUNTER — HOSPITAL ENCOUNTER (EMERGENCY)
Facility: CLINIC | Age: 23
Discharge: HOME | End: 2018-08-06
Attending: PHYSICIAN ASSISTANT | Admitting: PHYSICIAN ASSISTANT
Payer: MEDICAID

## 2018-08-06 ENCOUNTER — TELEPHONE (OUTPATIENT)
Dept: FAMILY MEDICINE | Facility: CLINIC | Age: 23
End: 2018-08-06

## 2018-08-06 VITALS
HEIGHT: 66 IN | WEIGHT: 176 LBS | RESPIRATION RATE: 18 BRPM | OXYGEN SATURATION: 98 % | DIASTOLIC BLOOD PRESSURE: 77 MMHG | BODY MASS INDEX: 28.28 KG/M2 | SYSTOLIC BLOOD PRESSURE: 128 MMHG | TEMPERATURE: 98.3 F

## 2018-08-06 DIAGNOSIS — Z34.92 SECOND TRIMESTER PREGNANCY: ICD-10-CM

## 2018-08-06 DIAGNOSIS — J06.9 VIRAL URI WITH COUGH: ICD-10-CM

## 2018-08-06 PROCEDURE — 94640 AIRWAY INHALATION TREATMENT: CPT | Performed by: PHYSICIAN ASSISTANT

## 2018-08-06 PROCEDURE — 25000125 ZZHC RX 250: Performed by: PHYSICIAN ASSISTANT

## 2018-08-06 PROCEDURE — 99213 OFFICE O/P EST LOW 20 MIN: CPT | Mod: Z6 | Performed by: PHYSICIAN ASSISTANT

## 2018-08-06 PROCEDURE — G0463 HOSPITAL OUTPT CLINIC VISIT: HCPCS | Mod: 25 | Performed by: PHYSICIAN ASSISTANT

## 2018-08-06 RX ORDER — ALBUTEROL SULFATE 0.83 MG/ML
2.5 SOLUTION RESPIRATORY (INHALATION) ONCE
Status: COMPLETED | OUTPATIENT
Start: 2018-08-06 | End: 2018-08-06

## 2018-08-06 RX ORDER — ALBUTEROL SULFATE 90 UG/1
1-2 AEROSOL, METERED RESPIRATORY (INHALATION) EVERY 6 HOURS PRN
Qty: 1 INHALER | Refills: 0 | Status: SHIPPED | OUTPATIENT
Start: 2018-08-06 | End: 2018-09-06

## 2018-08-06 RX ADMIN — ALBUTEROL SULFATE 2.5 MG: 2.5 SOLUTION RESPIRATORY (INHALATION) at 12:37

## 2018-08-06 ASSESSMENT — ENCOUNTER SYMPTOMS
DIARRHEA: 0
STRIDOR: 0
PALPITATIONS: 0
HEADACHES: 0
RHINORRHEA: 1
COUGH: 1
FATIGUE: 0
ABDOMINAL PAIN: 0
WHEEZING: 0
CHOKING: 0
SHORTNESS OF BREATH: 1
DIZZINESS: 0
FEVER: 0
VOMITING: 0
NAUSEA: 0
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
## 2018-08-06 NOTE — ED AVS SNAPSHOT
[facility] Emergency Department    [address] [facility] [address]    Phone:  [phone]    Fax:  [phone]                                       Zacarias Adam   MRN: [id number]    Department:  [facility] Emergency Department   Date of Visit:  8/6/2018           After Visit Summary Signature Page     I have received my discharge instructions, and my questions have been answered. I have discussed any challenges I see with this plan with the nurse or doctor.    ..........................................................................................................................................  Patient/Patient Representative Signature      ..........................................................................................................................................  Patient Representative Print Name and Relationship to Patient    ..................................................               ................................................  Date                                            Time    ..........................................................................................................................................  Reviewed by Signature/Title    ...................................................              ..............................................  Date                                                            Time

## 2018-08-06 NOTE — ED AVS SNAPSHOT
[facility] Emergency Department    [address] [facility] [address]    Phone:  [phone]    Fax:  [phone]                                       Zacarias Adam   MRN: [id number]    Department:  [facility] Emergency Department   Date of Visit:  8/6/2018           Patient Information     Date Of Birth          1995        Your diagnoses for this visit were:     Viral URI with cough        You were seen by Jaquelin Muñiz PA-C.      Follow-up Information     Follow up with Yudi Sauceda MD In 2 days.    Specialty:  Family Practice    Contact information:    [address] [place] [address]  [phone]          Follow up with [facility] Emergency Department.    Specialty:  EMERGENCY MEDICINE    Why:  As needed, If symptoms worsen    Contact information:    [address] [place] [address] [phone]    Additional information:    The medical center is located at   [address] [place] [address]. ([address]   [facility] in [place], [place] miles north   of [place]).        Discharge Instructions       Use Medication as directed; no antibiotic indicated at this time.     Hydrate with fluids, rest, cool humidifier.  May use acetaminophen as needed for aches or fever.     For your Cough call OB/GYN for over the counter cough medication that is ok for you to take with your pregnancy.   Follow up with PCP in 2-3 days.    Go to Emergency Room if sx worsen or change, Shortness of breath, chest pain, persistent fevers, or painful breathing occur.   See OB/GYN if contractions, back pain, vaginal bleeding or fluid leakage occurs.     Patient voiced understanding of instructions given.            Your next 10 appointments already scheduled     Aug 15, 2018  9:15 AM CDT   LAB with [facility] ([facility])    [address] [facility] [address] [phone]           Please do not eat 10-12 hours before your  appointment if you are coming in fasting for labs on lipids, cholesterol, or glucose (sugar). This does not apply to pregnant women. Water, hot tea and black coffee (with nothing added) are okay. Do not drink other fluids, diet soda or chew gum.            Aug 15, 2018  9:30 AM CDT   ESTABLISHED PRENATAL with Wendie Johnson MD   [facility] ([facility])    [address] [facility] [address]   [phone]            Sep 26, 2018  8:45 AM CDT   MFM US COMPRE SINGLE F/U with [id number]   [facility])    [address]  [facility] [address] [phone]           Wear comfortable clothes and leave your valuables at home.            Sep 26, 2018  9:15 AM CDT   Radiology MD with UR ELANA MENDOZA   [facility])    [address]  [facility] [address]   [phone]           Please arrive at the time given for your first appointment. This visit is used internally to schedule the physician's time during your ultrasound.              24 Hour Appointment Hotline       To make an appointment at any [facility], call [phone] ([phone]). If you don't have a family doctor or clinic, we will help you find one. [facility] are conveniently located to serve the needs of you and your family.             Review of your medicines      START taking        Dose / Directions Last dose taken    albuterol 108 (90 Base) MCG/ACT Inhaler   Commonly known as:  PROAIR HFA/PROVENTIL HFA/VENTOLIN HFA   Dose:  1-2 puff   Quantity:  1 Inhaler        Inhale 1-2 puffs into the lungs every 6 hours as needed for shortness of breath / dyspnea or wheezing   Refills:  0          Our records show that you are taking the medicines listed below. If these are incorrect, please call your family doctor or  clinic.        Dose / Directions Last dose taken    prenatal multivitamin plus iron 27-0.8 MG Tabs per tablet   Dose:  1 tablet        Take 1 tablet by mouth daily   Refills:  0                Prescriptions were sent or printed at these locations (1 Prescription)                   [id number] - [facility] [address]   [address] [facility] [address]    Telephone:  [phone]   Fax:  [phone]   Hours:  Closed 10-16-08 business to [facility]                E-Prescribed (1 of 1)         albuterol (PROAIR HFA/PROVENTIL HFA/VENTOLIN HFA) 108 (90 Base) MCG/ACT Inhaler                Orders Needing Specimen Collection     None      Pending Results     No orders found from 8/4/2018 to 8/7/2018.            Pending Culture Results     No orders found from 8/4/2018 to 8/7/2018.            Pending Results Instructions     If you had any lab results that were not finalized at the time of your Discharge, you can call the ED Lab Result RN at [phone]. You will be contacted by this team for any positive Lab results or changes in treatment. The nurses are available 7 days a week from 10A to 6:30P.  You can leave a message 24 hours per day and they will return your call.        Test Results From Your Hospital Stay               Thank you for choosing [facility]       Thank you for choosing [facility] for your care. Our goal is always to provide you with excellent care. Hearing back from our patients is one way we can continue to improve our services. Please take a few minutes to complete the written survey that you may receive in the mail after you visit with us. Thank you!        Care EveryWhere ID     This is your Care EveryWhere ID. This could be used by other organizations to access your [facility] medical records  [id number]        Equal Access to Services     BRIDGETTE LOPEZ AH: Benedicto Kyle, bárbara lee, flora eddy  la'bhavya henri. So [facility] [phone].    ATENCIÓN: Si habla español, tiene a aleman disposición servicios gratuitos de asistencia lingüística. Llame al [phone].    We comply with applicable federal civil rights laws and [place] laws. We do not discriminate on the basis of race, color, national origin, age, disability, sex, sexual orientation, or gender identity.            After Visit Summary       This is your record. Keep this with you and show to your community pharmacist(s) and doctor(s) at your next visit.

## 2018-08-06 NOTE — DISCHARGE INSTRUCTIONS
Use Medication as directed; no antibiotic indicated at this time.     Hydrate with fluids, rest, cool humidifier.  May use acetaminophen as needed for aches or fever.     For your Cough call OB/GYN for over the counter cough medication that is ok for you to take with your pregnancy.   Follow up with PCP in 2-3 days.    Go to Emergency Room if sx worsen or change, Shortness of breath, chest pain, persistent fevers, or painful breathing occur.   See OB/GYN if contractions, back pain, vaginal bleeding or fluid leakage occurs.     Patient voiced understanding of instructions given.

## 2018-08-06 NOTE — TELEPHONE ENCOUNTER
"\"don't have sore throat any more, just a cough. Won't stop\" it is a productive cough,   Clear mucous.   Not coughing while we talk.     She does say she feels short of breath.   Advised ED immed.     encouraged a warm fluid, warm mist in the meantime, may help her.   \"ok, thanks, \"    Zeenat Bowden RNC    "

## 2018-08-06 NOTE — TELEPHONE ENCOUNTER
Reason for call:  Patient reporting a symptom    Symptom or request: Pt was seen in clinic 8/2 by CLEMENT Irwin for a sore throat and cough and pt states that it is much worse today.  She said that she feels like she has a fever, but she doesnot have a thermometer.  Please call patient and advise.      Duration (how long have symptoms been present): ongoing    Have you been treated for this before? Yes    Additional comments:     Phone Number patient can be reached at:  Home number on file [phone] (home)    Best Time:  any    Can we leave a detailed message on this number:  YES    Call taken on 8/6/2018 at 10:47 AM by Alba Almazan

## 2018-08-07 ENCOUNTER — TELEPHONE (OUTPATIENT)
Dept: OBGYN | Facility: CLINIC | Age: 23
End: 2018-08-07

## 2018-08-07 DIAGNOSIS — J06.9 URI (UPPER RESPIRATORY INFECTION): Primary | ICD-10-CM

## 2018-08-07 RX ORDER — AZITHROMYCIN 250 MG/1
TABLET, FILM COATED ORAL
Qty: 6 TABLET | Refills: 0 | Status: SHIPPED | OUTPATIENT
Start: 2018-08-07 | End: 2018-09-06

## 2018-08-07 NOTE — TELEPHONE ENCOUNTER
Pt called back and I gave her the msg that a RX was sent to the pharmacy.    [facility]

## 2018-08-07 NOTE — TELEPHONE ENCOUNTER
Pharmacy is [facility] in [place].  Prescription sent.  Called to notify patient.  Left message for patient to return call to clinic.    Johnna Fairbanks   Ob/Gyn Clinic  RN

## 2018-08-07 NOTE — TELEPHONE ENCOUNTER
Note from AFR Fransisco [employer].    Pt was seen in Urgent Care 8-6-18 for cough - poss Bronchial Pnuemonia which is spreading around her work.  States she needs MD to ok antibiotics due to high risk.    Please advise.    Thanks-    -Yudy Boyle  Clinic Station

## 2018-08-07 NOTE — TELEPHONE ENCOUNTER
"Return call to patient  Spoke with patient on the phone.    S-(situation): Cough, congestion, post nasal drip, shortness of breath and rhinorrhea. Patient was evaluated in Urgent Care x2. Patient reports both times, abx not given due to pregnancy. Patient was advised to \" call your OB doctor.\" Patient given inhaler which helps some. Patient reports she feels she is worsening. Co-workers on antibiotics for bronchial pnuemonia.     B-(background):  at 24W2D pregnant,     A-(assessment): viral URI per last urgent care visit.     R-(recommendations): Recommendation from Urgent Care was to follow up in 2-3 days. Patient requesting antibiotics now.    Please review and advise.  Thank you.    Johnna Fairbanks   Ob/Gyn Clinic  RN        "

## 2018-08-15 ENCOUNTER — PRENATAL OFFICE VISIT (OUTPATIENT)
Dept: OBGYN | Facility: CLINIC | Age: 23
End: 2018-08-15
Payer: MEDICAID

## 2018-08-15 VITALS
BODY MASS INDEX: 28.93 KG/M2 | WEIGHT: 180 LBS | HEIGHT: 66 IN | DIASTOLIC BLOOD PRESSURE: 80 MMHG | RESPIRATION RATE: 18 BRPM | HEART RATE: 103 BPM | TEMPERATURE: 98.1 F | SYSTOLIC BLOOD PRESSURE: 136 MMHG

## 2018-08-15 DIAGNOSIS — Z34.82 PRENATAL CARE, SUBSEQUENT PREGNANCY IN SECOND TRIMESTER: Primary | ICD-10-CM

## 2018-08-15 DIAGNOSIS — Z34.82 PRENATAL CARE, SUBSEQUENT PREGNANCY IN SECOND TRIMESTER: ICD-10-CM

## 2018-08-15 DIAGNOSIS — Z34.82 PRENATAL CARE, SUBSEQUENT PREGNANCY, SECOND TRIMESTER: Primary | ICD-10-CM

## 2018-08-15 LAB
GLUCOSE 1H P 50 G GLC PO SERPL-MCNC: 83 MG/DL (ref 60–129)
HGB BLD-MCNC: 11.1 G/DL (ref 11.7–15.7)

## 2018-08-15 PROCEDURE — 99207 ZZC PRENATAL VISIT: CPT | Performed by: OBSTETRICS & GYNECOLOGY

## 2018-08-15 PROCEDURE — 36415 COLL VENOUS BLD VENIPUNCTURE: CPT | Performed by: OBSTETRICS & GYNECOLOGY

## 2018-08-15 PROCEDURE — 00000218 ZZHCL STATISTIC OBHBG - HEMOGLOBIN: Performed by: OBSTETRICS & GYNECOLOGY

## 2018-08-15 PROCEDURE — 82950 GLUCOSE TEST: CPT | Performed by: OBSTETRICS & GYNECOLOGY

## 2018-08-15 PROCEDURE — 86780 TREPONEMA PALLIDUM: CPT | Performed by: OBSTETRICS & GYNECOLOGY

## 2018-08-15 NOTE — MR AVS SNAPSHOT
After Visit Summary   8/15/2018    Zacarias Adam    MRN: [id number]           Patient Information     Date Of Birth          1995        Visit Information        Provider Department      8/15/2018 9:30 AM Wendie Johnson MD [facility]        Today's Diagnoses     Prenatal care, subsequent pregnancy in second trimester    -  1       Follow-ups after your visit        Your next 10 appointments already scheduled     Sep 13, 2018  2:15 PM CDT   ESTABLISHED PRENATAL with Wendie Johnson MD   [facility]    [address] [facility] [address]   [phone]            Sep 26, 2018  8:45 AM CDT   MFM US COMPRE SINGLE F/U with [id number]   [facility])    [address]  [facility] [id number] [phone]           Wear comfortable clothes and leave your valuables at home.            Sep 26, 2018  9:15 AM CDT   Radiology MD with UR ELANA MENDOZA   [facility])    [address]  [facility] [address]   [phone]           Please arrive at the time given for your first appointment. This visit is used internally to schedule the physician's time during your ultrasound.              Who to contact     If you have questions or need follow up information about today's clinic visit or your schedule please contact [facility] directly at [phone].  Normal or non-critical lab and imaging results will be communicated to you by MyChart, letter or phone within 4 business days after the clinic has received the results. If you do not hear from us within 7 days, please contact the clinic through MyChart or phone. If you have a critical or abnormal lab result, we will notify you by phone as soon as possible.  Submit refill requests through  "MyChart or call your pharmacy and they will forward the refill request to us. Please allow 3 business days for your refill to be completed.          Additional Information About Your Visit        Care EveryWhere ID     This is your Care EveryWhere ID. This could be used by other organizations to access your [facility] medical records  [id number]        Your Vitals Were     Pulse Temperature Respirations Height Last Period BMI (Body Mass Index)    103 98.1  F (36.7  C) (Tympanic) 18 5' 6\" (1.676 m) 02/25/2018 29.05 kg/m2       Blood Pressure from Last 3 Encounters:   08/15/18 136/80   08/06/18 128/77   08/02/18 122/74    Weight from Last 3 Encounters:   08/15/18 180 lb (81.6 kg)   08/06/18 176 lb (79.8 kg)   08/02/18 178 lb 12.8 oz (81.1 kg)              Today, you had the following     No orders found for display       Primary Care Provider Office Phone # Fax #    Yudi Sauceda [phone] [address] [place], [place] [address]        Equal Access to Services     [facility] : Hadii alton cid hadjannetho Solaila, waaxda luqadaha, qaybta kaalmada neri, flora gr . So [facility] [phone].    ATENCIÓN: Si habla español, tiene a aleman disposición servicios gratuitos de asistencia lingüística. [facility] [phone].    We comply with applicable federal civil rights laws and [place] laws. We do not discriminate on the basis of race, color, national origin, age, disability, sex, sexual orientation, or gender identity.            Thank you!     Thank you for choosing [facility]  for your care. Our goal is always to provide you with excellent care. Hearing back from our patients is one way we can continue to improve our services. Please take a few minutes to complete the written survey that you may receive in the mail after your visit with us. Thank you!             Your Updated Medication List - Protect others around you: Learn how to safely use, store " and throw away your medicines at www.disposemymeds.org.          This list is accurate as of 8/15/18  9:37 AM.  Always use your most recent med list.                   Brand Name Dispense Instructions for use Diagnosis    albuterol 108 (90 Base) MCG/ACT inhaler    PROAIR HFA/PROVENTIL HFA/VENTOLIN HFA    1 Inhaler    Inhale 1-2 puffs into the lungs every 6 hours as needed for shortness of breath / dyspnea or wheezing        azithromycin 250 MG tablet    ZITHROMAX    6 tablet    Two tablets first day, then one tablet daily for four days.    URI (upper respiratory infection)       prenatal multivitamin plus iron 27-0.8 MG Tabs per tablet      Take 1 tablet by mouth daily

## 2018-08-15 NOTE — PROGRESS NOTES
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 Prenatal care, subsequent pregnancy in second trimester        Routine prenatal care  RTC 4 weeks.      Wendie Johnson M.D.     "

## 2018-08-16 LAB — T PALLIDUM AB SER QL: NONREACTIVE

## 2018-08-16 NOTE — PROGRESS NOTES
Inform patient that she passed her 1 hour glucose challenge test.   Her hemoglobin/iron level is mildly low, but nothing to serious to warrant oral iron supplementation.   Also her syphilis test is pending and she will be made aware of the results when the results are available.     Shaista Bedolla MD  [facility]

## 2018-08-17 NOTE — PROGRESS NOTES
Syphilis testing is negative. Inform patient.     Shaista Bedolla MD  [facility]

## 2018-08-27 ENCOUNTER — TELEPHONE (OUTPATIENT)
Dept: OBGYN | Facility: CLINIC | Age: 23
End: 2018-08-27

## 2018-08-27 ENCOUNTER — PRENATAL OFFICE VISIT (OUTPATIENT)
Dept: OBGYN | Facility: CLINIC | Age: 23
End: 2018-08-27
Payer: MEDICAID

## 2018-08-27 VITALS
SYSTOLIC BLOOD PRESSURE: 122 MMHG | TEMPERATURE: 97.8 F | HEIGHT: 66 IN | RESPIRATION RATE: 16 BRPM | BODY MASS INDEX: 29.54 KG/M2 | WEIGHT: 183.8 LBS | DIASTOLIC BLOOD PRESSURE: 76 MMHG | HEART RATE: 100 BPM

## 2018-08-27 DIAGNOSIS — Z34.82 PRENATAL CARE, SUBSEQUENT PREGNANCY IN SECOND TRIMESTER: Primary | ICD-10-CM

## 2018-08-27 DIAGNOSIS — R55 VASOVAGAL EPISODE: ICD-10-CM

## 2018-08-27 PROCEDURE — 99207 ZZC PRENATAL VISIT: CPT | Performed by: OBSTETRICS & GYNECOLOGY

## 2018-08-27 NOTE — TELEPHONE ENCOUNTER
Reason for call:  Patient reporting a symptom    Symptom or request: Has had high blood pressure with this pregnancy.  This morning - has had this episodes where she thinks she is going to faint, goes black and then she has to sit down and it goes away.  Also has a stomach ach    Duration (how long have symptoms been present): this morning    Have you been treated for this before? Yes states she had similar symptoms at the beginnig of her pregnancy.    Additional comments: states it happens every once in awhile but usually goes away this time it isn't going away.    Phone Number patient can be reached at:  Home number on file [phone] (home)    Best Time:  any    Can we leave a detailed message on this number:  YES    Call taken on 8/27/2018 at 8:43 AM by Yudy Boyle

## 2018-08-27 NOTE — NURSING NOTE
"Initial /76 (BP Location: Right arm, Patient Position: Chair, Cuff Size: Adult Regular)  Pulse 100  Temp 97.8  F (36.6  C) (Tympanic)  Resp 16  Ht 5' 6\" (1.676 m)  Wt 183 lb 12.8 oz (83.4 kg)  LMP 02/25/2018  BMI 29.67 kg/m2 Estimated body mass index is 29.67 kg/(m^2) as calculated from the following:    Height as of this encounter: 5' 6\" (1.676 m).    Weight as of this encounter: 183 lb 12.8 oz (83.4 kg). .    Genet Lindsay LPN    "

## 2018-08-27 NOTE — MR AVS SNAPSHOT
After Visit Summary   8/27/2018    Zacarias Adam    MRN: [id number]           Patient Information     Date Of Birth          1995        Visit Information        Provider Department      8/27/2018 9:45 AM Kacie Boston MD [facility]        Today's Diagnoses     Prenatal care, subsequent pregnancy in second trimester    -  1    Vasovagal episode           Follow-ups after your visit        Follow-up notes from your care team     Return in about 2 weeks (around 9/10/2018).      Your next 10 appointments already scheduled     Sep 13, 2018  2:15 PM CDT   ESTABLISHED PRENATAL with Wendie Johnson MD   [facility]    [address] [facility] [address]   [phone]            Sep 26, 2018  8:45 AM CDT   MFM US COMPRE SINGLE F/U with [id number]   [facility] [place])    [address]  [facility] [address] [phone]           Wear comfortable clothes and leave your valuables at home.            Sep 26, 2018  9:15 AM CDT   Radiology MD with UR ELANA MENDOZA   [facility] [place])    [address]  [facility] [address]   [phone]           Please arrive at the time given for your first appointment. This visit is used internally to schedule the physician's time during your ultrasound.              Who to contact     If you have questions or need follow up information about today's clinic visit or your schedule please contact [facility] directly at [phone].  Normal or non-critical lab and imaging results will be communicated to you by MyChart, letter or phone within 4 business days after the clinic has received the results. If you do not hear from us within 7 days, please contact the clinic through MyChart or phone. If you have a  "critical or abnormal lab result, we will notify you by phone as soon as possible.  Submit refill requests through [employer] or call your pharmacy and they will forward the refill request to us. Please allow 3 business days for your refill to be completed.          Additional Information About Your Visit        Care EveryWhere ID     This is your Care EveryWhere ID. This could be used by other organizations to access your [place] medical records  [id number]        Your Vitals Were     Pulse Temperature Respirations Height Last Period BMI (Body Mass Index)    100 97.8  F (36.6  C) (Tympanic) 16 5' 6\" (1.676 m) 02/25/2018 29.67 kg/m2       Blood Pressure from Last 3 Encounters:   08/27/18 122/76   08/15/18 136/80   08/06/18 128/77    Weight from Last 3 Encounters:   08/27/18 183 lb 12.8 oz (83.4 kg)   08/15/18 180 lb (81.6 kg)   08/06/18 176 lb (79.8 kg)              Today, you had the following     No orders found for display       Primary Care Provider Office Phone # Fax #    Yudi Sauceda [phone] [address] [place], [place] [address]        Equal Access to Services     BRIDGETTE LOPEZ AH: Hadii alton cid hadjannetho Soomaali, waaxda luqadaha, qaybta kaalmada adeegyada, flora álvarez. So [facility] [phone].    ATENCIÓN: Si habla español, tiene a aleman disposición servicios gratuitos de asistencia lingüística. Llame al [phone].    We comply with applicable federal civil rights laws and [place] laws. We do not discriminate on the basis of race, color, national origin, age, disability, sex, sexual orientation, or gender identity.            Thank you!     Thank you for choosing [facility]  for your care. Our goal is always to provide you with excellent care. Hearing back from our patients is one way we can continue to improve our services. Please take a few minutes to complete the written survey that you may receive in the mail after your visit with us. " Thank you!             Your Updated Medication List - Protect others around you: Learn how to safely use, store and throw away your medicines at www.disposemymeds.org.          This list is accurate as of 8/27/18 10:06 AM.  Always use your most recent med list.                   Brand Name Dispense Instructions for use Diagnosis    albuterol 108 (90 Base) MCG/ACT inhaler    PROAIR HFA/PROVENTIL HFA/VENTOLIN HFA    1 Inhaler    Inhale 1-2 puffs into the lungs every 6 hours as needed for shortness of breath / dyspnea or wheezing        azithromycin 250 MG tablet    ZITHROMAX    6 tablet    Two tablets first day, then one tablet daily for four days.    URI (upper respiratory infection)       prenatal multivitamin plus iron 27-0.8 MG Tabs per tablet      Take 1 tablet by mouth daily

## 2018-08-27 NOTE — TELEPHONE ENCOUNTER
"S-(situation): Dizzy this am and having visual changes.    B-(background): , 26w1d.  Vitals at last ov 08-15-18  Vital Signs 8/15/2018   Systolic 136   Diastolic 80   Pulse 103   Temperature 98.1   Respirations 18   Weight (LB) 180 lb   Height 5' 6\"   BMI (Calculated) 29.11   Pain    O2        A-(assessment): Having dizziness, lgt headedness, nausea, no vomiting, changes in vision to point of blacking out, sees sparkles.  Breaking out into cold sweats and having diarrhea x 2 this am.  She denies burning, frequency but positive for urgency due to pregnancy.  Pt says, \"I am just not feeling well and my blood pressure was high at last office visit\".    R-(recommendations): Advised to be seen in clinic today and not to drive herself if feeling like going to black out.  Pt agrees with this plan.    Carol Ann Rice  [facility] RN    "

## 2018-08-27 NOTE — PROGRESS NOTES
"Was at work this morning, felt like she was going to black out, had diarrhea, and got dizzy.  Feeling better now.  This happens sometimes, has had multiple EKGs and an event monitor before, never showed up with anything.  Recently completed GCT which was normal.  +FM, no ctx, no VB or LOF.    Vitals:    18 0934   BP: 122/76   BP Location: Right arm   Patient Position: Chair   Cuff Size: Adult Regular   Pulse: 100   Resp: 16   Temp: 97.8  F (36.6  C)   TempSrc: Tympanic   Weight: 183 lb 12.8 oz (83.4 kg)   Height: 5' 6\" (1.676 m)       23 year old  at 26w1d   - vasovagal episode: discussed etiology of vasovagal episodes, she has had multiple cardiac work ups in the past which were negative and her symptoms are already resolved so there is low utility in repeating.  Did not pass out or hit her head so low utility in sending to the ED for clearance.  Discussed hydration, iron supplementation, when to seek emergent care.  Baby moving normally.  Will manage expectantly.    RTC 2 weeks, sooner if having worsening symptoms    Kacie Boston MD, MPH  [facility]      "

## 2018-09-06 ENCOUNTER — PRENATAL OFFICE VISIT (OUTPATIENT)
Dept: OBGYN | Facility: CLINIC | Age: 23
End: 2018-09-06
Payer: COMMERCIAL

## 2018-09-06 VITALS
TEMPERATURE: 98.6 F | DIASTOLIC BLOOD PRESSURE: 80 MMHG | HEART RATE: 102 BPM | WEIGHT: 187 LBS | BODY MASS INDEX: 30.18 KG/M2 | SYSTOLIC BLOOD PRESSURE: 123 MMHG

## 2018-09-06 DIAGNOSIS — Z34.80 PRENATAL CARE, SUBSEQUENT PREGNANCY, UNSPECIFIED TRIMESTER: ICD-10-CM

## 2018-09-06 DIAGNOSIS — I10 ESSENTIAL HYPERTENSION: ICD-10-CM

## 2018-09-06 DIAGNOSIS — Z34.82 PRENATAL CARE, SUBSEQUENT PREGNANCY IN SECOND TRIMESTER: Primary | ICD-10-CM

## 2018-09-06 LAB
ALT SERPL W P-5'-P-CCNC: 11 U/L (ref 0–50)
AST SERPL W P-5'-P-CCNC: 11 U/L (ref 0–45)
CREAT SERPL-MCNC: 0.5 MG/DL (ref 0.52–1.04)
CREAT UR-MCNC: 106 MG/DL
ERYTHROCYTE [DISTWIDTH] IN BLOOD BY AUTOMATED COUNT: 12.6 % (ref 10–15)
GFR SERPL CREATININE-BSD FRML MDRD: >90 ML/MIN/1.7M2
HCT VFR BLD AUTO: 33 % (ref 35–47)
HGB BLD-MCNC: 11 G/DL (ref 11.7–15.7)
MCH RBC QN AUTO: 29.9 PG (ref 26.5–33)
MCHC RBC AUTO-ENTMCNC: 33.3 G/DL (ref 31.5–36.5)
MCV RBC AUTO: 90 FL (ref 78–100)
PLATELET # BLD AUTO: 299 10E9/L (ref 150–450)
PROT UR-MCNC: 0.1 G/L
PROT/CREAT 24H UR: 0.1 G/G CR (ref 0–0.2)
RBC # BLD AUTO: 3.68 10E12/L (ref 3.8–5.2)
URATE SERPL-MCNC: 3.6 MG/DL (ref 2.6–6)
WBC # BLD AUTO: 12.7 10E9/L (ref 4–11)

## 2018-09-06 PROCEDURE — 85027 COMPLETE CBC AUTOMATED: CPT | Performed by: OBSTETRICS & GYNECOLOGY

## 2018-09-06 PROCEDURE — 84460 ALANINE AMINO (ALT) (SGPT): CPT | Performed by: OBSTETRICS & GYNECOLOGY

## 2018-09-06 PROCEDURE — 82565 ASSAY OF CREATININE: CPT | Performed by: OBSTETRICS & GYNECOLOGY

## 2018-09-06 PROCEDURE — 36415 COLL VENOUS BLD VENIPUNCTURE: CPT | Performed by: OBSTETRICS & GYNECOLOGY

## 2018-09-06 PROCEDURE — 99207 ZZC PRENATAL VISIT: CPT | Performed by: OBSTETRICS & GYNECOLOGY

## 2018-09-06 PROCEDURE — 84550 ASSAY OF BLOOD/URIC ACID: CPT | Performed by: OBSTETRICS & GYNECOLOGY

## 2018-09-06 PROCEDURE — 84156 ASSAY OF PROTEIN URINE: CPT | Performed by: OBSTETRICS & GYNECOLOGY

## 2018-09-06 PROCEDURE — 84450 TRANSFERASE (AST) (SGOT): CPT | Performed by: OBSTETRICS & GYNECOLOGY

## 2018-09-06 NOTE — MR AVS SNAPSHOT
After Visit Summary   9/6/2018    Zacarias Adam    MRN: [id number]           Patient Information     Date Of Birth          1995        Visit Information        Provider Department      9/6/2018 10:30 AM Moises Hays MD [facility]        Today's Diagnoses     Prenatal care, subsequent pregnancy in second trimester    -  1    Essential hypertension        Prenatal care, subsequent pregnancy, unspecified trimester           Follow-ups after your visit        Follow-up notes from your care team     Return in about 2 weeks (around 9/20/2018), or if symptoms worsen or fail to improve.      Your next 10 appointments already scheduled     Sep 13, 2018  2:15 PM CDT   ESTABLISHED PRENATAL with Wendie oJhnson MD   [facility]    [address] [facility] [address]   [phone]            Sep 26, 2018  8:45 AM CDT   MFM US COMPRE SINGLE F/U with [id number]   [facility])    [address]  [facility] [address] [phone]           Wear comfortable clothes and leave your valuables at home.            Sep 26, 2018  9:15 AM CDT   Radiology MD with UR ELANA MENDOZA   [facility])    [address]  [facility] [address] [phone]           Please arrive at the time given for your first appointment. This visit is used internally to schedule the physician's time during your ultrasound.              Who to contact     If you have questions or need follow up information about today's clinic visit or your schedule please contact [facility] directly at [phone].  Normal or non-critical lab and imaging results will be communicated to you by MyChart, letter or phone within 4 business days after the clinic has received the results. If  you do not hear from us within 7 days, please contact the clinic through [employer] or phone. If you have a critical or abnormal lab result, we will notify you by phone as soon as possible.  Submit refill requests through [employer] or call your pharmacy and they will forward the refill request to us. Please allow 3 business days for your refill to be completed.          Additional Information About Your Visit        Care EveryWhere ID     This is your Care EveryWhere ID. This could be used by other organizations to access your [place] medical records  [id number]        Your Vitals Were     Pulse Temperature Last Period Breastfeeding? BMI (Body Mass Index)       102 98.6  F (37  C) (Tympanic) 02/25/2018 No 30.18 kg/m2        Blood Pressure from Last 3 Encounters:   09/06/18 123/80   08/27/18 122/76   08/15/18 136/80    Weight from Last 3 Encounters:   09/06/18 187 lb (84.8 kg)   08/27/18 183 lb 12.8 oz (83.4 kg)   08/15/18 180 lb (81.6 kg)              We Performed the Following     ALT     AST     CBC with platelets     Creatinine urine calculation only     Creatinine     Protein  random urine with Creat Ratio     Uric acid          Today's Medication Changes          These changes are accurate as of 9/6/18  3:08 PM.  If you have any questions, ask your nurse or doctor.               Stop taking these medicines if you haven't already. Please contact your care team if you have questions.     albuterol 108 (90 Base) MCG/ACT inhaler   Commonly known as:  PROAIR HFA/PROVENTIL HFA/VENTOLIN HFA   Stopped by:  Moises Hays MD                    Primary Care Provider Office Phone # Fax #    Yudi Anshul Sauceda [phone] [address] [place] [address]        Equal Access to Services     [facility] : Benedicto Kyle, bárbara lee, flora eddy. So [facility] [phone].    ATENCIÓN: Si raúl eckert, darren lockhart aleman  disposición servicios gratuitos de asistencia lingüística. Elise polanco [phone].    We comply with applicable federal civil rights laws and [place] laws. We do not discriminate on the basis of race, color, national origin, age, disability, sex, sexual orientation, or gender identity.            Thank you!     Thank you for choosing [facility]  for your care. Our goal is always to provide you with excellent care. Hearing back from our patients is one way we can continue to improve our services. Please take a few minutes to complete the written survey that you may receive in the mail after your visit with us. Thank you!             Your Updated Medication List - Protect others around you: Learn how to safely use, store and throw away your medicines at www.disposemymeds.org.          This list is accurate as of 9/6/18  3:08 PM.  Always use your most recent med list.                   Brand Name Dispense Instructions for use Diagnosis    prenatal multivitamin plus iron 27-0.8 MG Tabs per tablet      Take 1 tablet by mouth daily

## 2018-09-06 NOTE — PROGRESS NOTES
Concerns: she is having HELLP labs drawn today  C/o decreased fetal movement, lightheadedness and cramping   No LOF or VB  No nausea/vomiting. No heartburn  No vaginal discharge. No dysuria.   No headache, vision changes, lower extremity swelling, upper abdominal pain, chest pain, shortness of breath  Reportable signs and symptoms discussed.  Tdap planned next visit  Discussed PTL, PROM, and when to call or come in.  Normal anatomy ultrasound.  RTC 4 weeks.  GTT and labs today   Checklist updated, see prenatal flowsheet for details  NST recommended, BUT she had to go back to work  She may be available for NST after work- situation d/w on call OB  Moises Hays MD

## 2018-09-07 NOTE — PROGRESS NOTES
Inform patient that repeat HELLP labs from yesterday were normal.     Shaista Bedolla MD  [facility]

## 2018-09-13 ENCOUNTER — PRENATAL OFFICE VISIT (OUTPATIENT)
Dept: OBGYN | Facility: CLINIC | Age: 23
End: 2018-09-13
Payer: COMMERCIAL

## 2018-09-13 VITALS
TEMPERATURE: 98.1 F | WEIGHT: 188 LBS | DIASTOLIC BLOOD PRESSURE: 73 MMHG | BODY MASS INDEX: 30.22 KG/M2 | HEIGHT: 66 IN | RESPIRATION RATE: 18 BRPM | SYSTOLIC BLOOD PRESSURE: 138 MMHG | HEART RATE: 98 BPM

## 2018-09-13 DIAGNOSIS — Z34.83 PRENATAL CARE, SUBSEQUENT PREGNANCY IN THIRD TRIMESTER: Primary | ICD-10-CM

## 2018-09-13 PROCEDURE — 99207 ZZC PRENATAL VISIT: CPT | Performed by: OBSTETRICS & GYNECOLOGY

## 2018-09-13 NOTE — PROGRESS NOTES
"CC: Here for routine prenatal visit @ 28w4d   HPI:  Has MRI upcoming on 9/26/18;  Discussed updated CDC guidelines on TDAP vaccine in pregnancy between 27 and 36 weeks EGA .; declines at this time    PE: /73 (BP Location: Right arm, Patient Position: Chair, Cuff Size: Adult Small)  Pulse 98  Temp 98.1  F (36.7  C) (Tympanic)  Resp 18  Ht 5' 6\" (1.676 m)  Wt 188 lb (85.3 kg)  LMP 02/25/2018  BMI 30.34 kg/m2   See OB flowsheet      A:  1. Prenatal care, subsequent pregnancy in third trimester        Routine prenatal care  RTC 2 weeks.      Wendie Johnson M.D.     "

## 2018-09-13 NOTE — MR AVS SNAPSHOT
After Visit Summary   9/13/2018    Zacarias Adam    MRN: [id number]           Patient Information     Date Of Birth          1995        Visit Information        Provider Department      9/13/2018 2:15 PM Wendie Johnson MD [facility]        Today's Diagnoses     Prenatal care, subsequent pregnancy in third trimester    -  1       Follow-ups after your visit        Your next 10 appointments already scheduled     Sep 26, 2018  8:45 AM CDT   MFM US COMPRE SINGLE F/U with [id number]   [facility])    [address]  [facility] [address] [phone]           Wear comfortable clothes and leave your valuables at home.            Sep 26, 2018  9:15 AM CDT   Radiology MD with UR ELANA MENDOZA   [facility])    [address]  [facility] [address]   [phone]           Please arrive at the time given for your first appointment. This visit is used internally to schedule the physician's time during your ultrasound.            Sep 27, 2018 10:30 AM CDT   ESTABLISHED PRENATAL with Wendie Johnson MD   [facility]    [address] [facility] [address]   [phone]            Oct 10, 2018  3:30 PM CDT   ESTABLISHED PRENATAL with Wendie Johnson MD   [facility]    [address] [facility] [address]   [phone]            Oct 25, 2018 11:30 AM CDT   ESTABLISHED PRENATAL with Wendie Johnson MD   [facility]    [address] [facility] [address]   [phone]            Nov 08, 2018 11:30 AM CST   ESTABLISHED PRENATAL with Wendie Johnson MD   [facility]    [address]  [address]  [facility] [address]   [phone]              Who to contact     If you have questions or need follow up information about today's clinic visit or your schedule please contact [facility] directly at [phone].  Normal or non-critical lab and imaging results will be communicated to you by MyChart, letter or phone within 4 business days after the clinic has received the results. If you do not hear from us within 7 days, please contact the clinic through MyChart or phone. If you have a critical or abnormal lab result, we will notify you by phone as soon as possible.  Submit refill requests through [employer] or call your pharmacy and they will forward the refill request to us. Please allow 3 business days for your refill to be completed.          Additional Information About Your Visit        Care EveryWhere ID     This is your Care EveryWhere ID. This could be used by other organizations to access your [place] medical records  [id number]        Your Vitals Were     Pulse Temperature Respirations Height Last Period BMI (Body Mass Index)    98 98.1  F (36.7  C) (Tympanic) 18 5' 6\" (1.676 m) 02/25/2018 30.34 kg/m2       Blood Pressure from Last 3 Encounters:   09/13/18 138/73   09/06/18 123/80   08/27/18 122/76    Weight from Last 3 Encounters:   09/13/18 188 lb (85.3 kg)   09/06/18 187 lb (84.8 kg)   08/27/18 183 lb 12.8 oz (83.4 kg)              Today, you had the following     No orders found for display       Primary Care Provider Office Phone # Fax #    Yudi Sauceda [phone] [address] [place] [address]        Equal Access to Services     [facility] JOHN AH: Benedicto Kyle, bárbara lee, flora eddy. So [facility] [phone].    ATENCIÓN: Si habla español, tiene a aleman disposición servicios gratuitos de asistencia lingüística. Llame al [phone].    We comply with applicable " federal civil rights laws and [place] laws. We do not discriminate on the basis of race, color, national origin, age, disability, sex, sexual orientation, or gender identity.            Thank you!     Thank you for choosing [facility]  for your care. Our goal is always to provide you with excellent care. Hearing back from our patients is one way we can continue to improve our services. Please take a few minutes to complete the written survey that you may receive in the mail after your visit with us. Thank you!             Your Updated Medication List - Protect others around you: Learn how to safely use, store and throw away your medicines at www.disposemymeds.org.          This list is accurate as of 9/13/18  2:32 PM.  Always use your most recent med list.                   Brand Name Dispense Instructions for use Diagnosis    prenatal multivitamin plus iron 27-0.8 MG Tabs per tablet      Take 1 tablet by mouth daily

## 2018-09-21 ENCOUNTER — PRENATAL OFFICE VISIT (OUTPATIENT)
Dept: OBGYN | Facility: CLINIC | Age: 23
End: 2018-09-21
Payer: COMMERCIAL

## 2018-09-21 VITALS
HEART RATE: 101 BPM | WEIGHT: 188.2 LBS | SYSTOLIC BLOOD PRESSURE: 141 MMHG | DIASTOLIC BLOOD PRESSURE: 67 MMHG | TEMPERATURE: 98 F | BODY MASS INDEX: 30.25 KG/M2 | HEIGHT: 66 IN | RESPIRATION RATE: 16 BRPM

## 2018-09-21 DIAGNOSIS — K92.1 BLOOD IN STOOL: Primary | ICD-10-CM

## 2018-09-21 DIAGNOSIS — Z34.83 PRENATAL CARE, SUBSEQUENT PREGNANCY IN THIRD TRIMESTER: ICD-10-CM

## 2018-09-21 PROCEDURE — 82272 OCCULT BLD FECES 1-3 TESTS: CPT | Performed by: OBSTETRICS & GYNECOLOGY

## 2018-09-21 PROCEDURE — 99207 ZZC PRENATAL VISIT: CPT | Performed by: OBSTETRICS & GYNECOLOGY

## 2018-09-21 NOTE — NURSING NOTE
"Chief Complaint   Patient presents with     Prenatal Care     blood in stool       Initial /67 (BP Location: Left arm, Patient Position: Chair, Cuff Size: Adult Regular)  Pulse 101  Temp 98  F (36.7  C) (Tympanic)  Resp 16  Ht 5' 6\" (1.676 m)  Wt 188 lb 3.2 oz (85.4 kg)  LMP 02/25/2018  BMI 30.38 kg/m2 Estimated body mass index is 30.38 kg/(m^2) as calculated from the following:    Height as of this encounter: 5' 6\" (1.676 m).    Weight as of this encounter: 188 lb 3.2 oz (85.4 kg).  Medications and allergies reviewed.    Aurora AHUJA, CMA    "

## 2018-09-21 NOTE — MR AVS SNAPSHOT
After Visit Summary   9/21/2018    Zacarias Adam    MRN: [id number]           Patient Information     Date Of Birth          1995        Visit Information        Provider Department      9/21/2018 11:00 AM Kacie Boston MD [facility]        Today's Diagnoses     Blood in stool    -  1    Prenatal care, subsequent pregnancy in third trimester           Follow-ups after your visit        Follow-up notes from your care team     Return in about 2 weeks (around 10/5/2018).      Your next 10 appointments already scheduled     Sep 26, 2018  8:45 AM CDT   MFM US COMPRE SINGLE F/U with [id number]   [facility] Ultras[facility] - [facility])    [address]  [facility] [address] [phone]           Wear comfortable clothes and leave your valuables at home.            Sep 26, 2018  9:15 AM CDT   Radiology MD with UR ELANA MENDOZA   [facility]    [address]  [facility] [address]   [phone]           Please arrive at the time given for your first appointment. This visit is used internally to schedule the physician's time during your ultrasound.            Sep 27, 2018 10:30 AM CDT   ESTABLISHED PRENATAL with Wendie Johnson MD   [facility]    [address] [facility] [address]   [phone]            Oct 10, 2018  3:30 PM CDT   ESTABLISHED PRENATAL with Wendie Johnson MD   [facility])    [address] [facility] [address]   [phone]            Oct 25, 2018 11:30 AM CDT   ESTABLISHED PRENATAL with Wendie Johnson MD   [facility])    [address] [facility] [address]   [phone]            Nov 08, 2018 11:30 AM CST   ESTABLISHED  "PRENATAL with Wendie Johnson MD   [facility])    [address] [facility] [address] [phone]              Who to contact     If you have questions or need follow up information about today's clinic visit or your schedule please contact [facility] directly at [phone].  Normal or non-critical lab and imaging results will be communicated to you by MyChart, letter or phone within 4 business days after the clinic has received the results. If you do not hear from us within 7 days, please contact the clinic through MyChart or phone. If you have a critical or abnormal lab result, we will notify you by phone as soon as possible.  Submit refill requests through [employer] or call your pharmacy and they will forward the refill request to us. Please allow 3 business days for your refill to be completed.          Additional Information About Your Visit        Care EveryWhere ID     This is your Care EveryWhere ID. This could be used by other organizations to access your [place] medical records  [id number]        Your Vitals Were     Pulse Temperature Respirations Height Last Period BMI (Body Mass Index)    101 98  F (36.7  C) (Tympanic) 16 5' 6\" (1.676 m) 02/25/2018 30.38 kg/m2       Blood Pressure from Last 3 Encounters:   09/21/18 141/67   09/13/18 138/73   09/06/18 123/80    Weight from Last 3 Encounters:   09/21/18 188 lb 3.2 oz (85.4 kg)   09/13/18 188 lb (85.3 kg)   09/06/18 187 lb (84.8 kg)              We Performed the Following     Occult blood stool        Primary Care Provider Office Phone # Fax #    Yudi Sauceda [phone] [address] [place] [address]        Equal Access to Services     BRIDGETTE LOPEZ : Benedicto Kyle, bárbaar lee, flora eddy. [facility] [phone].    ATENCIÓN: Si habla español, tiene a aleman disposición " servicios gratuitos de asistencia lingüística. Elise polanco [phone].    We comply with applicable federal civil rights laws and [place] laws. We do not discriminate on the basis of race, color, national origin, age, disability, sex, sexual orientation, or gender identity.            Thank you!     Thank you for choosing [facility]  for your care. Our goal is always to provide you with excellent care. Hearing back from our patients is one way we can continue to improve our services. Please take a few minutes to complete the written survey that you may receive in the mail after your visit with us. Thank you!             Your Updated Medication List - Protect others around you: Learn how to safely use, store and throw away your medicines at www.disposemymeds.org.          This list is accurate as of 9/21/18 12:19 PM.  Always use your most recent med list.                   Brand Name Dispense Instructions for use Diagnosis    prenatal multivitamin plus iron 27-0.8 MG Tabs per tablet      Take 1 tablet by mouth daily

## 2018-09-21 NOTE — PROGRESS NOTES
Passed a lot of blood in the toilet during a BM this morning.  Has a history of hemorrhoids but wants to make sure everything is OK.  +FM, no ctx, no VB or LOF.    SSE: cvx visually closed, no blood in vault, physiologic discharge  Rectal: enlarged external hemorrhoids which are tender to palpation.  A finger in the rectum reveals a small amount of what appears to be blood. hemocult sent.    23 year old  at 29w5d   - rectal bleeding: hemocult today, but this is not likely related to cervical change or PTL.  Discussed tx of hemorrhoids with anusol suppositories and aggressive stool softeners.  Could also consider anoscopy if worsening symptoms.  - cHTN: mild range BP today, otherwise asymptomatic    RTC 2 weeks    Kacie Boston MD, MPH  [facility] OB[facility]

## 2018-09-24 LAB — HEMOCCULT STL QL: NEGATIVE

## 2018-09-25 NOTE — PROGRESS NOTES
Please call with results and let her know they are normal, negative for blood in the stool.    Kacie Boston MD, MPH

## 2018-09-26 ENCOUNTER — OFFICE VISIT (OUTPATIENT)
Dept: MATERNAL FETAL MEDICINE | Facility: CLINIC | Age: 23
End: 2018-09-26
Attending: OBSTETRICS & GYNECOLOGY
Payer: COMMERCIAL

## 2018-09-26 ENCOUNTER — HOSPITAL ENCOUNTER (OUTPATIENT)
Dept: ULTRASOUND IMAGING | Facility: CLINIC | Age: 23
Discharge: HOME | End: 2018-09-26
Attending: OBSTETRICS & GYNECOLOGY | Admitting: OBSTETRICS & GYNECOLOGY
Payer: COMMERCIAL

## 2018-09-26 DIAGNOSIS — O43.109: ICD-10-CM

## 2018-09-26 DIAGNOSIS — O10.019 PRE-EXISTING ESSENTIAL HYPERTENSION DURING PREGNANCY, ANTEPARTUM: Primary | ICD-10-CM

## 2018-09-26 PROCEDURE — 76816 OB US FOLLOW-UP PER FETUS: CPT

## 2018-09-26 NOTE — PROGRESS NOTES
"Please see \"Imaging\" tab under \"Chart Review\" for details of today's US at the [facility].    Rhett Galvez MD  Maternal-Fetal Medicine      "

## 2018-09-26 NOTE — MR AVS SNAPSHOT
After Visit Summary   9/26/2018    Zacarias Adam    MRN: [id number]           Patient Information     Date Of Birth          1995        Visit Information        Provider Department      9/26/2018 9:15 AM Rhett Galvez MD [facility] Maternal Fetal Medicine [facility]        Today's Diagnoses     Pre-existing essential hypertension during pregnancy, antepartum    -  1       Follow-ups after your visit        Your next 10 appointments already scheduled     Sep 27, 2018 10:30 AM CDT   ESTABLISHED PRENATAL with Wendie Johnson MD   [facility]    [address] [facility] [address]   [phone]            Oct 10, 2018  3:30 PM CDT   ESTABLISHED PRENATAL with Wendie Johnson MD   [facility])    [address] [facility] [address]   [phone]            Oct 25, 2018 11:30 AM CDT   ESTABLISHED PRENATAL with Wendie Johnson MD   [facility])    [address] [facility] [address]   [phone]            Nov 08, 2018 11:30 AM CST   ESTABLISHED PRENATAL with Wendie Johnson MD   [facility])    [address] [facility] [address]   [phone]              Who to contact     If you have questions or need follow up information about today's clinic visit or your schedule please contact [facility]ER[facility] FETAL [facility]IC[facility] directly at [phone].  Normal or non-critical lab and imaging results will be communicated to you by MyChart, letter or phone within 4 business days after the clinic has received the results. If you do not hear from us within 7 days, please contact the clinic through MyChart or phone. If you have a critical or abnormal lab result, we will notify you by phone as soon as possible.  Submit refill requests through [employer] or call your  pharmacy and they will forward the refill request to us. Please allow 3 business days for your refill to be completed.          Additional Information About Your Visit        Care EveryWhere ID     This is your Care EveryWhere ID. This could be used by other organizations to access your [place] medical records  [id number]        Your Vitals Were     Last Period                   02/25/2018            Blood Pressure from Last 3 Encounters:   09/21/18 141/67   09/13/18 138/73   09/06/18 123/80    Weight from Last 3 Encounters:   09/21/18 85.4 kg (188 lb 3.2 oz)   09/13/18 85.3 kg (188 lb)   09/06/18 84.8 kg (187 lb)              Today, you had the following     No orders found for display       Primary Care Provider Office Phone # Fax #    Yudi Sauceda [phone] [address] [address]        Equal Access to Services     [facility] : Hadii alton cid hadasho Sojenaroali, waaxda luqadaha, qaybta kaalmada adeegyada, waxay genein haybhavya gr . So [facility] [phone].    ATENCIÓN: Si habla español, tiene a aleman disposición servicios gratuitos de asistencia lingüística. Elise al [phone].    We comply with applicable federal civil rights laws and [place] laws. We do not discriminate on the basis of race, color, national origin, age, disability, sex, sexual orientation, or gender identity.            Thank you!     Thank you for choosing [facility]  for your care. Our goal is always to provide you with excellent care. Hearing back from our patients is one way we can continue to improve our services. Please take a few minutes to complete the written survey that you may receive in the mail after your visit with us. Thank you!             Your Updated Medication List - Protect others around you: Learn how to safely use, store and throw away your medicines at www.disposemymeds.org.          This list is accurate as of 9/26/18  9:41  AM.  Always use your most recent med list.                   Brand Name Dispense Instructions for use Diagnosis    prenatal multivitamin plus iron 27-0.8 MG Tabs per tablet      Take 1 tablet by mouth daily

## 2018-09-27 ENCOUNTER — TELEPHONE (OUTPATIENT)
Dept: OBGYN | Facility: CLINIC | Age: 23
End: 2018-09-27

## 2018-09-27 ENCOUNTER — PRENATAL OFFICE VISIT (OUTPATIENT)
Dept: OBGYN | Facility: CLINIC | Age: 23
End: 2018-09-27
Payer: COMMERCIAL

## 2018-09-27 VITALS
TEMPERATURE: 98.1 F | BODY MASS INDEX: 30.53 KG/M2 | HEIGHT: 66 IN | DIASTOLIC BLOOD PRESSURE: 76 MMHG | WEIGHT: 190 LBS | SYSTOLIC BLOOD PRESSURE: 139 MMHG | RESPIRATION RATE: 18 BRPM | HEART RATE: 111 BPM

## 2018-09-27 DIAGNOSIS — I10 ESSENTIAL HYPERTENSION: ICD-10-CM

## 2018-09-27 DIAGNOSIS — Z34.83 PRENATAL CARE, SUBSEQUENT PREGNANCY IN THIRD TRIMESTER: Primary | ICD-10-CM

## 2018-09-27 PROCEDURE — 99207 ZZC PRENATAL VISIT: CPT | Performed by: OBSTETRICS & GYNECOLOGY

## 2018-09-27 NOTE — PROGRESS NOTES
"CC: Here for routine prenatal visit @ 30w4d   HPI:  Pt expresses very strong desire to avoid what she perceived as a very traumatic vaginal birth with her first delivery--3 days IOL with baby requiring resucitation at birth; she also desires permanent sterilization (removal of remaining left fallopian tube); I discussed with her that a second vaginal delivery is much less likely to be as traumatic, that c/section may not avoid  resuscitation, and may have more risks to her of bleeding or infection  She is resolute in her request.  Will likely need delivery at 37 weeks given her chronic hypertension and increased risk for preeclampsia  Will tentatively set up for 18    PE: /76 (BP Location: Left arm, Patient Position: Chair, Cuff Size: Adult Large)  Pulse 111  Temp 98.1  F (36.7  C) (Tympanic)  Resp 18  Ht 5' 6\" (1.676 m)  Wt 190 lb (86.2 kg)  LMP 2018  BMI 30.67 kg/m2   See OB flowsheet      A:  1. Prenatal care, subsequent pregnancy in third trimester      2. Essential hypertension    - US Fetal Biophys Prof w/o Non Stress Test; Standing      Routine prenatal care  RTC 2 weeks.      Wendie Johnson M.D.     "

## 2018-09-27 NOTE — TELEPHONE ENCOUNTER
"Zacarias YOUNG [facility]  [id number]  1995    You are now scheduled for surgery at [facility].  Below are the details for your surgery.  Please read the \"Preparing for Your Surgery\" instructions and let us know if you have any questions.    Type of surgery: primary  section with left salpingectomy  Surgeon:  Wendie Johnson MD  Location of surgery: [facility] OR    Date of surgery: 18    Time: 7:30am   Arrival Time: 6:00am [facility]    Time can change, to be confirmed a couple of days prior by pre-op surgery nurse.    Pre-Op Appt Date:  Last OB visit  Post-Op Appt Date: To be determined by provider     Packet sent out: Yes  Pre-cert/Authorization completed:  TBD by Financial Securing Office.   MA Sterilization/Hysterectomy Acknowledgment Consent signed: Not Applicable    [facility]  [phone]    Fax: [phone]  Same Day Surgery [phone]  Fax: [phone]  Birth Center [phone]    "

## 2018-09-27 NOTE — MR AVS SNAPSHOT
After Visit Summary   9/27/2018    Zacarias Adam    MRN: [id number]           Patient Information     Date Of Birth          1995        Visit Information        Provider Department      9/27/2018 10:30 AM Wendie Johnson MD [facility]        Today's Diagnoses     Prenatal care, subsequent pregnancy in third trimester    -  1    Essential hypertension           Follow-ups after your visit        Your next 10 appointments already scheduled     Oct 10, 2018  3:30 PM CDT   ESTABLISHED PRENATAL with Wendie Johnson MD   [facility]    [address] [facility] [address]   [phone]            Oct 25, 2018 11:30 AM CDT   ESTABLISHED PRENATAL with Wendie Johnson MD   [facility]    [address] [facility] [address]   [phone]            Nov 08, 2018 11:30 AM CST   ESTABLISHED PRENATAL with Wendie Johnson MD   [facility]    [address] [facility] [address]   [phone]              Future tests that were ordered for you today     Open Standing Orders        Priority Remaining Interval Expires Ordered    US Fetal Biophys Prof w/o Non Stress Test Routine 9/9 9/27/2019 9/27/2018            Who to contact     If you have questions or need follow up information about today's clinic visit or your schedule please contact [facility] directly at [phone].  Normal or non-critical lab and imaging results will be communicated to you by MyChart, letter or phone within 4 business days after the clinic has received the results. If you do not hear from us within 7 days, please contact the clinic through MyChart or phone. If you have a critical or abnormal lab result, we will notify you by phone as soon as possible.  Submit refill requests through "[employer] or call your pharmacy and they  "will forward the refill request to us. Please allow 3 business days for your refill to be completed.          Additional Information About Your Visit        Care EveryWhere ID     This is your Care EveryWhere ID. This could be used by other organizations to access your [place] medical records  [id number]        Your Vitals Were     Pulse Temperature Respirations Height Last Period BMI (Body Mass Index)    111 98.1  F (36.7  C) (Tympanic) 18 5' 6\" (1.676 m) 2018 30.67 kg/m2       Blood Pressure from Last 3 Encounters:   18 139/76   18 141/67   18 138/73    Weight from Last 3 Encounters:   18 190 lb (86.2 kg)   18 188 lb 3.2 oz (85.4 kg)   18 188 lb (85.3 kg)              We Performed the Following     Wendy-Operative Worksheet  Section        Primary Care Provider Office Phone # Fax #    Yudi Sauceda [phone] [address] [place], [place] [address]        Equal Access to Services     [facility]: Hadii alton ku hadasho Solaila, waaxda luqadaha, qaybta kaalmada neri, flora gr . So [facility] [phone].    ATENCIÓN: Si habla español, tiene a aleman disposición servicios gratuitos de asistencia lingüística. [facility] [phone].    We comply with applicable federal civil rights laws and [place] laws. We do not discriminate on the basis of race, color, national origin, age, disability, sex, sexual orientation, or gender identity.            Thank you!     Thank you for choosing [facility]  for your care. Our goal is always to provide you with excellent care. Hearing back from our patients is one way we can continue to improve our services. Please take a few minutes to complete the written survey that you may receive in the mail after your visit with us. Thank you!             Your Updated Medication List - Protect others around you: Learn how to safely use, store and throw away your " medicines at www.disposemymeds.org.          This list is accurate as of 9/27/18 11:02 AM.  Always use your most recent med list.                   Brand Name Dispense Instructions for use Diagnosis    prenatal multivitamin plus iron 27-0.8 MG Tabs per tablet      Take 1 tablet by mouth daily

## 2018-10-10 ENCOUNTER — PRENATAL OFFICE VISIT (OUTPATIENT)
Dept: OBGYN | Facility: CLINIC | Age: 23
End: 2018-10-10
Payer: COMMERCIAL

## 2018-10-10 ENCOUNTER — HOSPITAL ENCOUNTER (OUTPATIENT)
Dept: ULTRASOUND IMAGING | Facility: CLINIC | Age: 23
Discharge: HOME | End: 2018-10-10
Attending: OBSTETRICS & GYNECOLOGY | Admitting: OBSTETRICS & GYNECOLOGY
Payer: COMMERCIAL

## 2018-10-10 VITALS
HEART RATE: 101 BPM | SYSTOLIC BLOOD PRESSURE: 143 MMHG | TEMPERATURE: 97.6 F | BODY MASS INDEX: 30.86 KG/M2 | WEIGHT: 192 LBS | RESPIRATION RATE: 18 BRPM | HEIGHT: 66 IN | DIASTOLIC BLOOD PRESSURE: 74 MMHG

## 2018-10-10 DIAGNOSIS — Z34.83 PRENATAL CARE, SUBSEQUENT PREGNANCY IN THIRD TRIMESTER: Primary | ICD-10-CM

## 2018-10-10 DIAGNOSIS — I10 ESSENTIAL HYPERTENSION: ICD-10-CM

## 2018-10-10 PROCEDURE — 99207 ZZC PRENATAL VISIT: CPT | Performed by: OBSTETRICS & GYNECOLOGY

## 2018-10-10 PROCEDURE — 76819 FETAL BIOPHYS PROFIL W/O NST: CPT

## 2018-10-10 NOTE — MR AVS SNAPSHOT
After Visit Summary   10/10/2018    Zacarias Adam    MRN: [id number]           Patient Information     Date Of Birth          1995        Visit Information        Provider Department      10/10/2018 3:30 PM Wendie Johnson MD [facility]        Today's Diagnoses     Prenatal care, subsequent pregnancy in third trimester    -  1    Essential hypertension           Follow-ups after your visit        Your next 10 appointments already scheduled     Oct 17, 2018 10:50 AM CDT   US FETAL BIOPHYS PROFILE W/O NON STRESS TEST with WYUS1   [facility] Ultrasound ([facility])    [address] [facility] [facility] [address]   [phone]           How do I prepare for my exam? (Food and drink instructions) Drink four 8-ounce glasses of fluid an hour before your exam. If you need to empty your bladder before your exam, try to release only a little urine. Then, drink another glass of fluid.  How do I prepare for my exam? (Other instructions) You may have up to two family members in the exam room. If you bring a small child, an adult must be there to care for him or her. No video or camera photography during the procedure.  What should I wear: Wear comfortable clothes.  How long does the exam take: Most ultrasounds take 30 to 60 minutes.  What should I bring: Bring a list of your medicines, including vitamins, minerals and over-the-counter drugs. It is safest to leave personal items at home.  Do I need a :  No  is needed.  What do I need to tell my doctor: Tell your doctor about any allergies you may have.  What should I do after the exam: No restrictions, You may resume normal activities.  What is this test: An ultrasound uses sound waves to make pictures of the body. Sound waves do not cause pain. The only discomfort may be the pressure of the wand against your skin or full bladder.  Who should I call with questions: If you have any questions, please call  the Imaging Department where you will have your exam. Directions, parking instructions, and other information is available on our website, [email or web address].            Oct 24, 2018 10:45 AM CDT   US FETAL BIOPHYS PROFILE W/O NON STRESS TEST with WYUS1   [facility] ([facility])    [address] [address] [facility] [address]   [phone]           How do I prepare for my exam? (Food and drink instructions) Drink four 8-ounce glasses of fluid an hour before your exam. If you need to empty your bladder before your exam, try to release only a little urine. Then, drink another glass of fluid.  How do I prepare for my exam? (Other instructions) You may have up to two family members in the exam room. If you bring a small child, an adult must be there to care for him or her. No video or camera photography during the procedure.  What should I wear: Wear comfortable clothes.  How long does the exam take: Most ultrasounds take 30 to 60 minutes.  What should I bring: Bring a list of your medicines, including vitamins, minerals and over-the-counter drugs. It is safest to leave personal items at home.  Do I need a :  No  is needed.  What do I need to tell my doctor: Tell your doctor about any allergies you may have.  What should I do after the exam: No restrictions, You may resume normal activities.  What is this test: An ultrasound uses sound waves to make pictures of the body. Sound waves do not cause pain. The only discomfort may be the pressure of the wand against your skin or full bladder.  Who should I call with questions: If you have any questions, please call the Imaging Department where you will have your exam. Directions, parking instructions, and other information is available on our website, [email or web address].            Oct 25, 2018 11:30 AM CDT   ESTABLISHED PRENATAL with Wendie Johnson MD   [facility] ([facility])    [address] [facility] [address]   [phone]            Oct 31, 2018 10:45 AM CDT   US FETAL BIOPHYS PROFILE W/O NON STRESS TEST [facility]    [address] [facility] [address]   [phone]           How do I prepare for my exam? (Food and drink instructions) Drink four 8-ounce glasses of fluid an hour before your exam. If you need to empty your bladder before your exam, try to release only a little urine. Then, drink another glass of fluid.  How do I prepare for my exam? (Other instructions) You may have up to two family members in the exam room. If you bring a small child, an adult must be there to care for him or her. No video or camera photography during the procedure.  What should I wear: Wear comfortable clothes.  How long does the exam take: Most ultrasounds take 30 to 60 minutes.  What should I bring: Bring a list of your medicines, including vitamins, minerals and over-the-counter drugs. It is safest to leave personal items at home.  Do I need a :  No  is needed.  What do I need to tell my doctor: Tell your doctor about any allergies you may have.  What should I do after the exam: No restrictions, You may resume normal activities.  What is this test: An ultrasound uses sound waves to make pictures of the body. Sound waves do not cause pain. The only discomfort may be the pressure of the wand against your skin or full bladder.  Who should I call with questions: If you have any questions, please call the Imaging Department where you will have your exam. Directions, parking instructions, and other information is available on our website, [email or web address].            Nov 07, 2018 10:45 AM CST   US FETAL BIOPHYS PROFILE W/O NON STRESS TEST with WYUS1   [facility] [place] [facility] ([facility])    [address] [facility] [place] [address]   [phone]           How do I prepare for my exam? (Food  and drink instructions) Drink four 8-ounce glasses of fluid an hour before your exam. If you need to empty your bladder before your exam, try to release only a little urine. Then, drink another glass of fluid.  How do I prepare for my exam? (Other instructions) You may have up to two family members in the exam room. If you bring a small child, an adult must be there to care for him or her. No video or camera photography during the procedure.  What should I wear: Wear comfortable clothes.  How long does the exam take: Most ultrasounds take 30 to 60 minutes.  What should I bring: Bring a list of your medicines, including vitamins, minerals and over-the-counter drugs. It is safest to leave personal items at home.  Do I need a :  No  is needed.  What do I need to tell my doctor: Tell your doctor about any allergies you may have.  What should I do after the exam: No restrictions, You may resume normal activities.  What is this test: An ultrasound uses sound waves to make pictures of the body. Sound waves do not cause pain. The only discomfort may be the pressure of the wand against your skin or full bladder.  Who should I call with questions: If you have any questions, please call the Imaging Department where you will have your exam. Directions, parking instructions, and other information is available on our website, [email or web address].            Nov 08, 2018 11:30 AM CST   ESTABLISHED PRENATAL with Wendie Johnson MD   [facility] ([facility])    [address] [address]   [phone]            Nov 13, 2018   Procedure with Wendie Johnson MD   [facility] PeriOP Services (--)    [address] [facility] [address]   [phone]           The medical center is located at [address]. (between [address] in [place], [place] north of [place]).              Future tests that were ordered for you today      "Open Future Orders        Priority Expected Expires Ordered    US OB Ltd One Or More Fetus FU/Repeat Routine  10/10/2019 10/10/2018            Who to contact     If you have questions or need follow up information about today's clinic visit or your schedule please contact [facility] directly at [phone].  Normal or non-critical lab and imaging results will be communicated to you by MyChart, letter or phone within 4 business days after the clinic has received the results. If you do not hear from us within 7 days, please contact the clinic through MyChart or phone. If you have a critical or abnormal lab result, we will notify you by phone as soon as possible.  Submit refill requests through [employer] or call your pharmacy and they will forward the refill request to us. Please allow 3 business days for your refill to be completed.          Additional Information About Your Visit        Care EveryWhere ID     This is your Care EveryWhere ID. This could be used by other organizations to access your [place] medical records  [id number]        Your Vitals Were     Pulse Temperature Respirations Height Last Period BMI (Body Mass Index)    101 97.6  F (36.4  C) (Tympanic) 18 5' 6\" (1.676 m) 02/25/2018 30.99 kg/m2       Blood Pressure from Last 3 Encounters:   10/10/18 143/74   09/27/18 139/76   09/21/18 141/67    Weight from Last 3 Encounters:   10/10/18 192 lb (87.1 kg)   09/27/18 190 lb (86.2 kg)   09/21/18 188 lb 3.2 oz (85.4 kg)               Primary Care Provider Office Phone # Fax #    Yudi Sauceda [phone] [address] [place] [address]        Equal Access to Services     TARA LOPEZ AH: Benedicto Kyle, bárbara lee, flora eddy. So [facility] [phone].    ATENCIÓN: Si habla español, tiene a aleman disposición servicios gratuitos de asistencia lingüística. Llame al [phone].    We comply " with applicable federal civil rights laws and [place] laws. We do not discriminate on the basis of race, color, national origin, age, disability, sex, sexual orientation, or gender identity.            Thank you!     Thank you for choosing [facility]  for your care. Our goal is always to provide you with excellent care. Hearing back from our patients is one way we can continue to improve our services. Please take a few minutes to complete the written survey that you may receive in the mail after your visit with us. Thank you!             Your Updated Medication List - Protect others around you: Learn how to safely use, store and throw away your medicines at www.disposemymeds.org.          This list is accurate as of 10/10/18  3:44 PM.  Always use your most recent med list.                   Brand Name Dispense Instructions for use Diagnosis    prenatal multivitamin plus iron 27-0.8 MG Tabs per tablet      Take 1 tablet by mouth daily

## 2018-10-10 NOTE — PROGRESS NOTES
"CC: Here for routine prenatal visit @ 32w3d   HPI:  Feeling more tissue swelling in hands and feet; no headache or scotomata; BPP 8/8    PE: /74 (BP Location: Right arm, Patient Position: Chair, Cuff Size: Adult Large)  Pulse 101  Temp 97.6  F (36.4  C) (Tympanic)  Resp 18  Ht 5' 6\" (1.676 m)  Wt 192 lb (87.1 kg)  LMP 02/25/2018  BMI 30.99 kg/m2   See OB flowsheet      A:  (Z34.83) Prenatal care, subsequent pregnancy in third trimester  (primary encounter diagnosis)  Comment:   Plan: US OB Ltd One Or More Fetus FU/Repeat            (I10) Essential hypertension  Comment:   Plan:     Routine prenatal care  Weekly BPP  Growth scan 10/24/18  RTC 2 weeks.      Wendie Johnson M.D.     "

## 2018-10-17 ENCOUNTER — HOSPITAL ENCOUNTER (OUTPATIENT)
Dept: ULTRASOUND IMAGING | Facility: CLINIC | Age: 23
Discharge: HOME | End: 2018-10-17
Attending: OBSTETRICS & GYNECOLOGY | Admitting: OBSTETRICS & GYNECOLOGY
Payer: COMMERCIAL

## 2018-10-17 ENCOUNTER — HOSPITAL ENCOUNTER (OUTPATIENT)
Dept: ULTRASOUND IMAGING | Facility: CLINIC | Age: 23
End: 2018-10-17
Attending: OBSTETRICS & GYNECOLOGY
Payer: COMMERCIAL

## 2018-10-17 DIAGNOSIS — Z34.83 PRENATAL CARE, SUBSEQUENT PREGNANCY IN THIRD TRIMESTER: ICD-10-CM

## 2018-10-17 DIAGNOSIS — I10 ESSENTIAL HYPERTENSION: ICD-10-CM

## 2018-10-17 PROCEDURE — 76816 OB US FOLLOW-UP PER FETUS: CPT

## 2018-10-17 PROCEDURE — 76819 FETAL BIOPHYS PROFIL W/O NST: CPT

## 2018-10-18 ENCOUNTER — PRENATAL OFFICE VISIT (OUTPATIENT)
Dept: OBGYN | Facility: CLINIC | Age: 23
End: 2018-10-18
Payer: COMMERCIAL

## 2018-10-18 VITALS
HEART RATE: 128 BPM | HEIGHT: 66 IN | RESPIRATION RATE: 20 BRPM | BODY MASS INDEX: 30.57 KG/M2 | TEMPERATURE: 98.6 F | WEIGHT: 190.2 LBS | DIASTOLIC BLOOD PRESSURE: 84 MMHG | SYSTOLIC BLOOD PRESSURE: 136 MMHG

## 2018-10-18 DIAGNOSIS — Z34.83 PRENATAL CARE, SUBSEQUENT PREGNANCY IN THIRD TRIMESTER: Primary | ICD-10-CM

## 2018-10-18 DIAGNOSIS — R10.32 LLQ ABDOMINAL PAIN: ICD-10-CM

## 2018-10-18 DIAGNOSIS — I10 ESSENTIAL HYPERTENSION: ICD-10-CM

## 2018-10-18 LAB
ALT SERPL W P-5'-P-CCNC: 20 U/L (ref 0–50)
AST SERPL W P-5'-P-CCNC: 15 U/L (ref 0–45)
CREAT SERPL-MCNC: 0.52 MG/DL (ref 0.52–1.04)
CREAT UR-MCNC: 103 MG/DL
ERYTHROCYTE [DISTWIDTH] IN BLOOD BY AUTOMATED COUNT: 12.6 % (ref 10–15)
GFR SERPL CREATININE-BSD FRML MDRD: >90 ML/MIN/1.7M2
HCT VFR BLD AUTO: 34.9 % (ref 35–47)
HGB BLD-MCNC: 11.4 G/DL (ref 11.7–15.7)
MCH RBC QN AUTO: 28.1 PG (ref 26.5–33)
MCHC RBC AUTO-ENTMCNC: 32.7 G/DL (ref 31.5–36.5)
MCV RBC AUTO: 86 FL (ref 78–100)
PLATELET # BLD AUTO: 306 10E9/L (ref 150–450)
PROT UR-MCNC: 0.09 G/L
PROT/CREAT 24H UR: 0.09 G/G CR (ref 0–0.2)
RBC # BLD AUTO: 4.06 10E12/L (ref 3.8–5.2)
WBC # BLD AUTO: 14.2 10E9/L (ref 4–11)

## 2018-10-18 PROCEDURE — 84460 ALANINE AMINO (ALT) (SGPT): CPT | Performed by: OBSTETRICS & GYNECOLOGY

## 2018-10-18 PROCEDURE — 84156 ASSAY OF PROTEIN URINE: CPT | Performed by: OBSTETRICS & GYNECOLOGY

## 2018-10-18 PROCEDURE — 85027 COMPLETE CBC AUTOMATED: CPT | Performed by: OBSTETRICS & GYNECOLOGY

## 2018-10-18 PROCEDURE — 84450 TRANSFERASE (AST) (SGOT): CPT | Performed by: OBSTETRICS & GYNECOLOGY

## 2018-10-18 PROCEDURE — 99207 ZZC PRENATAL VISIT: CPT | Performed by: OBSTETRICS & GYNECOLOGY

## 2018-10-18 PROCEDURE — 82565 ASSAY OF CREATININE: CPT | Performed by: OBSTETRICS & GYNECOLOGY

## 2018-10-18 PROCEDURE — 36415 COLL VENOUS BLD VENIPUNCTURE: CPT | Performed by: OBSTETRICS & GYNECOLOGY

## 2018-10-18 RX ORDER — HYDROXYZINE PAMOATE 50 MG/1
50 CAPSULE ORAL 3 TIMES DAILY PRN
Qty: 40 CAPSULE | Refills: 1 | Status: SHIPPED | OUTPATIENT
Start: 2018-10-18 | End: 2019-10-29

## 2018-10-18 NOTE — MR AVS SNAPSHOT
After Visit Summary   10/18/2018    Zacarias Adam    MRN: [id number]           Patient Information     Date Of Birth          1995        Visit Information        Provider Department      10/18/2018 1:00 PM Wendie Johnson MD [facility]        Today's Diagnoses     Prenatal care, subsequent pregnancy in third trimester    -  1    Essential hypertension        LLQ abdominal pain           Follow-ups after your visit        Your next 10 appointments already scheduled     Oct 24, 2018 10:45 AM CDT   US FETAL BIOPHYS PROFILE W/O NON STRESS TEST with WYUS1   [facility]trasound ([facility])    [address] [facility] [address] [address]   [phone]           How do I prepare for my exam? (Food and drink instructions) Drink four 8-ounce glasses of fluid an hour before your exam. If you need to empty your bladder before your exam, try to release only a little urine. Then, drink another glass of fluid.  How do I prepare for my exam? (Other instructions) You may have up to two family members in the exam room. If you bring a small child, an adult must be there to care for him or her. No video or camera photography during the procedure.  What should I wear: Wear comfortable clothes.  How long does the exam take: Most ultrasounds take 30 to 60 minutes.  What should I bring: Bring a list of your medicines, including vitamins, minerals and over-the-counter drugs. It is safest to leave personal items at home.  Do I need a :  No  is needed.  What do I need to tell my doctor: Tell your doctor about any allergies you may have.  What should I do after the exam: No restrictions, You may resume normal activities.  What is this test: An ultrasound uses sound waves to make pictures of the body. Sound waves do not cause pain. The only discomfort may be the pressure of the wand against your skin or full bladder.  Who should I call with questions: If you have any  questions, please call the Imaging Department where you will have your exam. Directions, parking instructions, and other information is available on our website, [email or web address].            Oct 25, 2018 11:30 AM CDT   ESTABLISHED PRENATAL with Wendie Johnson MD   [facility]    [address] [address]   [phone]            Oct 31, 2018 10:45 AM CDT   US FETAL BIOPHYS PROFILE W/O NON STRESS TEST with WYUS1   [facility] Ultrasound ([facility])    [address] [address]   [phone]           How do I prepare for my exam? (Food and drink instructions) Drink four 8-ounce glasses of fluid an hour before your exam. If you need to empty your bladder before your exam, try to release only a little urine. Then, drink another glass of fluid.  How do I prepare for my exam? (Other instructions) You may have up to two family members in the exam room. If you bring a small child, an adult must be there to care for him or her. No video or camera photography during the procedure.  What should I wear: Wear comfortable clothes.  How long does the exam take: Most ultrasounds take 30 to 60 minutes.  What should I bring: Bring a list of your medicines, including vitamins, minerals and over-the-counter drugs. It is safest to leave personal items at home.  Do I need a :  No  is needed.  What do I need to tell my doctor: Tell your doctor about any allergies you may have.  What should I do after the exam: No restrictions, You may resume normal activities.  What is this test: An ultrasound uses sound waves to make pictures of the body. Sound waves do not cause pain. The only discomfort may be the pressure of the wand against your skin or full bladder.  Who should I call with questions: If you have any questions, please call the Imaging Department where you will have your exam. Directions, parking  instructions, and other information is available on our website, [email or web address].            Nov 07, 2018 10:45 AM CST   US FETAL BIOPHYS PROFILE W/O NON STRESS TEST with WYUS1   [facility] ([facility])    [address] [facility] [address]   [phone]           How do I prepare for my exam? (Food and drink instructions) Drink four 8-ounce glasses of fluid an hour before your exam. If you need to empty your bladder before your exam, try to release only a little urine. Then, drink another glass of fluid.  How do I prepare for my exam? (Other instructions) You may have up to two family members in the exam room. If you bring a small child, an adult must be there to care for him or her. No video or camera photography during the procedure.  What should I wear: Wear comfortable clothes.  How long does the exam take: Most ultrasounds take 30 to 60 minutes.  What should I bring: Bring a list of your medicines, including vitamins, minerals and over-the-counter drugs. It is safest to leave personal items at home.  Do I need a :  No  is needed.  What do I need to tell my doctor: Tell your doctor about any allergies you may have.  What should I do after the exam: No restrictions, You may resume normal activities.  What is this test: An ultrasound uses sound waves to make pictures of the body. Sound waves do not cause pain. The only discomfort may be the pressure of the wand against your skin or full bladder.  Who should I call with questions: If you have any questions, please call the Imaging Department where you will have your exam. Directions, parking instructions, and other information is available on our website, [email or web address].            Nov 08, 2018 11:30 AM CST   ESTABLISHED PRENATAL with Wendie Johnson MD   [facility] ([facility])    [address] [address]   [phone]            Nov 13,  "2018   Procedure with Wendie Johnson MD   [facility] (--)    [address] [address] [phone]           The medical center is located at [address] [facility]. (between [address] in [place], [place] miles north of [place]).              Who to contact     If you have questions or need follow up information about today's clinic visit or your schedule please contact [facility] directly at [phone].  Normal or non-critical lab and imaging results will be communicated to you by MyChart, letter or phone within 4 business days after the clinic has received the results. If you do not hear from us within 7 days, please contact the clinic through MyChart or phone. If you have a critical or abnormal lab result, we will notify you by phone as soon as possible.  Submit refill requests through [employer] or call your pharmacy and they will forward the refill request to us. Please allow 3 business days for your refill to be completed.          Additional Information About Your Visit        Care EveryWhere ID     This is your Care EveryWhere ID. This could be used by other organizations to access your [place] medical records  [id number]        Your Vitals Were     Pulse Temperature Respirations Height Last Period BMI (Body Mass Index)    128 98.6  F (37  C) 20 5' 6\" (1.676 m) 02/25/2018 30.7 kg/m2       Blood Pressure from Last 3 Encounters:   10/18/18 136/84   10/10/18 143/74   09/27/18 139/76    Weight from Last 3 Encounters:   10/18/18 190 lb 3.2 oz (86.3 kg)   10/10/18 192 lb (87.1 kg)   09/27/18 190 lb (86.2 kg)              We Performed the Following     ALT     AST     CBC with platelets     Creatinine     Protein  random urine with Creat Ratio          Today's Medication Changes          These changes are accurate as of 10/18/18  1:30 PM.  If you have any questions, ask your nurse or doctor.               Start taking these medicines.        " Dose/Directions    hydrOXYzine 50 MG capsule   Commonly known as:  VISTARIL   Used for:  LLQ abdominal pain   Started by:  Wendie Johnson MD        Dose:  50 mg   Take 1 capsule (50 mg) by mouth 3 times daily as needed for other (abdominal pain)   Quantity:  40 capsule   Refills:  1            Where to get your medicines      These medications were sent to [id number] - [facility] [address]  [address] [address]    Hours:  Closed 10-16-08 business to [facility] Phone:  [phone]     hydrOXYzine 50 MG capsule                Primary Care Provider Office Phone # Fax #    Yudi Sauceda [phone] [address] [facility] [address]        Equal Access to Services     BRIDGETTE LOPEZ : Benedicto loredo Solaila, waaxda luqadaha, qaybta kaalmadestini he, flora gr . So [facility] [phone].    ATENCIÓN: Si habla español, tiene a aleman disposición servicios gratuitos de asistencia lingüística. [facility] [phone].    We comply with applicable federal civil rights laws and [place] laws. We do not discriminate on the basis of race, color, national origin, age, disability, sex, sexual orientation, or gender identity.            Thank you!     Thank you for choosing [facility]  for your care. Our goal is always to provide you with excellent care. Hearing back from our patients is one way we can continue to improve our services. Please take a few minutes to complete the written survey that you may receive in the mail after your visit with us. Thank you!             Your Updated Medication List - Protect others around you: Learn how to safely use, store and throw away your medicines at www.disposemymeds.org.          This list is accurate as of 10/18/18  1:30 PM.  Always use your most recent med list.                   Brand Name Dispense Instructions for use Diagnosis    hydrOXYzine 50 MG  capsule    VISTARIL    40 capsule    Take 1 capsule (50 mg) by mouth 3 times daily as needed for other (abdominal pain)    LLQ abdominal pain       prenatal multivitamin plus iron 27-0.8 MG Tabs per tablet      Take 1 tablet by mouth daily        TYLENOL PO

## 2018-10-18 NOTE — PROGRESS NOTES
Pt presents today due to episodes of quite severe spasm like pain that starts in LUQ and radiates to LLQ; no bleeding, normal fetal movement; contractoins unchanged; swelling unchanged; no headache or scotomata; appetite decreased; no change in bowel function (daily normal BM); no fever or chills  PE: abdm; gravid with baby on left side (hemiuterus);uterus not hypertonic; nonacute findings  A: left abdominal pain--likely musculoskeletal  P: Vistaril 50mg po TID prn  Advised to call BC if has bleeding, decreased fetal movement or consistent strong contractions/LOF  Wendie Johnson MD  [facility]

## 2018-10-24 ENCOUNTER — HOSPITAL ENCOUNTER (OUTPATIENT)
Dept: ULTRASOUND IMAGING | Facility: CLINIC | Age: 23
Discharge: HOME | End: 2018-10-24
Attending: OBSTETRICS & GYNECOLOGY | Admitting: OBSTETRICS & GYNECOLOGY
Payer: COMMERCIAL

## 2018-10-24 DIAGNOSIS — I10 ESSENTIAL HYPERTENSION: ICD-10-CM

## 2018-10-24 PROCEDURE — 76819 FETAL BIOPHYS PROFIL W/O NST: CPT

## 2018-10-25 ENCOUNTER — PRENATAL OFFICE VISIT (OUTPATIENT)
Dept: OBGYN | Facility: CLINIC | Age: 23
End: 2018-10-25
Payer: COMMERCIAL

## 2018-10-25 VITALS
DIASTOLIC BLOOD PRESSURE: 87 MMHG | HEART RATE: 122 BPM | SYSTOLIC BLOOD PRESSURE: 133 MMHG | WEIGHT: 194 LBS | HEIGHT: 66 IN | BODY MASS INDEX: 31.18 KG/M2 | RESPIRATION RATE: 18 BRPM | TEMPERATURE: 97.4 F

## 2018-10-25 DIAGNOSIS — I10 ESSENTIAL HYPERTENSION: ICD-10-CM

## 2018-10-25 DIAGNOSIS — Z34.83 PRENATAL CARE, SUBSEQUENT PREGNANCY IN THIRD TRIMESTER: Primary | ICD-10-CM

## 2018-10-25 PROCEDURE — 99207 ZZC PRENATAL VISIT: CPT | Performed by: OBSTETRICS & GYNECOLOGY

## 2018-10-25 NOTE — PROGRESS NOTES
"CC: Here for routine prenatal visit @ 34w4d   HPI:  Feeling very tired; swelling about the same    PE: /67 (BP Location: Right arm, Patient Position: Chair, Cuff Size: Adult Small)  Pulse 122  Temp 97.4  F (36.3  C) (Tympanic)  Resp 18  Ht 5' 6\" (1.676 m)  Wt 194 lb (88 kg)  LMP 02/25/2018  BMI 31.31 kg/m2   See OB flowsheet      A:  1. Prenatal care, subsequent pregnancy in third trimester      2. Essential hypertension        Routine prenatal care  RTC 1 weeks.      Wendie Johnson M.D.     "

## 2018-10-25 NOTE — MR AVS SNAPSHOT
After Visit Summary   10/25/2018    Zacarias Adam    MRN: [id number]           Patient Information     Date Of Birth          1995        Visit Information        Provider Department      10/25/2018 11:30 AM Wendie Johnson MD [facility]        Today's Diagnoses     Prenatal care, subsequent pregnancy in third trimester    -  1    Essential hypertension           Follow-ups after your visit        Your next 10 appointments already scheduled     Oct 31, 2018 10:45 AM CDT   US FETAL BIOPHYS PROFILE W/O NON STRESS TEST with WYUS1   [facility] Ultrasound ([facility])    [address] [facility] [address]   [phone]           How do I prepare for my exam? (Food and drink instructions) Drink four 8-ounce glasses of fluid an hour before your exam. If you need to empty your bladder before your exam, try to release only a little urine. Then, drink another glass of fluid.  How do I prepare for my exam? (Other instructions) You may have up to two family members in the exam room. If you bring a small child, an adult must be there to care for him or her. No video or camera photography during the procedure.  What should I wear: Wear comfortable clothes.  How long does the exam take: Most ultrasounds take 30 to 60 minutes.  What should I bring: Bring a list of your medicines, including vitamins, minerals and over-the-counter drugs. It is safest to leave personal items at home.  Do I need a :  No  is needed.  What do I need to tell my doctor: Tell your doctor about any allergies you may have.  What should I do after the exam: No restrictions, You may resume normal activities.  What is this test: An ultrasound uses sound waves to make pictures of the body. Sound waves do not cause pain. The only discomfort may be the pressure of the wand against your skin or full bladder.  Who should I call with questions: If you have any questions, please call  the Imaging Department where you will have your exam. Directions, parking instructions, and other information is available on our website, [email or web address].            Nov 07, 2018 10:45 AM CST   US FETAL BIOPHYS PROFILE W/O NON STRESS TEST with WYUS1   [facility]ound ([facility])    [address] [address]   [phone]           How do I prepare for my exam? (Food and drink instructions) Drink four 8-ounce glasses of fluid an hour before your exam. If you need to empty your bladder before your exam, try to release only a little urine. Then, drink another glass of fluid.  How do I prepare for my exam? (Other instructions) You may have up to two family members in the exam room. If you bring a small child, an adult must be there to care for him or her. No video or camera photography during the procedure.  What should I wear: Wear comfortable clothes.  How long does the exam take: Most ultrasounds take 30 to 60 minutes.  What should I bring: Bring a list of your medicines, including vitamins, minerals and over-the-counter drugs. It is safest to leave personal items at home.  Do I need a :  No  is needed.  What do I need to tell my doctor: Tell your doctor about any allergies you may have.  What should I do after the exam: No restrictions, You may resume normal activities.  What is this test: An ultrasound uses sound waves to make pictures of the body. Sound waves do not cause pain. The only discomfort may be the pressure of the wand against your skin or full bladder.  Who should I call with questions: If you have any questions, please call the Imaging Department where you will have your exam. Directions, parking instructions, and other information is available on our website, [email or web address].            Nov 08, 2018 11:30 AM CST   ESTABLISHED PRENATAL with Wendie Johnson MD   [facility]    [address] [address]  [facility] [address] [phone]            Nov 13, 2018   Procedure with Wendie Johnson MD   [facility] (--)    [address] [address] [phone]           The medical center is located at [address]. (between [address] in [place], [place] north of [place]).              Who to contact     If you have questions or need follow up information about today's clinic visit or your schedule please contact [facility] directly at [phone].  Normal or non-critical lab and imaging results will be communicated to you by MyChart, letter or phone within 4 business days after the clinic has received the results. If you do not hear from us within 7 days, please contact the clinic through MyChart or phone. If you have a critical or abnormal lab result, we will notify you by phone as soon as possible.  Submit refill requests through [employer]dor or call your pharmacy and they will forward the refill request to us. Please allow 3 business days for your refill to be completed.          Additional Information About Your Visit        Care EveryWhere ID     This is your Care EveryWhere ID. This could be used by other organizations to access your [place] medical records  [id number]        Your Vitals Were     Pulse Temperature Respirations Height Last Period BMI (Body Mass Index)    122 97.4  F (36.3  C) (Tympanic) 18 5' 6\" (1.676 m) 02/25/2018 31.31 kg/m2       Blood Pressure from Last 3 Encounters:   10/25/18 133/87   10/18/18 136/84   10/10/18 143/74    Weight from Last 3 Encounters:   10/25/18 194 lb (88 kg)   10/18/18 190 lb 3.2 oz (86.3 kg)   10/10/18 192 lb (87.1 kg)              Today, you had the following     No orders found for display       Primary Care Provider Office Phone # Fax #    Yudi Sauceda [phone] [address] [place] [address]        Equal Access to Services  "    BRIDGETTE LOPEZ : Hadii aad jose roberto facundo Kyle, waaxda luqadaha, qaybta kaalmada trentontrungdestini, flora mclainangelicaarchana gr . So [facility] [phone].    ATENCIÓN: Si habla español, tiene a aleman disposición servicios gratuitos de asistencia lingüística. [facility] [phone].    We comply with applicable federal civil rights laws and [place] laws. We do not discriminate on the basis of race, color, national origin, age, disability, sex, sexual orientation, or gender identity.            Thank you!     Thank you for choosing [facility]  for your care. Our goal is always to provide you with excellent care. Hearing back from our patients is one way we can continue to improve our services. Please take a few minutes to complete the written survey that you may receive in the mail after your visit with us. Thank you!             Your Updated Medication List - Protect others around you: Learn how to safely use, store and throw away your medicines at www.disposemymeds.org.          This list is accurate as of 10/25/18 11:37 AM.  Always use your most recent med list.                   Brand Name Dispense Instructions for use Diagnosis    hydrOXYzine 50 MG capsule    VISTARIL    40 capsule    Take 1 capsule (50 mg) by mouth 3 times daily as needed for other (abdominal pain)    LLQ abdominal pain       prenatal multivitamin plus iron 27-0.8 MG Tabs per tablet      Take 1 tablet by mouth daily        TYLENOL PO

## 2018-10-25 NOTE — TELEPHONE ENCOUNTER
"Gave pt surgery information at appt today - pt states \"it should have never been scheduled as a tubal\"  Salpingectomy removed - Sara in SDS informed.  FYI to provider.    -Yudy Boyle  [facility] [place]    "

## 2018-10-29 ENCOUNTER — TELEPHONE (OUTPATIENT)
Dept: OBGYN | Facility: CLINIC | Age: 23
End: 2018-10-29

## 2018-10-29 NOTE — TELEPHONE ENCOUNTER
Reason for Call:  Form, our goal is to have forms completed with 72 hours, however, some forms may require a visit or additional information.    Type of letter, form or note:  FMLA    Who is the form from?: Insurance comp    Where did the form come from: form was faxed in    What clinic location was the form placed at?: [facility]    Where the form was placed: Given to physician    What number is listed as a contact on the form?: [phone]       Additional comments: Man Gordon      Call taken on 10/29/2018 at 10:39 AM by Alicia Espinosa

## 2018-10-31 ENCOUNTER — HOSPITAL ENCOUNTER (OUTPATIENT)
Dept: ULTRASOUND IMAGING | Facility: CLINIC | Age: 23
Discharge: HOME | End: 2018-10-31
Attending: OBSTETRICS & GYNECOLOGY | Admitting: OBSTETRICS & GYNECOLOGY
Payer: COMMERCIAL

## 2018-10-31 DIAGNOSIS — I10 ESSENTIAL HYPERTENSION: ICD-10-CM

## 2018-10-31 PROCEDURE — 76819 FETAL BIOPHYS PROFIL W/O NST: CPT

## 2018-11-02 ENCOUNTER — TELEPHONE (OUTPATIENT)
Dept: OBGYN | Facility: CLINIC | Age: 23
End: 2018-11-02

## 2018-11-02 DIAGNOSIS — I10 ESSENTIAL HYPERTENSION: ICD-10-CM

## 2018-11-02 DIAGNOSIS — Z34.83 PRENATAL CARE, SUBSEQUENT PREGNANCY IN THIRD TRIMESTER: Primary | ICD-10-CM

## 2018-11-02 NOTE — TELEPHONE ENCOUNTER
Pt notified of below.  Pt reports understanding.  Pt does not have further questions or concerns.  Patient will contact scheduling for imaging to add on the growth scan.    Johnna Fairbanks   [facility] Clinic  RN

## 2018-11-02 NOTE — TELEPHONE ENCOUNTER
Reason for Call: Request for an order or referral:    Order or referral being requested: Growth US order    Date needed: 11/7/18    Has the patient been seen by the PCP for this problem? YES    Additional comments: Pt is having an US done on 11/7/18 and would like to also have a growth US done as well.    Phone number Patient can be reached at:  Cell number on file:    Telephone Information:   Mobile [phone]       Best Time:  any    Can we leave a detailed message on this number?  YES.    Call taken on 11/2/2018 at 3:20 PM by Alicia Espinosa

## 2018-11-05 ENCOUNTER — PRENATAL OFFICE VISIT (OUTPATIENT)
Dept: OBGYN | Facility: CLINIC | Age: 23
End: 2018-11-05
Payer: COMMERCIAL

## 2018-11-05 VITALS
WEIGHT: 198.4 LBS | RESPIRATION RATE: 18 BRPM | BODY MASS INDEX: 31.88 KG/M2 | HEIGHT: 66 IN | HEART RATE: 139 BPM | DIASTOLIC BLOOD PRESSURE: 87 MMHG | SYSTOLIC BLOOD PRESSURE: 133 MMHG

## 2018-11-05 DIAGNOSIS — Z34.83 PRENATAL CARE, SUBSEQUENT PREGNANCY IN THIRD TRIMESTER: Primary | ICD-10-CM

## 2018-11-05 LAB
ALBUMIN UR-MCNC: NEGATIVE MG/DL
APPEARANCE UR: CLEAR
BILIRUB UR QL STRIP: NEGATIVE
COLOR UR AUTO: YELLOW
GLUCOSE UR STRIP-MCNC: NEGATIVE MG/DL
HGB UR QL STRIP: NEGATIVE
KETONES UR STRIP-MCNC: NEGATIVE MG/DL
LEUKOCYTE ESTERASE UR QL STRIP: NEGATIVE
NITRATE UR QL: NEGATIVE
PH UR STRIP: 6 PH (ref 5–7)
SOURCE: NORMAL
SP GR UR STRIP: 1.02 (ref 1–1.03)
UROBILINOGEN UR STRIP-ACNC: 0.2 EU/DL (ref 0.2–1)

## 2018-11-05 PROCEDURE — 99207 ZZC PRENATAL VISIT: CPT | Performed by: OBSTETRICS & GYNECOLOGY

## 2018-11-05 PROCEDURE — 81003 URINALYSIS AUTO W/O SCOPE: CPT | Performed by: OBSTETRICS & GYNECOLOGY

## 2018-11-05 PROCEDURE — 87086 URINE CULTURE/COLONY COUNT: CPT | Performed by: OBSTETRICS & GYNECOLOGY

## 2018-11-05 PROCEDURE — 87653 STREP B DNA AMP PROBE: CPT | Performed by: OBSTETRICS & GYNECOLOGY

## 2018-11-05 PROCEDURE — 87186 SC STD MICRODIL/AGAR DIL: CPT | Performed by: OBSTETRICS & GYNECOLOGY

## 2018-11-05 NOTE — MR AVS SNAPSHOT
After Visit Summary   11/5/2018    Zacarias Adam    MRN: [id number]           Patient Information     Date Of Birth          1995        Visit Information        Provider Department      11/5/2018 3:00 PM Awilda Burger MD [facility]        Today's Diagnoses     Prenatal care, subsequent pregnancy in third trimester    -  1       Follow-ups after your visit        Your next 10 appointments already scheduled     Nov 07, 2018 10:45 AM CST   US OB BIOPHYS SINGLE GESTATION W MEASURE with WYUS1   [facility]trasound ([facility])    [address] [address]   [phone]           How do I prepare for my exam? (Food and drink instructions) Drink four 8-ounce glasses of fluid an hour before your exam. If you need to empty your bladder before your exam, try to release only a little urine. Then, drink another glass of fluid.  How do I prepare for my exam? (Other instructions) You may have up to two family members in the exam room. If you bring a small child, an adult must be there to care for him or her. No video or camera photography during the procedure.  What should I wear: Wear comfortable clothes.  How long does the exam take: Most ultrasounds take 30 to 60 minutes.  What should I bring: Bring a list of your medicines, including vitamins, minerals and over-the-counter drugs. It is safest to leave personal items at home.  Do I need a :  No  is needed.  What do I need to tell my doctor: Tell your doctor about any allergies you may have.  What should I do after the exam: No restrictions, You may resume normal activities.  What is this test: An ultrasound uses sound waves to make pictures of the body. Sound waves do not cause pain. The only discomfort may be the pressure of the wand against your skin or full bladder.  Who should I call with questions: If you have any questions, please call the Imaging Department where you will  "have your exam. Directions, parking instructions, and other information is available on our website, [email or web address].            Nov 08, 2018 11:30 AM CST   ESTABLISHED PRENATAL with Wendie Johnson MD   [facility]    [address] [address] [phone]            Nov 13, 2018   Procedure with Wendie Johnson MD   [facility] (--)    [address] [address] [phone]           The medical center is located at [address]. (between [address] in [place], four miles north of [place]).              Future tests that were ordered for you today     Open Future Orders        Priority Expected Expires Ordered    US OB Biophys Single Gestation Measure Routine  11/5/2019 11/5/2018            Who to contact     If you have questions or need follow up information about today's clinic visit or your schedule please contact [facility] directly at [phone].  Normal or non-critical lab and imaging results will be communicated to you by MyChart, letter or phone within 4 business days after the clinic has received the results. If you do not hear from us within 7 days, please contact the clinic through MyChart or phone. If you have a critical or abnormal lab result, we will notify you by phone as soon as possible.  Submit refill requests through Boxed or call your pharmacy and they will forward the refill request to us. Please allow 3 business days for your refill to be completed.          Additional Information About Your Visit        Care EveryWhere ID     This is your Care EveryWhere ID. This could be used by other organizations to access your [place] medical records  [id number]        Your Vitals Were     Pulse Respirations Height Last Period BMI (Body Mass Index)       139 18 5' 6\" (1.676 m) 02/25/2018 32.02 kg/m2        Blood Pressure from Last 3 " Encounters:   11/05/18 133/87   10/25/18 133/87   10/18/18 136/84    Weight from Last 3 Encounters:   11/05/18 198 lb 6.4 oz (90 kg)   10/25/18 194 lb (88 kg)   10/18/18 190 lb 3.2 oz (86.3 kg)              We Performed the Following     *UA reflex to Microscopic     Strep, Group B by PCR     Urine Culture Aerobic Bacterial        Primary Care Provider Office Phone # Fax #    Yudi Sauceda [phone] [address] [place] [address]        Equal Access to Services     [facility] : Hadii alton cid hadkenna Kyle, wanat lee, jyotsna he, flora gr . So [facility] [phone].    ATENCIÓN: Si habla español, tiene a aleman disposición servicios gratuitos de asistencia lingüística. [facility] [phone].    We comply with applicable federal civil rights laws and [place] laws. We do not discriminate on the basis of race, color, national origin, age, disability, sex, sexual orientation, or gender identity.            Thank you!     Thank you for choosing [facility]  for your care. Our goal is always to provide you with excellent care. Hearing back from our patients is one way we can continue to improve our services. Please take a few minutes to complete the written survey that you may receive in the mail after your visit with us. Thank you!             Your Updated Medication List - Protect others around you: Learn how to safely use, store and throw away your medicines at www.disposemymeds.org.          This list is accurate as of 11/5/18  3:20 PM.  Always use your most recent med list.                   Brand Name Dispense Instructions for use Diagnosis    hydrOXYzine 50 MG capsule    VISTARIL    40 capsule    Take 1 capsule (50 mg) by mouth 3 times daily as needed for other (abdominal pain)    LLQ abdominal pain       prenatal multivitamin plus iron 27-0.8 MG Tabs per tablet      Take 1 tablet by mouth daily        TYLENOL PO

## 2018-11-05 NOTE — NURSING NOTE
"Initial BP (!) 146/92 (BP Location: Left arm, Patient Position: Chair, Cuff Size: Adult Regular)  Pulse 133  Resp 18  Ht 5' 6\" (1.676 m)  Wt 198 lb 6.4 oz (90 kg)  LMP 02/25/2018  BMI 32.02 kg/m2 Estimated body mass index is 32.02 kg/(m^2) as calculated from the following:    Height as of this encounter: 5' 6\" (1.676 m).    Weight as of this encounter: 198 lb 6.4 oz (90 kg). .      "

## 2018-11-06 LAB
BACTERIA SPEC CULT: NORMAL
GP B STREP DNA SPEC QL NAA+PROBE: POSITIVE
Lab: NORMAL
SPECIMEN SOURCE: ABNORMAL
SPECIMEN SOURCE: NORMAL

## 2018-11-07 ENCOUNTER — HOSPITAL ENCOUNTER (OUTPATIENT)
Dept: ULTRASOUND IMAGING | Facility: CLINIC | Age: 23
Discharge: HOME | End: 2018-11-07
Attending: OBSTETRICS & GYNECOLOGY | Admitting: OBSTETRICS & GYNECOLOGY
Payer: COMMERCIAL

## 2018-11-07 DIAGNOSIS — I10 ESSENTIAL HYPERTENSION: ICD-10-CM

## 2018-11-07 PROCEDURE — 76819 FETAL BIOPHYS PROFIL W/O NST: CPT

## 2018-11-08 ENCOUNTER — PRENATAL OFFICE VISIT (OUTPATIENT)
Dept: OBGYN | Facility: CLINIC | Age: 23
End: 2018-11-08
Payer: COMMERCIAL

## 2018-11-08 VITALS
WEIGHT: 196 LBS | RESPIRATION RATE: 18 BRPM | HEART RATE: 131 BPM | BODY MASS INDEX: 31.5 KG/M2 | TEMPERATURE: 97.6 F | HEIGHT: 66 IN | SYSTOLIC BLOOD PRESSURE: 143 MMHG | DIASTOLIC BLOOD PRESSURE: 93 MMHG

## 2018-11-08 DIAGNOSIS — I10 ESSENTIAL HYPERTENSION: ICD-10-CM

## 2018-11-08 DIAGNOSIS — Z34.83 PRENATAL CARE, SUBSEQUENT PREGNANCY IN THIRD TRIMESTER: Primary | ICD-10-CM

## 2018-11-08 LAB
ALT SERPL W P-5'-P-CCNC: 13 U/L (ref 0–50)
AST SERPL W P-5'-P-CCNC: 17 U/L (ref 0–45)
CREAT SERPL-MCNC: 0.59 MG/DL (ref 0.52–1.04)
CREAT UR-MCNC: 215 MG/DL
ERYTHROCYTE [DISTWIDTH] IN BLOOD BY AUTOMATED COUNT: 13.7 % (ref 10–15)
GFR SERPL CREATININE-BSD FRML MDRD: >90 ML/MIN/1.7M2
HCT VFR BLD AUTO: 35.4 % (ref 35–47)
HGB BLD-MCNC: 11.3 G/DL (ref 11.7–15.7)
MCH RBC QN AUTO: 27 PG (ref 26.5–33)
MCHC RBC AUTO-ENTMCNC: 31.9 G/DL (ref 31.5–36.5)
MCV RBC AUTO: 85 FL (ref 78–100)
PLATELET # BLD AUTO: 350 10E9/L (ref 150–450)
PROT UR-MCNC: 0.16 G/L
PROT/CREAT 24H UR: 0.07 G/G CR (ref 0–0.2)
RBC # BLD AUTO: 4.19 10E12/L (ref 3.8–5.2)
URATE SERPL-MCNC: 4.7 MG/DL (ref 2.6–6)
WBC # BLD AUTO: 13.4 10E9/L (ref 4–11)

## 2018-11-08 PROCEDURE — 36415 COLL VENOUS BLD VENIPUNCTURE: CPT | Performed by: OBSTETRICS & GYNECOLOGY

## 2018-11-08 PROCEDURE — 99207 ZZC PRENATAL VISIT: CPT | Performed by: OBSTETRICS & GYNECOLOGY

## 2018-11-08 PROCEDURE — 84460 ALANINE AMINO (ALT) (SGPT): CPT | Performed by: OBSTETRICS & GYNECOLOGY

## 2018-11-08 PROCEDURE — 84156 ASSAY OF PROTEIN URINE: CPT | Performed by: OBSTETRICS & GYNECOLOGY

## 2018-11-08 PROCEDURE — 59426 ANTEPARTUM CARE ONLY: CPT | Performed by: OBSTETRICS & GYNECOLOGY

## 2018-11-08 PROCEDURE — 84450 TRANSFERASE (AST) (SGOT): CPT | Performed by: OBSTETRICS & GYNECOLOGY

## 2018-11-08 PROCEDURE — 84550 ASSAY OF BLOOD/URIC ACID: CPT | Performed by: OBSTETRICS & GYNECOLOGY

## 2018-11-08 PROCEDURE — 85027 COMPLETE CBC AUTOMATED: CPT | Performed by: OBSTETRICS & GYNECOLOGY

## 2018-11-08 PROCEDURE — 82565 ASSAY OF CREATININE: CPT | Performed by: OBSTETRICS & GYNECOLOGY

## 2018-11-08 RX ORDER — CEFAZOLIN SODIUM 1 G/50ML
1 INJECTION, SOLUTION INTRAVENOUS SEE ADMIN INSTRUCTIONS
Status: CANCELLED | OUTPATIENT
Start: 2018-11-08

## 2018-11-08 RX ORDER — LIDOCAINE 40 MG/G
CREAM TOPICAL
Status: CANCELLED | OUTPATIENT
Start: 2018-11-08

## 2018-11-08 RX ORDER — SODIUM CHLORIDE, SODIUM LACTATE, POTASSIUM CHLORIDE, CALCIUM CHLORIDE 600; 310; 30; 20 MG/100ML; MG/100ML; MG/100ML; MG/100ML
INJECTION, SOLUTION INTRAVENOUS CONTINUOUS
Status: CANCELLED | OUTPATIENT
Start: 2018-11-08

## 2018-11-08 RX ORDER — CEFAZOLIN SODIUM 2 G/100ML
2 INJECTION, SOLUTION INTRAVENOUS
Status: CANCELLED | OUTPATIENT
Start: 2018-11-08

## 2018-11-08 RX ORDER — CITRIC ACID/SODIUM CITRATE 334-500MG
30 SOLUTION, ORAL ORAL
Status: CANCELLED | OUTPATIENT
Start: 2018-11-08

## 2018-11-08 NOTE — LETTER
November 8, 2018      Zacarias Adam  [address] [address]        To Whom It May Concern:    Zacarias Adam  was seen on 11/8/18.  Please excuse her from work on11/9/18 and 11/12/18. She will then start her leave of absence.       Sincerely,        Wendie Johnson MD
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
## 2018-11-08 NOTE — MR AVS SNAPSHOT
"              After Visit Summary   11/8/2018    Zacarias Adam    MRN: [id number]           Patient Information     Date Of Birth          1995        Visit Information        Provider Department      11/8/2018 11:30 AM Wendie Johnson MD [facility]        Today's Diagnoses     Prenatal care, subsequent pregnancy in third trimester    -  1    Essential hypertension           Follow-ups after your visit        Your next 10 appointments already scheduled     Nov 13, 2018   Procedure with Wendie Johnson MD   [facility] (--)    [address] [facility] [address] [phone]           The medical center is located at [address]. (between [address] in [place], [place] miles north of [place]).              Who to contact     If you have questions or need follow up information about today's clinic visit or your schedule please contact [facility] directly at [phone].  Normal or non-critical lab and imaging results will be communicated to you by MyChart, letter or phone within 4 business days after the clinic has received the results. If you do not hear from us within 7 days, please contact the clinic through MyChart or phone. If you have a critical or abnormal lab result, we will notify you by phone as soon as possible.  Submit refill requests through [facility] or call your pharmacy and they will forward the refill request to us. Please allow 3 business days for your refill to be completed.          Additional Information About Your Visit        Care EveryWhere ID     This is your Care EveryWhere ID. This could be used by other organizations to access your [facility] medical records  [id number]        Your Vitals Were     Pulse Temperature Respirations Height Last Period BMI (Body Mass Index)    131 97.6  F (36.4  C) (Tympanic) 18 5' 6\" (1.676 m) 02/25/2018 31.64 kg/m2       Blood Pressure from Last 3 Encounters:   11/08/18 " (!) 143/93   11/05/18 133/87   10/25/18 133/87    Weight from Last 3 Encounters:   11/08/18 196 lb (88.9 kg)   11/05/18 198 lb 6.4 oz (90 kg)   10/25/18 194 lb (88 kg)              We Performed the Following     ALT     AST     CBC with platelets     Creatinine     Protein  random urine with Creat Ratio     Uric acid        Primary Care Provider Office Phone # Fax #    Yudi Sauceda [phone] [address] [place] [address]        Equal Access to Services     [facility]: Hadii aad ku hadasho Soomaali, waaxda luqadaha, qaybta kaalmada neri, flora gr . So [facility] [phone].    ATENCIÓN: Si habla español, tiene a aleman disposición servicios gratuitos de asistencia lingüística. [facility] [phone].    We comply with applicable federal civil rights laws and [place] laws. We do not discriminate on the basis of race, color, national origin, age, disability, sex, sexual orientation, or gender identity.            Thank you!     Thank you for choosing [facility]  for your care. Our goal is always to provide you with excellent care. Hearing back from our patients is one way we can continue to improve our services. Please take a few minutes to complete the written survey that you may receive in the mail after your visit with us. Thank you!             Your Updated Medication List - Protect others around you: Learn how to safely use, store and throw away your medicines at www.disposemymeds.org.          This list is accurate as of 11/8/18 11:43 AM.  Always use your most recent med list.                   Brand Name Dispense Instructions for use Diagnosis    hydrOXYzine 50 MG capsule    VISTARIL    40 capsule    Take 1 capsule (50 mg) by mouth 3 times daily as needed for other (abdominal pain)    LLQ abdominal pain       prenatal multivitamin plus iron 27-0.8 MG Tabs per tablet      Take 1 tablet by mouth daily        TYLENOL PO

## 2018-11-10 LAB
BACTERIA SPEC CULT: ABNORMAL
SPECIMEN SOURCE: ABNORMAL

## 2018-11-12 ENCOUNTER — ANESTHESIA EVENT (OUTPATIENT)
Dept: SURGERY | Facility: CLINIC | Age: 23
End: 2018-11-12
Payer: COMMERCIAL

## 2018-11-12 RX ORDER — SODIUM CHLORIDE, SODIUM LACTATE, POTASSIUM CHLORIDE, CALCIUM CHLORIDE 600; 310; 30; 20 MG/100ML; MG/100ML; MG/100ML; MG/100ML
INJECTION, SOLUTION INTRAVENOUS CONTINUOUS
Status: CANCELLED | OUTPATIENT
Start: 2018-11-12

## 2018-11-13 ENCOUNTER — ANESTHESIA (OUTPATIENT)
Dept: SURGERY | Facility: CLINIC | Age: 23
End: 2018-11-13
Payer: COMMERCIAL

## 2018-11-13 ENCOUNTER — SURGERY (OUTPATIENT)
Age: 23
End: 2018-11-13

## 2018-11-13 ENCOUNTER — HOSPITAL ENCOUNTER (INPATIENT)
Facility: CLINIC | Age: 23
LOS: 2 days | Discharge: HOME | End: 2018-11-15
Attending: OBSTETRICS & GYNECOLOGY | Admitting: OBSTETRICS & GYNECOLOGY
Payer: COMMERCIAL

## 2018-11-13 DIAGNOSIS — Z98.891 S/P PRIMARY LOW TRANSVERSE C-SECTION: Primary | ICD-10-CM

## 2018-11-13 DIAGNOSIS — O13.3 GESTATIONAL HYPERTENSION W/O SIGNIFICANT PROTEINURIA IN 3RD TRIMESTER: ICD-10-CM

## 2018-11-13 LAB
ABO + RH BLD: NORMAL
ABO + RH BLD: NORMAL
ANION GAP SERPL CALCULATED.3IONS-SCNC: 10 MMOL/L (ref 3–14)
BLD GP AB SCN SERPL QL: NORMAL
BLOOD BANK CMNT PATIENT-IMP: NORMAL
BUN SERPL-MCNC: 10 MG/DL (ref 7–30)
CALCIUM SERPL-MCNC: 7.9 MG/DL (ref 8.5–10.1)
CHLORIDE SERPL-SCNC: 109 MMOL/L (ref 94–109)
CO2 SERPL-SCNC: 21 MMOL/L (ref 20–32)
CREAT SERPL-MCNC: 0.54 MG/DL (ref 0.52–1.04)
GFR SERPL CREATININE-BSD FRML MDRD: >90 ML/MIN/1.7M2
GLUCOSE SERPL-MCNC: 87 MG/DL (ref 70–99)
POTASSIUM SERPL-SCNC: 3.7 MMOL/L (ref 3.4–5.3)
SODIUM SERPL-SCNC: 140 MMOL/L (ref 133–144)
SPECIMEN EXP DATE BLD: NORMAL

## 2018-11-13 PROCEDURE — 27210794 ZZH OR GENERAL SUPPLY STERILE: Performed by: OBSTETRICS & GYNECOLOGY

## 2018-11-13 PROCEDURE — 25000132 ZZH RX MED GY IP 250 OP 250 PS 637: Performed by: NURSE ANESTHETIST, CERTIFIED REGISTERED

## 2018-11-13 PROCEDURE — 25000125 ZZHC RX 250: Performed by: NURSE ANESTHETIST, CERTIFIED REGISTERED

## 2018-11-13 PROCEDURE — 59514 CESAREAN DELIVERY ONLY: CPT | Mod: AS | Performed by: PHYSICIAN ASSISTANT

## 2018-11-13 PROCEDURE — 25000132 ZZH RX MED GY IP 250 OP 250 PS 637: Performed by: OBSTETRICS & GYNECOLOGY

## 2018-11-13 PROCEDURE — 25000128 H RX IP 250 OP 636: Performed by: OBSTETRICS & GYNECOLOGY

## 2018-11-13 PROCEDURE — 27110028 ZZH OR GENERAL SUPPLY NON-STERILE: Performed by: OBSTETRICS & GYNECOLOGY

## 2018-11-13 PROCEDURE — 80048 BASIC METABOLIC PNL TOTAL CA: CPT | Performed by: OBSTETRICS & GYNECOLOGY

## 2018-11-13 PROCEDURE — 40000274 ZZH STATISTIC RCP CONSULT EA 30 MIN

## 2018-11-13 PROCEDURE — 86900 BLOOD TYPING SEROLOGIC ABO: CPT | Performed by: OBSTETRICS & GYNECOLOGY

## 2018-11-13 PROCEDURE — 37000008 ZZH ANESTHESIA TECHNICAL FEE, 1ST 30 MIN: Performed by: OBSTETRICS & GYNECOLOGY

## 2018-11-13 PROCEDURE — 25000128 H RX IP 250 OP 636: Performed by: NURSE ANESTHETIST, CERTIFIED REGISTERED

## 2018-11-13 PROCEDURE — 36000056 ZZH SURGERY LEVEL 3 1ST 30 MIN: Performed by: OBSTETRICS & GYNECOLOGY

## 2018-11-13 PROCEDURE — 71000013 ZZH RECOVERY PHASE 1 LEVEL 1 EA ADDTL HR: Performed by: OBSTETRICS & GYNECOLOGY

## 2018-11-13 PROCEDURE — 86780 TREPONEMA PALLIDUM: CPT | Performed by: OBSTETRICS & GYNECOLOGY

## 2018-11-13 PROCEDURE — 86850 RBC ANTIBODY SCREEN: CPT | Performed by: OBSTETRICS & GYNECOLOGY

## 2018-11-13 PROCEDURE — 36000058 ZZH SURGERY LEVEL 3 EA 15 ADDTL MIN: Performed by: OBSTETRICS & GYNECOLOGY

## 2018-11-13 PROCEDURE — 25000125 ZZHC RX 250: Performed by: OBSTETRICS & GYNECOLOGY

## 2018-11-13 PROCEDURE — 36415 COLL VENOUS BLD VENIPUNCTURE: CPT | Performed by: OBSTETRICS & GYNECOLOGY

## 2018-11-13 PROCEDURE — 71000012 ZZH RECOVERY PHASE 1 LEVEL 1 FIRST HR: Performed by: OBSTETRICS & GYNECOLOGY

## 2018-11-13 PROCEDURE — 59515 CESAREAN DELIVERY: CPT | Performed by: OBSTETRICS & GYNECOLOGY

## 2018-11-13 PROCEDURE — 12000031 ZZH R&B OB CRITICAL

## 2018-11-13 PROCEDURE — 86901 BLOOD TYPING SEROLOGIC RH(D): CPT | Performed by: OBSTETRICS & GYNECOLOGY

## 2018-11-13 PROCEDURE — 37000009 ZZH ANESTHESIA TECHNICAL FEE, EACH ADDTL 15 MIN: Performed by: OBSTETRICS & GYNECOLOGY

## 2018-11-13 RX ORDER — DIPHENHYDRAMINE HYDROCHLORIDE 50 MG/ML
25 INJECTION INTRAMUSCULAR; INTRAVENOUS EVERY 6 HOURS PRN
Status: DISCONTINUED | OUTPATIENT
Start: 2018-11-13 | End: 2018-11-15 | Stop reason: HOSPADM

## 2018-11-13 RX ORDER — EPHEDRINE SULFATE 50 MG/ML
INJECTION, SOLUTION INTRAMUSCULAR; INTRAVENOUS; SUBCUTANEOUS
Status: DISCONTINUED
Start: 2018-11-13 | End: 2018-11-13 | Stop reason: WASHOUT

## 2018-11-13 RX ORDER — MULTIVIT-MIN/IRON/FOLIC ACID/K 18-600-40
2000 CAPSULE ORAL DAILY
COMMUNITY

## 2018-11-13 RX ORDER — OXYTOCIN/0.9 % SODIUM CHLORIDE 30/500 ML
100 PLASTIC BAG, INJECTION (ML) INTRAVENOUS CONTINUOUS
Status: DISCONTINUED | OUTPATIENT
Start: 2018-11-13 | End: 2018-11-15 | Stop reason: HOSPADM

## 2018-11-13 RX ORDER — HYDROCORTISONE 2.5 %
CREAM (GRAM) TOPICAL 3 TIMES DAILY PRN
Status: DISCONTINUED | OUTPATIENT
Start: 2018-11-13 | End: 2018-11-15 | Stop reason: HOSPADM

## 2018-11-13 RX ORDER — PROCHLORPERAZINE 25 MG
25 SUPPOSITORY, RECTAL RECTAL EVERY 12 HOURS PRN
Status: DISCONTINUED | OUTPATIENT
Start: 2018-11-13 | End: 2018-11-15 | Stop reason: HOSPADM

## 2018-11-13 RX ORDER — OXYTOCIN 10 [USP'U]/ML
10 INJECTION, SOLUTION INTRAMUSCULAR; INTRAVENOUS
Status: DISCONTINUED | OUTPATIENT
Start: 2018-11-13 | End: 2018-11-15 | Stop reason: HOSPADM

## 2018-11-13 RX ORDER — ACETAMINOPHEN 325 MG/1
975 TABLET ORAL EVERY 8 HOURS
Status: DISCONTINUED | OUTPATIENT
Start: 2018-11-13 | End: 2018-11-15 | Stop reason: HOSPADM

## 2018-11-13 RX ORDER — CEFAZOLIN SODIUM 1 G/50ML
1 INJECTION, SOLUTION INTRAVENOUS SEE ADMIN INSTRUCTIONS
Status: DISCONTINUED | OUTPATIENT
Start: 2018-11-13 | End: 2018-11-15

## 2018-11-13 RX ORDER — DEXTROSE, SODIUM CHLORIDE, SODIUM LACTATE, POTASSIUM CHLORIDE, AND CALCIUM CHLORIDE 5; .6; .31; .03; .02 G/100ML; G/100ML; G/100ML; G/100ML; G/100ML
INJECTION, SOLUTION INTRAVENOUS CONTINUOUS
Status: DISCONTINUED | OUTPATIENT
Start: 2018-11-13 | End: 2018-11-15 | Stop reason: HOSPADM

## 2018-11-13 RX ORDER — DIPHENHYDRAMINE HCL 25 MG
25 CAPSULE ORAL EVERY 6 HOURS PRN
Status: DISCONTINUED | OUTPATIENT
Start: 2018-11-13 | End: 2018-11-15 | Stop reason: HOSPADM

## 2018-11-13 RX ORDER — SODIUM CHLORIDE, SODIUM LACTATE, POTASSIUM CHLORIDE, CALCIUM CHLORIDE 600; 310; 30; 20 MG/100ML; MG/100ML; MG/100ML; MG/100ML
INJECTION, SOLUTION INTRAVENOUS CONTINUOUS
Status: DISCONTINUED | OUTPATIENT
Start: 2018-11-13 | End: 2018-11-15

## 2018-11-13 RX ORDER — MISOPROSTOL 200 UG/1
400 TABLET ORAL
Status: DISCONTINUED | OUTPATIENT
Start: 2018-11-13 | End: 2018-11-15 | Stop reason: HOSPADM

## 2018-11-13 RX ORDER — ONDANSETRON 2 MG/ML
4 INJECTION INTRAMUSCULAR; INTRAVENOUS EVERY 4 HOURS PRN
Status: DISCONTINUED | OUTPATIENT
Start: 2018-11-13 | End: 2018-11-13

## 2018-11-13 RX ORDER — OXYTOCIN/0.9 % SODIUM CHLORIDE 30/500 ML
PLASTIC BAG, INJECTION (ML) INTRAVENOUS CONTINUOUS PRN
Status: DISCONTINUED | OUTPATIENT
Start: 2018-11-13 | End: 2018-11-13

## 2018-11-13 RX ORDER — ONDANSETRON 4 MG/1
4 TABLET, ORALLY DISINTEGRATING ORAL EVERY 30 MIN PRN
Status: DISCONTINUED | OUTPATIENT
Start: 2018-11-13 | End: 2018-11-15 | Stop reason: HOSPADM

## 2018-11-13 RX ORDER — LIDOCAINE 40 MG/G
CREAM TOPICAL
Status: DISCONTINUED | OUTPATIENT
Start: 2018-11-13 | End: 2018-11-15

## 2018-11-13 RX ORDER — OXYTOCIN/0.9 % SODIUM CHLORIDE 30/500 ML
340 PLASTIC BAG, INJECTION (ML) INTRAVENOUS CONTINUOUS PRN
Status: DISCONTINUED | OUTPATIENT
Start: 2018-11-13 | End: 2018-11-15 | Stop reason: HOSPADM

## 2018-11-13 RX ORDER — ONDANSETRON 2 MG/ML
4 INJECTION INTRAMUSCULAR; INTRAVENOUS EVERY 30 MIN PRN
Status: DISCONTINUED | OUTPATIENT
Start: 2018-11-13 | End: 2018-11-15 | Stop reason: HOSPADM

## 2018-11-13 RX ORDER — LIDOCAINE 40 MG/G
CREAM TOPICAL
Status: DISCONTINUED | OUTPATIENT
Start: 2018-11-13 | End: 2018-11-15 | Stop reason: HOSPADM

## 2018-11-13 RX ORDER — EPHEDRINE SULFATE 50 MG/ML
INJECTION, SOLUTION INTRAMUSCULAR; INTRAVENOUS; SUBCUTANEOUS PRN
Status: DISCONTINUED | OUTPATIENT
Start: 2018-11-13 | End: 2018-11-13

## 2018-11-13 RX ORDER — CEFAZOLIN SODIUM 2 G/100ML
2 INJECTION, SOLUTION INTRAVENOUS
Status: COMPLETED | OUTPATIENT
Start: 2018-11-13 | End: 2018-11-13

## 2018-11-13 RX ORDER — DEXAMETHASONE SODIUM PHOSPHATE 4 MG/ML
4 INJECTION, SOLUTION INTRA-ARTICULAR; INTRALESIONAL; INTRAMUSCULAR; INTRAVENOUS; SOFT TISSUE
Status: DISCONTINUED | OUTPATIENT
Start: 2018-11-13 | End: 2018-11-15 | Stop reason: HOSPADM

## 2018-11-13 RX ORDER — SODIUM CHLORIDE, SODIUM LACTATE, POTASSIUM CHLORIDE, CALCIUM CHLORIDE 600; 310; 30; 20 MG/100ML; MG/100ML; MG/100ML; MG/100ML
INJECTION, SOLUTION INTRAVENOUS CONTINUOUS PRN
Status: DISCONTINUED | OUTPATIENT
Start: 2018-11-13 | End: 2018-11-13

## 2018-11-13 RX ORDER — MISOPROSTOL 200 UG/1
TABLET ORAL PRN
Status: DISCONTINUED | OUTPATIENT
Start: 2018-11-13 | End: 2018-11-13

## 2018-11-13 RX ORDER — BISACODYL 10 MG
10 SUPPOSITORY, RECTAL RECTAL DAILY PRN
Status: DISCONTINUED | OUTPATIENT
Start: 2018-11-15 | End: 2018-11-15 | Stop reason: HOSPADM

## 2018-11-13 RX ORDER — NALOXONE HYDROCHLORIDE 0.4 MG/ML
.1-.4 INJECTION, SOLUTION INTRAMUSCULAR; INTRAVENOUS; SUBCUTANEOUS
Status: DISCONTINUED | OUTPATIENT
Start: 2018-11-13 | End: 2018-11-13

## 2018-11-13 RX ORDER — ALBUTEROL SULFATE 0.83 MG/ML
2.5 SOLUTION RESPIRATORY (INHALATION) EVERY 4 HOURS PRN
Status: DISCONTINUED | OUTPATIENT
Start: 2018-11-13 | End: 2018-11-15 | Stop reason: HOSPADM

## 2018-11-13 RX ORDER — SCOLOPAMINE TRANSDERMAL SYSTEM 1 MG/1
1 PATCH, EXTENDED RELEASE TRANSDERMAL ONCE
Status: COMPLETED | OUTPATIENT
Start: 2018-11-13 | End: 2018-11-13

## 2018-11-13 RX ORDER — OXYCODONE HYDROCHLORIDE 5 MG/1
5-10 TABLET ORAL
Status: DISCONTINUED | OUTPATIENT
Start: 2018-11-13 | End: 2018-11-15 | Stop reason: HOSPADM

## 2018-11-13 RX ORDER — HYDROMORPHONE HYDROCHLORIDE 1 MG/ML
.3-.5 INJECTION, SOLUTION INTRAMUSCULAR; INTRAVENOUS; SUBCUTANEOUS EVERY 30 MIN PRN
Status: DISCONTINUED | OUTPATIENT
Start: 2018-11-13 | End: 2018-11-15 | Stop reason: HOSPADM

## 2018-11-13 RX ORDER — IBUPROFEN 800 MG/1
800 TABLET, FILM COATED ORAL EVERY 6 HOURS PRN
Status: DISCONTINUED | OUTPATIENT
Start: 2018-11-13 | End: 2018-11-15 | Stop reason: HOSPADM

## 2018-11-13 RX ORDER — METOCLOPRAMIDE HYDROCHLORIDE 5 MG/ML
10 INJECTION INTRAMUSCULAR; INTRAVENOUS EVERY 6 HOURS PRN
Status: DISCONTINUED | OUTPATIENT
Start: 2018-11-13 | End: 2018-11-15 | Stop reason: HOSPADM

## 2018-11-13 RX ORDER — KETOROLAC TROMETHAMINE 30 MG/ML
30 INJECTION, SOLUTION INTRAMUSCULAR; INTRAVENOUS EVERY 6 HOURS
Status: DISPENSED | OUTPATIENT
Start: 2018-11-13 | End: 2018-11-14

## 2018-11-13 RX ORDER — MORPHINE SULFATE 4 MG/ML
INJECTION, SOLUTION INTRAMUSCULAR; INTRAVENOUS PRN
Status: DISCONTINUED | OUTPATIENT
Start: 2018-11-13 | End: 2018-11-13

## 2018-11-13 RX ORDER — AMOXICILLIN 250 MG
1 CAPSULE ORAL 2 TIMES DAILY PRN
Status: DISCONTINUED | OUTPATIENT
Start: 2018-11-13 | End: 2018-11-15 | Stop reason: HOSPADM

## 2018-11-13 RX ORDER — DIPHENHYDRAMINE HYDROCHLORIDE 50 MG/ML
25 INJECTION INTRAMUSCULAR; INTRAVENOUS EVERY 6 HOURS PRN
Status: DISCONTINUED | OUTPATIENT
Start: 2018-11-13 | End: 2018-11-13

## 2018-11-13 RX ORDER — GLYCOPYRROLATE 0.2 MG/ML
INJECTION, SOLUTION INTRAMUSCULAR; INTRAVENOUS PRN
Status: DISCONTINUED | OUTPATIENT
Start: 2018-11-13 | End: 2018-11-13

## 2018-11-13 RX ORDER — LANOLIN 100 %
OINTMENT (GRAM) TOPICAL
Status: DISCONTINUED | OUTPATIENT
Start: 2018-11-13 | End: 2018-11-15 | Stop reason: HOSPADM

## 2018-11-13 RX ORDER — ONDANSETRON 2 MG/ML
4 INJECTION INTRAMUSCULAR; INTRAVENOUS EVERY 6 HOURS PRN
Status: DISCONTINUED | OUTPATIENT
Start: 2018-11-13 | End: 2018-11-15 | Stop reason: HOSPADM

## 2018-11-13 RX ORDER — CITRIC ACID/SODIUM CITRATE 334-500MG
30 SOLUTION, ORAL ORAL
Status: COMPLETED | OUTPATIENT
Start: 2018-11-13 | End: 2018-11-13

## 2018-11-13 RX ORDER — DIPHENHYDRAMINE HCL 25 MG
25 CAPSULE ORAL EVERY 6 HOURS PRN
Status: DISCONTINUED | OUTPATIENT
Start: 2018-11-13 | End: 2018-11-13

## 2018-11-13 RX ORDER — MISOPROSTOL 200 UG/1
TABLET ORAL
Status: DISCONTINUED
Start: 2018-11-13 | End: 2018-11-13 | Stop reason: HOSPADM

## 2018-11-13 RX ORDER — AMOXICILLIN 250 MG
2 CAPSULE ORAL 2 TIMES DAILY PRN
Status: DISCONTINUED | OUTPATIENT
Start: 2018-11-13 | End: 2018-11-15 | Stop reason: HOSPADM

## 2018-11-13 RX ORDER — SODIUM CHLORIDE, SODIUM LACTATE, POTASSIUM CHLORIDE, CALCIUM CHLORIDE 600; 310; 30; 20 MG/100ML; MG/100ML; MG/100ML; MG/100ML
INJECTION, SOLUTION INTRAVENOUS CONTINUOUS
Status: DISCONTINUED | OUTPATIENT
Start: 2018-11-13 | End: 2018-11-15 | Stop reason: HOSPADM

## 2018-11-13 RX ORDER — BUPIVACAINE HYDROCHLORIDE 7.5 MG/ML
INJECTION, SOLUTION INTRASPINAL PRN
Status: DISCONTINUED | OUTPATIENT
Start: 2018-11-13 | End: 2018-11-13

## 2018-11-13 RX ORDER — ACETAMINOPHEN 325 MG/1
650 TABLET ORAL EVERY 4 HOURS PRN
Status: DISCONTINUED | OUTPATIENT
Start: 2018-11-16 | End: 2018-11-15 | Stop reason: HOSPADM

## 2018-11-13 RX ORDER — FENTANYL CITRATE 50 UG/ML
25-50 INJECTION, SOLUTION INTRAMUSCULAR; INTRAVENOUS
Status: DISCONTINUED | OUTPATIENT
Start: 2018-11-13 | End: 2018-11-15 | Stop reason: HOSPADM

## 2018-11-13 RX ORDER — SIMETHICONE 80 MG
80 TABLET,CHEWABLE ORAL 4 TIMES DAILY PRN
Status: DISCONTINUED | OUTPATIENT
Start: 2018-11-13 | End: 2018-11-15 | Stop reason: HOSPADM

## 2018-11-13 RX ORDER — NALOXONE HYDROCHLORIDE 0.4 MG/ML
.1-.4 INJECTION, SOLUTION INTRAMUSCULAR; INTRAVENOUS; SUBCUTANEOUS
Status: DISCONTINUED | OUTPATIENT
Start: 2018-11-13 | End: 2018-11-15 | Stop reason: HOSPADM

## 2018-11-13 RX ORDER — LIDOCAINE HYDROCHLORIDE 10 MG/ML
INJECTION, SOLUTION INFILTRATION; PERINEURAL PRN
Status: DISCONTINUED | OUTPATIENT
Start: 2018-11-13 | End: 2018-11-13

## 2018-11-13 RX ORDER — KETOROLAC TROMETHAMINE 30 MG/ML
30 INJECTION, SOLUTION INTRAMUSCULAR; INTRAVENOUS
Status: COMPLETED | OUTPATIENT
Start: 2018-11-13 | End: 2018-11-13

## 2018-11-13 RX ADMIN — LIDOCAINE HYDROCHLORIDE 30 MG: 10 INJECTION, SOLUTION INFILTRATION; PERINEURAL at 07:34

## 2018-11-13 RX ADMIN — SODIUM CHLORIDE, POTASSIUM CHLORIDE, SODIUM LACTATE AND CALCIUM CHLORIDE: 600; 310; 30; 20 INJECTION, SOLUTION INTRAVENOUS at 07:31

## 2018-11-13 RX ADMIN — HYDROMORPHONE HYDROCHLORIDE 0.5 MG: 1 INJECTION, SOLUTION INTRAMUSCULAR; INTRAVENOUS; SUBCUTANEOUS at 18:38

## 2018-11-13 RX ADMIN — OXYTOCIN-SODIUM CHLORIDE 0.9% IV SOLN 30 UNIT/500ML 340 ML/HR: 30-0.9/5 SOLUTION at 07:55

## 2018-11-13 RX ADMIN — KETOROLAC TROMETHAMINE 30 MG: 30 INJECTION, SOLUTION INTRAMUSCULAR at 09:26

## 2018-11-13 RX ADMIN — OXYCODONE HYDROCHLORIDE 5 MG: 5 TABLET ORAL at 17:39

## 2018-11-13 RX ADMIN — SODIUM CHLORIDE, POTASSIUM CHLORIDE, SODIUM LACTATE AND CALCIUM CHLORIDE 1000 ML: 600; 310; 30; 20 INJECTION, SOLUTION INTRAVENOUS at 06:15

## 2018-11-13 RX ADMIN — Medication 10 MG: at 07:41

## 2018-11-13 RX ADMIN — FENTANYL CITRATE 25 MCG: 50 INJECTION, SOLUTION INTRAMUSCULAR; INTRAVENOUS at 07:37

## 2018-11-13 RX ADMIN — SODIUM CITRATE AND CITRIC ACID MONOHYDRATE 30 ML: 500; 334 SOLUTION ORAL at 07:18

## 2018-11-13 RX ADMIN — WATER 2000 ML: 100 IRRIGANT IRRIGATION at 08:12

## 2018-11-13 RX ADMIN — KETOROLAC TROMETHAMINE 30 MG: 30 INJECTION, SOLUTION INTRAMUSCULAR at 15:27

## 2018-11-13 RX ADMIN — ACETAMINOPHEN 975 MG: 325 TABLET, FILM COATED ORAL at 10:57

## 2018-11-13 RX ADMIN — SODIUM CHLORIDE, POTASSIUM CHLORIDE, SODIUM LACTATE AND CALCIUM CHLORIDE: 600; 310; 30; 20 INJECTION, SOLUTION INTRAVENOUS at 06:15

## 2018-11-13 RX ADMIN — MISOPROSTOL 400 MCG: 200 TABLET ORAL at 08:02

## 2018-11-13 RX ADMIN — SCOPOLAMINE 1 PATCH: 1 PATCH, EXTENDED RELEASE TRANSDERMAL at 09:30

## 2018-11-13 RX ADMIN — ACETAMINOPHEN 975 MG: 325 TABLET, FILM COATED ORAL at 18:38

## 2018-11-13 RX ADMIN — SENNOSIDES AND DOCUSATE SODIUM 1 TABLET: 8.6; 5 TABLET ORAL at 20:32

## 2018-11-13 RX ADMIN — OXYCODONE HYDROCHLORIDE 5 MG: 5 TABLET ORAL at 17:07

## 2018-11-13 RX ADMIN — GLYCOPYRROLATE 0.2 MG: 0.2 INJECTION, SOLUTION INTRAMUSCULAR; INTRAVENOUS at 07:47

## 2018-11-13 RX ADMIN — OXYCODONE HYDROCHLORIDE 10 MG: 5 TABLET ORAL at 23:38

## 2018-11-13 RX ADMIN — OXYTOCIN-SODIUM CHLORIDE 0.9% IV SOLN 30 UNIT/500ML 100 ML/HR: 30-0.9/5 SOLUTION at 13:05

## 2018-11-13 RX ADMIN — Medication 20 MG: at 07:42

## 2018-11-13 RX ADMIN — CEFAZOLIN SODIUM 2 G: 2 INJECTION, SOLUTION INTRAVENOUS at 07:20

## 2018-11-13 RX ADMIN — OXYCODONE HYDROCHLORIDE 5 MG: 5 TABLET ORAL at 14:04

## 2018-11-13 RX ADMIN — BUPIVACAINE HYDROCHLORIDE IN DEXTROSE 1.4 ML: 7.5 INJECTION, SOLUTION SUBARACHNOID at 07:37

## 2018-11-13 RX ADMIN — KETOROLAC TROMETHAMINE 30 MG: 30 INJECTION, SOLUTION INTRAMUSCULAR at 21:30

## 2018-11-13 RX ADMIN — OXYCODONE HYDROCHLORIDE 10 MG: 5 TABLET ORAL at 20:32

## 2018-11-13 RX ADMIN — OXYCODONE HYDROCHLORIDE 5 MG: 5 TABLET ORAL at 10:57

## 2018-11-13 RX ADMIN — MORPHINE SULFATE 0.2 MG: 4 INJECTION, SOLUTION INTRAMUSCULAR; INTRAVENOUS at 07:37

## 2018-11-13 RX ADMIN — Medication 20 MG: at 07:44

## 2018-11-13 ASSESSMENT — ACTIVITIES OF DAILY LIVING (ADL)
SWALLOWING: 0-->SWALLOWS FOODS/LIQUIDS WITHOUT DIFFICULTY
AMBULATION: 0-->INDEPENDENT
FALL_HISTORY_WITHIN_LAST_SIX_MONTHS: NO
BATHING: 0-->INDEPENDENT
COGNITION: 0 - NO COGNITION ISSUES REPORTED
DRESS: 0-->INDEPENDENT
RETIRED_COMMUNICATION: 0-->UNDERSTANDS/COMMUNICATES WITHOUT DIFFICULTY
TRANSFERRING: 0-->INDEPENDENT
TOILETING: 0-->INDEPENDENT
RETIRED_EATING: 0-->INDEPENDENT

## 2018-11-13 NOTE — OP NOTE
Procedure Date: 2018      PREOPERATIVE DIAGNOSES:   1.  A 37-week intrauterine pregnancy.   2.  Essential hypertension, with superimposed pregnancy-induced hypertension.   3.  Breech presentation.   4.  Unicornuate uterus.   5.  History of placenta accreta.      POSTOPERATIVE DIAGNOSIS:  Status post  section, uncomplicated.      SURGEON:  Wendie Johnson MD      ASSISTANT:  Lalo Tanner PA-C      A surgical vital for surgical retraction, delivery of baby, hemostasis, and closure.      ANESTHESIA:  Spinal.      FINDINGS:  Liveborn male infant in a vianey breech presentation, clear amniotic fluid.  Birth weight 7 pounds 11 ounces.      DESCRIPTION OF PROCEDURE:  After assuring informed consent and being certain to team was aware of the patient's prior history of placenta accreta and breech presentation, the patient was taken to the operative suite, where a spinal anesthetic was placed.  The patient was then placed supine and the abdomen was sterilely prepped and draped and a Carcamo catheter placed for bladder drainage.  A timeout and first safety  assessment was performed.  After assuring adequate anesthetic level, a Pfannenstiel-type incision was made with a knife and extended down to the fascia.  The fascia was incised sharply to the right and left and freed from the underlying rectus muscles, which were bluntly .  The peritoneum was entered and extended superiorly and inferiorly.  A self-retaining retractor was then placed in the abdomen to expose the lower uterine segment.  No significant adhesions were obstructing at this time.  The lower uterine segment then scored in a curvilinear fashion.  The cavity entered with a Stephani clamp and clear amniotic fluid was encountered.  It was then bluntly extended to the right and left.  The infant's breech was elevated to the incision, with the assistance of fundal pressure.  The lower extremities, body, upper extremities, and head were  delivered without difficulty.  Spontaneous cry was noted.  Delayed cord clamping was performed and the baby transferred to the warmer.  Cord blood was obtained and placenta was delivered manually intact, without evidence of placenta accreta.  The cavity was clear.  The uterus was then externalized and the endometrial cavity wiped thoroughly with a lap sponge.  Intravenous Pitocin was given and ultimately Misoprostol 400 mcg p.o. was given to help with uterine contractility.  Uterine incision was then closed in a running locking fashion with a secondary imbricating layer of 0 chromic suture.  The uterus was replaced in the abdomen, thoroughly irrigated, and inspected for hemostasis, which was satisfactory.  The fascia was then closed in a running fashion with Vicryl suture.  The skin edges were irrigated and closed in a subcuticular fashion with a V-Loc suture and Dermabond.  The patient was then taken to postanesthesia recovery in stable condition.      ESTIMATED BLOOD LOSS:  157 mL      SPECIMENS TO LAB:  Cord blood.         EWELINA RO MD             D: 2018   T: 2018   MT: ZAK      Name:     LUX RUTHERFORD   MRN:      [id number]        Account:        [id number]   :      1995           Procedure Date: 2018      Document: [id number]

## 2018-11-13 NOTE — INTERVAL H&P NOTE
H & P unchanged except baby persists in a breech presentation; informed consent reviewed and signed  Wendie Johnson MD  [facility]

## 2018-11-13 NOTE — ANESTHESIA PREPROCEDURE EVALUATION
Anesthesia Evaluation     . Pt has had prior anesthetic.     No history of anesthetic complications          ROS/MED HX    ENT/Pulmonary:     (+)Intermittent asthma Treatment: Inhaler prn,  , . .    Neurologic:       Cardiovascular:     (+) hypertension----. : . . . :. .       METS/Exercise Tolerance:  >4 METS   Hematologic:  - neg hematologic  ROS       Musculoskeletal:  - neg musculoskeletal ROS       GI/Hepatic:  - neg GI/hepatic ROS       Renal/Genitourinary:  - ROS Renal section negative       Endo:  - neg endo ROS       Psychiatric:  - neg psychiatric ROS       Infectious Disease:  - neg infectious disease ROS       Malignancy:      - no malignancy   Other:    (+) Possibly pregnant                    Physical Exam  Normal systems: cardiovascular and pulmonary    Airway   Mallampati: III  TM distance: >3 FB  Neck ROM: full    Dental   (+) lower retainer    Cardiovascular       Pulmonary                     Anesthesia Plan      History & Physical Review  History and physical reviewed and following examination; no interval change.    ASA Status:  2 .    NPO Status:  > 8 hours    Plan for Spinal and General   PONV prophylaxis:  Ondansetron (or other 5HT-3), Dexamethasone or Solumedrol and Scopolamine patch       Postoperative Care  Postoperative pain management:  IV analgesics and Oral pain medications.      Consents  Anesthetic plan, risks, benefits and alternatives discussed with:  Patient and Spouse..                          .

## 2018-11-13 NOTE — IP AVS SNAPSHOT
MRN:[id number]                      After Visit Summary   2018    Zacarias Adam    MRN: [id number]           Thank you!     Thank you for choosing [facility] for your care. Our goal is always to provide you with excellent care. Hearing back from our patients is one way we can continue to improve our services. Please take a few minutes to complete the written survey that you may receive in the mail after you visit with us. Thank you!        Patient Information     Date Of Birth          1995        Designated Caregiver       Most Recent Value    Caregiver    Will someone help with your care after discharge? no    Name of designated caregiver Bernardo      About your hospital stay     You were admitted on:  2018 You last received care in the:  [facility]    You were discharged on:  November 15, 2018       Who to Call     For medical emergencies, please call 911.  For non-urgent questions about your medical care, please call your primary care provider or clinic, [phone]  For questions related to your surgery, please call your surgery clinic        Attending Provider     Provider Specialty    Wendie Johnson MD OB/Gyn       Primary Care Provider Office Phone # Fax #    Yudi Sauceda [phone]      Further instructions from your care team       Postop  Birth Instructions    Activity       Do not lift more than 10 pounds for 6 weeks after surgery.  Ask family and friends for help when you need it.    No driving until you have stopped taking your pain medications (usually two weeks after surgery).    No heavy exercise or activity for 6 weeks.  Don't do anything that will put a strain on your surgery site.    Don't strain when using the toilet.  Your care team may prescribe a stool softener if you have problems with your bowel movements.     To care for your incision:       Keep the incision clean and dry.    Do not soak your  incision in water. No swimming or hot tubs until it has fully healed. You may soak in the bathtub if the water level is below your incision.    Do not use peroxide, gel, cream, lotion, or ointment on your incision.    Adjust your clothes to avoid pressure on your surgery site (check the elastic in your underwear for example).     You may see a small amount of clear or pink drainage and this is normal.  Check with your health care provider:       If the drainage increases or has an odor.    If the incision reddens, you have swelling, or develop a rash.    If you have increased pain and the medicine we prescribed doesn't help.    If you have a fever above 100.4 F (38 C) with or without chills when placing thermometer under your tongue.   The area around your incision (surgery wound), will feel numb.  This is normal. The numbness should go away in less than a year.     Keep your hands clean:  Always wash your hands before touching your incision (surgery wound). This helps reduce your risk of infection. If your hands aren't dirty, you may use an alcohol hand-rub to clean your hands. Keep your nails clean and short.    Call your healthcare provider if you have any of these symptoms:       You soak a sanitary pad with blood within 1 hour, or you see blood clots larger than a golf ball.    Bleeding that lasts more than 6 weeks.    Vaginal discharge that smells bad.    Severe pain, cramping or tenderness in your lower belly area.    A need to urinate more frequently (use the toilet more often), more urgently (use the toilet very quickly), or it burns when you urinate.    Nausea and vomiting.    Redness, swelling or pain around a vein in your leg.    Problems breastfeeding or a red or painful area on your breast.    Chest pain and cough or are gasping for air.    Problems with coping with sadness, anxiety or depression. If you have concerns about hurting yourself or the baby, call your provider immediately.      You have  "questions or concerns after you return home.                  Pending Results     No orders found from 11/11/2018 to 11/14/2018.            Statement of Approval     Ordered          11/15/18 0854  I have reviewed and agree with all the recommendations and orders detailed in this document.  EFFECTIVE NOW     Approved and electronically signed by:  Moises Hays MD             Admission Information     Date & Time Provider Department Dept. Phone    11/13/2018 Wendie Johnson MD [facility] [phone]      Your Vitals Were     Blood Pressure Pulse Temperature Respirations Height Weight    125/68 83 98.1  F (36.7  C) (Oral) 16 1.689 m (5' 6.5\") 88 kg (194 lb)    Last Period Pulse Oximetry BMI (Body Mass Index)             02/25/2018 98% 30.84 kg/m2         Care EveryWhere ID     This is your Care EveryWhere ID. This could be used by other organizations to access your [facility] medical records  [id number]        Equal Access to Services     [facility]: Hadii alton patelo Solaila, waaxda luqadaha, qaybta kaalmada adeegyadestini, flora gr . So [facility] [phone].    ATENCIÓN: Si habla español, tiene a aleman disposición servicios gratuitos de asistencia lingüística. Llame al [phone].    We comply with applicable federal civil rights laws and [place] laws. We do not discriminate on the basis of race, color, national origin, age, disability, sex, sexual orientation, or gender identity.               Review of your medicines      START taking        Dose / Directions    breast pump Misc   Used for:  Ineffective breast feeding        Dose:  1 each   1 each as needed   Quantity:  1 each   Refills:  0       ferrous gluconate 324 (38 Fe) MG tablet   Commonly known as:  FERGON        Dose:  324 mg   Take 1 tablet (324 mg) by mouth daily (with breakfast)   Quantity:  100 tablet   Refills:  1       oxyCODONE IR 5 MG tablet   Commonly known as:  ROXICODONE        " Dose:  5-10 mg   Take 1-2 tablets (5-10 mg) by mouth every 3 hours as needed   Quantity:  24 tablet   Refills:  0         CONTINUE these medicines which have NOT CHANGED        Dose / Directions    hydrOXYzine 50 MG capsule   Commonly known as:  VISTARIL   Used for:  LLQ abdominal pain        Dose:  50 mg   Take 1 capsule (50 mg) by mouth 3 times daily as needed for other (abdominal pain)   Quantity:  40 capsule   Refills:  1       prenatal multivitamin plus iron 27-0.8 MG Tabs per tablet        Dose:  1 tablet   Take 1 tablet by mouth daily   Refills:  0       TYLENOL PO        Dose:  500-1000 mg   Take 500-1,000 mg by mouth once as needed   Refills:  0       Vitamin D (Cholecalciferol) 1000 units Tabs        Dose:  2000 Units   Take 2,000 Units by mouth daily   Refills:  0            Where to get your medicines      These medications were sent to [id number] [address]  [address] [address]    Hours:  Closed 10-16-08 business to [facility] Phone:  [phone]     breast pump Misc    ferrous gluconate 324 (38 Fe) MG tablet         Some of these will need a paper prescription and others can be bought over the counter. Ask your nurse if you have questions.     Bring a paper prescription for each of these medications     oxyCODONE IR 5 MG tablet                Protect others around you: Learn how to safely use, store and throw away your medicines at www.disposemymeds.org.        Information about OPIOIDS     PRESCRIPTION OPIOIDS: WHAT YOU NEED TO KNOW   We gave you an opioid (narcotic) pain medicine. It is important to manage your pain, but opioids are not always the best choice. You should first try all the other options your care team gave you. Take this medicine for as short a time (and as few doses) as possible.    Some activities can increase your pain, such as bandage changes or therapy sessions. It may help to take your pain medicine 30 to 60 minutes  before these activities. Reduce your stress by getting enough sleep, working on hobbies you enjoy and practicing relaxation or meditation. Talk to your care team about ways to manage your pain beyond prescription opioids.    These medicines have risks:    DO NOT drive when on new or higher doses of pain medicine. These medicines can affect your alertness and reaction times, and you could be arrested for driving under the influence (DUI). If you need to use opioids long-term, talk to your care team about driving.    DO NOT operate heavy machinery    DO NOT do any other dangerous activities while taking these medicines.    DO NOT drink any alcohol while taking these medicines.     If the opioid prescribed includes acetaminophen, DO NOT take with any other medicines that contain acetaminophen. Read all labels carefully. Look for the word  acetaminophen  or  Tylenol.  Ask your pharmacist if you have questions or are unsure.    You can get addicted to pain medicines, especially if you have a history of addiction (chemical, alcohol or substance dependence). Talk to your care team about ways to reduce this risk.    All opioids tend to cause constipation. Drink plenty of water and eat foods that have a lot of fiber, such as fruits, vegetables, prune juice, apple juice and high-fiber cereal. Take a laxative (Miralax, milk of magnesia, Colace, Senna) if you don t move your bowels at least every other day. Other side effects include upset stomach, sleepiness, dizziness, throwing up, tolerance (needing more of the medicine to have the same effect), physical dependence and slowed breathing.    Store your pills in a secure place, locked if possible. We will not replace any lost or stolen medicine. If you don t finish your medicine, please throw away (dispose) as directed by your pharmacist. The [place] Pollution [employer] has more information about safe disposal:  https://www.pca.[facility].mn.us/living-green/managing-unwanted-medications             Medication List: This is a list of all your medications and when to take them. Check marks below indicate your daily home schedule. Keep this list as a reference.      Medications           Morning Afternoon Evening Bedtime As Needed    breast pump Misc   1 each as needed                                ferrous gluconate 324 (38 Fe) MG tablet   Commonly known as:  FERGON   Take 1 tablet (324 mg) by mouth daily (with breakfast)   Last time this was given:  324 mg on 11/15/2018  9:00 AM                                hydrOXYzine 50 MG capsule   Commonly known as:  VISTARIL   Take 1 capsule (50 mg) by mouth 3 times daily as needed for other (abdominal pain)                                oxyCODONE IR 5 MG tablet   Commonly known as:  ROXICODONE   Take 1-2 tablets (5-10 mg) by mouth every 3 hours as needed   Last time this was given:  5 mg on 11/15/2018  9:00 AM                                prenatal multivitamin plus iron 27-0.8 MG Tabs per tablet   Take 1 tablet by mouth daily                                TYLENOL PO   Take 500-1,000 mg by mouth once as needed   Last time this was given:  975 mg on 11/15/2018 10:09 AM                                Vitamin D (Cholecalciferol) 1000 units Tabs   Take 2,000 Units by mouth daily

## 2018-11-13 NOTE — L&D DELIVERY NOTE
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  She was desirous of a primary c/section delivery for delivery of a liveborn male in vianey breech presentation  The placenta delivered manually intact; the uterus has poor tone in spite of IV pitocin so she was given Misoprostol 400mcg po;  ml  7 lb 11 oz  \"Circle\"  Wendie Johnson MD  [facility]          Labor Length    3rd Stage (hrs):  0 (min):  1      Delivery/Placenta Date and Time    Delivery Date:  18 Delivery Time:   7:54 AM   Placenta Date/Time:  2018  7:55 AM   Oxytocin given at the time of delivery:  after delivery of baby      Apgars    Living status:  Living    1 Minute 5 Minute 10 Minute 15 Minute 20 Minute   Skin color: 1  1       Heart rate: 2  2       Reflex irritability: 2  2       Muscle tone: 2  2       Respiratory effort: 2  2       Total: 9  9          Apgars assigned by:  MANUELA GIL      Cord    Vessels:  3 Vessels Complications:  None   Cord Blood Disposition:  Lab Gases Sent?:  No         [place] Resuscitation    Methods:  None      Output in Delivery Room:  Voided, Stool      [place] Measurements    Weight:  7 lb 11 oz Length:  1' 7\"   Head circumference:  34.9 cm       Skin to Skin and Feeding Plan    Skin to skin initiation date/time: 18 0810   Skin to skin with:  Mother   Skin to skin end date/time:        Labor Events and " Shoulder Dystocia    Fetal Tracing Prior to Delivery:  Category 1   Shoulder dystocia present?:  Neg            Delivery (Maternal) (Provider to Complete) (190629)    Episiotomy:  None         Mother's Information  Mother: Zacarias dAam #[id number]    Start of Mother's Information     IO Blood Loss  18 0750 - 18 0823    Mom's I/O Activity            End of Mother's Information  Mother: Zacarias Adam #[id number]            Delivery - Provider to Complete ([id number])    Delivering clinician:  EWELINA RO   Attempted Delivery Types (Choose all that apply):  Other   Delivery Type (Choose the 1 that will go to the Birth History):  , Low Transverse                      Specifics:  Primary multiparius   Indications for Primary:  Medical Indication, Other   Other Indications:  breech presentation   Other personnel:   Provider Role   TERENCE LOU Assistant Surgeon            Placenta    Delayed Cord Clamping:  Done   Date/Time:  2018  7:55 AM   Removal:  Manual Removal   Disposition:  Hospital disposal      Anesthesia    Method:  Spinal         Presentation and Position    Presentation:  Breech   Position:  Left Sacrum Posterior                    Ewelina Ro MD

## 2018-11-13 NOTE — ANESTHESIA PROCEDURE NOTES
Peripheral nerve/Neuraxial procedure note : intrathecal  Pre-Procedure  Performed by  GER GREENFIELD   Location: OB      Pre-Anesthestic Checklist: patient identified, IV checked, site marked, risks and benefits discussed, informed consent, monitors and equipment checked, pre-op evaluation and at physician/surgeon's request    Timeout  Correct Patient: Yes   Correct Procedure: Yes   Correct Site: Yes   Correct Laterality: N/A   Correct Position: Yes   Site Marked: N/A   .   Procedure Documentation  ASA 2  Diagnosis:primary  section.    Procedure:    Intrathecal.  Insertion Site:L3-4  (midline approach)      Patient Prep;mask, sterile gloves, povidone-iodine 7.5% surgical scrub, patient draped.  .  Needle: Chantell tip Spinal Needle (gauge): 27  Spinal/LP Needle Length (inches): 3.5 # of attempts: 1 and # of redirects:  Introducer used .       Assessment/Narrative  Paresthesias: No.  .  .  clear CSF fluid removed . Time Injected: 07:37  Comments:  VAS pain score prior to injection:    VAS pain score after injection:    FHR stable, pt. Tolerated well.

## 2018-11-13 NOTE — IP AVS SNAPSHOT
[facility]    [address] [address]    Phone:  [phone]    Fax:  [phone]                                       After Visit Summary   11/13/2018    Zacarias Adam    MRN: [id number]           After Visit Summary Signature Page     I have received my discharge instructions, and my questions have been answered. I have discussed any challenges I see with this plan with the nurse or doctor.    ..........................................................................................................................................  Patient/Patient Representative Signature      ..........................................................................................................................................  Patient Representative Print Name and Relationship to Patient    ..................................................               ................................................  Date                                   Time    ..........................................................................................................................................  Reviewed by Signature/Title    ...................................................              ..............................................  Date                                               Time          22EPIC Rev 08/18

## 2018-11-13 NOTE — PROGRESS NOTES
Pt presents to [facility] with FOB for scheduled primary c/s, reports (+) FM, denies LOF or VB.  Oriented to room, EFM & toco placed, IV started (L) hand, IVF bolus infusing as ordered.  Category 1 FHT verified by charge RN.  Risks/benefits discussed, consent signed.  Mother is planning to breastfeed.  Report given to oncoming shift.

## 2018-11-13 NOTE — ANESTHESIA POSTPROCEDURE EVALUATION
Patient: Zacarias YOUNG Kia    Procedure(s):   section    Diagnosis:Patient request for  section, Hypertension.  Diagnosis Additional Information: No value filed.    Anesthesia Type:  No value filed.    Note:  Anesthesia Post Evaluation    Patient location during evaluation: Bedside  Patient participation: Able to fully participate in evaluation  Level of consciousness: awake and alert  Pain management: adequate  Airway patency: patent  Cardiovascular status: stable  Respiratory status: room air  Hydration status: stable  PONV: none     Anesthetic complications: None          Last vitals:  Vitals:    18 0641   BP: 150/82   Pulse: 108   Resp: 16   Temp: 36.5  C (97.7  F)         Electronically Signed By: SAIMA Starkey CRNA  2018  8:40 AM
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
## 2018-11-13 NOTE — ANESTHESIA CARE TRANSFER NOTE
Patient: Zacarias YOUNG Kia    Procedure(s):   section    Diagnosis: Patient request for  section, Hypertension.  Diagnosis Additional Information: No value filed.    Anesthesia Type:   No value filed.     Note:  Airway :Room Air  Patient transferred to:PACU  Handoff Report: Identifed the Patient, Identified the Reponsible Provider, Reviewed the pertinent medical history, Discussed the surgical course, Reviewed Intra-OP anesthesia mangement and issues during anesthesia, Set expectations for post-procedure period and Allowed opportunity for questions and acknowledgement of understanding      Vitals: (Last set prior to Anesthesia Care Transfer)    CRNA VITALS  2018 0758 - 2018 0840      2018             Pulse: 92    SpO2: 99 %                Electronically Signed By: SAIMA Starkey CRNA  2018  8:40 AM

## 2018-11-13 NOTE — PROGRESS NOTES
Ready to go to the OR.  Permit signed.  Voided.  Bicitra given.  Seen by CRNA and Dr Johnson.  Antibiotic running.  Will ambulate to the OR accompanied by RN.

## 2018-11-13 NOTE — PLAN OF CARE
Problem: Patient Care Overview  Goal: Plan of Care/Patient Progress Review  Outcome: Improving  Data: Vital signs within normal limits. Postpartum checks within normal limits - see flow record. Patient eating and drinking normally. Patient has an indwelling catheter. . Patient has not yet ambulated..   No apparent signs of infection. Incision healing well. Patient Is not performing self cares and Is not able to care for infant. Positive attachment behaviors are observed with infant. Support persons are present.  Action:  Pain plan was discussed. Patient would like pain meds to be brought in when they are due. Patient was medicated during the shift for pain and cramping. See MAR.Patient education done about hand hygiene,  cares and postpartum cares. See flow record.  Response:   Patient reassessed within 1 hour after each medication for pain. Patient stated that pain had improved. Patient stated that she was comfortable. .   Plan: Anticipate discharge on 18.

## 2018-11-14 LAB
HGB BLD-MCNC: 8.8 G/DL (ref 11.7–15.7)
T PALLIDUM AB SER QL: NONREACTIVE

## 2018-11-14 PROCEDURE — 25000132 ZZH RX MED GY IP 250 OP 250 PS 637: Performed by: OBSTETRICS & GYNECOLOGY

## 2018-11-14 PROCEDURE — 40000275 ZZH STATISTIC RCP TIME EA 10 MIN

## 2018-11-14 PROCEDURE — 36415 COLL VENOUS BLD VENIPUNCTURE: CPT | Performed by: OBSTETRICS & GYNECOLOGY

## 2018-11-14 PROCEDURE — 25000128 H RX IP 250 OP 636: Performed by: OBSTETRICS & GYNECOLOGY

## 2018-11-14 PROCEDURE — 25000131 ZZH RX MED GY IP 250 OP 636 PS 637: Performed by: NURSE ANESTHETIST, CERTIFIED REGISTERED

## 2018-11-14 PROCEDURE — 85018 HEMOGLOBIN: CPT | Performed by: OBSTETRICS & GYNECOLOGY

## 2018-11-14 PROCEDURE — 12000027 ZZH R&B OB

## 2018-11-14 RX ORDER — FERROUS GLUCONATE 324(38)MG
324 TABLET ORAL
Status: DISCONTINUED | OUTPATIENT
Start: 2018-11-14 | End: 2018-11-15 | Stop reason: HOSPADM

## 2018-11-14 RX ADMIN — ACETAMINOPHEN 975 MG: 325 TABLET, FILM COATED ORAL at 19:21

## 2018-11-14 RX ADMIN — SENNOSIDES AND DOCUSATE SODIUM 1 TABLET: 8.6; 5 TABLET ORAL at 08:28

## 2018-11-14 RX ADMIN — OXYCODONE HYDROCHLORIDE 10 MG: 5 TABLET ORAL at 08:29

## 2018-11-14 RX ADMIN — IBUPROFEN 800 MG: 800 TABLET ORAL at 16:20

## 2018-11-14 RX ADMIN — OXYCODONE HYDROCHLORIDE 10 MG: 5 TABLET ORAL at 02:32

## 2018-11-14 RX ADMIN — OXYCODONE HYDROCHLORIDE 5 MG: 5 TABLET ORAL at 23:49

## 2018-11-14 RX ADMIN — OXYCODONE HYDROCHLORIDE 10 MG: 5 TABLET ORAL at 11:31

## 2018-11-14 RX ADMIN — OXYCODONE HYDROCHLORIDE 10 MG: 5 TABLET ORAL at 14:34

## 2018-11-14 RX ADMIN — KETOROLAC TROMETHAMINE 30 MG: 30 INJECTION, SOLUTION INTRAMUSCULAR at 03:36

## 2018-11-14 RX ADMIN — OXYCODONE HYDROCHLORIDE 10 MG: 5 TABLET ORAL at 17:30

## 2018-11-14 RX ADMIN — OXYCODONE HYDROCHLORIDE 10 MG: 5 TABLET ORAL at 05:28

## 2018-11-14 RX ADMIN — ACETAMINOPHEN 975 MG: 325 TABLET, FILM COATED ORAL at 02:32

## 2018-11-14 RX ADMIN — IBUPROFEN 800 MG: 800 TABLET ORAL at 22:11

## 2018-11-14 RX ADMIN — FERROUS GLUCONATE 324 MG: 324 TABLET ORAL at 09:52

## 2018-11-14 RX ADMIN — ONDANSETRON 4 MG: 4 TABLET, ORALLY DISINTEGRATING ORAL at 18:37

## 2018-11-14 RX ADMIN — ACETAMINOPHEN 975 MG: 325 TABLET, FILM COATED ORAL at 09:52

## 2018-11-14 RX ADMIN — OXYCODONE HYDROCHLORIDE 5 MG: 5 TABLET ORAL at 20:42

## 2018-11-14 RX ADMIN — IBUPROFEN 800 MG: 800 TABLET ORAL at 09:52

## 2018-11-14 RX ADMIN — SENNOSIDES AND DOCUSATE SODIUM 1 TABLET: 8.6; 5 TABLET ORAL at 19:25

## 2018-11-14 NOTE — PROGRESS NOTES
[facility] Obstetrics Post-Op / Progress Note         Assessment and Plan:    Assessment:   Post-operative day #1  Low transverse primary  section  L&D complications: cHTN      Had episode of atony with clots; now much improved      Plan:   Start po iron therapy           Interval History:   Doing well.  Pain is controlled.  No fevers.  No history of wound drainage, warmth or significant erythema.  Good appetite.  Denies chest pain, shortness of breath, nausea or vomiting.  Breastfeeding well.          Significant Problems:    Active Problems:    Essential hypertension    Prenatal care, subsequent pregnancy    Breech presentation    Gestational hypertension w/o significant proteinuria in 3rd trimester    S/P primary low transverse             Review of Systems:    The patient denies any chest pain, shortness of breath, excessive pain, fever, chills, purulent drainage from the wound, nausea or vomiting.          Medications:   All medications related to the patient's surgery have been reviewed          Physical Exam:   All vitals stable  Temp: 98  F (36.7  C) Temp src: Oral BP: 120/71 Pulse: 100   Resp: 18 SpO2: 99 %      Wound clean and dry with minimal or no drainage.  Surrounding skin with minimal erythema.          Data:     All laboratory data related to this surgery reviewed  Hemoglobin   Date Value Ref Range Status   2018 8.8 (L) 11.7 - 15.7 g/dL Final   2018 11.3 (L) 11.7 - 15.7 g/dL Final   10/18/2018 11.4 (L) 11.7 - 15.7 g/dL Final   2018 11.0 (L) 11.7 - 15.7 g/dL Final   08/15/2018 11.1 (L) 11.7 - 15.7 g/dL Final     No imaging studies have been ordered    Wendie Johnson MD

## 2018-11-14 NOTE — PLAN OF CARE
Problem: Patient Care Overview  Goal: Plan of Care/Patient Progress Review  Outcome: Improving  Pt was rolled and repositioned for primo care and pad change. Pt reported feeling leaking, bright red blood was seen, palpated fundus, unable to locate, clots and a large flow of blood came out. Called for assistance. Stephani RN came in and assisted in fundus massage, pt was crying, grabbing arm and pulling it off of her. Had her refocus, breathe and 2nd RN was able to feel fundus. Minimum bleeding afterwards. Fundus firm U/U. Rechecked fundus and bleeding, small flow. No clots. QBL 75cc. Pt stated having severe cramping after check. Dilaudid given. Pt is currently resting. VSS, afebrile.

## 2018-11-14 NOTE — PLAN OF CARE
Problem: Postpartum ( Delivery) (Adult,Obstetrics,Pediatric)  Goal: Signs and Symptoms of Listed Potential Problems Will be Absent, Minimized or Managed (Postpartum)  Signs and symptoms of listed potential problems will be absent, minimized or managed by discharge/transition of care (reference Postpartum ( Delivery) (Adult,Obstetrics,Pediatric) CPG).   Outcome: Improving  Pt follows PP pathway without incident, tolerates po meds for pain which are working well for her. She is breastfeeding her , that is going well.  Dangled at the bedside tonight, she got up and ambulated in her room, tolerates well.  FF@u, minimal flow, no clots.  Carcamo drains clear yellow urine in good amounts.  FOB present & supportive, bonding well; infant rooming in with parents tonight.  Will continue to monitor & update as needed.

## 2018-11-14 NOTE — PLAN OF CARE
Problem: Postpartum ( Delivery) (Adult,Obstetrics,Pediatric)  Goal: Signs and Symptoms of Listed Potential Problems Will be Absent, Minimized or Managed (Postpartum)  Signs and symptoms of listed potential problems will be absent, minimized or managed by discharge/transition of care (reference Postpartum ( Delivery) (Adult,Obstetrics,Pediatric) CPG).   Outcome: Improving  C/o bad headache this am.  caffiene drink given as well as pain meds.  Patient said headache somewhat goes away with lying supine and is able to up oob with somewhat of a head ache  Her blood pressure is now lower than normal  Will continue to assess whether she has a spinal head ache or not

## 2018-11-15 VITALS
WEIGHT: 194 LBS | HEIGHT: 67 IN | DIASTOLIC BLOOD PRESSURE: 68 MMHG | HEART RATE: 83 BPM | SYSTOLIC BLOOD PRESSURE: 125 MMHG | RESPIRATION RATE: 16 BRPM | BODY MASS INDEX: 30.45 KG/M2 | OXYGEN SATURATION: 98 % | TEMPERATURE: 98.1 F

## 2018-11-15 PROCEDURE — 25000132 ZZH RX MED GY IP 250 OP 250 PS 637: Performed by: OBSTETRICS & GYNECOLOGY

## 2018-11-15 RX ORDER — HYDROXYZINE HYDROCHLORIDE 50 MG/1
50 TABLET, FILM COATED ORAL 3 TIMES DAILY PRN
Status: DISCONTINUED | OUTPATIENT
Start: 2018-11-15 | End: 2018-11-15 | Stop reason: HOSPADM

## 2018-11-15 RX ORDER — BREAST PUMP
1 EACH MISCELLANEOUS PRN
Qty: 1 EACH | Refills: 0 | Status: SHIPPED | OUTPATIENT
Start: 2018-11-15 | End: 2020-02-18

## 2018-11-15 RX ORDER — NALOXONE HYDROCHLORIDE 0.4 MG/ML
.1-.4 INJECTION, SOLUTION INTRAMUSCULAR; INTRAVENOUS; SUBCUTANEOUS
Status: DISCONTINUED | OUTPATIENT
Start: 2018-11-15 | End: 2018-11-15 | Stop reason: HOSPADM

## 2018-11-15 RX ORDER — FERROUS GLUCONATE 324(38)MG
324 TABLET ORAL
Qty: 100 TABLET | Refills: 1 | Status: SHIPPED | OUTPATIENT
Start: 2018-11-15 | End: 2020-02-18

## 2018-11-15 RX ORDER — OXYCODONE HYDROCHLORIDE 5 MG/1
5-10 TABLET ORAL
Qty: 24 TABLET | Refills: 0 | Status: SHIPPED | OUTPATIENT
Start: 2018-11-15 | End: 2019-10-29

## 2018-11-15 RX ADMIN — IBUPROFEN 800 MG: 800 TABLET ORAL at 10:09

## 2018-11-15 RX ADMIN — SENNOSIDES AND DOCUSATE SODIUM 1 TABLET: 8.6; 5 TABLET ORAL at 09:00

## 2018-11-15 RX ADMIN — ACETAMINOPHEN 975 MG: 325 TABLET, FILM COATED ORAL at 10:09

## 2018-11-15 RX ADMIN — ACETAMINOPHEN 975 MG: 325 TABLET, FILM COATED ORAL at 03:02

## 2018-11-15 RX ADMIN — IBUPROFEN 800 MG: 800 TABLET ORAL at 04:22

## 2018-11-15 RX ADMIN — OXYCODONE HYDROCHLORIDE 5 MG: 5 TABLET ORAL at 09:00

## 2018-11-15 RX ADMIN — OXYCODONE HYDROCHLORIDE 5 MG: 5 TABLET ORAL at 03:02

## 2018-11-15 RX ADMIN — OXYCODONE HYDROCHLORIDE 5 MG: 5 TABLET ORAL at 06:12

## 2018-11-15 RX ADMIN — FERROUS GLUCONATE 324 MG: 324 TABLET ORAL at 09:00

## 2018-11-15 NOTE — DISCHARGE SUMMARY
[facility] Discharge Summary    Zacarias Adam MRN# [id number]   Age: 23 year old YOB: 1995     Date of Admission:  2018  Date of Discharge::  11/15/2018  Admitting Physician:  Wendie Johnson MD  Discharge Physician:  Moises Hays MD     Home clinic: [facility]          Admission Diagnoses:   Patient request for  section, Hypertension.  Prenatal care, subsequent pregnancy  S/P primary low transverse           Discharge Diagnosis:   Breech presentation  Intrauterine pregnancy at 37+2  weeks gestation  Chronic HTN without preeclampsia          Procedures:   Procedure(s): Primary low transverse  section       No other procedures performed during this admission           Medications Prior to Admission:   The patient was not taking any medications prior to admission          Discharge Medications:     Current Facility-Administered Medications   Medication     [START ON 2018] acetaminophen (TYLENOL) tablet 650 mg     acetaminophen (TYLENOL) tablet 975 mg     albuterol neb solution 2.5 mg     bisacodyl (DULCOLAX) Suppository 10 mg     dexamethasone (DECADRON) injection 4 mg     dexamethasone (DECADRON) injection 4 mg     dextrose 5% in lactated ringers infusion     diphenhydrAMINE (BENADRYL) capsule 25 mg    Or     diphenhydrAMINE (BENADRYL) injection 25 mg     fentaNYL (PF) (SUBLIMAZE) injection 25-50 mcg     ferrous gluconate (FERGON) tablet 324 mg     hydrocortisone 2.5 % cream     HYDROmorphone (PF) (DILAUDID) injection 0.3-0.5 mg     hydrOXYzine (ATARAX) tablet 50 mg     ibuprofen (ADVIL/MOTRIN) tablet 800 mg     lactated ringers BOLUS 1,000 mL     lactated ringers infusion     lanolin ointment     lidocaine (LMX4) kit     lidocaine 1 % 1 mL     magnesium hydroxide (MILK OF MAGNESIA) suspension 30 mL     metoclopramide (REGLAN) injection 10 mg     misoprostol (CYTOTEC) tablet 400 mcg     naloxone (NARCAN) injection 0.1-0.4 mg      naloxone (NARCAN) injection 0.1-0.4 mg     No MMR Needed -  Assessment: Patient does not need MMR vaccine     NO Rho (D) immune globulin (RhoGam) needed - mother Rh POSITIVE     No Tdap Needed - Assessment: Patient does not need Tdap vaccine     ondansetron (ZOFRAN-ODT) ODT tab 4 mg    Or     ondansetron (ZOFRAN) injection 4 mg     ondansetron (ZOFRAN) injection 4 mg     oxyCODONE IR (ROXICODONE) tablet 5-10 mg     oxytocin (PITOCIN) 30 units in 500 mL 0.9% NaCl infusion     oxytocin (PITOCIN) 30 units in 500 mL 0.9% NaCl infusion     oxytocin (PITOCIN) injection 10 Units     prochlorperazine (COMPAZINE) injection 10 mg    Or     prochlorperazine (COMPAZINE) Suppository 25 mg     scopolamine (TRANSDERM-SCOP) patch REMOVAL     senna-docusate (SENOKOT-S;PERICOLACE) 8.6-50 MG per tablet 1 tablet    Or     senna-docusate (SENOKOT-S;PERICOLACE) 8.6-50 MG per tablet 2 tablet     simethicone (MYLICON) chewable tablet 80 mg     sodium chloride (PF) 0.9% PF flush 3 mL     sodium chloride (PF) 0.9% PF flush 3 mL     sodium phosphate (FLEET ENEMA) 1 enema     tranexamic acid (CYKLOKAPRON) 1 g in sodium chloride 0.9 % 60 mL bolus             Consultations:   No consultations were requested during this admission          Brief History of Labor or Admission:     Zacarias Adam MRN# [id number]   Age: 23 year old YOB: 1995     ASSESSMENT & PLAN: 22 y/o  admitted at 37w2d for scheduled primary  section due to breech presentation and essential hypertension with superimposed gestational hypertension; her BP began to rise in the last 2 weeks of pregnancy, no proteinuria, but deemed sufficiently above her baseline to represent superimposed PIH; 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  She was desirous of a primary c/section delivery for delivery of a  "liveborn male in vianey breech presentation  The placenta delivered manually intact; the uterus has poor tone in spite of IV pitocin so she was given Misoprostol 400mcg po;  ml  7 lb 11 oz  \"[place]\"             Hospital Course:   The patient's hospital course was unremarkable.  She recovered as anticipated and experienced no post-operative complications.  On discharge, her pain was well controlled. Vaginal bleeding is similar to peak menstrual flow.  Voiding without difficulty.  Ambulating well and tolerating a normal diet.  No fever or significant wound drainage.  Breastfeeding well.  Infant is stable.  No bowel movement yet.  She was discharged on post-partum day #2.    Post-partum hemoglobin:   Hemoglobin   Date Value Ref Range Status   11/14/2018 8.8 (L) 11.7 - 15.7 g/dL Final             Discharge Instructions and Follow-Up:   Discharge diet: Regular   Discharge activity: Activity as tolerated   Discharge follow-up: Follow up with Dr. Johnson in 6 weeks   Wound care: Drink plenty of fluids  Ice to area for comfort  Keep wound clean and dry           Discharge Disposition:   Discharged to home      Attestation:  I have reviewed today's vital signs, notes, medications, labs and imaging.  Amount of time performed on this discharge summary: 7 minutes.    Moises Hays MD     "

## 2018-11-15 NOTE — PLAN OF CARE
Problem: Patient Care Overview  Goal: Individualization & Mutuality  Patient discharged per wheelchair with infant in arms, car seat in car . Mother verified that her band matches her infant's band by comparing the infant's band#.  Discharge instructions given. Encouraged to call for any problems, questions or concerns. RXs at [facility] pharmacy.

## 2018-11-15 NOTE — PLAN OF CARE
"Problem: Patient Care Overview  Goal: Plan of Care/Patient Progress Review  Pt reported a mild headache that she noted to increase with being up. Pt reported nausea, passing gas in small amounts. VSS. Pt offered an evaluation from anesthesia for spinal headache. Pt declined and stated \" I think it's from being tired\". Pt reports that her pain is adequately controlled using oral pain medications.      "

## 2018-11-15 NOTE — PLAN OF CARE
Problem: Postpartum ( Delivery) (Adult,Obstetrics,Pediatric)  Goal: Signs and Symptoms of Listed Potential Problems Will be Absent, Minimized or Managed (Postpartum)  Signs and symptoms of listed potential problems will be absent, minimized or managed by discharge/transition of care (reference Postpartum ( Delivery) (Adult,Obstetrics,Pediatric) CPG).   Outcome: Improving  Writer was at bedside for  for pain medication, pt has now declined to be woken for pain medications and states she will now call for pain medications.

## 2018-11-15 NOTE — PLAN OF CARE
Problem: Postpartum ( Delivery) (Adult,Obstetrics,Pediatric)  Goal: Signs and Symptoms of Listed Potential Problems Will be Absent, Minimized or Managed (Postpartum)  Signs and symptoms of listed potential problems will be absent, minimized or managed by discharge/transition of care (reference Postpartum ( Delivery) (Adult,Obstetrics,Pediatric) CPG).   Outcome: Improving  Data: Vital signs within normal limits. Postpartum checks within normal limits - see flow record. Patient eating and drinking normally. Patient able to empty bladder independently. . Patient ambulating independently..   No apparent signs. Incision healing well. Patient Is performing self cares and Is able to care for infant. Positive attachment behaviors are observed with infant. Support persons are present.  Action:  Pain plan was discussed. Patient will request pain med when she is ready for it. Patient was medicated during the shift for pain. See MAR.Patient education done about breastfeeding and pain management/plan. See flow record.  Response:   Patient reassessed within 1 hour after each medication for pain. Patient stated that pain had improved. Patient stated that she was comfortable. .   Plan: Anticipate discharge on 11/15/2018.

## 2018-11-16 RX ORDER — FENTANYL CITRATE 50 UG/ML
INJECTION, SOLUTION INTRAMUSCULAR; INTRAVENOUS PRN
Status: DISCONTINUED | OUTPATIENT
Start: 2018-11-13 | End: 2018-11-16

## 2018-11-21 ENCOUNTER — PRENATAL OFFICE VISIT (OUTPATIENT)
Dept: OBGYN | Facility: CLINIC | Age: 23
End: 2018-11-21
Payer: COMMERCIAL

## 2018-11-21 VITALS
TEMPERATURE: 98.2 F | SYSTOLIC BLOOD PRESSURE: 148 MMHG | DIASTOLIC BLOOD PRESSURE: 85 MMHG | HEIGHT: 67 IN | RESPIRATION RATE: 16 BRPM | HEART RATE: 110 BPM | BODY MASS INDEX: 28.6 KG/M2 | WEIGHT: 182.2 LBS

## 2018-11-21 DIAGNOSIS — Z98.891 S/P PRIMARY LOW TRANSVERSE C-SECTION: Primary | ICD-10-CM

## 2018-11-21 PROCEDURE — 99024 POSTOP FOLLOW-UP VISIT: CPT | Performed by: OBSTETRICS & GYNECOLOGY

## 2018-11-21 NOTE — MR AVS SNAPSHOT
"              After Visit Summary   2018    Zacarias Adam    MRN: [id number]           Patient Information     Date Of Birth          1995        Visit Information        Provider Department      2018 9:45 AM Wendie Johnson MD [facility]        Today's Diagnoses     S/P primary low transverse     -  1       Follow-ups after your visit        Who to contact     If you have questions or need follow up information about today's clinic visit or your schedule please contact [facility] directly at [phone].  Normal or non-critical lab and imaging results will be communicated to you by MyChart, letter or phone within 4 business days after the clinic has received the results. If you do not hear from us within 7 days, please contact the clinic through MyChart or phone. If you have a critical or abnormal lab result, we will notify you by phone as soon as possible.  Submit refill requests through [employer] or call your pharmacy and they will forward the refill request to us. Please allow 3 business days for your refill to be completed.          Additional Information About Your Visit        Care EveryWhere ID     This is your Care EveryWhere ID. This could be used by other organizations to access your [place] medical records  [id number]        Your Vitals Were     Pulse Temperature Respirations Height Last Period BMI (Body Mass Index)    110 98.2  F (36.8  C) (Tympanic) 16 5' 6.5\" (1.689 m) 2018 28.97 kg/m2       Blood Pressure from Last 3 Encounters:   18 148/85   11/15/18 125/68   18 (!) 143/93    Weight from Last 3 Encounters:   18 182 lb 3.2 oz (82.6 kg)   18 194 lb (88 kg)   18 196 lb (88.9 kg)              Today, you had the following     No orders found for display         Today's Medication Changes          These changes are accurate as of 18 10:00 AM.  If you have any questions, ask your nurse or doctor.       "         Stop taking these medicines if you haven't already. Please contact your care team if you have questions.     prenatal multivitamin plus iron 27-0.8 MG Tabs per tablet   Stopped by:  Wendie Johnson MD                    Primary Care Provider Office Phone # Fax #    Yudi Anshul Sauceda [phone] [address] [place] [address]        Equal Access to Services     [facility] : Hadii aad ku hadasho Soomaali, waaxda luqadaha, qaybta kaalmada adeegyada, waxay idiin hayjessican adeeg leonardo lakarien . So [facility] [phone].    ATENCIÓN: Si raúl eckert, tiene a aleman disposición servicios gratuitos de asistencia lingüística. Llame al [phone].    We comply with applicable federal civil rights laws and [place] laws. We do not discriminate on the basis of race, color, national origin, age, disability, sex, sexual orientation, or gender identity.            Thank you!     Thank you for choosing [facility]  for your care. Our goal is always to provide you with excellent care. Hearing back from our patients is one way we can continue to improve our services. Please take a few minutes to complete the written survey that you may receive in the mail after your visit with us. Thank you!             Your Updated Medication List - Protect others around you: Learn how to safely use, store and throw away your medicines at www.disposemymeds.org.          This list is accurate as of 18 10:00 AM.  Always use your most recent med list.                   Brand Name Dispense Instructions for use Diagnosis    breast pump Misc     1 each    1 each as needed    Ineffective breast feeding       ferrous gluconate 324 (38 Fe) MG tablet    FERGON    100 tablet    Take 1 tablet (324 mg) by mouth daily (with breakfast)    S/P primary low transverse , Gestational hypertension w/o significant proteinuria in 3rd trimester, Breech presentation, single or unspecified fetus        hydrOXYzine 50 MG capsule    VISTARIL    40 capsule    Take 1 capsule (50 mg) by mouth 3 times daily as needed for other (abdominal pain)    LLQ abdominal pain       IBUPROFEN PO           oxyCODONE IR 5 MG tablet    ROXICODONE    24 tablet    Take 1-2 tablets (5-10 mg) by mouth every 3 hours as needed    S/P primary low transverse , Gestational hypertension w/o significant proteinuria in 3rd trimester       TYLENOL PO      Take 500-1,000 mg by mouth once as needed        Vitamin D (Cholecalciferol) 1000 units Tabs      Take 2,000 Units by mouth daily

## 2018-11-21 NOTE — NURSING NOTE
"Initial /85 (BP Location: Right arm, Patient Position: Chair, Cuff Size: Adult Regular)  Pulse 110  Temp 98.2  F (36.8  C) (Tympanic)  Resp 16  Ht 5' 6.5\" (1.689 m)  Wt 182 lb 3.2 oz (82.6 kg)  LMP 02/25/2018  BMI 28.97 kg/m2 Estimated body mass index is 28.97 kg/(m^2) as calculated from the following:    Height as of this encounter: 5' 6.5\" (1.689 m).    Weight as of this encounter: 182 lb 3.2 oz (82.6 kg). .      "

## 2018-11-21 NOTE — PROGRESS NOTES
"Zacarias Adam is a 23 year old female 1 weeks S/P  primary , complicated by no problems reported.  She is currently requiring nothing for pain management.  The pathology report showed no pathology taken. She reports pain is much improved.    Exam:  /85 (BP Location: Right arm, Patient Position: Chair, Cuff Size: Adult Regular)  Pulse 110  Temp 98.2  F (36.8  C) (Tympanic)  Resp 16  Ht 5' 6.5\" (1.689 m)  Wt 182 lb 3.2 oz (82.6 kg)  LMP 2018  BMI 28.97 kg/m2  Pfannensteil  incision examined:  intact, no erythema, induration or discharge    Assessment:  Post-op      Plan:  Pelvic rest for 6 weeks total.  Report excessive pain, fever, chills, bleeding, or foul wound odor promptly.  Follow-up visit in 5 weeks.  Wendie Johnson MD  [facility]      "

## 2018-12-12 ENCOUNTER — OFFICE VISIT (OUTPATIENT)
Dept: FAMILY MEDICINE | Facility: CLINIC | Age: 23
End: 2018-12-12
Payer: COMMERCIAL

## 2018-12-12 ENCOUNTER — ANCILLARY PROCEDURE (OUTPATIENT)
Dept: GENERAL RADIOLOGY | Facility: CLINIC | Age: 23
End: 2018-12-12
Attending: NURSE PRACTITIONER
Payer: COMMERCIAL

## 2018-12-12 VITALS
BODY MASS INDEX: 28.45 KG/M2 | WEIGHT: 177 LBS | OXYGEN SATURATION: 98 % | SYSTOLIC BLOOD PRESSURE: 126 MMHG | TEMPERATURE: 98.6 F | HEIGHT: 66 IN | HEART RATE: 117 BPM | DIASTOLIC BLOOD PRESSURE: 70 MMHG

## 2018-12-12 DIAGNOSIS — R06.02 SHORTNESS OF BREATH: ICD-10-CM

## 2018-12-12 DIAGNOSIS — R05.9 COUGH: ICD-10-CM

## 2018-12-12 DIAGNOSIS — R07.0 THROAT PAIN: ICD-10-CM

## 2018-12-12 DIAGNOSIS — J45.30 MILD PERSISTENT ASTHMA WITHOUT COMPLICATION: ICD-10-CM

## 2018-12-12 DIAGNOSIS — J20.9 ACUTE BRONCHITIS WITH COEXISTING CONDITION REQUIRING PROPHYLACTIC TREATMENT: Primary | ICD-10-CM

## 2018-12-12 LAB
DEPRECATED S PYO AG THROAT QL EIA: NORMAL
SPECIMEN SOURCE: NORMAL

## 2018-12-12 PROCEDURE — 71046 X-RAY EXAM CHEST 2 VIEWS: CPT | Mod: FY

## 2018-12-12 PROCEDURE — 87880 STREP A ASSAY W/OPTIC: CPT | Performed by: NURSE PRACTITIONER

## 2018-12-12 PROCEDURE — 99214 OFFICE O/P EST MOD 30 MIN: CPT | Performed by: NURSE PRACTITIONER

## 2018-12-12 PROCEDURE — 87081 CULTURE SCREEN ONLY: CPT | Performed by: NURSE PRACTITIONER

## 2018-12-12 ASSESSMENT — MIFFLIN-ST. JEOR: SCORE: 1579.62

## 2018-12-12 NOTE — PROGRESS NOTES
SUBJECTIVE:   Zacarias Adam is a 23 year old female who presents to clinic today for the following health issues:      Acute Illness   Acute illness concerns: Cough   Onset: 2 weeks     Fever: no    Chills/Sweats: YES    Headache (location?): YES- severe     Sinus Pressure: no    Conjunctivitis:  no    Ear Pain: YES: bilateral    Rhinorrhea: YES    Congestion: YES    Sore Throat: YES     Cough: YES-with shortness of breath, worsening over time. Chest congestion     Wheeze: YES    Decreased Appetite: YES    Nausea: YES    Vomiting: no    Diarrhea:  YES    Dysuria/Freq.: no    Fatigue/Achiness: YES    Sick/Strep Exposure: no     Therapies Tried and outcome: Ibuprofen, cough drops, tylenol           Problem list and histories reviewed & adjusted, as indicated.  Additional history: as documented    Patient Active Problem List   Diagnosis     Congenital uterine anomaly     Endometriosis     Mild persistent asthma     Food allergy     Allergic rhinitis     PTSD (post-traumatic stress disorder)     Fibromyalgia     Chronic low back pain     Migraine aura without headache (migraine equivalents)     Anxiety     Vitamin D deficiency     Essential hypertension     External hemorrhoids     Anemia due to blood loss, acute     Prenatal care, subsequent pregnancy     Vasovagal episode     Gestational hypertension w/o significant proteinuria in 3rd trimester     S/P primary low transverse      Past Surgical History:   Procedure Laterality Date     ARTHROSCOPY KNEE RT/LT      right      SECTION N/A 2018    Procedure:  section;  Surgeon: Wendie Johnson MD;  Location: [facility] OR      LAPAROSCOPY, SURGICAL, ABDOMEN, PERITONEUM & OMENTUM; DX W/ OR W/O SPECIMEN(S)  10/20/10    Adhesiolysis, cautery of endometriosis--Dr. Donald     LAPAROSCOPIC APPENDECTOMY  4/10/2014    Procedure: LAPAROSCOPIC APPENDECTOMY;;  Surgeon: Simone Morales MD;  Location: [facility] OR     LAPAROSCOPIC LYSIS ADHESIONS   6/1/2011    [facility] [place] [facility]--Dr. Donald     LAPAROSCOPY DIAGNOSTIC (GYN)  4/10/2014    Procedure: LAPAROSCOPY DIAGNOSTIC (GYN);  Laparoscopic Right Salpingo Oopherectomy with Laparoscopic Appendectomy;  Surgeon: Sol Wooten MD;  Location: [facility] OR     SURGICAL HISTORY OF -   7/8/09     C resection of right blind rudimentary uterine horn and paratubal cyst       Social History     Tobacco Use     Smoking status: Former Smoker     Packs/day: 0.50     Types: Cigarettes     Smokeless tobacco: Never Used     Tobacco comment: down to 1 cig/day with pregnancy   Substance Use Topics     Alcohol use: Yes     Alcohol/week: 0.0 oz     Comment: occasional- quit with pregnancy     Family History   Problem Relation Age of Onset     Allergies Mother      Depression Mother      Thyroid Disease Mother      Respiratory Mother         asthma     Hypertension Mother      Depression Father      Migraines Father      Depression Sister      Coronary Artery Disease Paternal Grandfather         MI     Diabetes Maternal Grandfather          Current Outpatient Medications   Medication Sig Dispense Refill     Acetaminophen (TYLENOL PO) Take 500-1,000 mg by mouth once as needed        amoxicillin-clavulanate (AUGMENTIN) 875-125 MG tablet Take 1 tablet by mouth 2 times daily for 10 days 20 tablet 0     IBUPROFEN PO        Misc. Devices (BREAST PUMP) MISC 1 each as needed 1 each 0     Vitamin D, Cholecalciferol, 1000 units TABS Take 2,000 Units by mouth daily       ferrous gluconate (FERGON) 324 (38 Fe) MG tablet Take 1 tablet (324 mg) by mouth daily (with breakfast) (Patient not taking: Reported on 12/12/2018) 100 tablet 1     hydrOXYzine (VISTARIL) 50 MG capsule Take 1 capsule (50 mg) by mouth 3 times daily as needed for other (abdominal pain) (Patient not taking: Reported on 11/21/2018) 40 capsule 1     oxyCODONE IR (ROXICODONE) 5 MG tablet Take 1-2 tablets (5-10 mg) by mouth every 3 hours as needed (Patient not taking: Reported on 11/21/2018) 24  "tablet 0     Allergies   Allergen Reactions     Crabs [Crustaceans] Hives and Swelling            Nuts Hives and Swelling     angioedema     Food Other (See Comments) and Diarrhea     Turkey - Vomiting     Soybean Oil GI Disturbance     Vicodin [Hydrocodone-Acetaminophen] Other (See Comments) and Itching     Becomes agitated.  OK with tramadol     Morphine Anxiety     Soy Allergy GI Disturbance     Labs reviewed in EPIC    Reviewed and updated as needed this visit by clinical staff  Tobacco  Allergies  Meds  Problems  Med Hx  Surg Hx  Fam Hx  Soc Hx        Reviewed and updated as needed this visit by Provider  Tobacco  Allergies  Meds  Problems  Med Hx  Surg Hx  Fam Hx         ROS:  Constitutional, HEENT, cardiovascular, pulmonary, GI, , musculoskeletal, neuro, skin, endocrine and psych systems are negative, except as otherwise noted.    OBJECTIVE:     /70   Pulse 117   Temp 98.6  F (37  C) (Tympanic)   Ht 1.676 m (5' 6\")   Wt 80.3 kg (177 lb)   LMP 02/25/2018   SpO2 98%   BMI 28.57 kg/m    Body mass index is 28.57 kg/m .   GENERAL: healthy, alert and no distress, nontoxic in appearance but is hot and states it is hard to breath  EYES: Eyes grossly normal to inspection, PERRL and conjunctivae and sclerae normal  HENT: ear canals and TM's normal, nose and mouth without ulcers or lesions  NECK: no adenopathy, supple with full ROM  RESP: lungs clear to auscultation - no rales, rhonchi or wheezes  CV: regular rate and rhythm, normal S1 S2, no S3 or S4, no murmur, click or rub, no peripheral edema   ABDOMEN: soft, nontender, no hepatosplenomegaly, no masses and bowel sounds normal  MS: no gross musculoskeletal defects noted, no edema  No rash    Diagnostic Test Results:CHEST TWO VIEWS December 12, 2018 1:56 PM      HISTORY: Cough. Shortness of breath.     COMPARISON: 6/30/2011.     FINDINGS: Heart size and pulmonary vascularity are within normal  limits. The lungs are clear. No pneumothorax " or pleural effusion.                                                                       IMPRESSION: Normal two views of the chest.      NUVIA CASTRO MD  Results for orders placed or performed in visit on 12/12/18 (from the past 24 hour(s))   Strep, Rapid Screen   Result Value Ref Range    Specimen Description Throat     Rapid Strep A Screen       NEGATIVE: No Group A streptococcal antigen detected by immunoassay, await culture report.       ASSESSMENT/PLAN:     Problem List Items Addressed This Visit     Mild persistent asthma      Other Visit Diagnoses     Acute bronchitis with coexisting condition requiring prophylactic treatment    -  Primary    Relevant Medications    amoxicillin-clavulanate (AUGMENTIN) 875-125 MG tablet    Throat pain        Relevant Orders    Strep, Rapid Screen (Completed)    Beta strep group A culture (Completed)    Cough        Relevant Orders    XR Chest 2 Views (Completed)    Shortness of breath        Relevant Orders    XR Chest 2 Views (Completed)               Patient Instructions     Increase rest and fluids. Tylenol and/or Ibuprofen for comfort. Cool mist vaporizer. If your symptoms worsen or do not resolve follow up with your primary care provider in 1 week and sooner if needed.        Indications for emergent return to emergency department discussed with patient, who verbalized good understanding and agreement.  Patient understands the limitations of today's evaluation.           Patient Education     Bronchitis, Antibiotic Treatment (Adult)    Bronchitis is an infection of the air passages (bronchial tubes) in your lungs. It often occurs when you have a cold. This illness is contagious during the first few days and is spread through the air by coughing and sneezing, or by direct contact (touching the sick person and then touching your own eyes, nose, or mouth).  Symptoms of bronchitis include cough with mucus (phlegm) and low-grade fever. Bronchitis usually lasts 7 to 14  days. Mild cases can be treated with simple home remedies. More severe infection is treated with an antibiotic.  Home care  Follow these guidelines when caring for yourself at home:    If your symptoms are severe, rest at home for the first 2 to 3 days. When you go back to your usual activities, don't let yourself get too tired.    Don't smoke. Also stay away from secondhand smoke.    You may use over-the-counter medicines to control fever or pain, unless another medicine was prescribed. If you have chronic liver or kidney disease or have ever had a stomach ulcer or gastrointestinal bleeding, talk with your healthcare provider before using these medicines. Also talk to your provider if you are taking medicine to prevent blood clots. Aspirin should never be given to anyone younger than 18 who is ill with a viral infection or fever. It may cause severe liver or brain damage.    Your appetite may be low, so a light diet is fine. Stay well hydrated by drinking 6 to 8 glasses of fluids per day. This includes water, soft drinks, sports drinks, juices, tea, or soup. Extra fluids will help loosen mucus in your nose and lungs.    Over-the-counter cough, cold, and sore-throat medicines will not shorten the length of the illness, but they may be helpful to reduce your symptoms. Don't use decongestants if you have high blood pressure.    Finish all antibiotic medicine. Do this even if you are feeling better after only a few days.  Follow-up care  Follow up with your healthcare provider, or as advised. If you had an X-ray or ECG (electrocardiogram), a specialist will review it. You will be told of any new test results that may affect your care.  If you are age 65 or older, if you smoke, or if you have a chronic lung disease or condition that affects your immune system, ask your healthcare provider about getting a pneumococcal vaccine and a yearly flu shot (influenza vaccine).  When to seek medical advice  Call your healthcare  provider right away if any of these occur:    Fever of 100.4 F (38 C) or higher, or as directed by your healthcare provider    Coughing up more sputum    Weakness, drowsiness, headache, facial pain, ear pain, or a stiff neck     Call 911  Call 911 if any of these occur.    Coughing up blood    Weakness, drowsiness, headache, or stiff neck that get worse    Trouble breathing, wheezing, or pain with breathing   Date Last Reviewed: 6/1/2018 2000-2018 [employer]. [address] [place], [place] [id number]. All rights reserved. This information is not intended as a substitute for professional medical care. Always follow your healthcare professional's instructions.               SAIMA Johnson [facility]

## 2018-12-12 NOTE — NURSING NOTE
"Chief Complaint   Patient presents with     Cough       Initial /70   Pulse 117   Temp 98.6  F (37  C) (Tympanic)   Ht 1.676 m (5' 6\")   Wt 80.3 kg (177 lb)   LMP 02/25/2018   SpO2 98%   BMI 28.57 kg/m   Estimated body mass index is 28.57 kg/m  as calculated from the following:    Height as of this encounter: 1.676 m (5' 6\").    Weight as of this encounter: 80.3 kg (177 lb).    Patient presents to the clinic using No DME    Health Maintenance that is potentially due pending provider review:  PHQ9, GAD7 and ACT    Patient will complete today.    Is there anyone who you would like to be able to receive your results? No  If yes have patient fill out MARA      "

## 2018-12-12 NOTE — LETTER
December 13, 2018      Zacarias Adam  [address] [facility] [address]            The results of your recent throat culture were negative.  If you have any further questions or concerns please contact the clinic.          Sincerely,        SAIMA Johnson CNP/ls

## 2018-12-12 NOTE — PATIENT INSTRUCTIONS
Increase rest and fluids. Tylenol and/or Ibuprofen for comfort. Cool mist vaporizer. If your symptoms worsen or do not resolve follow up with your primary care provider in 1 week and sooner if needed.        Indications for emergent return to emergency department discussed with patient, who verbalized good understanding and agreement.  Patient understands the limitations of today's evaluation.           Patient Education     Bronchitis, Antibiotic Treatment (Adult)    Bronchitis is an infection of the air passages (bronchial tubes) in your lungs. It often occurs when you have a cold. This illness is contagious during the first few days and is spread through the air by coughing and sneezing, or by direct contact (touching the sick person and then touching your own eyes, nose, or mouth).  Symptoms of bronchitis include cough with mucus (phlegm) and low-grade fever. Bronchitis usually lasts 7 to 14 days. Mild cases can be treated with simple home remedies. More severe infection is treated with an antibiotic.  Home care  Follow these guidelines when caring for yourself at home:    If your symptoms are severe, rest at home for the first 2 to 3 days. When you go back to your usual activities, don't let yourself get too tired.    Don't smoke. Also stay away from secondhand smoke.    You may use over-the-counter medicines to control fever or pain, unless another medicine was prescribed. If you have chronic liver or kidney disease or have ever had a stomach ulcer or gastrointestinal bleeding, talk with your healthcare provider before using these medicines. Also talk to your provider if you are taking medicine to prevent blood clots. Aspirin should never be given to anyone younger than 18 who is ill with a viral infection or fever. It may cause severe liver or brain damage.    Your appetite may be low, so a light diet is fine. Stay well hydrated by drinking 6 to 8 glasses of fluids per day. This includes water, soft drinks,  sports drinks, juices, tea, or soup. Extra fluids will help loosen mucus in your nose and lungs.    Over-the-counter cough, cold, and sore-throat medicines will not shorten the length of the illness, but they may be helpful to reduce your symptoms. Don't use decongestants if you have high blood pressure.    Finish all antibiotic medicine. Do this even if you are feeling better after only a few days.  Follow-up care  Follow up with your healthcare provider, or as advised. If you had an X-ray or ECG (electrocardiogram), a specialist will review it. You will be told of any new test results that may affect your care.  If you are age 65 or older, if you smoke, or if you have a chronic lung disease or condition that affects your immune system, ask your healthcare provider about getting a pneumococcal vaccine and a yearly flu shot (influenza vaccine).  When to seek medical advice  Call your healthcare provider right away if any of these occur:    Fever of 100.4 F (38 C) or higher, or as directed by your healthcare provider    Coughing up more sputum    Weakness, drowsiness, headache, facial pain, ear pain, or a stiff neck     Call 911  Call 911 if any of these occur.    Coughing up blood    Weakness, drowsiness, headache, or stiff neck that get worse    Trouble breathing, wheezing, or pain with breathing   Date Last Reviewed: 6/1/2018 2000-2018 [employer]. [address] [place] [address]. All rights reserved. This information is not intended as a substitute for professional medical care. Always follow your healthcare professional's instructions.

## 2018-12-13 LAB
BACTERIA SPEC CULT: NORMAL
SPECIMEN SOURCE: NORMAL

## 2018-12-26 ENCOUNTER — TELEPHONE (OUTPATIENT)
Dept: FAMILY MEDICINE | Facility: CLINIC | Age: 23
End: 2018-12-26

## 2018-12-30 ENCOUNTER — HOSPITAL ENCOUNTER (EMERGENCY)
Facility: CLINIC | Age: 23
Discharge: HOME | End: 2018-12-30
Attending: NURSE PRACTITIONER | Admitting: NURSE PRACTITIONER
Payer: COMMERCIAL

## 2018-12-30 VITALS
SYSTOLIC BLOOD PRESSURE: 131 MMHG | RESPIRATION RATE: 16 BRPM | OXYGEN SATURATION: 98 % | BODY MASS INDEX: 27.64 KG/M2 | HEIGHT: 66 IN | DIASTOLIC BLOOD PRESSURE: 85 MMHG | TEMPERATURE: 98 F | WEIGHT: 172 LBS

## 2018-12-30 DIAGNOSIS — J20.9 ACUTE BRONCHITIS, UNSPECIFIED ORGANISM: ICD-10-CM

## 2018-12-30 DIAGNOSIS — J98.01 BRONCHOSPASM: ICD-10-CM

## 2018-12-30 PROCEDURE — 99214 OFFICE O/P EST MOD 30 MIN: CPT | Mod: Z6 | Performed by: NURSE PRACTITIONER

## 2018-12-30 PROCEDURE — G0463 HOSPITAL OUTPT CLINIC VISIT: HCPCS | Performed by: NURSE PRACTITIONER

## 2018-12-30 RX ORDER — ALBUTEROL SULFATE 90 UG/1
2 AEROSOL, METERED RESPIRATORY (INHALATION) EVERY 6 HOURS PRN
Qty: 1 INHALER | Refills: 0 | Status: SHIPPED | OUTPATIENT
Start: 2018-12-30 | End: 2019-10-29

## 2018-12-30 RX ORDER — PREDNISONE 20 MG/1
TABLET ORAL
Qty: 10 TABLET | Refills: 0 | Status: SHIPPED | OUTPATIENT
Start: 2018-12-30 | End: 2019-01-06

## 2018-12-30 RX ORDER — AZITHROMYCIN 250 MG/1
TABLET, FILM COATED ORAL
Qty: 6 TABLET | Refills: 0 | Status: SHIPPED | OUTPATIENT
Start: 2018-12-30 | End: 2019-01-04

## 2018-12-30 ASSESSMENT — ENCOUNTER SYMPTOMS
CHEST TIGHTNESS: 1
FEVER: 0
RHINORRHEA: 0
SORE THROAT: 0
VOMITING: 0
WHEEZING: 1
COUGH: 1

## 2018-12-30 ASSESSMENT — MIFFLIN-ST. JEOR: SCORE: 1556.94

## 2018-12-30 NOTE — ED AVS SNAPSHOT
[facility]  [address] [facility] [address]  Phone:  [phone]  Fax:  [phone]                                    Zacarias Adam   MRN: [id number]    Department:  [facility]   Date of Visit:  12/30/2018           After Visit Summary Signature Page    I have received my discharge instructions, and my questions have been answered. I have discussed any challenges I see with this plan with the nurse or doctor.    ..........................................................................................................................................  Patient/Patient Representative Signature      ..........................................................................................................................................  Patient Representative Print Name and Relationship to Patient    ..................................................               ................................................  Date                                   Time    ..........................................................................................................................................  Reviewed by Signature/Title    ...................................................              ..............................................  Date                                               Time          22EPIC Rev 08/18

## 2018-12-30 NOTE — DISCHARGE INSTRUCTIONS
Continue Augmentin.  Z-erica as directed.  Prednisone 40 mg daily for 5 days.  Albuterol inhaler 2 puffs every  6 hours as needed.
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
## 2019-06-17 PROBLEM — O33.9 MATERNAL CARE FOR DISPROPORTION: Status: RESOLVED | Noted: 2017-03-26 | Resolved: 2017-05-12

## 2019-10-29 ENCOUNTER — OFFICE VISIT (OUTPATIENT)
Dept: FAMILY MEDICINE | Facility: CLINIC | Age: 24
End: 2019-10-29
Payer: COMMERCIAL

## 2019-10-29 VITALS
TEMPERATURE: 98.2 F | HEART RATE: 93 BPM | DIASTOLIC BLOOD PRESSURE: 70 MMHG | SYSTOLIC BLOOD PRESSURE: 132 MMHG | HEIGHT: 66 IN | WEIGHT: 173 LBS | BODY MASS INDEX: 27.8 KG/M2 | RESPIRATION RATE: 12 BRPM | OXYGEN SATURATION: 97 %

## 2019-10-29 DIAGNOSIS — J01.90 ACUTE SINUSITIS WITH SYMPTOMS > 10 DAYS: Primary | ICD-10-CM

## 2019-10-29 DIAGNOSIS — J45.21 MILD INTERMITTENT ASTHMA WITH ACUTE EXACERBATION: ICD-10-CM

## 2019-10-29 PROCEDURE — 99214 OFFICE O/P EST MOD 30 MIN: CPT | Performed by: NURSE PRACTITIONER

## 2019-10-29 RX ORDER — ALBUTEROL SULFATE 90 UG/1
2 AEROSOL, METERED RESPIRATORY (INHALATION) EVERY 6 HOURS PRN
Qty: 1 INHALER | Refills: 0 | Status: SHIPPED | OUTPATIENT
Start: 2019-10-29 | End: 2020-03-19

## 2019-10-29 ASSESSMENT — MIFFLIN-ST. JEOR: SCORE: 1556.47

## 2019-10-29 NOTE — PROGRESS NOTES
Subjective     Zacarias Adam is a 24 year old female who presents to clinic today for the following health issues:    HPI   RESPIRATORY SYMPTOMS      Duration: she states that her sinus' have been plugged for 3 weeks. Her cough has been for 3 days     Description  nasal congestion, rhinorrhea, cough, headache, fatigue/malaise and myalgias    Severity: moderate    Accompanying signs and symptoms: None    History (predisposing factors):  none    Precipitating or alleviating factors: None    Therapies tried and outcome:  Sudafed, Robitussin DM and tylenol.     Sinus congestion improved some 1 week ago then worsened a few days ago and now with non productive cough.    No fevers, chills.  Appetite decreased.    History of mild allergies.  History of bronchitis last year.    History of sports induced asthma - worse with exercise and upper respiratory infection      Patient Active Problem List   Diagnosis     Congenital uterine anomaly     Endometriosis     Mild persistent asthma     Food allergy     Allergic rhinitis     PTSD (post-traumatic stress disorder)     Fibromyalgia     Chronic low back pain     Migraine aura without headache (migraine equivalents)     Anxiety     Vitamin D deficiency     Essential hypertension     External hemorrhoids     Anemia due to blood loss, acute     Prenatal care, subsequent pregnancy     Vasovagal episode     Gestational hypertension w/o significant proteinuria in 3rd trimester     S/P primary low transverse      Past Surgical History:   Procedure Laterality Date     ARTHROSCOPY KNEE RT/LT      right      SECTION N/A 2018    Procedure:  section;  Surgeon: Wendie Johnson MD;  Location: [facility]      LAPAROSCOPY, SURGICAL, ABDOMEN, PERITONEUM & OMENTUM; DX W/ OR W/O SPECIMEN(S)  10/20/10    Adhesiolysis, cautery of endometriosis--Dr. Donald     LAPAROSCOPIC APPENDECTOMY  4/10/2014    Procedure: LAPAROSCOPIC APPENDECTOMY;;  Surgeon: Simone Morales  MD [place];  Location: [facility]     LAPAROSCOPIC LYSIS ADHESIONS  6/1/2011    [place] TROY--Dr. 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       Social History     Tobacco Use     Smoking status: Former Smoker     Packs/day: 0.50     Types: Cigarettes     Smokeless tobacco: Never Used     Tobacco comment: down to 1 cig/day with pregnancy   Substance Use Topics     Alcohol use: Yes     Alcohol/week: 0.0 standard drinks     Comment: occasional- quit with pregnancy     Family History   Problem Relation Age of Onset     Allergies Mother      Depression Mother      Thyroid Disease Mother      Respiratory Mother         asthma     Hypertension Mother      Depression Father      Migraines Father      Depression Sister      Coronary Artery Disease Paternal Grandfather         MI     Diabetes Maternal Grandfather          Current Outpatient Medications   Medication Sig Dispense Refill     Acetaminophen (TYLENOL PO) Take 500-1,000 mg by mouth once as needed        IBUPROFEN PO        Vitamin D, Cholecalciferol, 1000 units TABS Take 2,000 Units by mouth daily       albuterol (PROAIR HFA/PROVENTIL HFA/VENTOLIN HFA) 108 (90 Base) MCG/ACT inhaler Inhale 2 puffs into the lungs every 6 hours as needed for shortness of breath / dyspnea or wheezing 1 Inhaler 0     ferrous gluconate (FERGON) 324 (38 Fe) MG tablet Take 1 tablet (324 mg) by mouth daily (with breakfast) (Patient not taking: Reported on 12/12/2018) 100 tablet 1     hydrOXYzine (VISTARIL) 50 MG capsule Take 1 capsule (50 mg) by mouth 3 times daily as needed for other (abdominal pain) (Patient not taking: Reported on 11/21/2018) 40 capsule 1     Misc. Devices (BREAST PUMP) MISC 1 each as needed  "(Patient not taking: Reported on 10/29/2019) 1 each 0     oxyCODONE IR (ROXICODONE) 5 MG tablet Take 1-2 tablets (5-10 mg) by mouth every 3 hours as needed (Patient not taking: Reported on 11/21/2018) 24 tablet 0     Allergies   Allergen Reactions     Crabs [Crustaceans] Hives and Swelling            Nuts Hives and Swelling     angioedema     Food Other (See Comments) and Diarrhea     Turkey - Vomiting     Soybean Oil GI Disturbance     Vicodin [Hydrocodone-Acetaminophen] Other (See Comments) and Itching     Becomes agitated.  OK with tramadol     Morphine Anxiety     Soy Allergy GI Disturbance     Recent Labs   Lab Test 11/13/18  0638 11/08/18  1147 10/18/18  1331 09/06/18  1105  04/25/18  1530  01/13/15  1357   ALT  --  13 20 11   < > 22   < > 30   CR 0.54 0.59 0.52 0.50*   < > 0.53   < > 0.76   GFRESTIMATED >90 >90 >90 >90   < > >90   < > >90  Non  GFR Calc     GFRESTBLACK >90 >90 >90 >90   < > >90   < > >90  African American GFR Calc     POTASSIUM 3.7  --   --   --   --  3.8  --  4.1   TSH  --   --   --   --   --   --   --  3.07    < > = values in this interval not displayed.      BP Readings from Last 3 Encounters:   10/29/19 132/70   12/30/18 131/85   12/12/18 126/70    Wt Readings from Last 3 Encounters:   10/29/19 78.5 kg (173 lb)   12/30/18 78 kg (172 lb)   12/12/18 80.3 kg (177 lb)                    Reviewed and updated as needed this visit by Provider         Review of Systems   ROS COMP: Constitutional, HEENT, cardiovascular, pulmonary, GI, , musculoskeletal, neuro, skin, endocrine and psych systems are negative, except as otherwise noted.      Objective    /70 (BP Location: Right arm, Patient Position: Sitting, Cuff Size: Adult Regular)   Pulse 93   Temp 98.2  F (36.8  C) (Tympanic)   Resp 12   Ht 1.676 m (5' 6\")   Wt 78.5 kg (173 lb)   SpO2 97%   BMI 27.92 kg/m    Body mass index is 27.92 kg/m .  Physical Exam   GENERAL: healthy, alert and no distress  HENT: ear canals " "and TM's normal, nose and mouth without ulcers or lesions  NECK: no adenopathy, no asymmetry, masses, or scars and thyroid normal to palpation  RESP: no rales , no rhonchi and expiratory wheezes bibasilar  CV: regular rate and rhythm, normal S1 S2, no S3 or S4, no murmur, click or rub, no peripheral edema and peripheral pulses strong  ABDOMEN: soft, nontender, no hepatosplenomegaly, no masses and bowel sounds normal  MS: no gross musculoskeletal defects noted, no edema    Diagnostic Test Results:  Labs reviewed in Epic        Assessment & Plan     1. Acute sinusitis with symptoms > 10 days   The risks, benefits and treatment options of prescribed medications or other treatments have been discussed with the patient. The patient verbalized their understanding and should call or follow up if no improvement or if they develop further problems.    - albuterol (PROAIR HFA/PROVENTIL HFA/VENTOLIN HFA) 108 (90 Base) MCG/ACT inhaler; Inhale 2 puffs into the lungs every 6 hours as needed for shortness of breath / dyspnea or wheezing  Dispense: 1 Inhaler; Refill: 0  - amoxicillin-clavulanate (AUGMENTIN) 875-125 MG tablet; Take 1 tablet by mouth 2 times daily for 7 days  Dispense: 14 tablet; Refill: 0    2. Mild intermittent asthma with acute exacerbation  Added Albuterol and use as needed.    - albuterol (PROAIR HFA/PROVENTIL HFA/VENTOLIN HFA) 108 (90 Base) MCG/ACT inhaler; Inhale 2 puffs into the lungs every 6 hours as needed for shortness of breath / dyspnea or wheezing  Dispense: 1 Inhaler; Refill: 0  - amoxicillin-clavulanate (AUGMENTIN) 875-125 MG tablet; Take 1 tablet by mouth 2 times daily for 7 days  Dispense: 14 tablet; Refill: 0     BMI:   Estimated body mass index is 27.92 kg/m  as calculated from the following:    Height as of this encounter: 1.676 m (5' 6\").    Weight as of this encounter: 78.5 kg (173 lb).           Patient Instructions   Prescription written for antibiotics to be taken twice daily for 7 days.  " Advised to take entire course of antibiotic despite improvement in symptoms.    For cough, dextromethorphan/guaifenesin combinations help loosen secretions and suppress cough safely without significant risk of sedation. Often 2 puffs four times daily of an albuterol inhaler will help with bronchitis.  This is a prescription medicine.    For nasal congestion and sinus pressure, pseudoephedrine (Sudafed) or phenylephrine is often helpful but it can cause elevations in blood pressure and insomnia.  Short courses of a nasal decongestant spray (Afrin or Neosinephrine) can be appropriate but their use should be restricted to 3 days due to the high risk of nasal addiction.  May use Coricidin as decongestant if history of hypertension.    For pain and fevers, acetaminophen (Tylenol) is most appropriate.  Ibuprofen (Advil) or naproxen (Aleve) are useful too and last longer but they can cause elevation of blood pressure or stomach problems.    Sometime the cause of your sinusitis is from allergies.  You may want to try taking a daily antihistamines such as Claritin, Zyrtec, or Allegra-all over the counter medications.    Return to the clinic if symptoms not improved or worsened after treatment complete.    JERMAINE Sanchez        No follow-ups on file.    Crissy Irwin NP  [facility]

## 2019-10-29 NOTE — PATIENT INSTRUCTIONS
Prescription written for antibiotics to be taken twice daily for 7 days.  Advised to take entire course of antibiotic despite improvement in symptoms.    For cough, dextromethorphan/guaifenesin combinations help loosen secretions and suppress cough safely without significant risk of sedation. Often 2 puffs four times daily of an albuterol inhaler will help with bronchitis.  This is a prescription medicine.    For nasal congestion and sinus pressure, pseudoephedrine (Sudafed) or phenylephrine is often helpful but it can cause elevations in blood pressure and insomnia.  Short courses of a nasal decongestant spray (Afrin or Neosinephrine) can be appropriate but their use should be restricted to 3 days due to the high risk of nasal addiction.  May use Coricidin as decongestant if history of hypertension.    For pain and fevers, acetaminophen (Tylenol) is most appropriate.  Ibuprofen (Advil) or naproxen (Aleve) are useful too and last longer but they can cause elevation of blood pressure or stomach problems.    Sometime the cause of your sinusitis is from allergies.  You may want to try taking a daily antihistamines such as Claritin, Zyrtec, or Allegra-all over the counter medications.    Return to the clinic if symptoms not improved or worsened after treatment complete.    JERMAINE Sanchez

## 2019-11-08 ENCOUNTER — TELEPHONE (OUTPATIENT)
Dept: FAMILY MEDICINE | Facility: CLINIC | Age: 24
End: 2019-11-08

## 2019-11-08 DIAGNOSIS — B37.31 YEAST INFECTION OF THE VAGINA: Primary | ICD-10-CM

## 2019-11-08 RX ORDER — FLUCONAZOLE 150 MG/1
TABLET ORAL
Qty: 2 TABLET | Refills: 0 | Status: SHIPPED | OUTPATIENT
Start: 2019-11-08 | End: 2020-02-18

## 2019-11-08 NOTE — TELEPHONE ENCOUNTER
Reason for Call:  Other vaginal yeast    Detailed comments: Patient states she was on an antibiotic for a sinus infection and now has a yeast infection.  She is asking for a medication.    Phone Number Patient can be reached at: Home number on file [phone] (home)    Best Time: any    Can we leave a detailed message on this number? YES    Call taken on 11/8/2019 at 2:35 PM by Shari Herman  .

## 2019-11-08 NOTE — TELEPHONE ENCOUNTER
Dr. Wilhelm,  Would you be ok prescribing diflucan for yeast infection in Crissy's absence?  Last RX for diflucan was 2014.    Patient seen 10-29-19 for sinus infection.  Given RX for amoxicillin.  After completion of ABX she now has symptoms of yeast infection - vaginal itching and white thick discharge.    Reports this has happened before after abx treatment.  Tried OTC treatment and it is not working.  Pharm ready.    Routing to provider.  Meghana LANE RN

## 2019-11-11 ENCOUNTER — TELEPHONE (OUTPATIENT)
Dept: FAMILY MEDICINE | Facility: CLINIC | Age: 24
End: 2019-11-11

## 2019-11-11 NOTE — LETTER
November 11, 2019      Zacarias Adam  [address] [address]        Dear Zacarias,     Your [place] Care Team works hard to make sure that you and your family receive exceptional care. Enclosed you will find a copy of the Asthma Control Test (ACT) that our clinic uses to monitor and manage your asthma. This test is an assessment tool that we use to determine how well your asthma is controlled.  Please complete the enclosed form and return in the provided envelope.  Thank you for trusting us with your health care.    Sincerely,        Your [facility] Care Team/erin

## 2019-11-11 NOTE — TELEPHONE ENCOUNTER
Panel Management Review      Patient has the following on her problem list:     Asthma review     ACT Total Scores 8/24/2015   ACT TOTAL SCORE -   ASTHMA ER VISITS -   ASTHMA HOSPITALIZATIONS -   ACT TOTAL SCORE (Goal Greater than or Equal to 20) 21   In the past 12 months, how many times did you visit the emergency room for your asthma without being admitted to the hospital? 0   In the past 12 months, how many times were you hospitalized overnight because of your asthma? 0      1. Is Asthma diagnosis on the Problem List? Yes    2. Is Asthma listed on Health Maintenance? Yes    3. Patient is due for:  ACT      Composite cancer screening  Chart review shows that this patient is due/due soon for the following None  Summary:    Patient is due/failing the following:   ACT    Action needed:   Patient needs to do ACT.    Type of outreach:  Letter mailed with ACT for the patient to complete and return.      Questions for provider review:    None                                                                                                                                    TR Pereyra MA

## 2019-12-05 ENCOUNTER — NURSE TRIAGE (OUTPATIENT)
Dept: NURSING | Facility: CLINIC | Age: 24
End: 2019-12-05

## 2019-12-06 NOTE — TELEPHONE ENCOUNTER
S: Calling about cough medicine.  B: Patient gave writer permission to talk with mother -in- law Emerald about her health concern.  Since Sunday has had a head cold.  This evening took generic Tussin using the medicine cup that comes with the bottle.  Bakari poured out the wrong amount of medicine she took 6 teaspoons. The correct does is 2 teaspoons.  Calling because she is now feeling dizzy and anxious.   A: Gave mother-in-law poison control phone number to call.  R: Emerald going to come to stay with Zacarias and together call poison control.  Clara Jefferson RN, [facility]         Reason for Disposition    Health Information question, no triage required and triager able to answer question    Protocols used: INFORMATION ONLY CALL - NO TRIAGE-P-

## 2020-02-09 ENCOUNTER — HOSPITAL ENCOUNTER (EMERGENCY)
Facility: CLINIC | Age: 25
Discharge: HOME | End: 2020-02-09
Attending: FAMILY MEDICINE | Admitting: FAMILY MEDICINE
Payer: MEDICAID

## 2020-02-09 VITALS
BODY MASS INDEX: 27.64 KG/M2 | OXYGEN SATURATION: 96 % | SYSTOLIC BLOOD PRESSURE: 133 MMHG | DIASTOLIC BLOOD PRESSURE: 78 MMHG | TEMPERATURE: 97.7 F | WEIGHT: 172 LBS | HEIGHT: 66 IN

## 2020-02-09 DIAGNOSIS — H92.02 OTALGIA, LEFT: ICD-10-CM

## 2020-02-09 PROCEDURE — 99214 OFFICE O/P EST MOD 30 MIN: CPT | Mod: Z6 | Performed by: FAMILY MEDICINE

## 2020-02-09 PROCEDURE — G0463 HOSPITAL OUTPT CLINIC VISIT: HCPCS | Performed by: FAMILY MEDICINE

## 2020-02-09 ASSESSMENT — ENCOUNTER SYMPTOMS
ALLERGIC/IMMUNOLOGIC NEGATIVE: 1
CARDIOVASCULAR NEGATIVE: 1
ENDOCRINE NEGATIVE: 1
PSYCHIATRIC NEGATIVE: 1
EYES NEGATIVE: 1
RESPIRATORY NEGATIVE: 1
CONSTITUTIONAL NEGATIVE: 1
HEMATOLOGIC/LYMPHATIC NEGATIVE: 1
MUSCULOSKELETAL NEGATIVE: 1
GASTROINTESTINAL NEGATIVE: 1
NEUROLOGICAL NEGATIVE: 1

## 2020-02-09 ASSESSMENT — MIFFLIN-ST. JEOR: SCORE: 1551.94

## 2020-02-09 NOTE — ED AVS SNAPSHOT
[facility] Emergency Department  [address] [address]  Phone:  [phone]  Fax:  [phone]                                    Zacarias Adam   MRN: [id number]    Department:  [facility] Emergency Department   Date of Visit:  2/9/2020           After Visit Summary Signature Page    I have received my discharge instructions, and my questions have been answered. I have discussed any challenges I see with this plan with the nurse or doctor.    ..........................................................................................................................................  Patient/Patient Representative Signature      ..........................................................................................................................................  Patient Representative Print Name and Relationship to Patient    ..................................................               ................................................  Date                                   Time    ..........................................................................................................................................  Reviewed by Signature/Title    ...................................................              ..............................................  Date                                               Time          22EPIC Rev 08/18
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
## 2020-02-18 ENCOUNTER — OFFICE VISIT (OUTPATIENT)
Dept: FAMILY MEDICINE | Facility: CLINIC | Age: 25
End: 2020-02-18
Payer: MEDICAID

## 2020-02-18 VITALS
HEART RATE: 88 BPM | OXYGEN SATURATION: 99 % | SYSTOLIC BLOOD PRESSURE: 110 MMHG | DIASTOLIC BLOOD PRESSURE: 72 MMHG | BODY MASS INDEX: 29.09 KG/M2 | HEIGHT: 66 IN | TEMPERATURE: 96.8 F | WEIGHT: 181 LBS

## 2020-02-18 DIAGNOSIS — F32.1 CURRENT MODERATE EPISODE OF MAJOR DEPRESSIVE DISORDER WITHOUT PRIOR EPISODE (H): ICD-10-CM

## 2020-02-18 DIAGNOSIS — F41.1 GAD (GENERALIZED ANXIETY DISORDER): Primary | ICD-10-CM

## 2020-02-18 PROCEDURE — 99214 OFFICE O/P EST MOD 30 MIN: CPT | Performed by: NURSE PRACTITIONER

## 2020-02-18 RX ORDER — CITALOPRAM HYDROBROMIDE 20 MG/1
20 TABLET ORAL DAILY
Qty: 30 TABLET | Refills: 1 | Status: SHIPPED | OUTPATIENT
Start: 2020-02-18 | End: 2020-04-17 | Stop reason: SINTOL

## 2020-02-18 ASSESSMENT — ANXIETY QUESTIONNAIRES
1. FEELING NERVOUS, ANXIOUS, OR ON EDGE: NEARLY EVERY DAY
GAD7 TOTAL SCORE: 17
6. BECOMING EASILY ANNOYED OR IRRITABLE: MORE THAN HALF THE DAYS
7. FEELING AFRAID AS IF SOMETHING AWFUL MIGHT HAPPEN: NEARLY EVERY DAY
3. WORRYING TOO MUCH ABOUT DIFFERENT THINGS: MORE THAN HALF THE DAYS
IF YOU CHECKED OFF ANY PROBLEMS ON THIS QUESTIONNAIRE, HOW DIFFICULT HAVE THESE PROBLEMS MADE IT FOR YOU TO DO YOUR WORK, TAKE CARE OF THINGS AT HOME, OR GET ALONG WITH OTHER PEOPLE: SOMEWHAT DIFFICULT
5. BEING SO RESTLESS THAT IT IS HARD TO SIT STILL: MORE THAN HALF THE DAYS
2. NOT BEING ABLE TO STOP OR CONTROL WORRYING: NEARLY EVERY DAY

## 2020-02-18 ASSESSMENT — PATIENT HEALTH QUESTIONNAIRE - PHQ9
SUM OF ALL RESPONSES TO PHQ QUESTIONS 1-9: 16
5. POOR APPETITE OR OVEREATING: MORE THAN HALF THE DAYS

## 2020-02-18 ASSESSMENT — MIFFLIN-ST. JEOR: SCORE: 1592.76

## 2020-02-18 NOTE — PROGRESS NOTES
Zacarias Adam is a 24 year old female who presents to clinic today for the following health issues:    HPI     Anxiety Follow-Up    How are you doing with your anxiety since your last visit? Worsened     Are you having other symptoms that might be associated with anxiety? Yes:  afraid to leave home since she feels something will happen to her children and she is afraid of death.  Worried about anyone getting hurt around her.  Panic attacks look like eyes are like sparkling and gets black and needs to sit down and take deep breaths and in 5 minutes will start to go away.  Mom has had a lot of health issues the last couple years    Have you had a significant life event? OTHER:       Are you feeling depressed? No    Do you have any concerns with your use of alcohol or other drugs? No     No concerns with depression and no suicidal idealation or thoughts of hurting herself.    Gets increase in anxiety when vitamin d is low.  Usually takes supplement but not this week.    Social History     Tobacco Use     Smoking status: Former Smoker     Packs/day: 0.50     Types: Cigarettes     Smokeless tobacco: Never Used     Tobacco comment: down to 1 cig/day with pregnancy   Substance Use Topics     Alcohol use: Yes     Alcohol/week: 0.0 standard drinks     Comment: occasional- quit with pregnancy     Drug use: No     PRAKASH-7 SCORE 5/12/2014 1/30/2017 2/18/2020   Total Score 20 - -   Total Score - 16 17     PHQ 4/5/2017 5/12/2017 2/18/2020   PHQ-9 Total Score 1 1 16   Q9: Thoughts of better off dead/self-harm past 2 weeks Not at all Not at all Not at all     Last PHQ-9 2/18/2020   1.  Little interest or pleasure in doing things 1   2.  Feeling down, depressed, or hopeless 1   3.  Trouble falling or staying asleep, or sleeping too much 3   4.  Feeling tired or having little energy 2   5.  Poor appetite or overeating 1   6.  Feeling bad about yourself 2   7.  Trouble concentrating 3   8.  Moving slowly or restless 3   Q9: Thoughts of  better off dead/self-harm past 2 weeks 0   PHQ-9 Total Score 16   Difficulty at work, home, or with people Somewhat difficult     PRAKASH-7  2020   1. Feeling nervous, anxious, or on edge 3   2. Not being able to stop or control worrying 3   3. Worrying too much about different things 2   4. Trouble relaxing 2   5. Being so restless that it is hard to sit still 2   6. Becoming easily annoyed or irritable 2   7. Feeling afraid, as if something awful might happen 3   PRAKASH-7 Total Score 17   If you checked any problems, how difficult have they made it for you to do your work, take care of things at home, or get along with other people? Somewhat difficult       Patient Active Problem List   Diagnosis     Congenital uterine anomaly     Endometriosis     Mild persistent asthma     Food allergy     Allergic rhinitis     PTSD (post-traumatic stress disorder)     Fibromyalgia     Chronic low back pain     Migraine aura without headache (migraine equivalents)     Anxiety     Vitamin D deficiency     Essential hypertension     External hemorrhoids     Anemia due to blood loss, acute     Prenatal care, subsequent pregnancy     Vasovagal episode     Gestational hypertension w/o significant proteinuria in 3rd trimester     S/P primary low transverse      PRAKASH (generalized anxiety disorder)     Current moderate episode of major depressive disorder without prior episode (H)     Past Surgical History:   Procedure Laterality Date     ARTHROSCOPY KNEE RT/LT      right      SECTION N/A 2018    Procedure:  section;  Surgeon: Wendie Johnson MD;  Location: [facility] OR      LAPAROSCOPY, SURGICAL, ABDOMEN, PERITONEUM & OMENTUM; DX W/ OR W/O SPECIMEN(S)  10/20/10    Adhesiolysis, cautery of endometriosis--Dr. Donald     LAPAROSCOPIC APPENDECTOMY  4/10/2014    Procedure: LAPAROSCOPIC APPENDECTOMY;;  Surgeon: Simone Morales MD;  Location: [facility] OR     LAPAROSCOPIC LYSIS ADHESIONS  2011    [facility] – [place]  [place]--Dr. 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       Social History     Tobacco Use     Smoking status: Former Smoker     Packs/day: 0.50     Types: Cigarettes     Smokeless tobacco: Never Used     Tobacco comment: down to 1 cig/day with pregnancy   Substance Use Topics     Alcohol use: Yes     Alcohol/week: 0.0 standard drinks     Comment: occasional- quit with pregnancy     Family History   Problem Relation Age of Onset     Allergies Mother      Depression Mother      Thyroid Disease Mother      Respiratory Mother         asthma     Hypertension Mother      Depression Father      Migraines Father      Depression Sister      Coronary Artery Disease Paternal Grandfather         MI     Diabetes Maternal Grandfather          Current Outpatient Medications   Medication Sig Dispense Refill     albuterol (PROAIR HFA/PROVENTIL HFA/VENTOLIN HFA) 108 (90 Base) MCG/ACT inhaler Inhale 2 puffs into the lungs every 6 hours as needed for shortness of breath / dyspnea or wheezing 1 Inhaler 0     citalopram (CELEXA) 20 MG tablet Take 1 tablet (20 mg) by mouth daily 30 tablet 1     Vitamin D, Cholecalciferol, 1000 units TABS Take 2,000 Units by mouth daily       Allergies   Allergen Reactions     Crabs [Crustaceans] Hives and Swelling            Nuts Hives and Swelling     angioedema     Food Other (See Comments) and Diarrhea     Turkey - Vomiting     Soybean Oil GI Disturbance     Vicodin [Hydrocodone-Acetaminophen] Other (See Comments) and Itching     Becomes agitated.  OK with tramadol     Morphine Anxiety     Soy Allergy GI Disturbance     Reviewed and updated as needed this visit by Provider  Allergies  Meds  Problems  Med Hx  Surg Hx   "       Review of Systems   ROS COMP: CONSTITUTIONAL: NEGATIVE for fever, chills, change in weight  RESP: NEGATIVE for significant cough or SOB  CV: NEGATIVE for chest pain, palpitations or peripheral edema  PSYCHIATRIC: POSITIVE foranxiety, depressed mood, Hx anxiety, Hx depression and fear of family and friends getting hurt and dying, overall just fear of death in general  ROS otherwise negative      Objective    /72   Pulse 88   Temp 96.8  F (36  C) (Tympanic)   Ht 1.676 m (5' 6\")   Wt 82.1 kg (181 lb)   LMP 01/26/2020 (Exact Date)   SpO2 99%   BMI 29.21 kg/m    Body mass index is 29.21 kg/m .  Physical Exam   GENERAL: healthy, alert and no distress  RESP: lungs clear to auscultation - no rales, rhonchi or wheezes  CV: regular rate and rhythm, normal S1 S2, no S3 or S4, no murmur, click or rub, no peripheral edema and peripheral pulses strong  PSYCH: mentation appears normal and affect normal/bright    Diagnostic Test Results:  none         Assessment & Plan     1. PRAKASH (generalized anxiety disorder)  Starting patient on Celexa 20 mg daily.  Commend daily exercise for symptoms.  Referral placed for mental health for individual counseling.  Follow-up in 1 month for recheck or sooner if any side effects to medication.  - citalopram (CELEXA) 20 MG tablet; Take 1 tablet (20 mg) by mouth daily  Dispense: 30 tablet; Refill: 1  - MENTAL HEALTH REFERRAL  - Adult; Outpatient Treatment; Individual/Couples/Family/Group [place] Psychology[place] [facility] [place] [facility] [phone]; We will contact you to schedule the appointment or please call with any questions    2. Current moderate episode of major depressive disorder without prior episode (H)  See note above.  - MENTAL HEALTH REFERRAL  - Adult; Outpatient Treatment; Individual/Couples/Family/Group Therapy/Health Psychology[place] [facility] [place] [facility] [phone]; We will contact you to schedule the appointment or please call with any " questions       See Patient Instructions    Return in about 1 month (around 3/18/2020) for anxiety/depression recheck.    Malena Saavedra NP  [facility]

## 2020-02-18 NOTE — PATIENT INSTRUCTIONS
1.  Counseling referral placed they will contact you to make an appointment.  2.  Try to get exercise in daily.  3.  Take Celexa once daily everyday with no missed doses.  4.  Follow-up in 1 month for refills and recheck.

## 2020-02-19 ASSESSMENT — ANXIETY QUESTIONNAIRES: GAD7 TOTAL SCORE: 17

## 2020-03-10 ENCOUNTER — OFFICE VISIT (OUTPATIENT)
Dept: FAMILY MEDICINE | Facility: CLINIC | Age: 25
End: 2020-03-10
Payer: COMMERCIAL

## 2020-03-10 VITALS
WEIGHT: 182 LBS | DIASTOLIC BLOOD PRESSURE: 70 MMHG | HEIGHT: 66 IN | BODY MASS INDEX: 29.25 KG/M2 | HEART RATE: 77 BPM | RESPIRATION RATE: 18 BRPM | TEMPERATURE: 98.5 F | OXYGEN SATURATION: 100 % | SYSTOLIC BLOOD PRESSURE: 112 MMHG

## 2020-03-10 DIAGNOSIS — Z30.49 ENCOUNTER FOR SURVEILLANCE OF OTHER CONTRACEPTIVE: ICD-10-CM

## 2020-03-10 DIAGNOSIS — F41.9 ANXIETY: Primary | ICD-10-CM

## 2020-03-10 PROCEDURE — 99214 OFFICE O/P EST MOD 30 MIN: CPT | Performed by: FAMILY MEDICINE

## 2020-03-10 RX ORDER — NORETHINDRONE ACETATE AND ETHINYL ESTRADIOL .02; 1 MG/1; MG/1
1 TABLET ORAL DAILY
Qty: 90 TABLET | Refills: 3 | Status: SHIPPED | OUTPATIENT
Start: 2020-03-10 | End: 2021-02-05

## 2020-03-10 RX ORDER — FLUOXETINE 10 MG/1
10 CAPSULE ORAL DAILY
Qty: 30 CAPSULE | Refills: 0 | Status: SHIPPED | OUTPATIENT
Start: 2020-03-10 | End: 2020-04-17

## 2020-03-10 ASSESSMENT — ANXIETY QUESTIONNAIRES
5. BEING SO RESTLESS THAT IT IS HARD TO SIT STILL: SEVERAL DAYS
1. FEELING NERVOUS, ANXIOUS, OR ON EDGE: NOT AT ALL
7. FEELING AFRAID AS IF SOMETHING AWFUL MIGHT HAPPEN: NEARLY EVERY DAY
6. BECOMING EASILY ANNOYED OR IRRITABLE: SEVERAL DAYS
2. NOT BEING ABLE TO STOP OR CONTROL WORRYING: MORE THAN HALF THE DAYS
GAD7 TOTAL SCORE: 11
3. WORRYING TOO MUCH ABOUT DIFFERENT THINGS: MORE THAN HALF THE DAYS

## 2020-03-10 ASSESSMENT — PATIENT HEALTH QUESTIONNAIRE - PHQ9
5. POOR APPETITE OR OVEREATING: MORE THAN HALF THE DAYS
SUM OF ALL RESPONSES TO PHQ QUESTIONS 1-9: 13

## 2020-03-10 ASSESSMENT — ASTHMA QUESTIONNAIRES
QUESTION_1 LAST FOUR WEEKS HOW MUCH OF THE TIME DID YOUR ASTHMA KEEP YOU FROM GETTING AS MUCH DONE AT WORK, SCHOOL OR AT HOME: NONE OF THE TIME
ACT_TOTALSCORE: 21
QUESTION_5 LAST FOUR WEEKS HOW WOULD YOU RATE YOUR ASTHMA CONTROL: WELL CONTROLLED
QUESTION_4 LAST FOUR WEEKS HOW OFTEN HAVE YOU USED YOUR RESCUE INHALER OR NEBULIZER MEDICATION (SUCH AS ALBUTEROL): ONCE A WEEK OR LESS
QUESTION_2 LAST FOUR WEEKS HOW OFTEN HAVE YOU HAD SHORTNESS OF BREATH: ONCE OR TWICE A WEEK
QUESTION_3 LAST FOUR WEEKS HOW OFTEN DID YOUR ASTHMA SYMPTOMS (WHEEZING, COUGHING, SHORTNESS OF BREATH, CHEST TIGHTNESS OR PAIN) WAKE YOU UP AT NIGHT OR EARLIER THAN USUAL IN THE MORNING: ONCE OR TWICE

## 2020-03-10 ASSESSMENT — MIFFLIN-ST. JEOR: SCORE: 1597.3

## 2020-03-10 NOTE — PROGRESS NOTES
Subjective     Zacarias Adam is a 24 year old female who presents to clinic today for the following health issues:      24 yr old female here for recheck of depression and anxiety . Patient was seen about a month ago and started on citalopram. She reports that it has helped but she is not sleeping . She reports that she is also very forgetful. Patient reports that she will like to try another medication. She is aware that most of the medication contain the same chemicals    HPI   Depression and Anxiety Follow-Up    How are you doing with your depression since your last visit? Worsened patient is having insomnia and forgetful and no emotion with the medication     How are you doing with your anxiety since your last visit?  Worsened same patient struggles more with the anxiety than depression     Are you having other symptoms that might be associated with depression or anxiety? Yes:  patient will worry about thing happening to her or her family  but has gotten a little better     Have you had a significant life event? No     Do you have any concerns with your use of alcohol or other drugs? No    Social History     Tobacco Use     Smoking status: Former Smoker     Packs/day: 0.50     Types: Cigarettes     Smokeless tobacco: Never Used     Tobacco comment: down to 1 cig/day with pregnancy   Substance Use Topics     Alcohol use: Yes     Alcohol/week: 0.0 standard drinks     Comment: occasional- quit with pregnancy     Drug use: No     PHQ 5/12/2017 2/18/2020 3/10/2020   PHQ-9 Total Score 1 16 13   Q9: Thoughts of better off dead/self-harm past 2 weeks Not at all Not at all Not at all     PRAKASH-7 SCORE 1/30/2017 2/18/2020 3/10/2020   Total Score - - -   Total Score 16 17 11     Last PHQ-9 3/10/2020   1.  Little interest or pleasure in doing things 2   2.  Feeling down, depressed, or hopeless 0   3.  Trouble falling or staying asleep, or sleeping too much 3   4.  Feeling tired or having little energy 2   5.  Poor appetite or  overeating 2   6.  Feeling bad about yourself 1   7.  Trouble concentrating 2   8.  Moving slowly or restless 1   Q9: Thoughts of better off dead/self-harm past 2 weeks 0   PHQ-9 Total Score 13   Difficulty at work, home, or with people -     PRAKASH-7  3/10/2020   1. Feeling nervous, anxious, or on edge 0   2. Not being able to stop or control worrying 2   3. Worrying too much about different things 2   4. Trouble relaxing 2   5. Being so restless that it is hard to sit still 1   6. Becoming easily annoyed or irritable 1   7. Feeling afraid, as if something awful might happen 3   PRAKASH-7 Total Score 11   If you checked any problems, how difficult have they made it for you to do your work, take care of things at home, or get along with other people? -         Suicide Assessment Five-step Evaluation and Treatment (SAFE-T)      How many servings of fruits and vegetables do you eat daily?  4 or more    On average, how many sweetened beverages do you drink each day (Examples: soda, juice, sweet tea, etc.  Do NOT count diet or artificially sweetened beverages)?   0    How many days per week do you exercise enough to make your heart beat faster? 3 or less    How many minutes a day do you exercise enough to make your heart beat faster? 10 - 19    How many days per week do you miss taking your medication? 0    Medication Followup of celexa     Taking Medication as prescribed: yes    Side Effects:  Make patient more forgetful and insomnia     Medication Helping Symptoms:  NO         Patient Active Problem List   Diagnosis     Congenital uterine anomaly     Endometriosis     Mild persistent asthma     Food allergy     Allergic rhinitis     PTSD (post-traumatic stress disorder)     Fibromyalgia     Chronic low back pain     Migraine aura without headache (migraine equivalents)     Anxiety     Vitamin D deficiency     Essential hypertension     External hemorrhoids     Anemia due to blood loss, acute     Prenatal care, subsequent  pregnancy     Vasovagal episode     Gestational hypertension w/o significant proteinuria in 3rd trimester     S/P primary low transverse      PRAKASH (generalized anxiety disorder)     Current moderate episode of major depressive disorder without prior episode (H)     Past Surgical History:   Procedure Laterality Date     ARTHROSCOPY KNEE RT/LT      right      SECTION N/A 2018    Procedure:  section;  Surgeon: Wendie Johnson MD;  Location: [facility]      LAPAROSCOPY, SURGICAL, ABDOMEN, PERITONEUM & OMENTUM; DX W/ OR W/O SPECIMEN(S)  10/20/10    Adhesiolysis, cautery of endometriosis--Dr. Donald     LAPAROSCOPIC APPENDECTOMY  4/10/2014    Procedure: LAPAROSCOPIC APPENDECTOMY;;  Surgeon: Simone Morales MD;  Location: [facility]     LAPAROSCOPIC LYSIS ADHESIONS  2011    LSC TROY--Dr. 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 and paratubal cyst       Social History     Tobacco Use     Smoking status: Former Smoker     Packs/day: 0.50     Types: Cigarettes     Smokeless tobacco: Never Used     Tobacco comment: down to 1 cig/day with pregnancy   Substance Use Topics     Alcohol use: Yes     Alcohol/week: 0.0 standard drinks     Comment: occasional- quit with pregnancy     Family History   Problem Relation Age of Onset     Allergies Mother      Depression Mother      Thyroid Disease Mother      Respiratory Mother         asthma     Hypertension Mother      Depression Father      Migraines Father      Depression Sister      Coronary Artery Disease Paternal Grandfather         MI     Diabetes Maternal Grandfather          Current Outpatient Medications   Medication Sig Dispense Refill     albuterol (PROAIR  "HFA/PROVENTIL HFA/VENTOLIN HFA) 108 (90 Base) MCG/ACT inhaler Inhale 2 puffs into the lungs every 6 hours as needed for shortness of breath / dyspnea or wheezing 1 Inhaler 0     citalopram (CELEXA) 20 MG tablet Take 1 tablet (20 mg) by mouth daily 30 tablet 1     FLUoxetine (PROZAC) 10 MG capsule Take 1 capsule (10 mg) by mouth daily 30 capsule 0     norethindrone-ethinyl estradiol (MICROGESTIN 1/20) 1-20 MG-MCG tablet Take 1 tablet by mouth daily 90 tablet 3     Vitamin D, Cholecalciferol, 1000 units TABS Take 2,000 Units by mouth daily       Allergies   Allergen Reactions     Crabs [Crustaceans] Hives and Swelling            Nuts Hives and Swelling     angioedema     Food Other (See Comments) and Diarrhea     Turkey - Vomiting     Soybean Oil GI Disturbance     Vicodin [Hydrocodone-Acetaminophen] Other (See Comments) and Itching     Becomes agitated.  OK with tramadol     Morphine Anxiety     Soy Allergy GI Disturbance     BP Readings from Last 3 Encounters:   03/10/20 112/70   02/18/20 110/72   02/09/20 133/78    Wt Readings from Last 3 Encounters:   03/10/20 82.6 kg (182 lb)   02/18/20 82.1 kg (181 lb)   02/09/20 78 kg (172 lb)                      Reviewed and updated as needed this visit by Provider         Review of Systems   ROS COMP: Constitutional, HEENT, cardiovascular, pulmonary, gi and gu systems are negative, except as otherwise noted.      Objective    /70   Pulse 77   Temp 98.5  F (36.9  C) (Tympanic)   Resp 18   Ht 1.676 m (5' 6\")   Wt 82.6 kg (182 lb)   SpO2 100%   BMI 29.38 kg/m    Body mass index is 29.38 kg/m .  Physical Exam   GENERAL: healthy, alert and no distress  EYES: Eyes grossly normal to inspection, PERRL and conjunctivae and sclerae normal  HENT: ear canals and TM's normal, nose and mouth without ulcers or lesions  NECK: no adenopathy, no asymmetry, masses, or scars and thyroid normal to palpation  RESP: lungs clear to auscultation - no rales, rhonchi or wheezes  CV: " regular rate and rhythm, normal S1 S2, no S3 or S4, no murmur, click or rub, no peripheral edema and peripheral pulses strong  ABDOMEN: soft, nontender, no hepatosplenomegaly, no masses and bowel sounds normal  MS: no gross musculoskeletal defects noted, no edema  SKIN: no suspicious lesions or rashes    Diagnostic Test Results:  none         Assessment & Plan     1. Anxiety  Faxed new medication. Will like to touch base with patient in a month. Discussed possible side effects with patient.  - FLUoxetine (PROZAC) 10 MG capsule; Take 1 capsule (10 mg) by mouth daily  Dispense: 30 capsule; Refill: 0    2. Encounter for surveillance of other contraceptive  Medication refilled.   - norethindrone-ethinyl estradiol (MICROGESTIN 1/20) 1-20 MG-MCG tablet; Take 1 tablet by mouth daily  Dispense: 90 tablet; Refill: 3       FUTURE APPOINTMENTS:       - Follow-up visit in one month or sooner as needed.  Patient Instructions     Patient Education     Treating Anxiety Disorders with Medicine  An anxiety disorder can make you feel nervous or apprehensive, even without a clear reason. In people age 65 and older, generalized anxiety disorder is one of the most commonly diagnosed anxiety disorders. Many times it occurs with depression. Certain anxiety disorders can cause intense feelings of fear or panic. You may even have physical symptoms such as a racing heartbeat, sweating, or dizziness. If you have these feelings, you don t have to suffer anymore. Treatment to help you overcome your fears will likely include therapy (also called counseling). Medicine may also be prescribed to help control your symptoms.    Medicines  Certain medicines may be prescribed to help control your symptoms. So you may feel less anxious. You may also feel able to move forward with therapy. At first, medicines and dosages may need to be adjusted to find what works best for you. Try to be patient. Tell your healthcare provider how a medicine makes you  feel. This way, you can work together to find the treatment that s best for you. Keep in mind that medicines can have side effects. Talk with your provider about any side effects that are bothering you. Changing the dose or type of medicine may help. Don t stop taking medicine on your own. That can cause symptoms to come back or cause dangerous withdrawal symptoms.    Anti-anxiety medicine. This medicine eases symptoms and helps you relax. Your healthcare provider will explain when and how to use it. It may be prescribed for use before situations that make you anxious. You may also be told to take medicine on a regular schedule. Anti-anxiety medicine may make you feel a little sleepy or  out of it.  Don t drive a car or operate machinery while on this medicine, until you know how it affects you.  Never use alcohol or other drugs with anti-anxiety medicines. This could result in loss of muscular control, sedation, coma, or death. Also, use only the amount of medicine prescribed for you. If you think you may have taken too much, get emergency care right away. Never share your medications with others. Store these medications in a safe place that can't be accessed by children or visitors.  Keep taking medicines as prescribed  Never change your dosage, share or use another person's medicine, or stop taking your medicines without talking to your healthcare provider first. Keep the following in mind:    Some medicines must be taken on a schedule. Make this part of your daily routine. For instance, always take your pill before brushing your teeth. A pillbox can help you remember if you ve taken your medicine each day.    Medicines are often taken for 6 to 12 months. Your healthcare provider will then evaluate whether you need to stay on them. Many people who have also had therapy may no longer need medicine to manage anxiety.    You may need to stop taking medicine slowly to give your body time to adjust. When it s time to  stop, your healthcare provider will tell you more. Remember: Never stop taking your medicine without talking to your provider first.    If symptoms return, you may need to start taking medicines again. This isn t your fault. It s just the nature of your anxiety disorder.    Side effects. Medicines may cause side effects. Ask your healthcare provider or pharmacist what you can expect. They may have ideas for avoiding some side effects.    Sexual problems. Some antidepressants can affect your desire for sex or your ability to have an orgasm. A change in dosage or medicine often solves the problem. If you have a sexual side effect that concerns you, tell your healthcare provider.    Addiction. If you ve never had a problem with drugs or alcohol, you may not have a problem with medicines used to treat anxiety disorders. But always discuss the medicines with your healthcare provider before taking them. If you have a history of addiction, you may not be able to use certain medicines used to treat anxiety disorders.    Medicine interactions. Always check with your pharmacist before using any over-the-counter medicines (OTCs), including herbal supplements. Some OTCs may interact with your anti-anxiety medications and increase or decrease their effectiveness.    Date Last Reviewed: 5/1/2017 2000-2019 [employer]. [address] [place] [address], [place] [address]. All rights reserved. This information is not intended as a substitute for professional medical care. Always follow your healthcare professional's instructions.               No follow-ups on file.    Justin Pyle MD  [facility]

## 2020-03-10 NOTE — PATIENT INSTRUCTIONS
Patient Education     Treating Anxiety Disorders with Medicine  An anxiety disorder can make you feel nervous or apprehensive, even without a clear reason. In people age 65 and older, generalized anxiety disorder is one of the most commonly diagnosed anxiety disorders. Many times it occurs with depression. Certain anxiety disorders can cause intense feelings of fear or panic. You may even have physical symptoms such as a racing heartbeat, sweating, or dizziness. If you have these feelings, you don t have to suffer anymore. Treatment to help you overcome your fears will likely include therapy (also called counseling). Medicine may also be prescribed to help control your symptoms.    Medicines  Certain medicines may be prescribed to help control your symptoms. So you may feel less anxious. You may also feel able to move forward with therapy. At first, medicines and dosages may need to be adjusted to find what works best for you. Try to be patient. Tell your healthcare provider how a medicine makes you feel. This way, you can work together to find the treatment that s best for you. Keep in mind that medicines can have side effects. Talk with your provider about any side effects that are bothering you. Changing the dose or type of medicine may help. Don t stop taking medicine on your own. That can cause symptoms to come back or cause dangerous withdrawal symptoms.    Anti-anxiety medicine. This medicine eases symptoms and helps you relax. Your healthcare provider will explain when and how to use it. It may be prescribed for use before situations that make you anxious. You may also be told to take medicine on a regular schedule. Anti-anxiety medicine may make you feel a little sleepy or  out of it.  Don t drive a car or operate machinery while on this medicine, until you know how it affects you.  Never use alcohol or other drugs with anti-anxiety medicines. This could result in loss of muscular control, sedation, coma,  or death. Also, use only the amount of medicine prescribed for you. If you think you may have taken too much, get emergency care right away. Never share your medications with others. Store these medications in a safe place that can't be accessed by children or visitors.  Keep taking medicines as prescribed  Never change your dosage, share or use another person's medicine, or stop taking your medicines without talking to your healthcare provider first. Keep the following in mind:    Some medicines must be taken on a schedule. Make this part of your daily routine. For instance, always take your pill before brushing your teeth. A pillbox can help you remember if you ve taken your medicine each day.    Medicines are often taken for 6 to 12 months. Your healthcare provider will then evaluate whether you need to stay on them. Many people who have also had therapy may no longer need medicine to manage anxiety.    You may need to stop taking medicine slowly to give your body time to adjust. When it s time to stop, your healthcare provider will tell you more. Remember: Never stop taking your medicine without talking to your provider first.    If symptoms return, you may need to start taking medicines again. This isn t your fault. It s just the nature of your anxiety disorder.    Side effects. Medicines may cause side effects. Ask your healthcare provider or pharmacist what you can expect. They may have ideas for avoiding some side effects.    Sexual problems. Some antidepressants can affect your desire for sex or your ability to have an orgasm. A change in dosage or medicine often solves the problem. If you have a sexual side effect that concerns you, tell your healthcare provider.    Addiction. If you ve never had a problem with drugs or alcohol, you may not have a problem with medicines used to treat anxiety disorders. But always discuss the medicines with your healthcare provider before taking them. If you have a history  of addiction, you may not be able to use certain medicines used to treat anxiety disorders.    Medicine interactions. Always check with your pharmacist before using any over-the-counter medicines (OTCs), including herbal supplements. Some OTCs may interact with your anti-anxiety medications and increase or decrease their effectiveness.    Date Last Reviewed: 5/1/2017 2000-2019 [employer]. [address] [place] [address]. All rights reserved. This information is not intended as a substitute for professional medical care. Always follow your healthcare professional's instructions.

## 2020-03-11 ASSESSMENT — ANXIETY QUESTIONNAIRES: GAD7 TOTAL SCORE: 11

## 2020-03-11 ASSESSMENT — ASTHMA QUESTIONNAIRES: ACT_TOTALSCORE: 21

## 2020-03-19 DIAGNOSIS — R05.9 COUGH: ICD-10-CM

## 2020-03-19 DIAGNOSIS — J45.21 MILD INTERMITTENT ASTHMA WITH ACUTE EXACERBATION: ICD-10-CM

## 2020-03-19 DIAGNOSIS — J01.90 ACUTE SINUSITIS WITH SYMPTOMS > 10 DAYS: ICD-10-CM

## 2020-03-19 RX ORDER — ALBUTEROL SULFATE 90 UG/1
2 AEROSOL, METERED RESPIRATORY (INHALATION) EVERY 6 HOURS PRN
Qty: 1 INHALER | Refills: 0 | Status: SHIPPED | OUTPATIENT
Start: 2020-03-19 | End: 2021-02-01

## 2020-03-19 NOTE — TELEPHONE ENCOUNTER
Reason for Call:  Medication or medication refill:    Do you use a [facility]?  Name of the pharmacy and phone number for the current request:  [facility] [phone]    Name of the medication requested: Patient is asking for a refill on her Advair Disk and her albuteral    Can we leave a detailed message on this number? YES    Phone number patient can be reached at: Home number on file [phone] (home)    Best Time: any    Call taken on 3/19/2020 at 11:26 AM by Rajani Brink

## 2020-03-20 ENCOUNTER — TELEPHONE (OUTPATIENT)
Dept: FAMILY MEDICINE | Facility: CLINIC | Age: 25
End: 2020-03-20

## 2020-03-20 DIAGNOSIS — R05.9 COUGH: ICD-10-CM

## 2020-03-20 RX ORDER — BUDESONIDE AND FORMOTEROL FUMARATE DIHYDRATE 80; 4.5 UG/1; UG/1
2 AEROSOL RESPIRATORY (INHALATION) 2 TIMES DAILY
Qty: 1 INHALER | Refills: 11 | Status: SHIPPED | OUTPATIENT
Start: 2020-03-20 | End: 2021-02-01

## 2020-03-20 NOTE — TELEPHONE ENCOUNTER
Routing refill request to provider for review/approval because:  Advair inhaler last sent in 2010 by Dr. Young [facility]. This was then discontinued from list in 2015.    Patient is requesting a refill on this.      WILLIAM VieyraN, RN

## 2020-03-20 NOTE — TELEPHONE ENCOUNTER
Advair / Fluticasone-Salmeterol is not covered my patient's [facility] insurance. Symbicort is in the same category, and is covered. Could you send in a new script?  Thank you,  Juan A Eaton Miguel Ángel  [facility]

## 2020-03-20 NOTE — TELEPHONE ENCOUNTER
"Need to contact pt.  I do not find that pt has ever been prescribed Advair.      Albuterol inhaler Prescription approved per [facility] Refill Protocol.    Albuterol 108 mcg/act  Last Written Prescription Date:  10/29/19  Last Fill Quantity: 1 inh,  # refills: 0   Last office visit: 3/10/2020 with prescribing provider: Crissy Irwin   Future Office Visit:      Requested Prescriptions   Pending Prescriptions Disp Refills     albuterol (PROAIR HFA/PROVENTIL HFA/VENTOLIN HFA) 108 (90 Base) MCG/ACT inhaler 1 Inhaler 0     Sig: Inhale 2 puffs into the lungs every 6 hours as needed for shortness of breath / dyspnea or wheezing       Asthma Maintenance Inhalers - Anticholinergics Passed - 3/19/2020 11:26 AM        Passed - Patient is age 12 years or older        Passed - Asthma control assessment score within normal limits in last 6 months     Please review ACT score.           Passed - Medication is active on med list        Passed - Recent (6 mo) or future (30 days) visit within the authorizing provider's specialty     Patient had office visit in the last 6 months or has a visit in the next 30 days with authorizing provider or within the authorizing provider's specialty.  See \"Patient Info\" tab in inbasket, or \"Choose Columns\" in Meds & Orders section of the refill encounter.           Short-Acting Beta Agonist Inhalers Protocol  Passed - 3/19/2020 11:26 AM        Passed - Patient is age 12 or older        Passed - Asthma control assessment score within normal limits in last 6 months     Please review ACT score.           Passed - Medication is active on med list        Passed - Recent (6 mo) or future (30 days) visit within the authorizing provider's specialty     Patient had office visit in the last 6 months or has a visit in the next 30 days with authorizing provider or within the authorizing provider's specialty.  See \"Patient Info\" tab in inbasket, or \"Choose Columns\" in Meds & Orders section of the refill encounter.     "           ACT Total Scores 2/2/2015 8/24/2015 3/10/2020   ACT TOTAL SCORE 22 - -   ASTHMA ER VISITS 0 = None - -   ASTHMA HOSPITALIZATIONS 0 = None - -   ACT TOTAL SCORE (Goal Greater than or Equal to 20) - 21 21   In the past 12 months, how many times did you visit the emergency room for your asthma without being admitted to the hospital? - 0 0   In the past 12 months, how many times were you hospitalized overnight because of your asthma? - 0 0

## 2020-04-17 ENCOUNTER — VIRTUAL VISIT (OUTPATIENT)
Dept: FAMILY MEDICINE | Facility: CLINIC | Age: 25
End: 2020-04-17
Payer: COMMERCIAL

## 2020-04-17 DIAGNOSIS — F41.9 ANXIETY: ICD-10-CM

## 2020-04-17 PROCEDURE — 99441 ZZC PHYSICIAN TELEPHONE EVALUATION 5-10 MIN: CPT | Performed by: INTERNAL MEDICINE

## 2020-04-17 RX ORDER — FLUOXETINE 10 MG/1
10 CAPSULE ORAL DAILY
Qty: 30 CAPSULE | Refills: 0 | Status: CANCELLED | OUTPATIENT
Start: 2020-04-17

## 2020-04-17 ASSESSMENT — PATIENT HEALTH QUESTIONNAIRE - PHQ9
5. POOR APPETITE OR OVEREATING: MORE THAN HALF THE DAYS
SUM OF ALL RESPONSES TO PHQ QUESTIONS 1-9: 3

## 2020-04-17 ASSESSMENT — ANXIETY QUESTIONNAIRES
2. NOT BEING ABLE TO STOP OR CONTROL WORRYING: MORE THAN HALF THE DAYS
IF YOU CHECKED OFF ANY PROBLEMS ON THIS QUESTIONNAIRE, HOW DIFFICULT HAVE THESE PROBLEMS MADE IT FOR YOU TO DO YOUR WORK, TAKE CARE OF THINGS AT HOME, OR GET ALONG WITH OTHER PEOPLE: SOMEWHAT DIFFICULT
3. WORRYING TOO MUCH ABOUT DIFFERENT THINGS: MORE THAN HALF THE DAYS
5. BEING SO RESTLESS THAT IT IS HARD TO SIT STILL: MORE THAN HALF THE DAYS
GAD7 TOTAL SCORE: 14
7. FEELING AFRAID AS IF SOMETHING AWFUL MIGHT HAPPEN: SEVERAL DAYS
1. FEELING NERVOUS, ANXIOUS, OR ON EDGE: NEARLY EVERY DAY
6. BECOMING EASILY ANNOYED OR IRRITABLE: MORE THAN HALF THE DAYS

## 2020-04-17 NOTE — PROGRESS NOTES
"Zacarias Adam is a 24 year old female who is being evaluated via a billable telephone visit.      The patient has been notified of following:     \"This telephone visit will be conducted via a call between you and your physician/provider. We have found that certain health care needs can be provided without the need for a physical exam.  This service lets us provide the care you need with a short phone conversation.  If a prescription is necessary we can send it directly to your pharmacy.  If lab work is needed we can place an order for that and you can then stop by our lab to have the test done at a later time.    Telephone visits are billed at different rates depending on your insurance coverage. During this emergency period, for some insurers they may be billed the same as an in-person visit.  Please reach out to your insurance provider with any questions.    If during the course of the call the physician/provider feels a telephone visit is not appropriate, you will not be charged for this service.\"    Patient has given verbal consent for Telephone visit?  Yes    How would you like to obtain your AVS? Mail a copy    Subjective     Zacarias Adam is a 24 year old female who presents to clinic today for the following health issues:    Anxiety Follow-Up    How are you doing with your anxiety since your last visit? Worsened     Are you having other symptoms that might be associated with anxiety? No    Have you had a significant life event? No     Are you feeling depressed? No    Do you have any concerns with your use of alcohol or other drugs? No     Tried on citalopram but had side effects - insomnia, 'flat emotions'    She reports she is feeling more anxious, and feels it is due to the med.  Is feeling more irritable which is unlike her.    Was on zoloft years ago, but doesn't recall if effective or if had side effects.    Is not doing counseling.  Has young children and this prevents her from follow-up on this. "     Social History     Tobacco Use     Smoking status: Former Smoker     Packs/day: 0.50     Types: Cigarettes     Smokeless tobacco: Never Used     Tobacco comment: down to 1 cig/day with pregnancy   Substance Use Topics     Alcohol use: Yes     Alcohol/week: 0.0 standard drinks     Comment: occasional- quit with pregnancy     Drug use: No     PRAKASH-7 SCORE 1/30/2017 2/18/2020 3/10/2020   Total Score - - -   Total Score 16 17 11     PHQ 5/12/2017 2/18/2020 3/10/2020   PHQ-9 Total Score 1 16 13   Q9: Thoughts of better off dead/self-harm past 2 weeks Not at all Not at all Not at all     Last PHQ-9 4/17/2020   1.  Little interest or pleasure in doing things 1   2.  Feeling down, depressed, or hopeless 0   3.  Trouble falling or staying asleep, or sleeping too much 2   4.  Feeling tired or having little energy 0   5.  Poor appetite or overeating 0   6.  Feeling bad about yourself 0   7.  Trouble concentrating 0   8.  Moving slowly or restless 0   Q9: Thoughts of better off dead/self-harm past 2 weeks 0   PHQ-9 Total Score 3   Difficulty at work, home, or with people Somewhat difficult     PRAKASH-7  4/17/2020   1. Feeling nervous, anxious, or on edge 3   2. Not being able to stop or control worrying 2   3. Worrying too much about different things 2   4. Trouble relaxing 2   5. Being so restless that it is hard to sit still 2   6. Becoming easily annoyed or irritable 2   7. Feeling afraid, as if something awful might happen 1   PRAKASH-7 Total Score 14   If you checked any problems, how difficult have they made it for you to do your work, take care of things at home, or get along with other people? Somewhat difficult         Medication Followup of FLUoxetine (PROZAC) 10 MG capsule     Taking Medication as prescribed: yes    Side Effects:  Yes - headaches and more inpatient    Medication Helping Symptoms:  Not really                 Current Outpatient Medications   Medication Sig Dispense Refill     albuterol (PROAIR  HFA/PROVENTIL HFA/VENTOLIN HFA) 108 (90 Base) MCG/ACT inhaler Inhale 2 puffs into the lungs every 6 hours as needed for shortness of breath / dyspnea or wheezing 1 Inhaler 0     budesonide-formoterol (SYMBICORT) 80-4.5 MCG/ACT Inhaler Inhale 2 puffs into the lungs 2 times daily 1 Inhaler 11     FLUoxetine (PROZAC) 20 MG capsule Take 1 capsule (20 mg) by mouth daily 30 capsule 2     norethindrone-ethinyl estradiol (MICROGESTIN 1/20) 1-20 MG-MCG tablet Take 1 tablet by mouth daily 90 tablet 3     Vitamin D, Cholecalciferol, 1000 units TABS Take 2,000 Units by mouth daily         Reviewed and updated as needed this visit by Provider         Review of Systems   ROS COMP: Constitutional, HEENT, cardiovascular, pulmonary, gi and gu systems are negative, except as otherwise noted.       Objective   Reported vitals:  There were no vitals taken for this visit.   healthy, alert and no distress  PSYCH: Alert and oriented times 3; coherent speech, normal   rate and volume, able to articulate logical thoughts, able   to abstract reason, no tangential thoughts, no hallucinations   or delusions  Her affect is anxious  RESP: No cough, no audible wheezing, able to talk in full sentences  Remainder of exam unable to be completed due to telephone visits    Diagnostic Test Results:  Labs reviewed in Epic  No results found for this or any previous visit (from the past 24 hour(s)).        Assessment/Plan:  1. Anxiety - uncontrolled at low dose prozac.  The worsened anxiety patient intially thought was side effects to prozac, but this seems more likely due to uncontrolled anxiety and need for dose increase.  Follow-up in 2-4 weeks.  Encouraged counseling.  See AVS for resources given to patient   - FLUoxetine (PROZAC) 20 MG capsule; Take 1 capsule (20 mg) by mouth daily  Dispense: 30 capsule; Refill: 2    No follow-ups on file.      Phone call duration:  9 minutes    Melisa Blanchard DO

## 2020-04-17 NOTE — PATIENT INSTRUCTIONS
"1. Increase Fluoxetine to 20 mg once daily.  Follow-up in 2-4 weeks for dose increase if needed.  2. Consider counseling.    Here are several resources for Online Therapy/Counseling:    [employer] https://www.[employer].com/    Better Help https://www.betterhelp.com/    [employer] https://[employer]in[employer].com/      Relaxation Techniques    Breathing Exercises:    Inhale through your nose counting to \"4\" as you are breathing in  Exhale through your mouth - counting to \"6:\" or more - pursing your lips to slow down the exhalation.  Try to do this exercise 3 times if you are able to -remember even once is going to make a difference.     Beach Ball Exercise:    Imagine you are holding a deflated beach ball with both hands in front of you.  As you inhale -imagine to be trouble filling with air  -until you have experienced a complete inhalation.  As you exhale through your mouth, slightly pursed lips, managing herself squeezing the air out of the beach well.    The following two exercises are from: DBT Skills Training Handouts and Worksheets, 2nd edition.  2015.  Latrice Valdez      Half Smile Exercise:    1st: Relax your face from top of her head down to her chin and jaw.  Let go of each facial muscle: Forehead, eyes, brows, cheeks, mouth, tongue - teeth slightly apart  2nd: Left both corners of her lips go slightly up, just so you can feel them.  It is not necessary for others to see it.  A half smile is slightly turned up lips with a relaxed face.  3rd: Adopt a serene facial expression.  Think of the Lucia Dorsey and here serene face    Willing Hands Exercise:    Notice the positions of your hands - especially if you are feeling some stress  While sitting place your hands on your lap - with hands unclenched - turn your palms up with fingers relaxed                "

## 2020-04-18 ASSESSMENT — ANXIETY QUESTIONNAIRES: GAD7 TOTAL SCORE: 14

## 2020-05-20 ENCOUNTER — OFFICE VISIT (OUTPATIENT)
Dept: FAMILY MEDICINE | Facility: CLINIC | Age: 25
End: 2020-05-20
Payer: COMMERCIAL

## 2020-05-20 VITALS
OXYGEN SATURATION: 98 % | BODY MASS INDEX: 29.7 KG/M2 | HEART RATE: 87 BPM | RESPIRATION RATE: 16 BRPM | DIASTOLIC BLOOD PRESSURE: 88 MMHG | WEIGHT: 184.8 LBS | SYSTOLIC BLOOD PRESSURE: 140 MMHG | TEMPERATURE: 98.4 F | HEIGHT: 66 IN

## 2020-05-20 DIAGNOSIS — T14.8XXA BRUISING: ICD-10-CM

## 2020-05-20 DIAGNOSIS — R35.0 URINARY FREQUENCY: ICD-10-CM

## 2020-05-20 DIAGNOSIS — R53.83 OTHER FATIGUE: Primary | ICD-10-CM

## 2020-05-20 LAB
ALBUMIN SERPL-MCNC: 3.7 G/DL (ref 3.4–5)
ALBUMIN UR-MCNC: NEGATIVE MG/DL
ALP SERPL-CCNC: 87 U/L (ref 40–150)
ALT SERPL W P-5'-P-CCNC: 23 U/L (ref 0–50)
ANION GAP SERPL CALCULATED.3IONS-SCNC: 4 MMOL/L (ref 3–14)
APPEARANCE UR: CLEAR
AST SERPL W P-5'-P-CCNC: 15 U/L (ref 0–45)
BILIRUB SERPL-MCNC: 0.3 MG/DL (ref 0.2–1.3)
BILIRUB UR QL STRIP: NEGATIVE
BUN SERPL-MCNC: 13 MG/DL (ref 7–30)
CALCIUM SERPL-MCNC: 9.1 MG/DL (ref 8.5–10.1)
CHLORIDE SERPL-SCNC: 105 MMOL/L (ref 94–109)
CO2 SERPL-SCNC: 27 MMOL/L (ref 20–32)
COLOR UR AUTO: YELLOW
CREAT SERPL-MCNC: 0.7 MG/DL (ref 0.52–1.04)
ERYTHROCYTE [DISTWIDTH] IN BLOOD BY AUTOMATED COUNT: 13.1 % (ref 10–15)
GFR SERPL CREATININE-BSD FRML MDRD: >90 ML/MIN/{1.73_M2}
GLUCOSE SERPL-MCNC: 71 MG/DL (ref 70–99)
GLUCOSE UR STRIP-MCNC: NEGATIVE MG/DL
HCG UR QL: NEGATIVE
HCT VFR BLD AUTO: 41.9 % (ref 35–47)
HETEROPH AB SER QL: NEGATIVE
HGB BLD-MCNC: 14 G/DL (ref 11.7–15.7)
HGB UR QL STRIP: NEGATIVE
KETONES UR STRIP-MCNC: ABNORMAL MG/DL
LEUKOCYTE ESTERASE UR QL STRIP: NEGATIVE
MCH RBC QN AUTO: 29.3 PG (ref 26.5–33)
MCHC RBC AUTO-ENTMCNC: 33.4 G/DL (ref 31.5–36.5)
MCV RBC AUTO: 88 FL (ref 78–100)
NITRATE UR QL: NEGATIVE
PH UR STRIP: 6 PH (ref 5–7)
PLATELET # BLD AUTO: 274 10E9/L (ref 150–450)
POTASSIUM SERPL-SCNC: 3.8 MMOL/L (ref 3.4–5.3)
PROT SERPL-MCNC: 7.3 G/DL (ref 6.8–8.8)
RBC # BLD AUTO: 4.78 10E12/L (ref 3.8–5.2)
SODIUM SERPL-SCNC: 136 MMOL/L (ref 133–144)
SOURCE: ABNORMAL
SP GR UR STRIP: 1.02 (ref 1–1.03)
T4 FREE SERPL-MCNC: 0.99 NG/DL (ref 0.76–1.46)
TSH SERPL DL<=0.005 MIU/L-ACNC: 7.13 MU/L (ref 0.4–4)
UROBILINOGEN UR STRIP-ACNC: 0.2 EU/DL (ref 0.2–1)
WBC # BLD AUTO: 8.4 10E9/L (ref 4–11)

## 2020-05-20 PROCEDURE — 81025 URINE PREGNANCY TEST: CPT | Performed by: NURSE PRACTITIONER

## 2020-05-20 PROCEDURE — 84443 ASSAY THYROID STIM HORMONE: CPT | Performed by: NURSE PRACTITIONER

## 2020-05-20 PROCEDURE — 87591 N.GONORRHOEAE DNA AMP PROB: CPT | Performed by: NURSE PRACTITIONER

## 2020-05-20 PROCEDURE — 86308 HETEROPHILE ANTIBODY SCREEN: CPT | Performed by: NURSE PRACTITIONER

## 2020-05-20 PROCEDURE — 84439 ASSAY OF FREE THYROXINE: CPT | Performed by: NURSE PRACTITIONER

## 2020-05-20 PROCEDURE — 87491 CHLMYD TRACH DNA AMP PROBE: CPT | Performed by: NURSE PRACTITIONER

## 2020-05-20 PROCEDURE — 99214 OFFICE O/P EST MOD 30 MIN: CPT | Performed by: NURSE PRACTITIONER

## 2020-05-20 PROCEDURE — 36415 COLL VENOUS BLD VENIPUNCTURE: CPT | Performed by: NURSE PRACTITIONER

## 2020-05-20 PROCEDURE — 81003 URINALYSIS AUTO W/O SCOPE: CPT | Performed by: NURSE PRACTITIONER

## 2020-05-20 PROCEDURE — 80053 COMPREHEN METABOLIC PANEL: CPT | Performed by: NURSE PRACTITIONER

## 2020-05-20 PROCEDURE — 85027 COMPLETE CBC AUTOMATED: CPT | Performed by: NURSE PRACTITIONER

## 2020-05-20 ASSESSMENT — MIFFLIN-ST. JEOR: SCORE: 1610

## 2020-05-20 NOTE — PROGRESS NOTES
"Subjective     Zacarias Adam is a 24 year old female who presents to clinic today for the following health issues:    HPI   Abdominal Pain      Duration: 2-3 days.    Description (location/character/radiation): states that if feels like she has done a lot of situps and her abdomen is extremely sore.        Associated flank pain: None    Intensity:  moderate    Accompanying signs and symptoms: bruising - top of foot, arms, legs/thighs.       Fever/Chills: no. But reports extreme sweating and being hot       Gas/Bloating: YES- both, intermittent        Nausea/vomitting: No       Diarrhea: YES- but intermittent - some days diarrhea and other days constipated.        Dysuria or Hematuria: no     History (previous similar pain/trauma/previous testing): No    Precipitating or alleviating factors:       Pain worse with eating/BM/urination: worsens \"a little\" with BM        Pain relieved by BM: no     Therapies tried and outcome: Tylenol - rest and fluids.   LMP:  not applicable. Patient takes BC non stop due to endometriosis. States she has not had a menstrual cycle for 2 months.    Reports fatigue has been ongoing for 1-2 months.    On oral contraceptive pills - restarted this.  On Fluoxetine for 3 months.  On Vitamin D.    Patient Active Problem List   Diagnosis     Congenital uterine anomaly     Endometriosis     Mild persistent asthma     Food allergy     Allergic rhinitis     PTSD (post-traumatic stress disorder)     Fibromyalgia     Chronic low back pain     Migraine aura without headache (migraine equivalents)     Anxiety     Vitamin D deficiency     Essential hypertension     External hemorrhoids     Anemia due to blood loss, acute     Prenatal care, subsequent pregnancy     Vasovagal episode     Gestational hypertension w/o significant proteinuria in 3rd trimester     S/P primary low transverse      PRAKASH (generalized anxiety disorder)     Current moderate episode of major depressive disorder without prior " episode (H)     Past Surgical History:   Procedure Laterality Date     ARTHROSCOPY KNEE RT/LT      right      SECTION N/A 2018    Procedure:  section;  Surgeon: Wendie Johnson MD;  Location: [facility]      LAPAROSCOPY, SURGICAL, ABDOMEN, PERITONEUM & OMENTUM; DX W/ OR W/O SPECIMEN(S)  10/20/10    Adhesiolysis, cautery of endometriosis--Dr. Donald     LAPAROSCOPIC APPENDECTOMY  4/10/2014    Procedure: LAPAROSCOPIC APPENDECTOMY;;  Surgeon: Simone Morales MD;  Location: [facility]     LAPAROSCOPIC LYSIS ADHESIONS  2011    C TROY--Dr. 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 and paratubal cyst       Social History     Tobacco Use     Smoking status: Former Smoker     Packs/day: 0.50     Types: Cigarettes     Smokeless tobacco: Never Used     Tobacco comment: down to 1 cig/day with pregnancy   Substance Use Topics     Alcohol use: Yes     Alcohol/week: 0.0 standard drinks     Comment: occasional- quit with pregnancy     Family History   Problem Relation Age of Onset     Allergies Mother      Depression Mother      Thyroid Disease Mother      Respiratory Mother         asthma     Hypertension Mother      Depression Father      Migraines Father      Depression Sister      Coronary Artery Disease Paternal Grandfather         MI     Diabetes Maternal Grandfather          Current Outpatient Medications   Medication Sig Dispense Refill     albuterol (PROAIR HFA/PROVENTIL HFA/VENTOLIN HFA) 108 (90 Base) MCG/ACT inhaler Inhale 2 puffs into the lungs every 6 hours as needed for shortness of breath / dyspnea or wheezing 1 Inhaler 0     budesonide-formoterol (SYMBICORT) 80-4.5 MCG/ACT Inhaler Inhale 2 puffs into the lungs 2  times daily 1 Inhaler 11     FLUoxetine (PROZAC) 20 MG capsule Take 1 capsule (20 mg) by mouth daily 30 capsule 2     norethindrone-ethinyl estradiol (MICROGESTIN 1/20) 1-20 MG-MCG tablet Take 1 tablet by mouth daily 90 tablet 3     Vitamin D, Cholecalciferol, 1000 units TABS Take 2,000 Units by mouth daily       Allergies   Allergen Reactions     Crabs [Crustaceans] Hives and Swelling            Nuts Hives and Swelling     angioedema     Food Other (See Comments) and Diarrhea     Turkey - Vomiting     Soybean Oil GI Disturbance     Vicodin [Hydrocodone-Acetaminophen] Other (See Comments) and Itching     Becomes agitated.  OK with tramadol     Morphine Anxiety     Soy Allergy GI Disturbance     Recent Labs   Lab Test 11/13/18  0638 11/08/18  1147 10/18/18  1331 09/06/18  1105  04/25/18  1530  01/13/15  1357   ALT  --  13 20 11   < > 22   < > 30   CR 0.54 0.59 0.52 0.50*   < > 0.53   < > 0.76   GFRESTIMATED >90 >90 >90 >90   < > >90   < > >90  Non  GFR Calc     GFRESTBLACK >90 >90 >90 >90   < > >90   < > >90  African American GFR Calc     POTASSIUM 3.7  --   --   --   --  3.8  --  4.1   TSH  --   --   --   --   --   --   --  3.07    < > = values in this interval not displayed.      BP Readings from Last 3 Encounters:   05/20/20 (!) 140/88   03/10/20 112/70   02/18/20 110/72    Wt Readings from Last 3 Encounters:   05/20/20 83.8 kg (184 lb 12.8 oz)   03/10/20 82.6 kg (182 lb)   02/18/20 82.1 kg (181 lb)                    Reviewed and updated as needed this visit by Provider  Tobacco  Allergies  Meds  Problems  Med Hx  Surg Hx  Fam Hx         Review of Systems   Constitutional, HEENT, cardiovascular, pulmonary, GI, , musculoskeletal, neuro, skin, endocrine and psych systems are negative, except as otherwise noted.      Objective    BP (!) 140/88 (BP Location: Left arm, Patient Position: Sitting, Cuff Size: Adult Regular)   Pulse 87   Temp 98.4  F (36.9  C) (Tympanic)   Resp 16   Ht 1.676 m  "(5' 6\")   Wt 83.8 kg (184 lb 12.8 oz)   SpO2 98%   BMI 29.83 kg/m    Body mass index is 29.83 kg/m .  Physical Exam   GENERAL: healthy, alert and no distress  NECK: no adenopathy, no asymmetry, masses, or scars and thyroid normal to palpation  RESP: lungs clear to auscultation - no rales, rhonchi or wheezes  CV: regular rate and rhythm, normal S1 S2, no S3 or S4, no murmur, click or rub, no peripheral edema and peripheral pulses strong  ABDOMEN: soft, nontender, no hepatosplenomegaly, no masses and bowel sounds normal  MS: no gross musculoskeletal defects noted, no edema    Diagnostic Test Results:  Labs reviewed in Epic        Assessment & Plan     1. Other fatigue  Uncertain etiology.  Labs today.    - CBC with platelets  - Comprehensive metabolic panel (BMP + Alb, Alk Phos, ALT, AST, Total. Bili, TP)  - Mononucleosis screen  - TSH with free T4 reflex    2. Bruising  Discussed nothing concerning on exam.  ? Due to SE from serotonin specific reuptake inhibitor or oral contraceptive pills or benign etiology.    - CBC with platelets  - Comprehensive metabolic panel (BMP + Alb, Alk Phos, ALT, AST, Total. Bili, TP)    3. Urinary frequency   UA ordered.    - *UA reflex to Microscopic and Culture ([place] [facility] (except [place] and [place])  - NEISSERIA GONORRHOEA PCR  - CHLAMYDIA TRACHOMATIS PCR  - HCG Qual, Urine ([id number])     BMI:   Estimated body mass index is 29.83 kg/m  as calculated from the following:    Height as of this encounter: 1.676 m (5' 6\").    Weight as of this encounter: 83.8 kg (184 lb 12.8 oz).           Patient Instructions     Patient Education     Bruises (Contusions)    A contusion is a bruise. A bruise happens when a blow to your body doesn't break the skin but does break blood vessels beneath the skin. Blood leaking from the broken vessels causes redness and swelling. As it heals, your bruise is likely to turn colors like purple, green, and yellow. This is normal. The bruise " should fade in 2 or 3 weeks.  Factors that make you more likely to bruise  Almost everyone bruises now and then. Certain people do bruise more easily than others. You're more prone to bruising as you get older. That's because blood vessels become more fragile with age. You're also more likely to bruise if you have a clotting disorder such as hemophilia or take medicines that reduce clotting, including aspirin and coumadin. You are also more likley to bruise if you have liver disease and or drink alcohol daily.  When to go to the emergency room (ER)  Bruises almost always heal on their own without special treatment. But for some people, a bad bruise can be serious. Seek medical care if you:    Have a clotting disorder such as hemophilia    Have cirrhosis or other serious liver disease    Take blood-thinning medicines such as warfarin  What to expect in the ER  A doctor will examine your bruise and ask about any health conditions you have. In some cases, you may have a test to check how well your blood clots. Other treatment will depend on your needs.  Follow-up care  Sometimes a bruise gets worse instead of better. It may become larger and more swollen. This can occur when your body walls off a small pool of blood under the skin (hematoma). In very rare cases, your doctor may need to drain extra blood from the area.  Tip:  Apply an ice pack or bag of frozen peas to a bruise. Keep a thin cloth between the ice or frozen peas and your skin. The cold can help reduce redness and swelling.  Date Last Reviewed: 12/1/2016 2000-2019 [employer]. [address] [place] [address], [place] [address]. All rights reserved. This information is not intended as a substitute for professional medical care. Always follow your healthcare professional's instructions.               Return if symptoms worsen or fail to improve.    Crissy Irwin NP  [facility]

## 2020-05-20 NOTE — PATIENT INSTRUCTIONS
Patient Education     Bruises (Contusions)    A contusion is a bruise. A bruise happens when a blow to your body doesn't break the skin but does break blood vessels beneath the skin. Blood leaking from the broken vessels causes redness and swelling. As it heals, your bruise is likely to turn colors like purple, green, and yellow. This is normal. The bruise should fade in 2 or 3 weeks.  Factors that make you more likely to bruise  Almost everyone bruises now and then. Certain people do bruise more easily than others. You're more prone to bruising as you get older. That's because blood vessels become more fragile with age. You're also more likely to bruise if you have a clotting disorder such as hemophilia or take medicines that reduce clotting, including aspirin and coumadin. You are also more likley to bruise if you have liver disease and or drink alcohol daily.  When to go to the emergency room (ER)  Bruises almost always heal on their own without special treatment. But for some people, a bad bruise can be serious. Seek medical care if you:    Have a clotting disorder such as hemophilia    Have cirrhosis or other serious liver disease    Take blood-thinning medicines such as warfarin  What to expect in the ER  A doctor will examine your bruise and ask about any health conditions you have. In some cases, you may have a test to check how well your blood clots. Other treatment will depend on your needs.  Follow-up care  Sometimes a bruise gets worse instead of better. It may become larger and more swollen. This can occur when your body walls off a small pool of blood under the skin (hematoma). In very rare cases, your doctor may need to drain extra blood from the area.  Tip:  Apply an ice pack or bag of frozen peas to a bruise. Keep a thin cloth between the ice or frozen peas and your skin. The cold can help reduce redness and swelling.  Date Last Reviewed: 12/1/2016 2000-2019 [employer] [address]  [place] [address]. All rights reserved. This information is not intended as a substitute for professional medical care. Always follow your healthcare professional's instructions.

## 2020-05-21 LAB
C TRACH DNA SPEC QL NAA+PROBE: NEGATIVE
N GONORRHOEA DNA SPEC QL NAA+PROBE: NEGATIVE
SPECIMEN SOURCE: NORMAL
SPECIMEN SOURCE: NORMAL

## 2020-06-14 NOTE — MR AVS SNAPSHOT
After Visit Summary   2018    Zacarias Adam    MRN: [id number]           Patient Information     Date Of Birth          1995        Visit Information        Provider Department      2018 2:45 PM Ramone Hernandez MD [facility] Maternal Fetal Medicine [facility]        Today's Diagnoses     Anomalies of the placenta, unspecified trimester    -  1    Screening, , for malformation by ultrasound           Follow-ups after your visit        Your next 10 appointments already scheduled     Sep 26, 2018  8:45 AM CDT   MFM US COMPRE SINGLE F/U with [id number]   [facility] Maternal Fetal Medicine Ultrasound - [facility])    [address]  [facility] [id number] [phone]           Wear comfortable clothes and leave your valuables at home.            Sep 26, 2018  9:15 AM CDT   Radiology MD with UR ELANA MENDOZA   [facility] Maternal Fetal Medicine - [facility])    [address]  [facility] [id number] [phone]           Please arrive at the time given for your first appointment. This visit is used internally to schedule the physician's time during your ultrasound.              Future tests that were ordered for you today     Open Future Orders        Priority Expected Expires Ordered    MFM US Comprehensive Single F/U Routine  2019    M US Comprehensive Single Routine  2019            Who to contact     If you have questions or need follow up information about today's clinic visit or your schedule please contact [facility] MATERNAL FETAL MEDICINE [facility] directly at [phone].  Normal or non-critical lab and imaging results will be communicated to you by MyChart, letter or phone within 4 business days after the clinic has received the results. If you do not hear from us within 7 days, please contact the clinic through MyChart or phone. If you have a  pda alert    Pt just admitted and evaluated.  Spoke to charge nurse, pt hasnt even been moved in bed yet on floor from ed bed.  Appears sedated still, have stopped ordered valium and switched to only prn ativan for agitation/sz.  Cont current management.     critical or abnormal lab result, we will notify you by phone as soon as possible.  Submit refill requests through [employer] or call your pharmacy and they will forward the refill request to us. Please allow 3 business days for your refill to be completed.          Additional Information About Your Visit        Care EveryWhere ID     This is your Care EveryWhere ID. This could be used by other organizations to access your [place] medical records  [id number]        Your Vitals Were     Last Period                   02/25/2018            Blood Pressure from Last 3 Encounters:   07/26/18 136/76   06/19/18 119/66   05/21/18 133/71    Weight from Last 3 Encounters:   07/26/18 79.8 kg (176 lb)   06/19/18 78 kg (172 lb)   05/21/18 79.4 kg (175 lb)               Primary Care Provider Office Phone # Fax #    Yudi Sauceda [phone] [address] [place] [id number]        Equal Access to Services     BRIDGETTE LOPEZ : Hadii alton ku hadasho Soomaali, waaxda luqadaha, qaybta kaalmada adeegyada, waxay genein haybhavya gr . So [facility] [phone].    ATENCIÓN: Si habla español, tiene a aleman disposición servicios gratuitos de asistencia lingüística. Llame al [phone].    We comply with applicable federal civil rights laws and [place] laws. We do not discriminate on the basis of race, color, national origin, age, disability, sex, sexual orientation, or gender identity.            Thank you!     Thank you for choosing [facility]  for your care. Our goal is always to provide you with excellent care. Hearing back from our patients is one way we can continue to improve our services. Please take a few minutes to complete the written survey that you may receive in the mail after your visit with us. Thank you!             Your Updated Medication List - Protect others around you: Learn how to safely use, store and throw away your medicines at  www.disposemymeds.org.          This list is accurate as of 7/31/18  3:21 PM.  Always use your most recent med list.                   Brand Name Dispense Instructions for use Diagnosis    prenatal multivitamin plus iron 27-0.8 MG Tabs per tablet      Take 1 tablet by mouth daily

## 2020-07-21 ENCOUNTER — VIRTUAL VISIT (OUTPATIENT)
Dept: FAMILY MEDICINE | Facility: CLINIC | Age: 25
End: 2020-07-21
Payer: COMMERCIAL

## 2020-07-21 DIAGNOSIS — F42.9 OBSESSIVE-COMPULSIVE DISORDER, UNSPECIFIED TYPE: Primary | ICD-10-CM

## 2020-07-21 DIAGNOSIS — F41.9 ANXIETY: ICD-10-CM

## 2020-07-21 PROCEDURE — 99214 OFFICE O/P EST MOD 30 MIN: CPT | Mod: 95 | Performed by: FAMILY MEDICINE

## 2020-07-21 ASSESSMENT — ANXIETY QUESTIONNAIRES
GAD7 TOTAL SCORE: 13
1. FEELING NERVOUS, ANXIOUS, OR ON EDGE: NEARLY EVERY DAY
2. NOT BEING ABLE TO STOP OR CONTROL WORRYING: SEVERAL DAYS
5. BEING SO RESTLESS THAT IT IS HARD TO SIT STILL: MORE THAN HALF THE DAYS
7. FEELING AFRAID AS IF SOMETHING AWFUL MIGHT HAPPEN: NEARLY EVERY DAY
3. WORRYING TOO MUCH ABOUT DIFFERENT THINGS: SEVERAL DAYS
6. BECOMING EASILY ANNOYED OR IRRITABLE: NOT AT ALL

## 2020-07-21 ASSESSMENT — PATIENT HEALTH QUESTIONNAIRE - PHQ9
5. POOR APPETITE OR OVEREATING: NEARLY EVERY DAY
SUM OF ALL RESPONSES TO PHQ QUESTIONS 1-9: 10

## 2020-07-21 NOTE — LETTER
July 21, 2020      Zacarias Adam  [address] [facility] [address]        To Whom It May Concern:    Zacarias Adam  was seen on 07/21/2020. Please allow patient to have an emotional support animal as she deals with anxiety and obsessive compulsive disorder daily and the animal as been a great support for her. Please call if you have any questions.       Sincerely,        Justin Pyle MD

## 2020-07-21 NOTE — PROGRESS NOTES
"Zacarias Adam is a 25 year old female who is being evaluated via a billable telephone visit.      25 yr old femae with severe anxiety and OCD - says her OCD has not been well controlled. She picks at her skin and hair. She will like to see a psychiatrist for this.  She is on Fluoxetine for the anxiety and she states that it has been helping her. She reports that she thinks she may need an increase in the dose of the Fluoxetine.    The patient has been notified of following:     \"This telephone visit will be conducted via a call between you and your physician/provider. We have found that certain health care needs can be provided without the need for a physical exam.  This service lets us provide the care you need with a short phone conversation.  If a prescription is necessary we can send it directly to your pharmacy.  If lab work is needed we can place an order for that and you can then stop by our lab to have the test done at a later time.    Telephone visits are billed at different rates depending on your insurance coverage. During this emergency period, for some insurers they may be billed the same as an in-person visit.  Please reach out to your insurance provider with any questions.    If during the course of the call the physician/provider feels a telephone visit is not appropriate, you will not be charged for this service.\"    Patient has given verbal consent for Telephone visit?  Yes    What phone number would you like to be contacted at? [phone]    How would you like to obtain your AVS? Mail a copy    Subjective     Zacarias Adam is a 25 year old female who presents via phone visit today for the following health issues:    HPI    Chief Complaint   Patient presents with     Patient Request for Note/Letter     patient is moving and needs a note for a  pet      Obsessive Compulsive Disorder     getting much worse     Medication Request     wouldlike to talk about a different medication      Patient " will be moving and will need a letter for a  pet she does have a dog at this time and would need a letter for the dog.  Patient is also having increased sx with OCD with picking she feels that the prozac is not working for that        Anxiety Follow-Up    How are you doing with your anxiety since your last visit? Worsened would like to change medication     Are you having other symptoms that might be associated with anxiety? Yes:  panic attack and OCD     Have you had a significant life event? Relationship Concerns     Are you feeling depressed? Yes:  a little     Do you have any concerns with your use of alcohol or other drugs? No    Social History     Tobacco Use     Smoking status: Former Smoker     Packs/day: 0.50     Types: Cigarettes     Smokeless tobacco: Never Used     Tobacco comment: down to 1 cig/day with pregnancy   Substance Use Topics     Alcohol use: Yes     Alcohol/week: 0.0 standard drinks     Comment: occasional- quit with pregnancy     Drug use: No     PRAKASH-7 SCORE 3/10/2020 4/17/2020 7/21/2020   Total Score - - -   Total Score 11 14 13     PHQ 3/10/2020 4/17/2020 7/21/2020   PHQ-9 Total Score 13 3 10   Q9: Thoughts of better off dead/self-harm past 2 weeks Not at all Not at all Not at all     Last PHQ-9 7/21/2020   1.  Little interest or pleasure in doing things 2   2.  Feeling down, depressed, or hopeless 1   3.  Trouble falling or staying asleep, or sleeping too much 2   4.  Feeling tired or having little energy 1   5.  Poor appetite or overeating 1   6.  Feeling bad about yourself 0   7.  Trouble concentrating 2   8.  Moving slowly or restless 1   Q9: Thoughts of better off dead/self-harm past 2 weeks 0   PHQ-9 Total Score 10   Difficulty at work, home, or with people Somewhat difficult     PRAKASH-7  7/21/2020   1. Feeling nervous, anxious, or on edge 3   2. Not being able to stop or control worrying 1   3. Worrying too much about different things 1   4. Trouble relaxing 3   5. Being so  restless that it is hard to sit still 2   6. Becoming easily annoyed or irritable 0   7. Feeling afraid, as if something awful might happen 3   PRAKASH-7 Total Score 13   If you checked any problems, how difficult have they made it for you to do your work, take care of things at home, or get along with other people? -         How many servings of fruits and vegetables do you eat daily?  4 or more    On average, how many sweetened beverages do you drink each day (Examples: soda, juice, sweet tea, etc.  Do NOT count diet or artificially sweetened beverages)?   0    How many days per week do you exercise enough to make your heart beat faster? 3 or less    How many minutes a day do you exercise enough to make your heart beat faster? 9 or less    How many days per week do you miss taking your medication? 0      Patient Active Problem List   Diagnosis     Congenital uterine anomaly     Endometriosis     Mild persistent asthma     Food allergy     Allergic rhinitis     PTSD (post-traumatic stress disorder)     Fibromyalgia     Chronic low back pain     Migraine aura without headache (migraine equivalents)     Anxiety     Vitamin D deficiency     Essential hypertension     External hemorrhoids     Anemia due to blood loss, acute     Prenatal care, subsequent pregnancy     Vasovagal episode     Gestational hypertension w/o significant proteinuria in 3rd trimester     S/P primary low transverse      PRAKASH (generalized anxiety disorder)     Current moderate episode of major depressive disorder without prior episode (H)     Past Surgical History:   Procedure Laterality Date     ARTHROSCOPY KNEE RT/LT      right      SECTION N/A 2018    Procedure:  section;  Surgeon: Wendie Johnson MD;  Location: [facility] OR      LAPAROSCOPY, SURGICAL, ABDOMEN, PERITONEUM & OMENTUM; DX W/ OR W/O SPECIMEN(S)  10/20/10    Adhesiolysis, cautery of endometriosis--Dr. Donald     LAPAROSCOPIC APPENDECTOMY  4/10/2014     Procedure: LAPAROSCOPIC APPENDECTOMY;;  Surgeon: Simone Morales MD;  Location: [facility] OR     LAPAROSCOPIC LYSIS ADHESIONS  6/1/2011    [facility] [place]--Dr. 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       Social History     Tobacco Use     Smoking status: Former Smoker     Packs/day: 0.50     Types: Cigarettes     Smokeless tobacco: Never Used     Tobacco comment: down to 1 cig/day with pregnancy   Substance Use Topics     Alcohol use: Yes     Alcohol/week: 0.0 standard drinks     Comment: occasional- quit with pregnancy     Family History   Problem Relation Age of Onset     Allergies Mother      Depression Mother      Thyroid Disease Mother      Respiratory Mother         asthma     Hypertension Mother      Depression Father      Migraines Father      Depression Sister      Coronary Artery Disease Paternal Grandfather         MI     Diabetes Maternal Grandfather          Current Outpatient Medications   Medication Sig Dispense Refill     albuterol (PROAIR HFA/PROVENTIL HFA/VENTOLIN HFA) 108 (90 Base) MCG/ACT inhaler Inhale 2 puffs into the lungs every 6 hours as needed for shortness of breath / dyspnea or wheezing 1 Inhaler 0     FLUoxetine (PROZAC) 20 MG capsule Take 2 capsules (40 mg) by mouth daily 60 capsule 6     norethindrone-ethinyl estradiol (MICROGESTIN 1/20) 1-20 MG-MCG tablet Take 1 tablet by mouth daily 90 tablet 3     Vitamin D, Cholecalciferol, 1000 units TABS Take 2,000 Units by mouth daily       budesonide-formoterol (SYMBICORT) 80-4.5 MCG/ACT Inhaler Inhale 2 puffs into the lungs 2 times daily (Patient not taking: Reported on 7/21/2020) 1 Inhaler 11     Allergies   Allergen Reactions     Crabs [Crustaceans] Hives and  Swelling            Nuts Hives and Swelling     angioedema     Food Other (See Comments) and Diarrhea     Turkey - Vomiting     Soybean Oil GI Disturbance     Vicodin [Hydrocodone-Acetaminophen] Other (See Comments) and Itching     Becomes agitated.  OK with tramadol     Morphine Anxiety     Soy Allergy GI Disturbance     BP Readings from Last 3 Encounters:   05/20/20 (!) 140/88   03/10/20 112/70   02/18/20 110/72    Wt Readings from Last 3 Encounters:   05/20/20 83.8 kg (184 lb 12.8 oz)   03/10/20 82.6 kg (182 lb)   02/18/20 82.1 kg (181 lb)                    Reviewed and updated as needed this visit by Provider  Tobacco  Allergies  Meds  Problems  Med Hx  Surg Hx  Fam Hx         Review of Systems   Constitutional, HEENT, cardiovascular, pulmonary, gi and gu systems are negative, except as otherwise noted.       Objective   Reported vitals:  There were no vitals taken for this visit.   healthy, alert and no distress  PSYCH: Alert and oriented times 3; coherent speech, normal   rate and volume, able to articulate logical thoughts, able   to abstract reason, no tangential thoughts, no hallucinations   or delusions  Her affect is normal  RESP: No cough, no audible wheezing, able to talk in full sentences  Remainder of exam unable to be completed due to telephone visits    Diagnostic Test Results:  none         Assessment/Plan:    1. Obsessive-compulsive disorder, unspecified type  Referral placed- she will like to see a provider in [facility].   - MENTAL HEALTH REFERRAL  - Adult; Psychiatry; Psychiatry; Other: [facility] [phone]; We will contact you to schedule the appointment or please call with any questions    2. Anxiety  Increased Fluoxetine to 40 mg - letter for emotional support animal given   - FLUoxetine (PROZAC) 20 MG capsule; Take 2 capsules (40 mg) by mouth daily  Dispense: 60 capsule; Refill: 6    Return in about 4 weeks (around 8/18/2020) for In-Clinic Visit.      Phone call  duration:  6 minutes    Emilianoutanatoly Pyle MD

## 2020-07-22 ASSESSMENT — ANXIETY QUESTIONNAIRES: GAD7 TOTAL SCORE: 13

## 2020-09-05 ENCOUNTER — OFFICE VISIT (OUTPATIENT)
Dept: URGENT CARE | Facility: URGENT CARE | Age: 25
End: 2020-09-05
Payer: COMMERCIAL

## 2020-09-05 VITALS
RESPIRATION RATE: 14 BRPM | TEMPERATURE: 98.7 F | DIASTOLIC BLOOD PRESSURE: 84 MMHG | WEIGHT: 187.6 LBS | BODY MASS INDEX: 30.28 KG/M2 | SYSTOLIC BLOOD PRESSURE: 145 MMHG | HEART RATE: 88 BPM

## 2020-09-05 DIAGNOSIS — H81.10 BENIGN PAROXYSMAL POSITIONAL VERTIGO, UNSPECIFIED LATERALITY: Primary | ICD-10-CM

## 2020-09-05 PROCEDURE — 99214 OFFICE O/P EST MOD 30 MIN: CPT | Performed by: EMERGENCY MEDICINE

## 2020-09-05 RX ORDER — ONDANSETRON 4 MG/1
4 TABLET, ORALLY DISINTEGRATING ORAL EVERY 8 HOURS PRN
Qty: 12 TABLET | Refills: 0 | Status: SHIPPED | OUTPATIENT
Start: 2020-09-05 | End: 2021-08-03

## 2020-09-05 RX ORDER — CLOMIPRAMINE HYDROCHLORIDE 25 MG/1
25 CAPSULE ORAL DAILY
COMMUNITY
Start: 2020-08-25 | End: 2020-11-24 | Stop reason: DRUGHIGH

## 2020-09-05 RX ORDER — MECLIZINE HYDROCHLORIDE 25 MG/1
25 TABLET ORAL 3 TIMES DAILY PRN
Qty: 20 TABLET | Refills: 0 | Status: SHIPPED | OUTPATIENT
Start: 2020-09-05 | End: 2024-03-11

## 2020-09-05 ASSESSMENT — PAIN SCALES - GENERAL: PAINLEVEL: SEVERE PAIN (7)

## 2020-09-05 NOTE — PROGRESS NOTES
HPI: Patient is a 25-year-old female who presents with 1-1 and half weeks of vertiginous dizziness.  She notes the dizziness to be worse when she moves her head.  It does trigger nausea at a time.  She has had rare episodes in the past believe to be with upper respiratory infections.  She has no chronic ear disease.      ROS: See HPI otherwise normal.    Allergies   Allergen Reactions     Crabs [Crustaceans] Hives and Swelling            Nuts Hives and Swelling     angioedema     Food Other (See Comments) and Diarrhea     Turkey - Vomiting     Soybean Oil GI Disturbance     Vicodin [Hydrocodone-Acetaminophen] Other (See Comments) and Itching     Becomes agitated.  OK with tramadol     Morphine Anxiety     Soy Allergy GI Disturbance      Current Outpatient Medications   Medication Sig Dispense Refill     albuterol (PROAIR HFA/PROVENTIL HFA/VENTOLIN HFA) 108 (90 Base) MCG/ACT inhaler Inhale 2 puffs into the lungs every 6 hours as needed for shortness of breath / dyspnea or wheezing 1 Inhaler 0     budesonide-formoterol (SYMBICORT) 80-4.5 MCG/ACT Inhaler Inhale 2 puffs into the lungs 2 times daily 1 Inhaler 11     clomiPRAMINE (ANAFRANIL) 25 MG capsule Take 25 mg by mouth daily       meclizine (ANTIVERT) 25 MG tablet Take 1 tablet (25 mg) by mouth 3 times daily as needed for dizziness 20 tablet 0     norethindrone-ethinyl estradiol (MICROGESTIN 1/20) 1-20 MG-MCG tablet Take 1 tablet by mouth daily 90 tablet 3     ondansetron (ZOFRAN-ODT) 4 MG ODT tab Take 1 tablet (4 mg) by mouth every 8 hours as needed for nausea 12 tablet 0     Vitamin D, Cholecalciferol, 1000 units TABS Take 2,000 Units by mouth daily       FLUoxetine (PROZAC) 20 MG capsule Take 2 capsules (40 mg) by mouth daily (Patient not taking: Reported on 9/5/2020) 60 capsule 6         PE: 25-year-old female presents no acute distress but is holding her head still to avoid dizziness.  HEENT reveals PERRLA EOMI.  No nystagmus noted.  Cranial nerves are  symmetrically intact.  Ears reveal normal TMs and normal landmarks.  Heart is regular skin warm dry with neurologic exam is symmetrically intact.      TREATMENT: None      ASSESSMENT: Benign positional vertigo in a 25-year-old female who is in no acute distress although symptomatic at this time.      DIAGNOSIS: Benign positional vertigo      PLAN: Meclizine and Zofran for symptomatic improvement.  Quiet activity.  Read additional instruction materials.  Follow-up with Dr. rodriguez early next week if no improvement.

## 2020-09-05 NOTE — PATIENT INSTRUCTIONS
Meclizine for dizziness.  Zofran for nausea.    Very quiet activity to avoid symptoms    Stay well-hydrated    Go to emergency department if worsening symptoms    Follow-up with Dr. Sauceda for 5 days if no improvement  Patient Education     Benign Paroxysmal Positional Vertigo    Benign paroxysmal positional vertigo is a common condition. You feel as if the room is spinning after changing position, moving your head quickly, or even just rolling over in bed.  Vertigo is a false feeling of motion plus disorientation that makes it seem as though the room is spinning. A vertigo attack may cause sudden nausea, vomiting, and heavy sweating. Severe vertigo causes a loss of balance. You may even fall down.  Vertigo is caused by a problem with the inner ear. The inner ear is located behind the middle ear. It is a part of the balance center of the body. It contains small calcium particles within fluid-filled canals (semi-circular canals). These particles can move out of position. This may happen as a result of aging, head injury, or disease of the inner ear. Once that happens, moving your head in certain ways may cause the particles to stimulate the inner ear. This creates the feeling of vertigo.  An episode of vertigo may last seconds, minutes, or hours. Once you are over the first episode of vertigo, it may never return. Sometimes symptoms return off and on for several weeks or longer.  Home care  Follow these guidelines when caring for yourself at home:  Rest quietly in bed if your symptoms are severe. Change position slowly. There is usually 1 position that will feel best. This might be lying on 1 side or lying on your back with your head slightly raised on pillows. Until you have no symptoms, you are at a higher risk of falling. Let someone help you when you get up. Get rid of home hazards such as loose electrical cords and throw rugs. Don t walk in unfamiliar areas that are not lighted. Use night lights in bathrooms  and kitchen areas.  Do not drive or work with dangerous machinery for 1 week after symptoms go away. This is in case symptoms return suddenly.  Take medicine as prescribed to relieve your symptoms. Unless another medicine was prescribed for nausea, vomiting, and vertigo, you may use over-the-counter motion sickness medicine. Examples of this include meclizine and dimenhydrinate.  Follow-up care  Follow up with your healthcare provider, or as directed. Tell your provider about any ringing in your ear or hearing loss.  If you had a CT or MRI scan, a specialist will review it. You will be told of any new findings that may affect your care.  When to seek medical advice  Call your healthcare provider right away if any of these occur:  Vertigo gets worse even after taking prescribed medicine  Repeated vomiting even after taking prescribed medicine  Weakness that gets worse  Fainting  Severe headache or unusual drowsiness or confusion  Weakness of an arm or leg or 1 side of the face  Trouble walking  Trouble with speech or vision  Seizure  Trouble hearing  Fever of 100.4 F (38 C) or higher, or as directed by your healthcare provider  Fast heart rate  Chest pain   Date Last Reviewed: 11/1/2017 2000-2019 [employer]. [address] [place] [address], [place] [address]. All rights reserved. This information is not intended as a substitute for professional medical care. Always follow your healthcare professional's instructions.

## 2020-10-27 ENCOUNTER — TELEPHONE (OUTPATIENT)
Dept: FAMILY MEDICINE | Facility: CLINIC | Age: 25
End: 2020-10-27

## 2020-10-27 DIAGNOSIS — E55.9 VITAMIN D DEFICIENCY: Primary | ICD-10-CM

## 2020-10-27 DIAGNOSIS — E03.9 HYPOTHYROIDISM, UNSPECIFIED TYPE: ICD-10-CM

## 2020-10-27 NOTE — TELEPHONE ENCOUNTER
Patient would like lab orders for Vitamin D and Thyroid.     Per last TSH/T4 results:  Crissy Irwin, NP   5/21/2020 10:15 AM      Your Thyroid function was slightly elevated but your T4 is normal.  Not sure if this is contributing to your fatigue and symptoms.     If you would like you can start on medication Levothyroxine at 25 mg once daily - on an empty stomach for hypothyroidism and we   Can recheck TSH in 4-6 weeks.  Otherwise we can monitor and recheck labs in 3 months.     Please send information on hypothyroidism.     Call patient with information.  If interested order Levothyroxine 25 mcg daily #30 with 1 refill and TSH with reflex to T4 all attached to hypothyroidism, acquired.     Crissy Irwin, JERMAINE     It appears patient has not completed recheck yet and has not started levothyroxine.      WILLIAM VieyraN, RN

## 2020-10-27 NOTE — TELEPHONE ENCOUNTER
Reason for Call:  Other call back    Detailed comments: Would like orders for Vitamin D and Thyroid.    Phone Number Patient can be reached at: Home number on file [phone] (home)    Best Time: any    Can we leave a detailed message on this number? YES    Call taken on 10/27/2020 at 3:52 PM by Rajani Brink

## 2020-10-30 NOTE — TELEPHONE ENCOUNTER
Left detailed message for patient that labs are ordered and she needs outpatient lab appt. April VAUGHAN RN

## 2020-11-24 ENCOUNTER — HOSPITAL ENCOUNTER (EMERGENCY)
Facility: CLINIC | Age: 25
Discharge: HOME | End: 2020-11-24
Attending: EMERGENCY MEDICINE | Admitting: EMERGENCY MEDICINE
Payer: COMMERCIAL

## 2020-11-24 ENCOUNTER — APPOINTMENT (OUTPATIENT)
Dept: GENERAL RADIOLOGY | Facility: CLINIC | Age: 25
End: 2020-11-24
Attending: EMERGENCY MEDICINE
Payer: COMMERCIAL

## 2020-11-24 VITALS
WEIGHT: 185 LBS | SYSTOLIC BLOOD PRESSURE: 134 MMHG | HEART RATE: 95 BPM | TEMPERATURE: 99.2 F | BODY MASS INDEX: 29.73 KG/M2 | HEIGHT: 66 IN | RESPIRATION RATE: 20 BRPM | OXYGEN SATURATION: 98 % | DIASTOLIC BLOOD PRESSURE: 81 MMHG

## 2020-11-24 DIAGNOSIS — R05.9 COUGH: ICD-10-CM

## 2020-11-24 DIAGNOSIS — R06.02 SHORTNESS OF BREATH: ICD-10-CM

## 2020-11-24 LAB
ANION GAP SERPL CALCULATED.3IONS-SCNC: 7 MMOL/L (ref 3–14)
BASOPHILS # BLD AUTO: 0.1 10E9/L (ref 0–0.2)
BASOPHILS NFR BLD AUTO: 0.4 %
BUN SERPL-MCNC: 10 MG/DL (ref 7–30)
CALCIUM SERPL-MCNC: 8.3 MG/DL (ref 8.5–10.1)
CHLORIDE SERPL-SCNC: 108 MMOL/L (ref 94–109)
CO2 SERPL-SCNC: 26 MMOL/L (ref 20–32)
CREAT SERPL-MCNC: 0.76 MG/DL (ref 0.52–1.04)
D DIMER PPP FEU-MCNC: 0.3 UG/ML FEU (ref 0–0.5)
DIFFERENTIAL METHOD BLD: ABNORMAL
EOSINOPHIL # BLD AUTO: 0.3 10E9/L (ref 0–0.7)
EOSINOPHIL NFR BLD AUTO: 3 %
ERYTHROCYTE [DISTWIDTH] IN BLOOD BY AUTOMATED COUNT: 12.5 % (ref 10–15)
GFR SERPL CREATININE-BSD FRML MDRD: >90 ML/MIN/{1.73_M2}
GLUCOSE SERPL-MCNC: 78 MG/DL (ref 70–99)
HCG SERPL QL: NEGATIVE
HCT VFR BLD AUTO: 39.7 % (ref 35–47)
HGB BLD-MCNC: 13.5 G/DL (ref 11.7–15.7)
IMM GRANULOCYTES # BLD: 0.1 10E9/L (ref 0–0.4)
IMM GRANULOCYTES NFR BLD: 0.9 %
LYMPHOCYTES # BLD AUTO: 3.2 10E9/L (ref 0.8–5.3)
LYMPHOCYTES NFR BLD AUTO: 28.1 %
MCH RBC QN AUTO: 30 PG (ref 26.5–33)
MCHC RBC AUTO-ENTMCNC: 34 G/DL (ref 31.5–36.5)
MCV RBC AUTO: 88 FL (ref 78–100)
MONOCYTES # BLD AUTO: 0.7 10E9/L (ref 0–1.3)
MONOCYTES NFR BLD AUTO: 6.1 %
NEUTROPHILS # BLD AUTO: 7.1 10E9/L (ref 1.6–8.3)
NEUTROPHILS NFR BLD AUTO: 61.5 %
NRBC # BLD AUTO: 0 10*3/UL
NRBC BLD AUTO-RTO: 0 /100
PLATELET # BLD AUTO: 297 10E9/L (ref 150–450)
POTASSIUM SERPL-SCNC: 3.6 MMOL/L (ref 3.4–5.3)
RBC # BLD AUTO: 4.5 10E12/L (ref 3.8–5.2)
SODIUM SERPL-SCNC: 141 MMOL/L (ref 133–144)
WBC # BLD AUTO: 11.5 10E9/L (ref 4–11)

## 2020-11-24 PROCEDURE — U0003 INFECTIOUS AGENT DETECTION BY NUCLEIC ACID (DNA OR RNA); SEVERE ACUTE RESPIRATORY SYNDROME CORONAVIRUS 2 (SARS-COV-2) (CORONAVIRUS DISEASE [COVID-19]), AMPLIFIED PROBE TECHNIQUE, MAKING USE OF HIGH THROUGHPUT TECHNOLOGIES AS DESCRIBED BY CMS-2020-01-R: HCPCS | Performed by: EMERGENCY MEDICINE

## 2020-11-24 PROCEDURE — C9803 HOPD COVID-19 SPEC COLLECT: HCPCS | Performed by: EMERGENCY MEDICINE

## 2020-11-24 PROCEDURE — 99284 EMERGENCY DEPT VISIT MOD MDM: CPT | Mod: 25 | Performed by: EMERGENCY MEDICINE

## 2020-11-24 PROCEDURE — 84703 CHORIONIC GONADOTROPIN ASSAY: CPT | Performed by: EMERGENCY MEDICINE

## 2020-11-24 PROCEDURE — 99284 EMERGENCY DEPT VISIT MOD MDM: CPT | Performed by: EMERGENCY MEDICINE

## 2020-11-24 PROCEDURE — 85379 FIBRIN DEGRADATION QUANT: CPT | Performed by: EMERGENCY MEDICINE

## 2020-11-24 PROCEDURE — 80048 BASIC METABOLIC PNL TOTAL CA: CPT | Performed by: EMERGENCY MEDICINE

## 2020-11-24 PROCEDURE — 85025 COMPLETE CBC W/AUTO DIFF WBC: CPT | Performed by: EMERGENCY MEDICINE

## 2020-11-24 PROCEDURE — 71045 X-RAY EXAM CHEST 1 VIEW: CPT

## 2020-11-24 RX ORDER — LEVOTHYROXINE SODIUM 25 UG/1
1 TABLET ORAL DAILY
COMMUNITY
Start: 2020-06-24 | End: 2021-02-01

## 2020-11-24 RX ORDER — CLOMIPRAMINE HYDROCHLORIDE 50 MG/1
1 CAPSULE ORAL EVERY EVENING
COMMUNITY
Start: 2020-10-31

## 2020-11-24 RX ORDER — PROPRANOLOL HYDROCHLORIDE 20 MG/1
1 TABLET ORAL 3 TIMES DAILY
COMMUNITY
Start: 2020-11-10 | End: 2021-08-03 | Stop reason: ALTCHOICE

## 2020-11-24 ASSESSMENT — ENCOUNTER SYMPTOMS
PSYCHIATRIC NEGATIVE: 1
ENDOCRINE NEGATIVE: 1
PALPITATIONS: 1
GASTROINTESTINAL NEGATIVE: 1
MUSCULOSKELETAL NEGATIVE: 1
HEMATOLOGIC/LYMPHATIC NEGATIVE: 1
NEUROLOGICAL NEGATIVE: 1
SHORTNESS OF BREATH: 1
COUGH: 1
ALLERGIC/IMMUNOLOGIC NEGATIVE: 1
EYES NEGATIVE: 1
CONSTITUTIONAL NEGATIVE: 1

## 2020-11-24 ASSESSMENT — MIFFLIN-ST. JEOR: SCORE: 1605.9

## 2020-11-24 NOTE — ED AVS SNAPSHOT
[facility] Emergency Dept  [address] [facility] [address]  Phone: [phone]  Fax: [phone]                                    Zacarias Adam   MRN: [id number]    Department: [facility] Emergency Dept   Date of Visit: 11/24/2020           After Visit Summary Signature Page    I have received my discharge instructions, and my questions have been answered. I have discussed any challenges I see with this plan with the nurse or doctor.    ..........................................................................................................................................  Patient/Patient Representative Signature      ..........................................................................................................................................  Patient Representative Print Name and Relationship to Patient    ..................................................               ................................................  Date                                   Time    ..........................................................................................................................................  Reviewed by Signature/Title    ...................................................              ..............................................  Date                                               Time          22EPIC Rev 08/18

## 2020-11-25 LAB
LABORATORY COMMENT REPORT: NORMAL
SARS-COV-2 RNA SPEC QL NAA+PROBE: NEGATIVE
SARS-COV-2 RNA SPEC QL NAA+PROBE: NORMAL
SPECIMEN SOURCE: NORMAL
SPECIMEN SOURCE: NORMAL
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
## 2020-11-25 NOTE — ED NOTES
Pt reports fever, cough, SOB, and congestion for 4 days. Cough more frequent with exertion but SOB is constant.

## 2020-11-25 NOTE — DISCHARGE INSTRUCTIONS
1) Your symptoms today may be due to COVID-19.  Your evaluation today does not suggest an emergency diagnosis and we have agreed that you are stable for discharge to home.    2) COVID-19 test is pending.  You should be notified if results are positive.  Results may take anywhere from a few hours to 5 days.  You may also receive a notice in the mail if your results are negative.  You may also be contacted by email to reassess your symptoms.    3) we have discussed and reviewed symptoms of COVID-19 and what to expect.  If develop any new concerns you should return to the emergency department to be reevaluated immediately

## 2020-12-08 ENCOUNTER — HOSPITAL ENCOUNTER (EMERGENCY)
Facility: CLINIC | Age: 25
Discharge: HOME | End: 2020-12-08
Attending: EMERGENCY MEDICINE | Admitting: EMERGENCY MEDICINE
Payer: COMMERCIAL

## 2020-12-08 ENCOUNTER — TELEPHONE (OUTPATIENT)
Dept: OBGYN | Facility: CLINIC | Age: 25
End: 2020-12-08

## 2020-12-08 VITALS
SYSTOLIC BLOOD PRESSURE: 153 MMHG | RESPIRATION RATE: 20 BRPM | DIASTOLIC BLOOD PRESSURE: 106 MMHG | BODY MASS INDEX: 30.02 KG/M2 | HEART RATE: 108 BPM | WEIGHT: 186 LBS | OXYGEN SATURATION: 98 % | TEMPERATURE: 98.5 F

## 2020-12-08 DIAGNOSIS — K64.4 EXTERNAL HEMORRHOIDS: ICD-10-CM

## 2020-12-08 PROCEDURE — 99284 EMERGENCY DEPT VISIT MOD MDM: CPT | Performed by: EMERGENCY MEDICINE

## 2020-12-08 ASSESSMENT — ENCOUNTER SYMPTOMS
RECTAL PAIN: 1
NAUSEA: 0
APPETITE CHANGE: 0
HEADACHES: 0
FATIGUE: 0
ANAL BLEEDING: 1
FEVER: 0
DIAPHORESIS: 0
ABDOMINAL PAIN: 0
NERVOUS/ANXIOUS: 0
BLOOD IN STOOL: 1
VOMITING: 0
COUGH: 0
SHORTNESS OF BREATH: 0
BRUISES/BLEEDS EASILY: 0
BACK PAIN: 0
CHEST TIGHTNESS: 0

## 2020-12-08 NOTE — ED AVS SNAPSHOT
[facility] Emergency Dept  [address] [address]  Phone: [phone]  Fax: [phone]                                    Zacarias Adam   MRN: [id number]    Department: [facility] Emergency Dept   Date of Visit: 12/8/2020           After Visit Summary Signature Page    I have received my discharge instructions, and my questions have been answered. I have discussed any challenges I see with this plan with the nurse or doctor.    ..........................................................................................................................................  Patient/Patient Representative Signature      ..........................................................................................................................................  Patient Representative Print Name and Relationship to Patient    ..................................................               ................................................  Date                                   Time    ..........................................................................................................................................  Reviewed by Signature/Title    ...................................................              ..............................................  Date                                               Time          22EPIC Rev 08/18

## 2020-12-08 NOTE — TELEPHONE ENCOUNTER
Return call to patient.  Spoke with patient on the phone.    Patient advised to be seen by PCP or in FP for concerns.  Patient reports understanding.  Patient transferred for an appointment.    Johnna Fairbanks   [facility]  RN     Jing Schaefer

## 2020-12-08 NOTE — TELEPHONE ENCOUNTER
Reason for Call:  Other     Detailed comments: Pt is requesting appt to be seen for an extreme case of external hemorrhoids. Says she can barely walk. Every time she uses the bathroom they bleed. Told no appts available - Please advise     Phone Number Patient can be reached at: Home number on file [phone] (home)    Best Time: Any    Can we leave a detailed message on this number? YES    Call taken on 12/8/2020 at 9:02 AM by Denise Behrendt

## 2020-12-09 ENCOUNTER — OFFICE VISIT (OUTPATIENT)
Dept: SURGERY | Facility: CLINIC | Age: 25
End: 2020-12-09
Payer: COMMERCIAL

## 2020-12-09 ENCOUNTER — TELEPHONE (OUTPATIENT)
Dept: SURGERY | Facility: CLINIC | Age: 25
End: 2020-12-09

## 2020-12-09 VITALS
WEIGHT: 186.07 LBS | HEIGHT: 66 IN | SYSTOLIC BLOOD PRESSURE: 143 MMHG | TEMPERATURE: 99.3 F | BODY MASS INDEX: 29.9 KG/M2 | HEART RATE: 101 BPM | DIASTOLIC BLOOD PRESSURE: 89 MMHG

## 2020-12-09 DIAGNOSIS — Z01.818 PRE-OP TESTING: ICD-10-CM

## 2020-12-09 DIAGNOSIS — K62.89 RECTAL PAIN: Primary | ICD-10-CM

## 2020-12-09 PROCEDURE — 99203 OFFICE O/P NEW LOW 30 MIN: CPT | Performed by: SURGERY

## 2020-12-09 RX ORDER — HYDROCODONE BITARTRATE AND ACETAMINOPHEN 5; 325 MG/1; MG/1
1-2 TABLET ORAL EVERY 6 HOURS PRN
Qty: 20 TABLET | Refills: 0 | Status: ON HOLD | OUTPATIENT
Start: 2020-12-09 | End: 2020-12-18

## 2020-12-09 ASSESSMENT — MIFFLIN-ST. JEOR: SCORE: 1610.75
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
## 2020-12-09 NOTE — TELEPHONE ENCOUNTER
Reason for Call:  Other prescription    Detailed comments: Sherita calling from pharmacy stating nifedipine 0.2% in white petrolatum 0.2 % OINT ointment is NOT covered by ins. Would you like to change medication? Or try for a PA?    Phone Number Patient can be reached at: Other phone number:  [phone]*    Best Time: any     Can we leave a detailed message on this number? YES    Call taken on 12/9/2020 at 3:08 PM by Jaylyn Preston

## 2020-12-09 NOTE — NURSING NOTE
"Initial BP (!) 143/89 (BP Location: Right arm, Patient Position: Sitting, Cuff Size: Adult Regular)   Pulse 101   Temp 99.3  F (37.4  C) (Tympanic)   Ht 1.676 m (5' 6\")   Wt 84.4 kg (186 lb 1.1 oz)   BMI 30.03 kg/m   Estimated body mass index is 30.03 kg/m  as calculated from the following:    Height as of this encounter: 1.676 m (5' 6\").    Weight as of this encounter: 84.4 kg (186 lb 1.1 oz). .    Alicia Coronado MA    "

## 2020-12-11 NOTE — TELEPHONE ENCOUNTER
Nifedipine cream would be my best choice for this.  If is not covered by her insurance company then I do not really have a good substitute.  Continue with a high-fiber diet and we have her scheduled for surgery soon.    Dayday Epstein MD

## 2020-12-11 NOTE — TELEPHONE ENCOUNTER
I spoke to Zacarias and let her know that Dr Epstein does not have a good substitute for the nifedipine that is not covered by her insurance. Dr Epstein would like her to continue with a high fiber diet and we can schedule her for surgery soon. She is in agreement with the plan. Gaby NG Rn

## 2020-12-14 ENCOUNTER — TELEPHONE (OUTPATIENT)
Dept: SURGERY | Facility: CLINIC | Age: 25
End: 2020-12-14

## 2020-12-14 DIAGNOSIS — K62.89 RECTAL PAIN: ICD-10-CM

## 2020-12-14 RX ORDER — HYDROCODONE BITARTRATE AND ACETAMINOPHEN 5; 325 MG/1; MG/1
1-2 TABLET ORAL EVERY 6 HOURS PRN
Qty: 20 TABLET | Refills: 0 | Status: CANCELLED | OUTPATIENT
Start: 2020-12-14

## 2020-12-14 NOTE — TELEPHONE ENCOUNTER
Reason for Call:  Other pain    Detailed comments: Pt states she is in a lot of pain, put in a refill for pain meds but having to take 2 every 6 hours now, needs some more before Friday, please call    Phone Number Patient can be reached at: Home number on file [phone] (home)    Best Time: today    Can we leave a detailed message on this number? YES    Call taken on 12/14/2020 at 2:38 PM by Yulia Walker

## 2020-12-14 NOTE — TELEPHONE ENCOUNTER
Requested Prescriptions   Pending Prescriptions Disp Refills     HYDROcodone-acetaminophen (NORCO) 5-325 MG tablet 20 tablet 0     Sig: Take 1-2 tablets by mouth every 6 hours as needed for pain       There is no refill protocol information for this order      Last Written Prescription Date:    Last Fill Quantity: ,  # refills:    Last office visit: 12/9/2020 with prescribing provider:     Future Office Visit:

## 2020-12-15 DIAGNOSIS — K62.89 RECTAL PAIN: Primary | ICD-10-CM

## 2020-12-15 DIAGNOSIS — K62.89 RECTAL PAIN: ICD-10-CM

## 2020-12-15 PROCEDURE — U0003 INFECTIOUS AGENT DETECTION BY NUCLEIC ACID (DNA OR RNA); SEVERE ACUTE RESPIRATORY SYNDROME CORONAVIRUS 2 (SARS-COV-2) (CORONAVIRUS DISEASE [COVID-19]), AMPLIFIED PROBE TECHNIQUE, MAKING USE OF HIGH THROUGHPUT TECHNOLOGIES AS DESCRIBED BY CMS-2020-01-R: HCPCS | Performed by: SURGERY

## 2020-12-15 RX ORDER — HYDROCODONE BITARTRATE AND ACETAMINOPHEN 5; 325 MG/1; MG/1
1-2 TABLET ORAL EVERY 4 HOURS PRN
Qty: 20 TABLET | Refills: 0 | Status: ON HOLD | OUTPATIENT
Start: 2020-12-15 | End: 2020-12-18

## 2020-12-15 NOTE — TELEPHONE ENCOUNTER
Pt calling to check on the status of this request. Informed provider is in surgery this morning.     Pt is asking if there is another doctor that can fill this as she is in a lot of pain and has to go to work at 2 today.     Contact pt @:  [phone]    Denise Behrendt  Specialty CSS

## 2020-12-15 NOTE — TELEPHONE ENCOUNTER
Pt's mother calling on behalf of pt - pt is in so much pain, unable to have bowel movements.  Can't wait until Friday when she has surgery to have this addressed  Looking on the status of this request?  Informed mother to have to pt call in to speak with RN if she needs to be triaged (mother was not with pt -pt is at work)    Thanks-    -Yudy Boyle  Clinic Station

## 2020-12-16 ENCOUNTER — TELEPHONE (OUTPATIENT)
Dept: SURGERY | Facility: CLINIC | Age: 25
End: 2020-12-16

## 2020-12-16 ENCOUNTER — ANESTHESIA EVENT (OUTPATIENT)
Dept: SURGERY | Facility: CLINIC | Age: 25
End: 2020-12-16
Payer: COMMERCIAL

## 2020-12-16 LAB
SARS-COV-2 RNA SPEC QL NAA+PROBE: NOT DETECTED
SPECIMEN SOURCE: NORMAL

## 2020-12-16 NOTE — LETTER
[facility]  [address] [address]  [phone]  Dept: [phone]      12/16/2020    Re: Zacarias Adam      TO WHOM IT MAY CONCERN:    Zacraias Adam  Will be out of work on  12/17/2020.  Please excuse her  until 12/21/2020.              Dayday Epstein MD  [facility]

## 2020-12-16 NOTE — TELEPHONE ENCOUNTER
Letter faxed to employer, tried to call patient but was unable to reach or leave a message because her mailbox is full.  Alicia Coronado MA

## 2020-12-16 NOTE — TELEPHONE ENCOUNTER
Pt called back with a fax number [phone] Attsasha Nice.    Please call pt when this has been addressed.    -Yudy Boyle  Clinic Station

## 2020-12-16 NOTE — TELEPHONE ENCOUNTER
Reason for Call:  Other letter    Detailed comments: pt calling stating she would like to get a letter to excuse her from work for Thursday 12/17 due to pain. She tried to work yesterday 12/15 and left early because she was in too much pain. She is having surgery on 12/18. Pt will call back w/ a fax # to fax letter to her employer.     Phone Number Patient can be reached at: Home number on file [phone] (home)    Best Time: any     Can we leave a detailed message on this number? YES    Call taken on 12/16/2020 at 11:01 AM by Jaylyn Preston

## 2020-12-16 NOTE — LETTER
[facility]  [address] [address]  Phone: [phone]    December 16, 2020        Zacarias YOUNG Kia  [address] [facility] [address]          To whom it may concern:    RE: Zacarias K Kia    Patient was seen and treated at our clinic.  With ongoing care related to this issue; we ask that you excuse Bakari from work on 12/17/2020.    Please contact me for questions or concerns.      Sincerely,        Dayday Epstein MD

## 2020-12-18 ENCOUNTER — ANESTHESIA (OUTPATIENT)
Dept: SURGERY | Facility: CLINIC | Age: 25
End: 2020-12-18
Payer: COMMERCIAL

## 2020-12-18 ENCOUNTER — HOSPITAL ENCOUNTER (OUTPATIENT)
Facility: CLINIC | Age: 25
Discharge: HOME | End: 2020-12-18
Attending: SURGERY | Admitting: SURGERY
Payer: COMMERCIAL

## 2020-12-18 VITALS
HEIGHT: 66 IN | RESPIRATION RATE: 16 BRPM | SYSTOLIC BLOOD PRESSURE: 141 MMHG | WEIGHT: 186 LBS | HEART RATE: 80 BPM | DIASTOLIC BLOOD PRESSURE: 64 MMHG | BODY MASS INDEX: 29.89 KG/M2 | TEMPERATURE: 97.7 F | OXYGEN SATURATION: 97 %

## 2020-12-18 DIAGNOSIS — G89.18 POSTOPERATIVE PAIN: Primary | ICD-10-CM

## 2020-12-18 DIAGNOSIS — K62.89 RECTAL PAIN: ICD-10-CM

## 2020-12-18 LAB — HCG UR QL: NEGATIVE

## 2020-12-18 PROCEDURE — 370N000001 HC ANESTHESIA TECHNICAL FEE, 1ST 30 MIN: Performed by: SURGERY

## 2020-12-18 PROCEDURE — 360N000008 HC SURGERY LEVEL 1 1ST 30 MIN: Performed by: SURGERY

## 2020-12-18 PROCEDURE — 360N000009 HC SURGERY LEVEL 1 EA 15 ADDTL MIN: Performed by: SURGERY

## 2020-12-18 PROCEDURE — 761N000003 HC RECOVERY PHASE 1 LEVEL 2 FIRST HR: Performed by: SURGERY

## 2020-12-18 PROCEDURE — 370N000002 HC ANESTHESIA TECHNICAL FEE, EACH ADDTL 15 MIN: Performed by: SURGERY

## 2020-12-18 PROCEDURE — 761N000007 HC RECOVERY PHASE 2 EACH 15 MINS: Performed by: SURGERY

## 2020-12-18 PROCEDURE — 250N000009 HC RX 250: Performed by: NURSE ANESTHETIST, CERTIFIED REGISTERED

## 2020-12-18 PROCEDURE — 272N000001 HC OR GENERAL SUPPLY STERILE: Performed by: SURGERY

## 2020-12-18 PROCEDURE — 81025 URINE PREGNANCY TEST: CPT | Performed by: NURSE ANESTHETIST, CERTIFIED REGISTERED

## 2020-12-18 PROCEDURE — 999N000135 HC STATISTIC PRE PROC ASSESS I: Performed by: SURGERY

## 2020-12-18 PROCEDURE — 250N000003 HC SEVOFLURANE, EA 15 MIN: Performed by: SURGERY

## 2020-12-18 PROCEDURE — 258N000003 HC RX IP 258 OP 636: Performed by: NURSE ANESTHETIST, CERTIFIED REGISTERED

## 2020-12-18 PROCEDURE — 88304 TISSUE EXAM BY PATHOLOGIST: CPT | Mod: 26 | Performed by: PATHOLOGY

## 2020-12-18 PROCEDURE — C9290 INJ, BUPIVACAINE LIPOSOME: HCPCS | Performed by: SURGERY

## 2020-12-18 PROCEDURE — 46250 REMOVE EXT HEM GROUPS 2+: CPT | Performed by: SURGERY

## 2020-12-18 PROCEDURE — 250N000013 HC RX MED GY IP 250 OP 250 PS 637: Performed by: NURSE ANESTHETIST, CERTIFIED REGISTERED

## 2020-12-18 PROCEDURE — 88304 TISSUE EXAM BY PATHOLOGIST: CPT | Mod: TC | Performed by: SURGERY

## 2020-12-18 PROCEDURE — 250N000013 HC RX MED GY IP 250 OP 250 PS 637: Performed by: SURGERY

## 2020-12-18 PROCEDURE — 250N000011 HC RX IP 250 OP 636: Performed by: SURGERY

## 2020-12-18 PROCEDURE — 250N000011 HC RX IP 250 OP 636: Performed by: NURSE ANESTHETIST, CERTIFIED REGISTERED

## 2020-12-18 RX ORDER — DEXAMETHASONE SODIUM PHOSPHATE 4 MG/ML
INJECTION, SOLUTION INTRA-ARTICULAR; INTRALESIONAL; INTRAMUSCULAR; INTRAVENOUS; SOFT TISSUE PRN
Status: DISCONTINUED | OUTPATIENT
Start: 2020-12-18 | End: 2020-12-18

## 2020-12-18 RX ORDER — NALOXONE HYDROCHLORIDE 0.4 MG/ML
0.4 INJECTION, SOLUTION INTRAMUSCULAR; INTRAVENOUS; SUBCUTANEOUS
Status: DISCONTINUED | OUTPATIENT
Start: 2020-12-18 | End: 2020-12-18 | Stop reason: HOSPADM

## 2020-12-18 RX ORDER — NALOXONE HYDROCHLORIDE 0.4 MG/ML
0.2 INJECTION, SOLUTION INTRAMUSCULAR; INTRAVENOUS; SUBCUTANEOUS
Status: DISCONTINUED | OUTPATIENT
Start: 2020-12-18 | End: 2020-12-18 | Stop reason: HOSPADM

## 2020-12-18 RX ORDER — ACETAMINOPHEN 325 MG/1
975 TABLET ORAL ONCE
Status: COMPLETED | OUTPATIENT
Start: 2020-12-18 | End: 2020-12-18

## 2020-12-18 RX ORDER — DIMENHYDRINATE 50 MG/ML
25 INJECTION, SOLUTION INTRAMUSCULAR; INTRAVENOUS
Status: DISCONTINUED | OUTPATIENT
Start: 2020-12-18 | End: 2020-12-18 | Stop reason: HOSPADM

## 2020-12-18 RX ORDER — CEFAZOLIN SODIUM 2 G/100ML
2 INJECTION, SOLUTION INTRAVENOUS
Status: COMPLETED | OUTPATIENT
Start: 2020-12-18 | End: 2020-12-18

## 2020-12-18 RX ORDER — FENTANYL CITRATE 50 UG/ML
INJECTION, SOLUTION INTRAMUSCULAR; INTRAVENOUS PRN
Status: DISCONTINUED | OUTPATIENT
Start: 2020-12-18 | End: 2020-12-18

## 2020-12-18 RX ORDER — ONDANSETRON 2 MG/ML
INJECTION INTRAMUSCULAR; INTRAVENOUS PRN
Status: DISCONTINUED | OUTPATIENT
Start: 2020-12-18 | End: 2020-12-18

## 2020-12-18 RX ORDER — LIDOCAINE HYDROCHLORIDE 10 MG/ML
INJECTION, SOLUTION INFILTRATION; PERINEURAL PRN
Status: DISCONTINUED | OUTPATIENT
Start: 2020-12-18 | End: 2020-12-18

## 2020-12-18 RX ORDER — SODIUM CHLORIDE, SODIUM LACTATE, POTASSIUM CHLORIDE, CALCIUM CHLORIDE 600; 310; 30; 20 MG/100ML; MG/100ML; MG/100ML; MG/100ML
INJECTION, SOLUTION INTRAVENOUS CONTINUOUS
Status: DISCONTINUED | OUTPATIENT
Start: 2020-12-18 | End: 2020-12-18 | Stop reason: HOSPADM

## 2020-12-18 RX ORDER — CEFAZOLIN SODIUM 1 G/50ML
1 INJECTION, SOLUTION INTRAVENOUS SEE ADMIN INSTRUCTIONS
Status: DISCONTINUED | OUTPATIENT
Start: 2020-12-18 | End: 2020-12-18 | Stop reason: HOSPADM

## 2020-12-18 RX ORDER — ONDANSETRON 2 MG/ML
4 INJECTION INTRAMUSCULAR; INTRAVENOUS EVERY 30 MIN PRN
Status: DISCONTINUED | OUTPATIENT
Start: 2020-12-18 | End: 2020-12-18 | Stop reason: HOSPADM

## 2020-12-18 RX ORDER — KETAMINE HYDROCHLORIDE 10 MG/ML
INJECTION, SOLUTION INTRAMUSCULAR; INTRAVENOUS PRN
Status: DISCONTINUED | OUTPATIENT
Start: 2020-12-18 | End: 2020-12-18

## 2020-12-18 RX ORDER — ALBUTEROL SULFATE 0.83 MG/ML
2.5 SOLUTION RESPIRATORY (INHALATION) EVERY 4 HOURS PRN
Status: DISCONTINUED | OUTPATIENT
Start: 2020-12-18 | End: 2020-12-18 | Stop reason: HOSPADM

## 2020-12-18 RX ORDER — DIAZEPAM 5 MG
10 TABLET ORAL EVERY 6 HOURS PRN
Status: COMPLETED | OUTPATIENT
Start: 2020-12-18 | End: 2020-12-18

## 2020-12-18 RX ORDER — MEPERIDINE HYDROCHLORIDE 50 MG/ML
INJECTION INTRAMUSCULAR; INTRAVENOUS; SUBCUTANEOUS PRN
Status: DISCONTINUED | OUTPATIENT
Start: 2020-12-18 | End: 2020-12-18

## 2020-12-18 RX ORDER — FENTANYL CITRATE 50 UG/ML
25-50 INJECTION, SOLUTION INTRAMUSCULAR; INTRAVENOUS
Status: DISCONTINUED | OUTPATIENT
Start: 2020-12-18 | End: 2020-12-18 | Stop reason: HOSPADM

## 2020-12-18 RX ORDER — HYDROXYZINE HYDROCHLORIDE 50 MG/ML
100 INJECTION, SOLUTION INTRAMUSCULAR EVERY 6 HOURS PRN
Status: DISCONTINUED | OUTPATIENT
Start: 2020-12-18 | End: 2020-12-18 | Stop reason: HOSPADM

## 2020-12-18 RX ORDER — MEPERIDINE HYDROCHLORIDE 25 MG/ML
12.5 INJECTION INTRAMUSCULAR; INTRAVENOUS; SUBCUTANEOUS
Status: DISCONTINUED | OUTPATIENT
Start: 2020-12-18 | End: 2020-12-18 | Stop reason: HOSPADM

## 2020-12-18 RX ORDER — HYDROCODONE BITARTRATE AND ACETAMINOPHEN 5; 325 MG/1; MG/1
1-2 TABLET ORAL EVERY 6 HOURS PRN
Qty: 45 TABLET | Refills: 0 | Status: SHIPPED | OUTPATIENT
Start: 2020-12-18 | End: 2021-02-01

## 2020-12-18 RX ORDER — ONDANSETRON 4 MG/1
4 TABLET, ORALLY DISINTEGRATING ORAL EVERY 30 MIN PRN
Status: DISCONTINUED | OUTPATIENT
Start: 2020-12-18 | End: 2020-12-18 | Stop reason: HOSPADM

## 2020-12-18 RX ORDER — HYDROCODONE BITARTRATE AND ACETAMINOPHEN 5; 325 MG/1; MG/1
1-2 TABLET ORAL EVERY 4 HOURS PRN
Status: DISCONTINUED | OUTPATIENT
Start: 2020-12-18 | End: 2020-12-18 | Stop reason: HOSPADM

## 2020-12-18 RX ORDER — KETOROLAC TROMETHAMINE 30 MG/ML
INJECTION, SOLUTION INTRAMUSCULAR; INTRAVENOUS PRN
Status: DISCONTINUED | OUTPATIENT
Start: 2020-12-18 | End: 2020-12-18

## 2020-12-18 RX ORDER — GLYCOPYRROLATE 0.2 MG/ML
INJECTION, SOLUTION INTRAMUSCULAR; INTRAVENOUS PRN
Status: DISCONTINUED | OUTPATIENT
Start: 2020-12-18 | End: 2020-12-18

## 2020-12-18 RX ORDER — LIDOCAINE 40 MG/G
CREAM TOPICAL
Status: DISCONTINUED | OUTPATIENT
Start: 2020-12-18 | End: 2020-12-18 | Stop reason: HOSPADM

## 2020-12-18 RX ORDER — PROPOFOL 10 MG/ML
INJECTION, EMULSION INTRAVENOUS PRN
Status: DISCONTINUED | OUTPATIENT
Start: 2020-12-18 | End: 2020-12-18

## 2020-12-18 RX ADMIN — HYDROXYZINE HYDROCHLORIDE 100 MG: 50 INJECTION, SOLUTION INTRAMUSCULAR at 10:58

## 2020-12-18 RX ADMIN — ROCURONIUM BROMIDE 10 MG: 10 INJECTION INTRAVENOUS at 09:33

## 2020-12-18 RX ADMIN — KETOROLAC TROMETHAMINE 30 MG: 30 INJECTION, SOLUTION INTRAMUSCULAR at 10:19

## 2020-12-18 RX ADMIN — ONDANSETRON 4 MG: 2 INJECTION INTRAMUSCULAR; INTRAVENOUS at 09:34

## 2020-12-18 RX ADMIN — SODIUM CHLORIDE, POTASSIUM CHLORIDE, SODIUM LACTATE AND CALCIUM CHLORIDE 10 ML: 600; 310; 30; 20 INJECTION, SOLUTION INTRAVENOUS at 08:59

## 2020-12-18 RX ADMIN — DEXAMETHASONE SODIUM PHOSPHATE 8 MG: 4 INJECTION, SOLUTION INTRA-ARTICULAR; INTRALESIONAL; INTRAMUSCULAR; INTRAVENOUS; SOFT TISSUE at 09:34

## 2020-12-18 RX ADMIN — PROPOFOL 200 MG: 10 INJECTION, EMULSION INTRAVENOUS at 09:34

## 2020-12-18 RX ADMIN — MEPERIDINE HYDROCHLORIDE 50 MG: 50 INJECTION, SOLUTION INTRAMUSCULAR; INTRAVENOUS; SUBCUTANEOUS at 10:19

## 2020-12-18 RX ADMIN — FENTANYL CITRATE 100 MCG: 50 INJECTION, SOLUTION INTRAMUSCULAR; INTRAVENOUS at 09:31

## 2020-12-18 RX ADMIN — LIDOCAINE HYDROCHLORIDE 50 MG: 10 INJECTION, SOLUTION INFILTRATION; PERINEURAL at 09:34

## 2020-12-18 RX ADMIN — MIDAZOLAM 2 MG: 1 INJECTION INTRAMUSCULAR; INTRAVENOUS at 10:44

## 2020-12-18 RX ADMIN — CEFAZOLIN SODIUM 2 G: 2 INJECTION, SOLUTION INTRAVENOUS at 09:29

## 2020-12-18 RX ADMIN — MIDAZOLAM 2 MG: 1 INJECTION INTRAMUSCULAR; INTRAVENOUS at 09:29

## 2020-12-18 RX ADMIN — ROCURONIUM BROMIDE 40 MG: 10 INJECTION INTRAVENOUS at 09:34

## 2020-12-18 RX ADMIN — ACETAMINOPHEN 975 MG: 325 TABLET, FILM COATED ORAL at 08:58

## 2020-12-18 RX ADMIN — SUGAMMADEX 200 MG: 100 INJECTION, SOLUTION INTRAVENOUS at 10:14

## 2020-12-18 RX ADMIN — LIDOCAINE HYDROCHLORIDE 0.1 ML: 10 INJECTION, SOLUTION EPIDURAL; INFILTRATION; INTRACAUDAL; PERINEURAL at 08:58

## 2020-12-18 RX ADMIN — FENTANYL CITRATE 150 MCG: 50 INJECTION, SOLUTION INTRAMUSCULAR; INTRAVENOUS at 09:34

## 2020-12-18 RX ADMIN — DIAZEPAM 10 MG: 5 TABLET ORAL at 12:10

## 2020-12-18 RX ADMIN — KETAMINE HYDROCHLORIDE 50 MG: 10 INJECTION INTRAMUSCULAR; INTRAVENOUS at 09:48

## 2020-12-18 RX ADMIN — GLYCOPYRROLATE 0.2 MG: 0.2 INJECTION, SOLUTION INTRAMUSCULAR; INTRAVENOUS at 09:34

## 2020-12-18 ASSESSMENT — MIFFLIN-ST. JEOR: SCORE: 1610.44

## 2020-12-18 ASSESSMENT — LIFESTYLE VARIABLES: TOBACCO_USE: 1

## 2020-12-18 NOTE — PLAN OF CARE
"Patient has 3 piercings by her left eye, 1 above her lip and a nose ring in and per patient \"they do not come out\". MD aware.  Carla Espinal RN  12/18/2020    "

## 2020-12-18 NOTE — PROGRESS NOTES
Patient is getting greater than 100 mg morphine equivalents of postoperative pain medication due to the extensive nature of the surgery.    Dayday Epstein MD

## 2020-12-18 NOTE — OP NOTE
Preop diagnosis: Enlarged external hemorrhoids and anal pain    Postop diagnosis: Large external hemorrhoids    Procedure: Rectal exam under anesthesia and hemorrhoidectomy x3    Surgeon: Lucian    Assistant surgeon: Lalo Tanner PA-C (needed for expertise in retraction hemostasis wound closure and suctioning)    Anesthesia: General endotracheal Bales CRNA    Procedure: Patient's perianal area was cleaned and draped in sterile manner.  General Endo tracheal anesthesia was induced.  Exam of external tissue showed enlarged hemorrhoid and skin tags at the 10 to and 6 o'clock position.  An anal speculum was gently inserted into the anal canal and the entire canal inspected.  There was no posterior fissure which was what was suspected initially.  Instead just enlarged hemorrhoids at the 10 to and 6 clock position.  These were taken out each using an anchoring stitch of 3-0 chromic suture and the tissue itself was excised using electrocautery.  Bleeding was controlled with electrocautery.  The chromic suture was then used in a running locking fashion to close the mucosal defect.  When complete all hemorrhoids and tags were removed and there was adequate hemostasis.  20 mL of Exparel local anesthetic agent was injected in and around the anal canal.  A lubricated gauze sponge was inserted and a in the anal canal for additional dressing and hemostasis.  Patient was then placed into mesh underwear and extubated.    Estimated blood loss: 20 mL    IV fluid: 800 mL

## 2020-12-18 NOTE — ANESTHESIA PROCEDURE NOTES
Airway   Date/Time: 12/18/2020 9:35 AM   Patient location during procedure: OR    Staff -   CRNA: Raul Bales APRN CRNA  Performed By: CRNA    Consent for Airway   Urgency: elective    Indications and Patient Condition  Indications for airway management: primo-procedural  Induction type:intravenousMask difficulty assessment: 1 - vent by mask    Final Airway Details  Final airway type: endotracheal airway  Successful airway:ETT - single and Oral  Endotracheal Airway Details   ETT size (mm): 7.0  Successful intubation technique: video laryngoscopy  Grade View of Cords: 1  Adjucts: stylet  Measured from: lips  Secured at (cm): 21  Secured with: silk tape  Bite block used: None    Post intubation assessment   Placement verified by: capnometry, equal breath sounds and chest rise   Number of attempts at approach: 1  Number of other approaches attempted: 0  Secured with:silk tape  Ease of procedure: easy  Dentition: Intact
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
## 2020-12-18 NOTE — ANESTHESIA CARE TRANSFER NOTE
Patient: Zacarias YOUNG Kia    Procedure(s):  EXAM UNDER ANESTHESIA, RECTUM, hemmoroidectomy x3    Diagnosis: Rectal pain [K62.89]  Diagnosis Additional Information: No value filed.    Anesthesia Type:   General     Note:  Airway :Face Mask  Patient transferred to:PACU  Handoff Report: Identifed the Patient, Identified the Reponsible Provider, Reviewed the pertinent medical history, Discussed the surgical course, Reviewed Intra-OP anesthesia mangement and issues during anesthesia, Set expectations for post-procedure period and Allowed opportunity for questions and acknowledgement of understanding      Vitals: (Last set prior to Anesthesia Care Transfer)    CRNA VITALS  12/18/2020 1005 - 12/18/2020 1059      12/18/2020             Pulse:  103    SpO2:  91 %                Electronically Signed By: SAIMA Mccallum CRNA  December 18, 2020  10:59 AM

## 2020-12-18 NOTE — DISCHARGE INSTRUCTIONS
DISCHARGE INSTRUCTIONS     1.  Soak the area twice daily in warm bath with Epson salts to minimize swelling.    1.5 High-fiber diet daily (use Metamucil) doubling the fiber while taking pain medication.    2.  Some bleeding over the next several days as expected.  Gauze plug in anal canal should come out with first bowel movement.    3. You will have some discomfort at the incision sites. This is expected. This should improve over the next 2-3 days. Ice and pain medication will help with this pain. Use prescribed pain medication as instructed.     4. Some bruising and mild swelling is normal after surgery. The area below and around the incision(s) may be hard and elevated. This is part of the normal healing process. This will resolve slowly over the next several months.     6. Use the following medications (in addition to your normal meds) as shown:      Tylenol (acetaminophen) 500 mg every 6 hours as needed for pain.    Do not take more than 1000 mg of Tylenol every 6 hours -OR- 4g in a day    Motrin (ibuprophen) 600 mg every 6 hours as needed for pain. Take with food.     8. Notify Clinic at [phone] if:     Your discomfort is not relieved by your pain medication     You have signs of infection such as temperature above 100.4 degrees orally,  chills, or increasing daily discomfort.     Incision site is becoming more red and/or there is purulent drainage.      9. Follow up with Dr Epstein in 1-2 weeks.                        Same Day Surgery Discharge Instructions  Special Precautions After Surgery - Adult    1. It is not unusual to feel lightheaded or faint, up to 24 hours after surgery or while taking pain medication.  If you have these symptoms; sit for a few minutes before standing and have someone assist you when getting up.  2. You should rest and relax for the next 24 hours and must have someone stay with you for at least 24 hours after your discharge.  3. DO NOT DRIVE any vehicle or operate mechanical  equipment for 24 hours following the end of your surgery.  DO NOT DRIVE while taking narcotic pain medications that have been prescribed by your physician.  If you had a limb operated on, you must be able to use it fully to drive.  4. DO NOT drink alcoholic beverages for 24 hours following surgery or while taking prescription pain medication.  5. Drink clear liquids (apple juice, ginger ale, broth, 7-Up, etc.).  Progress to your regular diet as you feel able.  6. Any questions call your physician and do not make important decisions for 24 hours.         Call for an appointment to return to the clinic { :[id number]}      __________________________________________________________________________________________________________________________________  IMPORTANT NUMBERS:    [facility] [place] Main Number:  [phone], [phone]  Pharmacy:  [phone]  Same Day Surgery:  [phone], Monday - Friday until 8:30 p.m.  Urgent Care:  [phone]  Emergency Room:  [phone]

## 2020-12-18 NOTE — PLAN OF CARE
Pt anxious and tearful despite vistaril and versed in PACU. Anesthesia called. 10mg of PO Valium prior to discharge. Pt's mother at bedside upon discharge.

## 2020-12-18 NOTE — ANESTHESIA POSTPROCEDURE EVALUATION
Patient: Zacarias YOUNG Kia    Procedure(s):  EXAM UNDER ANESTHESIA, RECTUM, hemmoroidectomy x3    Diagnosis:Rectal pain [K62.89]  Diagnosis Additional Information: No value filed.    Anesthesia Type:  General    Note:  Anesthesia Post Evaluation    Patient location during evaluation: Phase 2  Patient participation: Able to fully participate in evaluation  Level of consciousness: awake and alert  Pain management: adequate  Airway patency: patent  Cardiovascular status: stable  Respiratory status: room air and spontaneous ventilation  Hydration status: stable  PONV: none             Last vitals:  Vitals:    12/18/20 1040 12/18/20 1100 12/18/20 1231   BP: (!) 117/102 (!) 136/97 (!) 141/64   Pulse: 134 106 80   Resp: 22 19 16   Temp: 36.7  C (98  F)  36.5  C (97.7  F)   SpO2: 99% 97% 97%         Electronically Signed By: SAIMA Starkey CRNA  December 18, 2020  1:14 PM

## 2020-12-22 LAB — COPATH REPORT: NORMAL

## 2021-01-27 DIAGNOSIS — E03.9 HYPOTHYROIDISM, UNSPECIFIED TYPE: ICD-10-CM

## 2021-01-27 DIAGNOSIS — E55.9 VITAMIN D DEFICIENCY: ICD-10-CM

## 2021-01-27 LAB
T4 FREE SERPL-MCNC: 0.73 NG/DL (ref 0.76–1.46)
TSH SERPL DL<=0.005 MIU/L-ACNC: 4.26 MU/L (ref 0.4–4)

## 2021-01-27 PROCEDURE — 82306 VITAMIN D 25 HYDROXY: CPT | Performed by: NURSE PRACTITIONER

## 2021-01-27 PROCEDURE — 84443 ASSAY THYROID STIM HORMONE: CPT | Performed by: NURSE PRACTITIONER

## 2021-01-27 PROCEDURE — 36415 COLL VENOUS BLD VENIPUNCTURE: CPT | Performed by: NURSE PRACTITIONER

## 2021-01-27 PROCEDURE — 84439 ASSAY OF FREE THYROXINE: CPT | Performed by: NURSE PRACTITIONER

## 2021-01-28 ENCOUNTER — TELEPHONE (OUTPATIENT)
Dept: FAMILY MEDICINE | Facility: CLINIC | Age: 26
End: 2021-01-28

## 2021-01-28 LAB — DEPRECATED CALCIDIOL+CALCIFEROL SERPL-MC: 55 UG/L (ref 20–75)

## 2021-01-28 NOTE — TELEPHONE ENCOUNTER
Reason for call:  Results   Name of test or procedure: labs  Date of test or procedure: 1/27/21  Location of test or procedure: [place]    Additional comments: na    Phone number to reach patient:  Home number on file [phone] (home)    Best Time:  any    Can we leave a detailed message on this number?  YES    Travel screening: Not Applicable

## 2021-01-28 NOTE — TELEPHONE ENCOUNTER
Patient requesting lab results from yesterdays lab draw, 1/27/21, no comments yet by provider.     Provider please review and advise. Thank you.

## 2021-02-01 ENCOUNTER — VIRTUAL VISIT (OUTPATIENT)
Dept: FAMILY MEDICINE | Facility: CLINIC | Age: 26
End: 2021-02-01
Payer: COMMERCIAL

## 2021-02-01 DIAGNOSIS — J45.20 MILD INTERMITTENT ASTHMA WITHOUT COMPLICATION: ICD-10-CM

## 2021-02-01 DIAGNOSIS — R05.9 COUGH: ICD-10-CM

## 2021-02-01 DIAGNOSIS — E03.9 HYPOTHYROIDISM, UNSPECIFIED TYPE: Primary | ICD-10-CM

## 2021-02-01 PROCEDURE — 99214 OFFICE O/P EST MOD 30 MIN: CPT | Mod: 95 | Performed by: INTERNAL MEDICINE

## 2021-02-01 RX ORDER — LEVOTHYROXINE SODIUM 50 UG/1
50 TABLET ORAL DAILY
Qty: 90 TABLET | Refills: 3 | Status: SHIPPED | OUTPATIENT
Start: 2021-02-01 | End: 2022-05-10

## 2021-02-01 RX ORDER — ALBUTEROL SULFATE 90 UG/1
2 AEROSOL, METERED RESPIRATORY (INHALATION) EVERY 6 HOURS PRN
Qty: 3 INHALER | Refills: 3 | Status: SHIPPED | OUTPATIENT
Start: 2021-02-01 | End: 2022-01-11

## 2021-02-01 RX ORDER — BUDESONIDE AND FORMOTEROL FUMARATE DIHYDRATE 80; 4.5 UG/1; UG/1
2 AEROSOL RESPIRATORY (INHALATION) 2 TIMES DAILY
Qty: 30.6 G | Refills: 3 | Status: SHIPPED | OUTPATIENT
Start: 2021-02-01 | End: 2024-10-02

## 2021-02-01 NOTE — PROGRESS NOTES
Zacarias is a 25 year old who is being evaluated via a billable telephone visit.      What phone number would you like to be contacted at? [phone]   How would you like to obtain your AVS? MyChart  Assessment & Plan     Hypothyroidism, unspecified type    Recent lab results suggest her levothyroxine dose may be too low (though she has also been out of med for a couple of weeks).  We'll increase dose to 50mcg and recheck labs in 2 months.     - **TSH with free T4 reflex FUTURE 2mo; Future  - levothyroxine (SYNTHROID/LEVOTHROID) 50 MCG tablet; Take 1 tablet (50 mcg) by mouth daily    Mild intermittent asthma without complication    She uses both inhalers intermittent when symptoms are not controlled, which they currently sound like they are not since her current inhalers are .  Refills sent     - albuterol (PROAIR HFA/PROVENTIL HFA/VENTOLIN HFA) 108 (90 Base) MCG/ACT inhaler; Inhale 2 puffs into the lungs every 6 hours as needed for shortness of breath / dyspnea or wheezing  - budesonide-formoterol (SYMBICORT) 80-4.5 MCG/ACT Inhaler; Inhale 2 puffs into the lungs 2 times daily        Return in about 2 months (around 2021) for Lab appointment.    Job Wilhelm MD  [facility]    Subjective     Zacarias is a 25 year old who presents to clinic today for the following health issues     Chief Complaint   Patient presents with     Results     She would like to discuss her thyroid results from       Refill Request     Albuterol and Symbicort inhaler          HPI       Hypothyroidism Follow-up    Zacarias recently had lab work showing slightly elevated TSH with low T4.  She was initially found to have elevated TSH with normal T4 in May when presenting with fatigue and was prescribed low dose levothyroxine.  She reports she's been out of medication for a couple of weeks.       Since last visit, patient describes the following symptoms: Weight stable, no hair loss, no skin changes, no  constipation, no loose stools.  She is having a lot of fatigue recently.      Asthma Follow-Up    She has been out of Symbicort and albuterol - her current ones are .  She is using both prn.      Was ACT completed today?    Yes    ACT Total Scores 2021   ACT TOTAL SCORE -   ASTHMA ER VISITS -   ASTHMA HOSPITALIZATIONS -   ACT TOTAL SCORE (Goal Greater than or Equal to 20) 17   In the past 12 months, how many times did you visit the emergency room for your asthma without being admitted to the hospital? 0   In the past 12 months, how many times were you hospitalized overnight because of your asthma? 0         How many days per week do you miss taking your asthma controller medication?  I do not have an asthma controller medication    Please describe any recent triggers for your asthma: None    Have you had any Emergency Room Visits, Urgent Care Visits, or Hospital Admissions since your last office visit?  Yes  Number of ER or Urgent Care visits for asthma: 1    Review of Systems   Constitutional, endo systems are negative, except as otherwise noted.      Objective           Vitals:  No vitals were obtained today due to virtual visit.    Physical Exam   healthy, alert and no distress  PSYCH: Alert and oriented times 3; coherent speech, normal   rate and volume, able to articulate logical thoughts, able   to abstract reason, no tangential thoughts, no hallucinations   or delusions  Her affect is normal  RESP: No cough, no audible wheezing, able to talk in full sentences  Remainder of exam unable to be completed due to telephone visits            Phone call duration: 6 minutes

## 2021-02-02 ASSESSMENT — ASTHMA QUESTIONNAIRES: ACT_TOTALSCORE: 17

## 2021-02-05 ENCOUNTER — OFFICE VISIT (OUTPATIENT)
Dept: OBGYN | Facility: CLINIC | Age: 26
End: 2021-02-05
Payer: COMMERCIAL

## 2021-02-05 VITALS
DIASTOLIC BLOOD PRESSURE: 82 MMHG | BODY MASS INDEX: 31.15 KG/M2 | WEIGHT: 193 LBS | TEMPERATURE: 98.2 F | HEART RATE: 85 BPM | SYSTOLIC BLOOD PRESSURE: 125 MMHG

## 2021-02-05 DIAGNOSIS — Z30.014 ENCOUNTER FOR INITIAL PRESCRIPTION OF INTRAUTERINE CONTRACEPTIVE DEVICE (IUD): Primary | ICD-10-CM

## 2021-02-05 DIAGNOSIS — Z30.430 ENCOUNTER FOR INSERTION OF MIRENA IUD: ICD-10-CM

## 2021-02-05 DIAGNOSIS — Z11.3 SCREEN FOR STD (SEXUALLY TRANSMITTED DISEASE): ICD-10-CM

## 2021-02-05 DIAGNOSIS — N76.0 BV (BACTERIAL VAGINOSIS): Primary | ICD-10-CM

## 2021-02-05 DIAGNOSIS — B96.89 BV (BACTERIAL VAGINOSIS): Primary | ICD-10-CM

## 2021-02-05 DIAGNOSIS — Z00.00 ROUTINE GENERAL MEDICAL EXAMINATION AT A HEALTH CARE FACILITY: ICD-10-CM

## 2021-02-05 DIAGNOSIS — Z30.430 ENCOUNTER FOR INSERTION OF INTRAUTERINE CONTRACEPTIVE DEVICE: ICD-10-CM

## 2021-02-05 LAB
CHOLEST SERPL-MCNC: 195 MG/DL
GLUCOSE SERPL-MCNC: 89 MG/DL (ref 70–99)
HCG UR QL: NEGATIVE
HCV AB SERPL QL IA: NONREACTIVE
HDLC SERPL-MCNC: 54 MG/DL
HIV 1+2 AB+HIV1 P24 AG SERPL QL IA: NONREACTIVE
LDLC SERPL CALC-MCNC: 112 MG/DL
NONHDLC SERPL-MCNC: 141 MG/DL
SPECIMEN SOURCE: ABNORMAL
T PALLIDUM AB SER QL: NONREACTIVE
TRIGL SERPL-MCNC: 145 MG/DL
WET PREP SPEC: ABNORMAL

## 2021-02-05 PROCEDURE — 58300 INSERT INTRAUTERINE DEVICE: CPT | Performed by: OBSTETRICS & GYNECOLOGY

## 2021-02-05 PROCEDURE — 80061 LIPID PANEL: CPT | Performed by: OBSTETRICS & GYNECOLOGY

## 2021-02-05 PROCEDURE — 82947 ASSAY GLUCOSE BLOOD QUANT: CPT | Performed by: OBSTETRICS & GYNECOLOGY

## 2021-02-05 PROCEDURE — 86780 TREPONEMA PALLIDUM: CPT | Mod: 90 | Performed by: OBSTETRICS & GYNECOLOGY

## 2021-02-05 PROCEDURE — 86803 HEPATITIS C AB TEST: CPT | Performed by: OBSTETRICS & GYNECOLOGY

## 2021-02-05 PROCEDURE — G0145 SCR C/V CYTO,THINLAYER,RESCR: HCPCS | Performed by: OBSTETRICS & GYNECOLOGY

## 2021-02-05 PROCEDURE — 87210 SMEAR WET MOUNT SALINE/INK: CPT | Performed by: OBSTETRICS & GYNECOLOGY

## 2021-02-05 PROCEDURE — 87491 CHLMYD TRACH DNA AMP PROBE: CPT | Performed by: OBSTETRICS & GYNECOLOGY

## 2021-02-05 PROCEDURE — 87389 HIV-1 AG W/HIV-1&-2 AB AG IA: CPT | Performed by: OBSTETRICS & GYNECOLOGY

## 2021-02-05 PROCEDURE — 81025 URINE PREGNANCY TEST: CPT | Performed by: OBSTETRICS & GYNECOLOGY

## 2021-02-05 PROCEDURE — 87591 N.GONORRHOEAE DNA AMP PROB: CPT | Performed by: OBSTETRICS & GYNECOLOGY

## 2021-02-05 PROCEDURE — 99000 SPECIMEN HANDLING OFFICE-LAB: CPT | Performed by: OBSTETRICS & GYNECOLOGY

## 2021-02-05 PROCEDURE — 36415 COLL VENOUS BLD VENIPUNCTURE: CPT | Performed by: OBSTETRICS & GYNECOLOGY

## 2021-02-05 PROCEDURE — 99395 PREV VISIT EST AGE 18-39: CPT | Mod: 25 | Performed by: OBSTETRICS & GYNECOLOGY

## 2021-02-05 RX ORDER — METRONIDAZOLE 500 MG/1
500 TABLET ORAL 2 TIMES DAILY
Qty: 14 TABLET | Refills: 0 | Status: SHIPPED | OUTPATIENT
Start: 2021-02-05 | End: 2021-02-12
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
## 2021-02-05 NOTE — NURSING NOTE
"Initial /82 (BP Location: Right arm, Patient Position: Chair, Cuff Size: Adult Large)   Pulse 85   Temp 98.2  F (36.8  C) (Tympanic)   Wt 87.5 kg (193 lb)   LMP 01/28/2021 (Exact Date)   Breastfeeding No   BMI 31.15 kg/m   Estimated body mass index is 31.15 kg/m  as calculated from the following:    Height as of 12/18/20: 1.676 m (5' 6\").    Weight as of this encounter: 87.5 kg (193 lb). .    Heaven Blankenship MA    "

## 2021-02-07 ENCOUNTER — HEALTH MAINTENANCE LETTER (OUTPATIENT)
Age: 26
End: 2021-02-07

## 2021-02-09 LAB
COPATH REPORT: NORMAL
PAP: NORMAL

## 2021-02-10 ENCOUNTER — AMBULATORY - HEALTHEAST (OUTPATIENT)
Dept: SURGERY | Facility: CLINIC | Age: 26
End: 2021-02-10

## 2021-02-10 ENCOUNTER — COMMUNICATION - HEALTHEAST (OUTPATIENT)
Dept: SURGERY | Facility: CLINIC | Age: 26
End: 2021-02-10

## 2021-02-10 DIAGNOSIS — E66.01 MORBID OBESITY (H): ICD-10-CM

## 2021-02-17 ENCOUNTER — OFFICE VISIT - HEALTHEAST (OUTPATIENT)
Dept: SURGERY | Facility: CLINIC | Age: 26
End: 2021-02-17

## 2021-02-17 DIAGNOSIS — R63.5 DRUG-INDUCED WEIGHT GAIN: ICD-10-CM

## 2021-02-17 DIAGNOSIS — E03.9 HYPOTHYROIDISM: ICD-10-CM

## 2021-02-17 DIAGNOSIS — R00.0 SINUS TACHYCARDIA: ICD-10-CM

## 2021-02-17 DIAGNOSIS — E66.811 OBESITY, CLASS I, BMI 30-34.9: ICD-10-CM

## 2021-02-17 DIAGNOSIS — E66.9 OBESITY (BMI 30-39.9): ICD-10-CM

## 2021-02-17 DIAGNOSIS — T50.905A DRUG-INDUCED WEIGHT GAIN: ICD-10-CM

## 2021-02-17 ASSESSMENT — MIFFLIN-ST. JEOR: SCORE: 1650.13

## 2021-02-17 NOTE — IP AVS SNAPSHOT
MRN:[id number]                      After Visit Summary   3/14/2017    Zacarias Adam    MRN: [id number]           Thank you!     Thank you for choosing [facility] for your care. Our goal is always to provide you with excellent care. Hearing back from our patients is one way we can continue to improve our services. Please take a few minutes to complete the written survey that you may receive in the mail after you visit with us. Thank you!        Patient Information     Date Of Birth          1995        About your hospital stay     You were admitted on:  March 14, 2017 You last received care in the:  [facility]    You were discharged on:  March 14, 2017       Who to Call     For medical emergencies, please call 911.  For non-urgent questions about your medical care, please call your primary care provider or clinic, [phone]          Attending Provider     Provider Specialty    Shaista Bedolla MD OB/Gyn       Primary Care Provider Office Phone # Fax #    Yudi Sauceda [phone]       [facility] [address] [place] [address]        Your next 10 appointments already scheduled     Mar 15, 2017  3:30 PM CDT   US FETAL BIOPHYS PROFILE W/O NON STRESS TEST with [address] Ultrasound ([facility])    [address] [facility] [address] [phone]           Please bring a list of your medicines (including vitamins, minerals and over-the-counter drugs). Also, tell your doctor about any allergies you may have. Wear comfortable clothes and leave your valuables at home.  If you re less than 20 weeks drink four 8-ounce glasses of fluid an hour before your exam. If you need to empty your bladder before your exam, try to release only a little urine. Then, drink another glass of fluid.  You may have up to two family members in the exam room. If you bring a small child, an adult must be there to  Chief complaint:   Chief Complaint   Patient presents with   • Cough   • Shortness of Breath       Vitals:  Visit Vitals  /90 (BP Location: LUE - Left upper extremity, Patient Position: Sitting, Cuff Size: Large Adult)   Pulse 96   Temp 98.5 °F (36.9 °C) (Oral)   Resp 16   Wt 96.2 kg   SpO2 97%   BMI 28.75 kg/m²       HISTORY OF PRESENT ILLNESS     56 yo male with more than 2 weeks of SOB and wheezing.   Has asthma. Non-smoker.  Seen on 2/1/2021 and rx Z callie and medrol dose callie.  Seen on 2/4/2021 and RX another 8, 20 mg prednisone.  Still having cough and shortness of breath.   COVID negative 2/1/2021.   PAtient still wheezing and wants refill of cough medication.        Other significant problems:  Patient Active Problem List    Diagnosis Date Noted   • Prediabetes 07/14/2016     Priority: Low     7/2016 with HgbA1C 6.5%, repeat 9/2016 was 5.8%.     • Chronic bilateral low back pain 06/13/2016     Priority: Low   • Dyslipidemia 06/13/2016     Priority: Low       PAST MEDICAL, FAMILY AND SOCIAL HISTORY     Medications:  Current Outpatient Medications   Medication   • albuterol 108 (90 Base) MCG/ACT inhaler   • Omega-3 Fatty Acids (Fish Oil) 1200 MG capsule   • simvastatin (ZOCOR) 10 MG tablet   • fluticasone propionate (FLOVENT HFA) 110 MCG/ACT inhaler   • Spacer/Aero-Holding Chambers (AEROCHAMBER)   • OneTouch Delica Lancets 33G Misc   • blood glucose (OneTouch Verio) test strip   • cetirizine-pseudoephedrine (ZyrTEC-D Allergy & Congestion) 5-120 MG per tablet   • cetirizine-pseudoephedrine (ZyrTEC-D) 5-120 MG per tablet   • ibuprofen (MOTRIN) 600 MG tablet   • dorzolamide-timolol (COSOPT) 22.3-6.8 MG/ML ophthalmic solution   • ferrous sulfate 325 (65 FE) MG tablet   • fluticasone (FLOVENT HFA) 110 MCG/ACT inhaler   • montelukast (SINGULAIR) 10 MG tablet   • ergocalciferol (DRISDOL) 34547 units capsule   • latanoprost (XALATAN) 0.005 % ophthalmic solution   • Magnesium Oxide 200 MG Tab     No current  care for him or her.  Please call the Imaging Department at your exam site with any questions.            Mar 24, 2017  2:00 PM CDT   US FETAL BIOPHYS PROFILE W/O NON STRESS TEST with WYUS1   [facility] Ultrasound ([facility])    [address] [facility] [address]   [phone]           Please bring a list of your medicines (including vitamins, minerals and over-the-counter drugs). Also, tell your doctor about any allergies you may have. Wear comfortable clothes and leave your valuables at home.  If you re less than 20 weeks drink four 8-ounce glasses of fluid an hour before your exam. If you need to empty your bladder before your exam, try to release only a little urine. Then, drink another glass of fluid.  You may have up to two family members in the exam room. If you bring a small child, an adult must be there to care for him or her.  Please call the Imaging Department at your exam site with any questions.            Mar 24, 2017  3:00 PM CDT   ESTABLISHED PRENATAL with Sol Wooten MD   [facility]    [address] [facility] [address]   [phone]            Mar 31, 2017  2:00 PM CDT   US FETAL BIOPHYS PROFILE W/O NON STRESS TEST with WYUS1   [facility] Ultrasound ([facility])    [address] [facility] [address]   [phone]           Please bring a list of your medicines (including vitamins, minerals and over-the-counter drugs). Also, tell your doctor about any allergies you may have. Wear comfortable clothes and leave your valuables at home.  If you re less than 20 weeks drink four 8-ounce glasses of fluid an hour before your exam. If you need to empty your bladder before your exam, try to release only a little urine. Then, drink another glass of fluid.  You may have up to two family members in the exam room. If you bring a small child, an adult must be there to care for him or her.   facility-administered medications for this visit.        Allergies:  ALLERGIES:   Allergen Reactions   • Cat Dander Other (See Comments)     Sneezing    • Flu Virus Vaccine Other (See Comments)     Patient stated that the flu vaccine almost killed him.   • Pollen PRURITUS       Past Medical  History/Surgeries:  Past Medical History:   Diagnosis Date   • Arthritis    • Borderline diabetes    • Fracture    • High cholesterol        Past Surgical History:   Procedure Laterality Date   • Colonoscopy  2016    Affi 10yr recall   • Hand surgery     • Knee surgery         Family History:  Family History   Problem Relation Age of Onset   • Diabetes Mother    • Hypertension Mother    • Cancer Sister         juan francisco,  at age 40   • Asthma Brother        Social History:  Social History     Tobacco Use   • Smoking status: Never Smoker   • Smokeless tobacco: Never Used   Substance Use Topics   • Alcohol use: Yes     Alcohol/week: 14.0 standard drinks     Types: 14 Cans of beer per week       REVIEW OF SYSTEMS     Review of Systems   Constitutional: Negative for chills and fever.   HENT: Negative for congestion and sore throat.    Respiratory: Positive for cough, shortness of breath and wheezing. Negative for chest tightness.    Cardiovascular: Negative for chest pain.   Musculoskeletal: Negative for myalgias.       PHYSICAL EXAM     Physical Exam  Vitals signs and nursing note reviewed.   Constitutional:       Appearance: Normal appearance.   HENT:      Head: Normocephalic and atraumatic.      Right Ear: Tympanic membrane, ear canal and external ear normal.      Left Ear: Tympanic membrane, ear canal and external ear normal.      Nose: Nose normal.      Mouth/Throat:      Mouth: Mucous membranes are moist.      Pharynx: Oropharynx is clear. No oropharyngeal exudate or posterior oropharyngeal erythema.   Neck:      Musculoskeletal: Normal range of motion.   Cardiovascular:      Rate and Rhythm: Normal rate and regular  Please call the Imaging Department at your exam site with any questions.            Mar 31, 2017  3:00 PM CDT   ESTABLISHED PRENATAL with Sol Wooten MD   [facility] ([facility])    [address] [facility] [address]   [phone]            Apr 05, 2017  3:30 PM CDT   US FETAL BIOPHYS PROFILE W/O NON STRESS TEST with [address] [place] [address] Ultrasound ([facility])    [address] [facility] [address]   [phone]           Please bring a list of your medicines (including vitamins, minerals and over-the-counter drugs). Also, tell your doctor about any allergies you may have. Wear comfortable clothes and leave your valuables at home.  If you re less than 20 weeks drink four 8-ounce glasses of fluid an hour before your exam. If you need to empty your bladder before your exam, try to release only a little urine. Then, drink another glass of fluid.  You may have up to two family members in the exam room. If you bring a small child, an adult must be there to care for him or her.  Please call the Imaging Department at your exam site with any questions.              Further instructions from your care team       Discharge Instructions for Undelivered Patients    Diet:    Drink 8 to 12 glasses of liquids (milk, juice, water) every day.    You may eat meals and snacks.        Activity:            Count fetal kicks every day.    Call your doctor if your baby is moving less than usual.    Medicines:    My care team has reviewed my medicines with me.    My care team has given me a list of my medicines.    My care team has prescribed a new medicine. They have either sent it home with me or ordered it from the pharmacy.    Call your provider if you notice:    Swelling in your face or increased swelling in your hands or legs.    Headaches that are not relieved bu Tylenol (acetaminophen).    Changes in your vision (blurring; seeing spots or  rhythm.      Heart sounds: Normal heart sounds. No murmur. No gallop.    Pulmonary:      Effort: Tachypnea present.      Breath sounds: Decreased breath sounds and wheezing present. No rhonchi or rales.   Skin:     General: Skin is warm and dry.   Neurological:      General: No focal deficit present.      Mental Status: He is alert and oriented to person, place, and time.   Psychiatric:         Mood and Affect: Mood normal.         Behavior: Behavior normal.     Exercising pulse ox 95% on room air with .     Patient given duoneb and afterward felt he was breathing easier. Had better air movement and mild wheezing.     Xr Chest Pa And Lateral    Result Date: 2/16/2021  EXAM: FRONTAL AND LATERAL CHEST CLINICAL INFORMATION: Acute bronchospasm COMPARISON: None FINDINGS: Cardiac silhouette is within normal limits in size. Mediastinal and hilar contours are within normal limits. There is no evidence of focal consolidation, effusion, edema or pneumothorax.     IMPRESSION: No acute cardiopulmonary process.     Patient given second duoneb and afterward felt he was breathing easier. Had better air movement and occasional wheezing.       ASSESSMENT/PLAN     ASSESSMENT: Cough due to bronchospasm  (primary encounter diagnosis)    Plan: ipratropium-albuterol (DUONEB) 0.5-2.5 (3)         MG/3ML nebulizer solution 3 mL, XR CHEST PA AND        LATERAL, benzonatate (TESSALON PERLES) 100 MG         capsule, cetirizine-pseudoephedrine (ZyrTEC-D         Allergy & Congestion) 5-120 MG per tablet,         promethazine-dextromethorphan (PROMETHAZINE-DM)        6.25-15 MG/5ML syrup, ipratropium-albuterol         (DUONEB) 0.5-2.5 (3) MG/3ML nebulizer solution         3 mL           Cough, persistent    Plan: benzonatate (TESSALON PERLES) 100 MG capsule,         cetirizine-pseudoephedrine (ZyrTEC-D Allergy &         Congestion) 5-120 MG per tablet,         promethazine-dextromethorphan (PROMETHAZINE-DM)        6.25-15 MG/5ML syrup,  "stars).    Nausea (sick to your stomach) and vomiting (throwing up).    Weight gain of 5 pounds per week.    Heartburn that doesn't go away.    Signs of bladder infection: pain when you urinate (use the toilet), needing to go more often or more urgently.    The bag of ponce (membranes) breaks, or you notice leaking in your underwear.    Bright red blood in your underwear.    Abdominal (lower belly) or stomach pain.    For first baby: Contractions (tightenings) less than 5 minutes apart for one hour or more.    For Second (plus) baby: Contractions (tightenings) less than 10 minutes apart and getting stronger.    Increase or change in vaginal discharge (note the color and amount).        Follow up with your provider:  Call for appointment on Monday      Pending Results     No orders found from 3/12/2017 to 3/15/2017.            Admission Information     Date & Time Provider Department Dept. Phone    [facility]14[facility]2017 [phone]      Your Vitals Were     Blood Pressure Pulse Temperature Respirations Last Period       128/84 101 98.4  F (36.9  C) (Oral) 16 2016       [facility] Information     [employer] lets you send messages to your doctor, view your test results, renew your prescriptions, schedule appointments and more. To sign up, go to [email or web address] . Click on \"Log in\" on the left side of the screen, which will take you to the Welcome page. Then click on \"Sign up Now\" on the right side of the page.     You will be asked to enter the access code listed below, as well as some personal information. Please follow the directions to create your username and password.     Your access code is: [email or web address]  Expires: 2017  6:10 PM     Your access code will  in 90 days. If you need help or a new code, please call your [facility] or [phone].        Care EveryWhere ID     This is your Care EveryWhere ID. This could be used by other organizations " ipratropium-albuterol         (DUONEB) 0.5-2.5 (3) MG/3ML nebulizer solution         3 mL      Bronchospasm, acute    Plan: benzonatate (TESSALON PERLES) 100 MG capsule,         promethazine-dextromethorphan (PROMETHAZINE-DM)        6.25-15 MG/5ML syrup, ipratropium-albuterol         (DUONEB) 0.5-2.5 (3) MG/3ML nebulizer solution         3 mL        Follow up with Primary Doctor as needed in 3 days.   The supervising physician for services performed today is Omari Valle M.D.           to access your [facility] medical records  [id number]           Review of your medicines      UNREVIEWED medicines. Ask your doctor about these medicines        Dose / Directions    albuterol 108 (90 BASE) MCG/ACT Inhaler   Commonly known as:  VENTOLIN HFA   Used for:  Cough        Dose:  2 puff   Inhale 2 puffs into the lungs every 4 hours as needed   Quantity:  2 Inhaler   Refills:  1       FLUoxetine 10 MG capsule   Commonly known as:  PROzac   Used for:  Anxiety        Dose:  10 mg   Take 1 capsule (10 mg) by mouth daily   Quantity:  30 capsule   Refills:  1       hydrOXYzine 50 MG tablet   Commonly known as:  ATARAX   Used for:  Anxiety        Dose:   mg   Take 1-2 tablets ( mg) by mouth every 6 hours as needed for itching   Quantity:  30 tablet   Refills:  1       ranitidine 150 MG tablet   Commonly known as:  ZANTAC   Used for:  Gastroesophageal reflux disease, esophagitis presence not specified        Dose:  150 mg   Take 1 tablet (150 mg) by mouth 2 times daily   Quantity:  60 tablet   Refills:  1       vitamin D 2000 UNITS tablet   Used for:  Vitamin D deficiency        Dose:  2000 Units   Take 2,000 Units by mouth daily   Quantity:  100 tablet   Refills:  3                Protect others around you: Learn how to safely use, store and throw away your medicines at www.disposemymeds.org.             Medication List: This is a list of all your medications and when to take them. Check marks below indicate your daily home schedule. Keep this list as a reference.      Medications           Morning Afternoon Evening Bedtime As Needed    albuterol 108 (90 BASE) MCG/ACT Inhaler   Commonly known as:  VENTOLIN HFA   Inhale 2 puffs into the lungs every 4 hours as needed                                FLUoxetine 10 MG capsule   Commonly known as:  PROzac   Take 1 capsule (10 mg) by mouth daily                                hydrOXYzine 50 MG tablet   Commonly known as:  ATARAX   Take 1-2 tablets ( mg)  by mouth every 6 hours as needed for itching                                ranitidine 150 MG tablet   Commonly known as:  ZANTAC   Take 1 tablet (150 mg) by mouth 2 times daily                                vitamin D 2000 UNITS tablet   Take 2,000 Units by mouth daily

## 2021-02-18 ENCOUNTER — COMMUNICATION - HEALTHEAST (OUTPATIENT)
Dept: SURGERY | Facility: CLINIC | Age: 26
End: 2021-02-18

## 2021-02-19 ENCOUNTER — COMMUNICATION - HEALTHEAST (OUTPATIENT)
Dept: SURGERY | Facility: CLINIC | Age: 26
End: 2021-02-19

## 2021-02-19 ENCOUNTER — AMBULATORY - HEALTHEAST (OUTPATIENT)
Dept: SURGERY | Facility: CLINIC | Age: 26
End: 2021-02-19

## 2021-03-15 ENCOUNTER — COMMUNICATION - HEALTHEAST (OUTPATIENT)
Dept: SURGERY | Facility: CLINIC | Age: 26
End: 2021-03-15

## 2021-06-05 VITALS — HEIGHT: 67 IN | BODY MASS INDEX: 30.29 KG/M2 | WEIGHT: 193 LBS

## 2021-06-15 NOTE — PROGRESS NOTES
"   Zacarias Adam is a 25 y.o. female who is being evaluated via a billable video visit.       How would you like to obtain your AVS? MyChart.  If dropped from the video visit, the video invitation should be resent by: Send to e-mail at: [email or web address]  Will anyone else be joining your video visit? No     Video Start Time: 4:00 pm      Medical  Weight Loss Initial Diet Evaluation  Zacarias is presenting today for a new weight management nutrition consultation. Pt has had an initial appointment with Dr. Rivas.  Weight loss medication: Naltrexone.   Nutrition Assessment:   Anthropometrics:  Pt's Initial Weight: 193 lbs  Weight: 193 lb (87.5 kg)  Weight loss from initial: 0  % Weight loss: 0 %  Weight (Patient Reported): 193 lb (87.5 kg)  Height (Patient Reported): 5' 6.5\" (1.689 m)  BMI (Based on Pt Reported Ht/Wt): 30.68    BMI: There is no height or weight on file to calculate BMI.   Ideal body weight: 60.5 kg (133 lb 4.3 oz)  Adjusted ideal body weight: 71.3 kg (157 lb 2.6 oz)  Estimated RMR (Coos-St Jeor equation):  1652 kcals x 1.3 (light active) = 2147 kcals (for weight maintenance)    Recommended Protein Intake: 60-75 grams of protein/day  Medical History:  Patient Active Problem List   Diagnosis     Vitamin D deficiency     Vasovagal episode     S/P primary low transverse      Rectal pain     Prenatal care, subsequent pregnancy     Mild persistent asthma     Migraine aura without headache (migraine equivalents)     Gestational hypertension w/o significant proteinuria in 3rd trimester     PRAKASH (generalized anxiety disorder)     Food allergy     Fibromyalgia     External hemorrhoids     Essential hypertension     Endometriosis     Encounter for insertion of mirena IUD     Current moderate episode of major depressive disorder without prior episode (H)     Congenital uterine anomaly     Chronic low back pain     Anxiety     Anemia due to blood loss, acute     Allergic rhinitis     Hypothyroidism     " Sinus tachycardia      Diabetes: No  No results found for: HGBA1C  Nutrition History:   Food allergies/intolerances/cultural or religous food customs: Yes, Wheat, Rye, soy, chicken, and turkey  Weight loss history:   Dietary Recall:  She states that she doesn't usually sit down to eat meals, and when she does eat she tends to feel bad about eating.  Breakfast: none  Lunch: none  Dinner: Chipotle   Beverages:   Water or flavored water  Cut out pop completely about 3 weeks ago  No alcohol - doesn't like it  Exercise:   Plans to go to the gym to do strength training 3-4x per week   Nutrition Diagnosis (PES statement):   Overweight/Obesity (NC 3.3) related to overeating and poor lifestyle habits as evidenced by BMI 30  Nutrition Intervention:  1. Food and/or Nutrient Delivery   a. Placed emphasis on importance of developing a healthy meal routine, aiming for 3 meals a day and no snacks.  2. Nutrition Education   a. Discussed with patient how to build a meal: the importance of including a lean/low fat protein at each meal, include a source of vegetables at a minimum of lunch and dinner and limiting carbohydrate intake to less than 25% of meal.  b. Educated on sources of lean protein, portion sizes, the amount of grams found in each source. Recommend patient to aim for 20-25g protein at each meal.  c. Discussed the importance of adequate hydration, with emphasis on drinking 64oz of water or zero calorie beverages per day.  3. Nutrition Counseling   a. Encouraged importance of developing routine exercise for health benefits and weight loss.  Goals established by patient:   1. Start meal planning - aim to eat meals according to Plate Method  2. Aim for 20-25 g protein per meal  3. Start doing strength training 3-4x per week in apartment gym  Handouts provided:  Plate Method  Assessment/Plan:    Pt will follow up in 2 month(s) with bariatrician and 1 month(s) with dietitian.     Video-Visit Details    Type of service:  Video  Visit    Video End Time (time video stopped): 4:27 pm  Originating Location (pt. Location): Home    Distant Location (provider location):  [facility]     Platform used for Video Visit: [employer]

## 2021-06-15 NOTE — PATIENT INSTRUCTIONS - HE
Plan:  1. Welcome to your weight loss season. To start we'll want to feel in control and avoid excess night/evening hunger cravings that lead to impulsive/comfort eating urges. To do this, aim for having your first meal of the day within 2-3 hours of waking, I'd like you to have 3 meals daily, each with 20-25grams of lean protein. (see below). Follow up with the dietician. Try to get to bed within 4-5 hours of supper to avoid late night hunger.    2. To curb cravings, start naltrexone: HALF a tablet with supper for 10 days, increase to one full tablet if tolerated thereafter. Take with food/meal to reduce risk of nauseating side effects.     3. Given your mental health medications and your BMI of 30.7, I will submit for prior authorization a 6-12 month trial of Saxenda. If covered and affordable, start 0.6mg injected once daily for a week. Increase each week to the next step up (0.6 to 1.2 to 1.8 to 2.4 to 3.0mg max dose) if tolerated. If nausea/upset stomach, turn down to the next lower dose and stay on that dose until our follow up. It can take 5 or more weeks sometimes to reach the top dose tolerated. Stop medication if vomiting/mid abdominal pain as there is a very slight risk for pancreatitis (less than 1% risk). Stop if any rash/throat swelling or other intolerance and let me know.  If too expensive, do not fill. Hx of drug induced weight gain with both Depo Provera and other meds.    4. Recent labs showed good lipids/cholesterol, slightly under supported thyroid.  I recommend recheck complete metabolic panel and blood count when you get your thyroid rechecked (ordered today).     5. With your love of lifting weights, start 3-4 days weekly for 15 minutes. You will not gain weight from this level of lifting as in weight loss, we're just looking to maintain strength/muscle mass.  Crossfit, flow yoga with very light hand weights are also great options. Pilates may be to your liking as well.     What makes a  "person succeed with dramatic and sustained weight loss?    It's being at the right point in your life where you feel the need to lose the weight, not because anyone told you so but because of a voice inside of you that says, \"I am ready for this\".  You're now at a point where you may be feeling anxious, irritable and when you look in the mirror you do not recognize the person looking back.  Your healthy self is buried somewhere in that reflexion and you want to free it again.  This is the sort of motivation that leads to success, and it comes from you.    Because the only person that can lose that extra weight is you, I like my patients to focus on the mindset of being in Weight Loss Season.  This gives you permission to make the changes necessary to be consistent with the diet/activity and behavior changes that lead to successful, healthy weight loss.  Nearly any diet plan can work for weight loss, but keeping it healthy and nutrient based to prevent deficiencies/hair loss/fatigue or irritability is vital.  If you have a plan you want to try, we'll work with you to make sure no adjustments are needed to keep you healthy through your weight loss season and working with our Bariatric Dieticians you'll be given expert guidance to customize your diet plan to suit your particular needs. If you don't have your own ideas in mind, we are always happy to suggest well researched and validated plans that provide enough food to prevent hunger but still tap into your excess fat reserves and lose weight in a sustainable fashion.  There is great evidence that lean protein/healthy fat intake with good fiber intake while minimizing simple starches/carbs produces reliable/sustainable weight loss in most people. But some feel more connected to an intermittent fasting/fast mimicking or ketogenic diet.  These protocols can be hard for many to stick with and that's why we prefer the protein/healthy fat focused diets but if these " "alternative strategies appeal to you, we can work with you to optimize your knowledge and results with these tools.    Losing weight is a temporary commitment, but you need to be \"All In\" to have a good weight loss season.  To avoid frustration, you have to be willing to be on track 19 out of 20 days or even better than that. But, weight loss season is generally only 4-8 months in length. After that length of time, it can be hard to maintain a negative calorie balance and our brain, motivations and metabolism will usually bring you to a plateau that cannot be broken in this modern world where other commitments start to take priority. That's when we look to stabilize the weight loss you've achieved.  If you've reached your goal by that time, fantastic, and job well done.  If there is more to go, then after a few months of stabilization, we can usually attack that previous plateau and break into new territory.    Because of this time limitation, we want to really get to work right away and get into a sustainable routine ASAP.  One of the best predictors of how much weight you're going to lose throughout the season is how much you lose in the first 6 weeks, so prepare well and jump in with both feet.      Occasionally, people may feel like they cannot commit fully to the changes necessary and may want to change one thing at a time and \"get used to\" the idea of losing weight.  That is OK because that is where they are in their life, and they cannot fully commit for any number of reasons.  It's part of that internal motivation and they just haven't reached PRIORTY NUMBER ONE status yet. It's possible that what they need is more time to reach that point and I am always willing to work with people that want to \"dabble\", but understand, the amount of success obtained with half measures, is much less than half results. Behavior Change cannot occur until we prepare our minds, bodies and environment for what is to come, " "action!    As you go through your plan, look for things to keep your motivation rolling.  The most successful people have a goal or target/reward that they are working towards.  Having a reward that celebrates your new fitness, mobility and energy is the best sort because it will encourage you to do well with the weight maintenance phase and long term lifestyle changes that promotes keeping the weight off for the long term.  Usually, \"getting healthier\" or improving blood tests or losing weight so your clothes fit better is not as internally motivating as having a tangible reward.  A good weight loss season reward is one that isn't food based, is affordable, but something special:  Something you won't be getting/doing unless you achieve your goal.   It s important to keep to the rule of success:  in order to get the reward, your goal MUST be achieved. Write this reward down, where you can look at it daily and keep it in the front of your mind as you go through your weight loss season and it will help keep you on track.    Tools that help change behavior are vital for success. The most studied and most supported tool for weight loss is nothing more than writing down your food and weight every day.  Every Day.  Accurately and completely.  When you commit to weight loss season, this information tells you whether you're getting ENOUGH food to fuel your weight loss properly as well as teaches you the interaction between different foods you eat and how your body responds with weight loss.  You'll see that sometimes after a heavy workout you don't see the scale move until 2-3 days later.  How saltier meals (chili for example) may make you retain water for 4-5 days before you see the weight come off, you'll get used to the mini-plateaus that develop after a good 3-8 lb drop in weight as well as how you break through if you keep working the diet as you should.    Weight loss is not a linear process, there are mini ups and " downs.  Learning how your body loses weight and getting comfortable with that is very rewarding. The act of writing words on paper also solidifies your will power and commitment to the season of weight loss and that by itself changes your brain chemistry/appetite, motivation and prepares you for maximal success.     Behavior change is all about getting into a new routine.  The old habits and routine have to change because without changing the circumstances of how you gained your weight, it's unlikely you'll enjoy satisfying results. If you have snacking habits, like every time you walk through the kitchen you grab a little something, well, that habit has to change and be replaced by a new habit.  It can be something as simple as keeping a doodle pad on the counter that you make a few scribbles and then walk through the kitchen having not opened the cupboard, or starting with a glass of water and leaving the kitchen without anything else, or checking your food journal to see how many calories you have left for the day.  Boredom is the enemy as are the old habits. Break new ground and try to push those old habits into a deep hole.  There are apps/counseling options available that can help with some of the day to day urges/behaviors if you're struggling. One commercial product that does a good job is [employer].  Unfortunately, there is a subscription fee.    Finally, exercise always helps.  While not mandatory to lose weight, every little bit helps and exercise has so many other benefits that to not work it into your plan is to miss out on all the mood, sleep, stress and general health benefits that come from making yourself a little short of breath and sweaty at least 3-4 days out of the week.  The metabolism and calorie burn benefits aside, almost every chronic ailment in medicine gets better with proper, aerobic exercise.  Allow yourself to start slow and let your body prepare itself to accept harder training 4-6 weeks  "down the road, but start now and commit to a plan.  Whether you have the means to hire a , join a gym or just walk out your front door or go down to your basement for a video workout, get into a exercise routine and  after 3 weeks of at least 3 times a week exercise you should be at a point where you can slowly start ramping up 10% each week to our goal of at least 150-300minutes weekly of aerobic exercise and at least twice weekly resistance training/strenghtening with weights/bands or body weight exercises.     I am a big fan of modifying the free training plan, \"Couch to 5k\", for almost all of my patients. Just type it into [employer] or look it up on your smart phone cyndi store.  To modify the plan,  you can use the training plan for whatever aerobic activity you do (bike/treadmill/elliptical/rower/pool/etc). During the \"jog\" intervals, you just move a little faster or harder or increase the tension or incline.  You use those little intervals to switch up the workout and recruit more muscles and pump the blood a little more and then recover again in the \"walk\" intervals by slowing down, decreasing the incline or turning down the tension.  3-4 days a week is not that much to ask and the benefits are enormous.  Start slow and develop the base from which you can then build on and reduce the risk of injury.  It's much more important 2-4 months from now to be enjoying your exercise then it is to over exert yourself at the start and hurt yourself.  Starting slowly allows your body to accept the training better down the road when the exercise becomes crucial for weight maintenance.  Without exercise down the road during your maintenance phase, all this hard work you are about to put in can be undone. It usually takes about 100-300 calories a day of exercise to maintain a weight loss and our focus during weight loss season is to generate the routine/activities and hobbies that make that " enjoyable/sustainable.    Thanks for taking this first and most important step in your weight loss season.  Commit to it and we will cheerlead you all the way to success.  When things get tough or off track we'll offer guidance and analysis and when you reach your goal we'll celebrate your success.  In the end, it is all about your success, your health and what you do with it.      Julio Rivas MD  [facility]  [phone]      To help lose weight in a safe way and sustainable way, I'd like to start you on a 1300 calorie diet each day.  Understanding that every 3500 calorie deficit adds up to a pound of weight reduction, with the combination of light aerobic exercise, some modest weight training and diet, we should be able to lose around 1 to 2.5lbs weekly depending on your starting height and weight and exercise routine with this goal intake. Dropping abut 1% total body weight weekly is exceptional weight loss and very sustainable once in a good routine.    Hunger and fatigue are the enemies of weight loss and behavior change in general.  We become much more reactive in our eating when we're hungry and tired and decrease our levels of self control.  It's not a personal failing, it's just body chemistry.   We can combat this by trying to avoid being tired and hungry at the same time.  To help this,  try to front load your calories in the first 10 hours of you day so you get into the fatigued evening hours reasonably full and you can control impulses/mindless eating a lot better and avoid those bedtime snacks/evening treats that the tired brain craves.  If you can getting your exercise in the beginning of your day has also been shown to have superior results (but anytime is better than none).  We want to build up to 150 minutes or more as you progress through the first 2-3 months of weight loss season (300 minutes weekly is ideal for maintaining weight loss for most after the end of  weight loss season).     Weight loss goes through ups and downs and plateaus but if you stay on the program, you will enjoy success.   Commit to the process, try to be on track at least 19 days out of 20 and continue to think about why you're doing this and what you're working towards. If you haven't thought of your reward for hitting your weight loss goals, think about it now. Using these little victory bribes along the way helps a lot.    Finding a diet that is satisfying and repeatable-- day in and day out, improves success.  The following uses the concept of Daily Caloric Restriction, eating less every day.  An outline of how to break up the day's food is given below.  It is a starting point and example of what a 1300 calorie diet looks like.  You can modify the listed foods to suite your particular tastes, but pay attention to portions and protein content.   Do not skip breakfast on this plan as it will leave you hungry and lead to overeating at some point during the rest of the day.  If you can make supper the last food for the day more days of the week then not, it will help a lot.  That means that the intake during those first 10-12 hours of the day and hitting your protein/intake targets for all three meals is vital to your success and evening hunger control.     Start reading labels so you know that you're getting what you think you are and start measuring foods so you can eventually look at a portion and understand how it will provide the fuel you want.    Prepping raw veggies after you buy them (washing and putting into bags/tupperware for easy access), cooking several chicken breasts/proteins for the next 2-3 lunches and generally being able to grab and go what will keep you on target when time is short will greatly aid your success.  Prepare and plan ahead and success will follow. It really only requires a couple days weekly to optimize the access to the right food at the right time of day.  Food  delivery and stopping at fast food is a sign of reactive eating and usually will signal a stalling of your weight loss.    Read labels:  for protein portions/yogurt, protein bars etc looking for items with more than 10 grams of protein and less than 10 grams of sugar is very helpful.  Frozen meals should have at least 18 grams of protein, under 10 grams of sugar as well (typically around 300-380 calories).    Please note: if you've had previous bariatric surgery: wait 20-30 minutes before/after eating to drink your beverages to avoid early fullness/dumping syndrome/worsening malabsorption, early loss of fullness and hunger.  Start with eating your protein first, slow down your meals and chew thoroughly.          1300 calorie diet:  Think Big Breakfast, Medium Lunch, smaller dinner.  Note: it's OK to consolidate the calories/protein of the mid morning snack with breakfast and the midafternoon snack with lunch if time doesn't allow or if your don't wish to have those snacks. No snacks recommended after supper. If you're prone to late afternoon nibbling, that is a great time to get your fitness in so you can get to supper without the extra.    Breakfast goal of 300 calories-350 calories (egg, 1/3 to 1/2 cup cooked old fashioned oatmeal or steel cut oats and berries,3-4oz greek yogurt.)Glass of water and if you like coffee (black) or tea.        Mid morning Snack or part of lunch, about 2-2.5 hrs later: 100 calories (cottage cheese/string cheese/ fruit or banana) and a handful of cruchy/green veggies (cucumber/celery/green peppers/broccoli).  Small glass of water    Lunch:  300 calories (3.5-4 oz tuna with little wood (no bread), apple, salad with drizzle of olive oil/balsamic vinegar for example)  Water    Snack:  100 calories.  4-5 oz Greek Yogurt with at least 17 grams of protein per serving.    Or Cottage cheese (lower sodium version preferable). Get this in about 2 hrs before you plan to have dinner. Protein drinks  with at least 15-20 grams of protein and less than 6 grams of sugar could be used here to hit protein goals and decrease afternoon/evening hunger.  Glass of water    Dinner:  350 calories (4 oz meat or fish with cooked veggies or salad with minimal dressing, one piece of bread).  Glass of water or unsweetened tea with lemon  Dessert: 100 calories Medium Apple or Small handful of nuts, about 2/3 of an ounce (almonds/walnuts/cashews or pistachios are ideal).   Glass of water.    Try to avoid all soda and juice (low sodium V8 ok once a day).   You can do it! Embrace the healthier you and give up the inflammatory sugary treats that accelerate disease.    Choose an activity that is fun/interesting and available to you such as  Going for a swim for 15 minutes, walking 40 minutes, elliptical 20 minutes or cycling 20 minutes as many days a week as you can.  Having a walking or workout buddy can make it even more enjoyable and keep you on track the days your motivation/energy may be lower.  If those times are too long for your fitness level, start at 10 minutes of movement and each week try to increase by 2 minutes each week.  The first 70 minutes a week (10 minutes a day only) of exercise drops the mortality rate of a sedentary person 30%!!!!  Our goal is to work up to 150 minutes or more per week of moderate to vigorous exercise  to optimize metabolism and prevent weight regain during maintenance. If time is short and your fitness allows it, high intensity interval training can be a nice way to cut the workout time in half:  warm up 2-4 minutes then 3-6 intervals of increased intensity effort (70% of max heart rate) followed by an equal amount or more of recovery before repeating, then 1-3 minutes of cooldown.     10 minutes of weight lifting can be helpful as well or using some body weight exercises like wall squats, pushups (with assistance as needed or standing/wall pushups), seat presses, yoga moves. At least twice  "weekly helps maintain a good strength to weight ratio, more days is better.  [employer] is a great resource for free video demonstrations.    If you are into strenuous weight lifting or prolonged exercise, use an online calculator for how many calories you've burned and if your exercise is lasting over 60 minutes, replace 20-30% of those calories burned immediately after exercise .  For the more limited exercise (less than 500 calories burned), there is no need to add extra food unless you notice a lot of hunger on your food journal.  Usually  Even a  calorie load after longer workouts is more than adequate.  For longer efforts, hunger will increase if you don't refuel afterwards and can get meal plan off track due to hunger, so replenish immediately after the workout to keep on the diet plan and feeling good.    Exercise example:  If you burned 1000 calories during the exercise, immediately (within 30 minutes) have a snack/replacement beverage totaling 200-300 calories and ideally have a 3:1 ratio of carbohydrate grams to protein grams to keep muscles ready for exercise the next day.  Have your next, full meal within 2-3 hours of exercise.    Tips for success:  KEEP A FOOD JOURNAL and a log of daily weights.  Pencil and paper works fine for most. Otherwise, [employer], [employer], [employer], [employer], [employer] are all good tracker apps/programs or websites for food/fitness.  Remembering to ask yourself, \"How did my nourishment affect me today\" and comparing \"good days\" to \"hungry days\" to solidify what helps your body, in your life, feel it's best while losing weight.    1.  Prepare proteins ahead of time (broil chicken breasts in bulk so you can grab and go), steel cut oats can be stored in casserole dish/bowl in the fridge for quick scoop in the morning and rewarm in microwave, make use of crock pot recipes (watch salt content).    2.  Drink a 8-12 oz glass of water every 2-3 hours when awake.  We often mistake hunger " "for thirst, especially when losing weight.    3. Remember your Reward and Motivation when things get hard.    4.  Weigh yourself every morning and record, you'll stay on track better and learn how your body loses weight. Don't worry about 1 or 2 day patterns, but when on track you'll notice good trend downward of weight over 3-4 day segments.    5. Call or use [employer] messaging if problems/concerns .    6.  Find a handful of meals/foods that keep you on track and get into a boring routine that is sustainable for you.    7.  Take a complete multivitamin just to make sure all micronutrients are adequate during weight loss.    8. If losing hair/brittle nails it often means you are not taking enough protein.  Minimum goal is 60 grams daily of protein, most people with normal kidneys do well with upwards of 100-120 grams/day of protein. Consider taking Biotin as supplement or a \"Hair and Nail\" multivitamin.  If you are hypothyroid and losing hair, see you doctor for a check up of your levels if you haven't had one recently.    9.  Getting adequate sleep is very important for starting your day properly, when we are sleep deprived, our morning appetite is suppressed and without eating an adequate breakfast, we overeat later in the day when we're tired.  Our body heals in our sleep and our mental and immune health depends on this rest.  Aim for 7-8 hours of sleep nightly if possible.  If you sleep is disturbed, perform some introspection on stressors/depression/anxiety/PTSD events or possible sleep disorders and we can trouble shoot solutions/evaulations if issues persist.    Exercise during the day, meditative breathing before bed and after waking and removing the television from the bedroom are easy ways to improve quality of sleep.      10. Relaxation.  Controlled breathing exercises can lower stress levels in the brain.  One technique is 4, 7, 8 breathing:  Place the tip of your tongue behind your front teeth, breath in " through your mouth for 4 seconds, Hold it for 7 seconds and breath out slowly, making a leaky tire noise for 8 seconds.  Repeat 4 times.  Ideally do at the start and end of your day or if feeling stressed.  It works and it's why meditation/yoga/martial arts are often very breath based activities.  There are breathing techniques for alertness as well as relaxation out there and they can be quite helpful.            LEAN PROTEIN SOURCES  Getting 20-30 grams of protein, 3 meals daily, is appropriate for most people, some need more but more than about 40 grams per meal is not useful.  General rule is drinking one ounce of water per gram of protein eaten over the course of the day:  70 grams of protein each day, drink 70 oz of water.  Protein Source Portion Calories Grams of Protein                           Nonfat, plain Greek yogurt    (10 grams sugar or less) 3/4 cup (6 oz)  12-17   Light Yogurt (10 grams sugar or less) 3/4 cup (6 oz)  6-8   Protein Shake 1 shake 110-180 15-30   Skim/1% Milk or lactose-free milk 1 cup ( 8 oz)  8   Plain or light, flavored soymilk 1 cup  7-8   Plain or light, hemp milk 1 cup 110 6   Fat Free or 1% Cottage Cheese 1/2 cup 90 15   Part skim ricotta cheese 1/2 cup 100 14   Part skim or reduced fat cheese slices 1 ounce 65-80 8     Mozzarella String Cheese 1 80 8   Canned tuna, chicken, crab or salmon  (canned in water)  1/2 cup 100 15-20   White fish (broiled, grilled, baked) 3 ounces 100 21   [place]/Tuna (broiled, grilled, baked) 3 ounces 150-180 21   Shrimp, Scallops, Lobster, Crab 3 ounces 100 21   Pork loin, Pork Tenderloin 3 ounces 150 21   Boneless, skinless chicken /turkey breast                          (broiled, grilled, baked) 3 ounces 120 21   [place], [place], [place], and Venison 3 ounces 120 21   Lean cuts of red meat and pork (sirloin,   round, tenderloin, flank, ground 93%-96%) 3 ounces 170 21   Lean or Extra Lean Ground Turkey 1/2 cup 150 20   90-95% Lean  [place] Burger 1 royal 140-180 21   Low-fat casserole with lean meat 3/4 cup 200 17   Luncheon Meats                                                        (turkey, lean ham, roast beef, chicken) 3 ounces 100 21   Egg (boiled, poached, scrambled) 1 Egg 60 7   Egg Substitute 1/2 cup 70 10   Nuts (limit to 1 serving per day)  3 Tbsp. 150 7   Nut [place] (peanut, almond)  Limit to 1 serving or less daily 1 Tbsp. 90 4   Soy Burger (varies) 1  15   Garbanzo, Black, Spicer Beans 1/2 cup 110 7   Refried Beans 1/2 cup 100 7   Kidney and Lima beans 1/2 cup 110 7   Tempeh 3 oz 175 18   Vegan crumbles 1/2 cup 100 14   Tofu 1/2 cup 110 14   Chili (beans and extra lean beef or turkey) 1 cup 200 23   Lentil Stew/Soup 1 cup 150 12   Black Bean Soup 1 cup 175 12             MEDICATIONS FOR WEIGHT LOSS  There are several medications available to assist us in weight loss.  By themselves, without compliance to a change in diet and increase in movement/activity these medications are disappointing in their results. However, combined with a closely monitored program of diet change and exercise they can be very effective in controlling appetite and boosting initial weight loss.  All weight loss medications need continual re-evaluation for efficacy as their side effects and health benefits fail to be worthwhile if a person is not continuing to lose weight or in maintaining their healthy weight.  Some weight loss medications are scheduled drugs, meaning there is at least a theoretical possibility for developing addiction to them but in practice this is rare.  We do anticipate coming off meds in the future after stabilization of weight loss is assurred.  Finally, a tolerance can develop and people s perceived efficacy of medication can diminish.  In communication with your physician, it may be appropriate to intermittently take a break from these medications and then restart again (few weeks off then restart again) if a plateau is  reached that cannot be broken through.  Each person can respond to a medication differently and to be a good option for you, it will need to be affordable, effective and well tolerated with minimal side effects.    In most cases, weight loss progress after one month and three months will be obtained and if a patient is not reaching the satisfactory progress towards weight loss, the medications may be discontinued.  The thought is that if a person is taking a weight loss medication and not receiving the potential health benefit of that drug, the side effects are not worthwhile and use should be discontinued.  On the flip side, there are many people on some weight loss medications for years because it continues to be an effective tool in their weight management and they are tolerating the medication without any long-term side effects.  Each person's response and purpose will be evaluated.      PHENTERMINE (Adipex): approved in 1959 for appetite suppression.  It has stimulant effects and cannot be used with Ritalin, Concerta, or other stimulants.  Although it is not highly addictive, it's chemically related to amphetamines which are addictive.  Occasional dependence can develop, but rarely. The most common side effects are dry mouth, increased energy and concentration, increased pulse, and constipation.  You should not take phentermine if you have glaucoma, hyperthyroidism, or uncontrolled/untreated hypertension or overly anxious. You should stop if dramatic mood swings, severe insomnia, palpations, chest pains, visual changes or if your Blood Pressure is consistently elevated or any time it's over 160/90.   It's ok to go off the med for a few weeks and restart if efficacy is wearing off.  $24-$30 for 90 tablets at [facility]. Females are required to have reliable birth control to reduce the risk birth defects/miscarriage.      TOPIRAMATE (Topamax): Anti-seizure medication, also used to prevent migraines and  sometimes for mood stabilization.  Side effects include paresthesia, glaucoma, altered concentration, attention difficulties, memory and speech problems, metabolic acidosis, depression, increase in body temperature and decrease sweating, risk of kidney stones.  Do not take Topamax while taking Depakote as this can cause high ammonia levels.  You must have reliable birth control as Topamax can cause birth defects.  If prolonged use has occurred it should be tapered off slowly to avoid withdrawal issues.  Insurance usually covers Topiramate.  At higher doses, there may be some confusion/forgetfulness associated with this so we try to limit dose to under 75mg twice daily to reduce this risk. Often covered by insurance as it's used for many reasons.  Topamax will cause carbonated beverages to taste bad. A recheck of your kidney/electrolytes may occur within a few months of starting.    QSYMIA (Phentermine + Topamax):  See above information about phentermine and Topamax.  Most common side effects are paresthesia, dizziness, distortion of taste, insomnia, constipation, and dry mouth.  See above descriptions for the two individual agents.Females are required to have reliable birth control to reduce the risk birth defects/miscarriage.  $150-$220 per month      Liraglutide (Victoza/Saxenda):   Part of the family of Glucagon Like Peptide Agonists, liraglutide directly suppresses appetite and is often used by diabetic patients due to decrease liver production of glucose, thereby improving glucose levels.  It also slows how quickly the stomach empties. May be hard to get covered for non diabetics and is an injectable medication delivered via a injector pen. It can be very costly without insurance coverage (over $500/month).  Small risk for pancreatitis and dose should be held if increased mid abdominal pain/burning. It is not to be used if previous Multiple Endocrine Neoplasia. In rodents, may increase risk of thyroid tumors  "and not indicated for anyone with hx of medullary thyroid cancer as a result.  If changes in voice/swallowing should be discontinued. Reliable birth control required in women.    Contrave (Bupropion/Naltrexone).    Synergistic combination of a mild appetite suppressing anti-depressant (Bupropion) whose effects are increased due to interaction with Naltrexone.  Naltrexone may have some effects on craving and is often used in addiction medicine to help previous opiate addicts be less prone to relapse as it blocks the action of opiates. Should be stopped if any need for opiate pain medication, surgery or planned procedures where you'll be given sedation/anesthesia. If prolonged use recommend stepping down bupropion over 2-3 weeks to limit any risk of withdrawal issues. Side effects may include dry mouth, increased heart rate, mild elevation in Blood pressure;  dizziness, ringing in the ears, anxiety (typically due to bupropion), nausea, constipation, and some get fatigued with naltrexone.  About $210 on [facility] for 120 tabs of \"Contrave\", the brand name without insurance coverage. Generic Bupropion 75mg: $25 for 120 tabs, Naltrexone: $55 for 90 tabs without insurance coverage on [facility]. Cannot be used if pregnant/trying to conceive or breast feeding.      Plenity:   Recently available October 2020, by mail order pharmacy only, but expensive. $98/month.  2-3 capsules taken 20 minutes before 2 meals daily provides crystals that expand into a gel that provides a mechanical fullness. Gel mixes with your next meal and increases satisfaction by bulking the meal up to feel bigger than it truly is. Plenity is not absorbed and gets passed through the digestive tract and excreted in stools. May cause some bloating/gas/full feeling as it behaves like a fiber in many ways. Cost not available yet. FDA cleared in March of 2019 but not available in stores as of March of 2020. Clearance safety and efficacy data done under " "\"Gelesis\" name. Not appropriate for people with stomach or bowel motility issues as requires you to pass it through digestive tract. [email or web address] has more information.      Weight Loss Shopping list:    Having the proper food on hand and ready to go is very important in losing weight.  Everybody has their own preferences/tastes so modify this list as you need but the following are foods that have been found to be helpful in following a calorie restricted diet.   If you are a person that doesn't \"like to eat my vegetables\", I recommend making smoothies and throwing the veggies into the  with some fruit and protein powder.      Fruits:  Generally limit to 2-3 whole fruits a day.  Do not buy Juice.  We depend on the fiber and chewing to slow us down and get phytonutrients/antioxidants available in the skins of fruits for health benefits.  Chewing also signals our stomach to send less hunger signals to our brains:    Apples (1-2 daily), Bananas (no more than one full banana a day), Grapes (2 handfuls a day), Clementines/oranges (one daily), berries (blue/rasp/straw:  2 handfuls a day). Watermelon (cubed for easy access, small bowl).    Vegetables:  Eating raw gets most nutrient and fiber benefits but cooked veggies are fine as well, using frozen for cooking or in smoothies is fine as well.  The more chewing/crunchiness the better the hunger control.  No limit to how many a day, but you should at least get 4 handfuls a day or more minimum.  Wash and cut up your vegetables into easy to carry, on the go sizes that are easy to put in plastic bags/pyrex and carry to work/school/etc.  Frozen veggies are just fine as well.     Broccoli, Carrots (no more than 3 a day large carrots or 2 handfuls of baby carrots, as they are very sweet), celery, cucumbers, green peppers, green beans (canned or fresh/frozen), Lettuce(all types), Beets(for roasting, will likely turn urine and stool a bit purple), asparagus.  Go crazy with " "the veggies, just wash well prior to eating.    Protein:  Women need at least  grams of protein a day and men at least  grams a day, more is fine.  Protein is our \"brain food\" and hunger suppressor and getting enough ensures maintenance of muscle mass during weight loss.  Vegetarians should look to balance their protein intake to get all essential amino acids and discuss with dietician if help is needed (mix of beans/soy/etc).  Protein powders or drinks count and can be a great way to control appetite during the day and easy to grab and go/carry to work/etc.  Premier Protein, BiPro, TarasWhey are all brands with high quality whey protein. For whey sensitive people, rice protein is an option as well.    Canned tuna/sardines or anchovies.  Fresh fish/salmon the day you plan to make it.  Chicken breasts/thighs (skinless while losing weight), filet mignon steak (leaner but ) or marinade sirloin (wipe off before cooking). Eggs cooked in olive oil for breakfast is a good way to get protein and good fat in the a.m. (cook on low heat to prevent transformation of olive oil into less healthy fat).  Greek Yogurt with at least 20 grams of protein per serving and less than 11 grams of carbs/sugars.  String Cheese  Cottage Cheese: 2% or fat free per your preference.                    Weight Loss Jump Start Plan      The Jump Start Plan is a great way to quickly lose a few pounds and eliminate sugar cravings before beginning a long-term weight loss plan. It will help you get used to eating more protein and drinking more water, and it helps you reintroduce vegetables and enjoy their taste again. Plus it s very restrictive so once you start your long-term weight loss plan  following your new diet will seem easier and more generous in comparison! This 3 day Jump Start can be done before beginning a weight loss plan to drop a few pounds and also anytime throughout your diet to break out of a plateau.       THE " JUMP START WEIGHT LOSS DIET:    Unlimited Protein. Choose from:    Roasted/sautéed/baked/grilled chicken breasts (skinless is ideal to start with but skin-on is fine if you use this for only one of your protein servings daily)    Venison    Canned tuna    Fish (broiled or baked)    Steak (filet mignon or sirloin ideally)    Shrimp (sautéed, grilled)    At least one egg daily but not more than 2 eggs per day    It can be helpful to roast several chicken breasts the night before beginning the Jump Start and then eat them throughout the day, just re-warming as needed.  Note: Vegetarians can use an unflavored protein powder in a vegetable smoothie or juice. Unlimited tofu, lentils and beans may also be used for the protein source for vegetarians.    Unlimited, RAW Vegetables, but NO carrots or corn.  Choose from:    Lettuce    Green beans    Peas    Cucumbers    Celery    Bell peppers (red/green/yellow)    Zucchini    Cauliflower    Tomatoes (limit to 5 cherry tomatoes or one regular tomato daily)      This helps cut sweet cravings and helps keep the stools soft and gets you your vitamins.  Try to keep veggies raw during these 2 days. After Jump Start is complete, cook as many veggies as you can, just not during this phase.      Water intake:  Mandatory minimum of 64 oz of water each day, with a maximum of 100 oz (unless you re sweating a lot, in which case drink more).  A 10-12 oz glass of water every 3 hours when awake works best.    Juice requirement: Juice is not recommended during the long term weight loss plan but for this Jump Start three-day portion you should drink four, 6 oz. glasses of 100% pineapple juice or 100% reuben juice per day, no more, no less (24 oz per day total).  Please measure carefully!  Drink the juice when you eat protein or veggies so your blood sugar doesn t spike as much. Your energy levels will also be much better throughout the 2 days.    Coffee/Tea: You can have up to 2 cups of coffee or  tea daily.      No alcohol during this phase.  If you are dependent on alcohol or have had withdrawal before, notify your practitioner before starting the Jump Start.    Fresh fruits, up to two servings per day. Choose from:    One apple    20 grapes    One nectarine    One tangerine    One peach    15 cherries    3 oz of fresh pineapple    One slice of cantaloupe    1 inch wide slice of watermelon    If craving occurs for sweets, have another glass of water and some veggies or protein.    Condiments are fine in moderation. Choose from:    A little olive oil or butter to cook your proteins    A dash of salt (try to limit)    Pepper    Garlic    Seasoning herbs    Mustard    Ketchup (no more than 1 Tablespoon daily)    Mayonnaise (max 2 Tablespoons daily)      Season your proteins so they taste good, just watch salt intake.  No jelly or peanut butter unless peanut butter is just crushed peanuts in the quantity listed in the Nuts section.    Nuts, up to one serving/1 oz. daily. Choose from:    15 Almonds    24 peanuts (unflavored/unsalted)    14 halves of walnuts    40 pistachios    No bread during the Jump Start.      No dairy products during the Jump Start.        Strategies for Success:  1. Start the morning with protein (plan ahead!). The more the better.  Cook 6 chicken breasts or put in crock put for pulled chicken over the day, etc.  Make it easy to grab the protein.    2. Get your water in, frequent small intake is better throughout the day.  3. If you feel severe hunger, start with a protein serving, followed by water and then a crunchy vegetable that requires chewing (this decreases the hunger hormone).  4. Schedule the juice intake like clockwork, and take it with protein, veggies or both to prevent blood sugar spikes and dips and prevent decreases in energy.  5. Record your intake on the sheet below.  Although this may be quite different from your usual diet, it s enough food!    6. Be intentional and  think about what you are eating and feel the food fuel you.  7. Vigorous physical activity is not recommended during these 3 days but low intensity exercise less than 30 minutes (walking/hiking/yard work) is encouraged.  8. This often works best on the weekend when you can devote your full attention to complying with the diet and the food is readily available.  9. Try to get protein in at least every 3.5 hours during the day to avoid a hunger surge late in the day.  10.  Use plenty of olive oil when cooking eggs/meats; the fat will make you feel more satisfied.  Don t cook on high heat though as this transforms the olive oil into a less healthy oil.  11. Expect to urinate a lot over the 3 days.  Most of us have a lot of carbohydrate weight retention.  Remember, if you get the recommended water intake in you will be fully hydrated but still urinating a lot. Urine should be pale yellow and the urge should come every 3-4 hours, if not sooner, for portions of the three days.        Note: diabetic patients on insulin should check their blood sugars 4 times a day during this phase and record those values.  If low blood sugar occurs, rescue as you usually would with an extra serving of juice/fruit.        Three-Day Jump Start Food Checklist: [place] or check off after eating.    Protein Servings:  Unlimited!!! [place] how many each day.     Day 1:  1  2  3  4  5  6  7  8  9  10  ___________________________      Day 2:  1  2  3  4  5  6  7  8  9  10  ___________________________    Vegetables: RAW, Unlimited as noted above.  [place] servings.     Day 1:  1  2  3  4  5  6  7  8  9  10  ___________________________      Day 2:  1  2  3  4  5  6  7  8  9  10  ___________________________    Fruit:   Day 1:  Serving 1 was ____________________,  Serving 2 was:  ___________________     Day 2:  Serving 1 was ____________________,  Serving 2 was:  ___________________    Nuts:   Day 1 Serving was _________________________      Day 2  Serving was _________________________    Eggs:  At least one daily unless allergic, max of 2 daily. [place] servings.     Day 1:  1 egg          2 eggs     Day 2:  1 egg          2 eggs    Jump Start Juice:  Mandatory four servings daily, one serving is 6 oz. Measure carefully!     Pineapple or [place] ([place])     Day 1: 6 oz. 6 oz. 6 oz. 6 oz.     Day 2:  6 oz. 6 oz. 6 oz. 6 oz.    Water: Minimum of 6 glasses 10 oz. glasses daily (max of 11 glasses)     Day 1: 1st glass   2nd glass   3rd glass   4th glass   5th glass   6th glass _________________     Day 2: 1st glass   2nd glass   3rd glass   4th glass   5th glass   6th glass _________________      Starting weight:  Day 1 morning weight:  _______________________ lbs.   Day 2 morning weight:  _______________________ lbs.  Day 3 morning weight:  _______________________ lbs.  Ending weight:  Day 4 morning weight:  _______________________ lbs.      Exercise Guidance    Nearly everything that bothers us gets better when the proper amount of exercise can be done in the proper amounts.  Getting to that level safely and without injury is the key.  When it comes to weight loss, exercise is especially important in maintaining the weight loss.  Unfortunately, one of the harsh realities is that substantial weight loss slows our metabolism, often anywhere from 5-20%.    Our brain always remembers our heaviest weight and we can return to that if we're not mindful and moving regularly.  Our biology doesn't understand the concept of having too much energy, only not having enough.  As such, when we lose weight, it's thought that the brain interprets this as we're ill or in a famine and dials back our metabolism to limit further weight loss.  This is why exercise is so important in keeping the weight off and is the main reason people have some weight regain from their low weight point after weight loss.  We have to make up that 10-20% of calories not being burned.Since we can restrict  our intake for only so long, exercise becomes very important in our long term healthy weigh maintenance to balance out the occasional indiscretion with our diet.    Generally, for every 5% body weight reduction in a weight loss season, a person needs to add  kilocalories of exercise in their daily routine to keep that weight off for the long term.  This is why it's vital to be starting your fitness regimen during weight loss season, so that routine is well established as you move into your maintenance period.    Additionally, all sorts of good enzymes and genes turn on with exercise and our stress, sleep, mood and bodies feel better when we can get to the point of making ourselves a little sweaty and short of breath 35-50 minutes most days of the week. But we have to start with what we can do first and give ourselves permission to work our way up to this goal.    Who isn't ready for exercise? Well, if you get severe dizziness/palpitations, chest pain or short of breath/faint with even minimal activity like walking across a room or you're having to pause while going up a flight of stairs, then getting your heart and/or lungs fully evaluated prior to starting an exercise regimen is recommended. Everyone else can probably start a program, but everyone may start at a different point:  Some can set a 5-10 minute walking goal and others will be able to ride their bike for an hour.      Start with where you're at and look to add 10% more each week until you're at that 150 minutes or more a week (or 75 minutes/week or more of vigorous exercise). Moderate exercise can be estimated as the pace you can carry on a conversation and vigorous is the pace at which you can get 3-5 words out before having to take a breath.  If you're using heart rate monitoring, Moderate is about 60% of your maximum heart rate and vigorous about 75%. (Max heart rate estimated as 220 beats minus your age:  Example: 220-age of 45 =176 Beats per  "minute (BPM) maximum. 0.6X 176= 105 BPM (moderate), 132 BMP(vigorous)).    If you like to count steps, the 10,000 steps per day does correlate well with weight maintenance but try to make at least 20-25% of those steps at a brisk pace (like you are about to miss your bus).    Finally, if you are pressed for time, it's important to know that some exercise is better than none.  High Intensity Interval training (HIIT) is a good way to get as much out of a short period of working out. If you can't walk, use the stairs, bike or swim; you could use a punching/arm workout regimen for your activity.  The idea with HIIT is to have a 3-6 minute warm up period of low intensity and the 3-6 \"intervals\" where you push the intensity up and then recover and start the next interval. One study showed that 3 intervals of 20 seconds at \"Maximum Effort\" while either biking on a stationary bike or going up stairs and then having 100 seconds recovery time before the next Maximum Effort was equally as beneficial on cardiovascular fitness development as doing 30 minutes of moderately paced walking 3 days weekly over a 6 week period of time.  So intensity matters. You just need to be able to safely do your desired exercise without injury. There are many great HIIT exercises/routines out there. IF you're not doing much exercise currently, I recommend giving your self 2-3 weeks of moderate exercise, 3 days weekly minimum to get your bones/tendons/muscles used to exercise before going for High Intensity workouts.    If you like to use Apps on the phone, the couch to 5k cyndi and 7 minute workout apps are nice places to start if you are reasonably healthy.  There are hundreds of other options out there.  Consider viewing [employer] if gentler exercise/movement is desired. Videos on [employer] and chair yoga for seniors exist and are free. Check them out and let's get that 3-4 days a week routine going.    Let's move!  Julio Rivas MD.     High " "Intensity Interval Training (HIIT):    To feel our best, our bodies need to move. Our mental health, sleep, memory, immune function, stress coping and metabolic health depend on exercise and its benefits for optimizing how our body works best. In the modern world, most do not get nearly enough exercise.  However, it's important to understand that ANYTHING is better than nothing and if you can get started with a routine for fitness, even a little bit has some benefit compared to none.  The more you do, the better (up to 20 hours weekly, beyond that the benefit tails off), but the NIH guidelines for all adults in [place] is to work up to 150-200 minutes of moderate aerobic activity each week to improve our health.  If you're a person that struggles with time pressure/lack of interest then those 2.5-3 hours weekly may be too much to ask.  So, we have to be very efficient in how we exercise to reap the benefits. It turns out that INTENSITY matters. When we use Vigorous rather than Moderate aerobic activity (Vigorous defined by a heart rate of 70-85% of calculated maximum heart rate; max heart rate equals 220 beats minus your age or the level of effort where you could talk 2-4 words before needing to take a breath), the amount of time required to reap the benefits of exercise is cut in half:  75-90 minutes/week.      A recent study showed that a 10 minute interval workout using a 3-4 minute warm up and then 20 second \"maximum effort\" followed by 1 minute, 40 seconds of recovery and then repeated two more times was as effective in improving fitness as a 30 minute brisk walk.  They utilized exercise bikes or stairs for the effort but you could adapt to whatever geography/gym/pool/bike/hill/staircase that you may have as long as you can safely do the effort without injury.   As your fitness improves, intervals of 30 seconds to 2 minutes of increased effort followed by 1-2 minutes of recovery are options as well. The fitter " you become, the harder and more intervals you'll be able to do.  Start with what you CAN do, not what you WANT to do and that will allow your body to adapt/develop fitness down the road to reach your goals.  Give yourself permission to develop a base of fitness the first 3 weeks and then you should be able to ramp up from there nicely and progressively each week.    Jumping right into a High Intensity Interval workout if you've not been having some level of exercise/fitness recently can increase your risk of injury.  To help develop some base fitness here are some options that would give you a 3-4 week base development, assuming you can walk or ride a stationary bike but haven't been doing much the last few months. 3-4 sessions each week of:      Week Warm up Interval: 3-6 repeats Cooldown   1 3-5 minutes easy walk/spin 20 seconds brisk but doable pace then recover with 90 seconds easy 2-3 minute easy walk/spin   2 3-5 minutes easy walk/spin 25 seconds brisk, 90 seconds recovery 2-3 minute easy walk/spin   3 3-5 minutes easy walk/spin 30 seconds brisk, 90 seconds recovery 2-3 minute easy walk/spin   4 3-5 minutes easy walk/spin 40 seconds brisk, 60 seconds recovery 2-3 minute easy walk/spin     *for base development, keep resistance steady and just  the speed. Once you've completed base phase, feel free to add a little extra resistance to the faster phase to increase vigorousness/heart rate.  During base phase you should be still able to talk comfortable/sing during faster pedaling/walking.     After completing the base phase, start ramping up workouts on the bike/walking. Have one easy pace workout but of longer duration (add 2-3 minutes each week to the time) each week.  One workout weekly should have an interval workout where you push your intensity working up to those 15-30 second maximum efforts and recovering fully and then repeating for at least 3-4 intervals.  Cool down/easy pedal at the end for 1-2  minutes.     Consider a spin class if you have the means/access and remember, it's OK to rest if needed. Make the workout yours and don't focus on other people's abilities, getting started will develop your own fitness every week.  Jogging plans such as the Couch to 10k or any aerobic training program make use of similar intervals and can help you reach a fitter and more capable body regardless of where/how you perform the intervals (walking/biking/swimming/elliptical/row machine/erg arm machine, peddler, raking, lawn mowing,etc).  Go for it.

## 2021-06-15 NOTE — TELEPHONE ENCOUNTER
Done.    Alicia Elena RN, CBN  [facility]  P [phone]  F [phone]

## 2021-06-15 NOTE — TELEPHONE ENCOUNTER
Prior Authorization Request  Who s requesting:  Pharmacy  Pharmacy Name and Location: [facility] [place]  Medication Name: Saxenda  Insurance Plan: [employer]  Insurance Member ID Number:  [id number]  CoverMyMeds Key: [id number]  Informed patient that prior authorizations can take up to 10 business days for response:   Yes  Okay to leave a detailed message: Yes

## 2021-06-15 NOTE — TELEPHONE ENCOUNTER
Called patient when she was not connected for follow up nutrition appointment. She said she just started a new job and needs to reschedule for a time that works with her new schedule. Offered to reschedule patient, but she preferred to call the clinic later to reschedule her appointment.    Awilda Hardy RD

## 2021-06-15 NOTE — TELEPHONE ENCOUNTER
Central PA team  [phone]  Pool: HE PA MED ([id number])          PA has been initiated.       PA form completed and faxed insurance via Cover My Meds     Key:  [id number]     Medication:  saxenda    Insurance:  Express Scripts         Response will be received via fax and may take up to 5-10 business days depending on plan      [address]
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
## 2021-06-17 NOTE — TELEPHONE ENCOUNTER
Telephone Encounter by Makayla Ontiveros at 2/19/2021  3:13 PM     Author: Makayla Ontiveros Service: -- Author Type: --    Filed: 2/19/2021  3:15 PM Encounter Date: 2/18/2021 Status: Signed    : Makayla Ontiveros       PRIOR AUTHORIZATION DENIED    Denial Rational: Called [employer] about message below.  Per rep patient has a PMAP plan.  State funded plans do not cover medications used to aid in weight loss.  Benefit exclusion.  Patient may still get medication but will be responsible for cost.

## 2021-08-03 ENCOUNTER — OFFICE VISIT (OUTPATIENT)
Dept: FAMILY MEDICINE | Facility: CLINIC | Age: 26
End: 2021-08-03
Payer: COMMERCIAL

## 2021-08-03 VITALS
BODY MASS INDEX: 30.61 KG/M2 | WEIGHT: 195 LBS | OXYGEN SATURATION: 100 % | TEMPERATURE: 99.1 F | RESPIRATION RATE: 24 BRPM | HEART RATE: 107 BPM | DIASTOLIC BLOOD PRESSURE: 80 MMHG | HEIGHT: 67 IN | SYSTOLIC BLOOD PRESSURE: 132 MMHG

## 2021-08-03 DIAGNOSIS — E03.9 ACQUIRED HYPOTHYROIDISM: ICD-10-CM

## 2021-08-03 DIAGNOSIS — J31.0 CHRONIC RHINITIS: ICD-10-CM

## 2021-08-03 DIAGNOSIS — H66.001 ACUTE SUPPURATIVE OTITIS MEDIA OF RIGHT EAR WITHOUT SPONTANEOUS RUPTURE OF TYMPANIC MEMBRANE, RECURRENCE NOT SPECIFIED: Primary | ICD-10-CM

## 2021-08-03 DIAGNOSIS — H60.391 INFECTIVE OTITIS EXTERNA, RIGHT: ICD-10-CM

## 2021-08-03 PROCEDURE — 99214 OFFICE O/P EST MOD 30 MIN: CPT | Performed by: NURSE PRACTITIONER

## 2021-08-03 RX ORDER — AMOXICILLIN 500 MG/1
500 CAPSULE ORAL 2 TIMES DAILY
Qty: 20 CAPSULE | Refills: 0 | Status: SHIPPED | OUTPATIENT
Start: 2021-08-03 | End: 2021-08-13

## 2021-08-03 RX ORDER — FLUTICASONE PROPIONATE 50 MCG
2 SPRAY, SUSPENSION (ML) NASAL DAILY
Qty: 16 G | Refills: 1 | Status: SHIPPED | OUTPATIENT
Start: 2021-08-03 | End: 2021-10-11

## 2021-08-03 RX ORDER — OFLOXACIN 3 MG/ML
5 SOLUTION AURICULAR (OTIC) DAILY
Qty: 2 ML | Refills: 0 | Status: SHIPPED | OUTPATIENT
Start: 2021-08-03 | End: 2021-08-10

## 2021-08-03 ASSESSMENT — ANXIETY QUESTIONNAIRES
5. BEING SO RESTLESS THAT IT IS HARD TO SIT STILL: SEVERAL DAYS
6. BECOMING EASILY ANNOYED OR IRRITABLE: SEVERAL DAYS
2. NOT BEING ABLE TO STOP OR CONTROL WORRYING: NOT AT ALL
3. WORRYING TOO MUCH ABOUT DIFFERENT THINGS: NOT AT ALL
1. FEELING NERVOUS, ANXIOUS, OR ON EDGE: SEVERAL DAYS
GAD7 TOTAL SCORE: 4
7. FEELING AFRAID AS IF SOMETHING AWFUL MIGHT HAPPEN: SEVERAL DAYS

## 2021-08-03 ASSESSMENT — PATIENT HEALTH QUESTIONNAIRE - PHQ9
SUM OF ALL RESPONSES TO PHQ QUESTIONS 1-9: 5
5. POOR APPETITE OR OVEREATING: NOT AT ALL

## 2021-08-03 ASSESSMENT — MIFFLIN-ST. JEOR: SCORE: 1654.2

## 2021-08-03 NOTE — PATIENT INSTRUCTIONS
Acquired hypothyroidism  Chronic, has not been taking medications for a few months thinking it was causing her symptoms.  Recommend patient get back on medication, schedule lab only appointment in 6 to 8 weeks for recheck.  Will be notified of results and recommendations  - **TSH with free T4 reflex FUTURE 2mo; Future    Acute suppurative otitis media of right ear without spontaneous rupture of tympanic membrane, recurrence not specified  Acute, likely causing most of symptoms.  Treat with antibiotics for 10 days.  Return to clinic if symptoms not proving or worsening.  - amoxicillin (AMOXIL) 500 MG capsule; Take 1 capsule (500 mg) by mouth 2 times daily for 10 days    Infective otitis externa, right  Acute, start drops.  Warm compresses may help as well as ibuprofen as needed.  Return to clinic if symptoms not improving or worsening.  - ofloxacin (FLOXIN) 0.3 % otic solution; Place 5 drops into the right ear daily for 7 days    Chronic rhinitis  Chronic, recommend continuing Zyrtec daily as well as the addition of Flonase nasal spray, 2 sprays each nostril daily.  Reviewed proper administration instructions with patient.  Follow-up if symptoms not improving or worsening.  - fluticasone (FLONASE) 50 MCG/ACT nasal spray; Spray 2 sprays into both nostrils daily  Dispense: 16 g; Refill: 1

## 2021-08-03 NOTE — PROGRESS NOTES
"Assessment & Plan     Acquired hypothyroidism  Chronic, has not been taking medications for a few months thinking it was causing her symptoms.  Recommend patient get back on medication, schedule lab only appointment in 6 to 8 weeks for recheck.  Will be notified of results and recommendations  - **TSH with free T4 reflex FUTURE 2mo; Future    Acute suppurative otitis media of right ear without spontaneous rupture of tympanic membrane, recurrence not specified  Acute, likely causing most of symptoms.  Treat with antibiotics for 10 days.  Return to clinic if symptoms not proving or worsening.  - amoxicillin (AMOXIL) 500 MG capsule; Take 1 capsule (500 mg) by mouth 2 times daily for 10 days    Infective otitis externa, right  Acute, start drops.  Warm compresses may help as well as ibuprofen as needed.  Return to clinic if symptoms not improving or worsening.  - ofloxacin (FLOXIN) 0.3 % otic solution; Place 5 drops into the right ear daily for 7 days    Chronic rhinitis  Chronic, recommend continuing Zyrtec daily as well as the addition of Flonase nasal spray, 2 sprays each nostril daily.  Reviewed proper administration instructions with patient.  Follow-up if symptoms not improving or worsening.  - fluticasone (FLONASE) 50 MCG/ACT nasal spray; Spray 2 sprays into both nostrils daily  Dispense: 16 g; Refill: 1          Tobacco Cessation:   reports that she quit smoking about 2 weeks ago. Her smoking use included cigarettes. She smoked 0.50 packs per day. She has never used smokeless tobacco.      BMI:   Estimated body mass index is 31 kg/m  as calculated from the following:    Height as of this encounter: 1.689 m (5' 6.5\").    Weight as of this encounter: 88.5 kg (195 lb).   Weight management plan: Discussed healthy diet and exercise guidelines    See Patient Instructions    Return in about 2 weeks (around 8/17/2021), or if symptoms worsen or fail to improve.    Holly Claros, DNP, APRN-CNP   [facility] " "[place]    Subjective     Zacarias Adam is a 26 year old female who presents today for the following   health issues:    HPI  Chief Complaint   Patient presents with     Ear Problem     Thyroid Disease     off medication for 1 month       ENT Symptoms             Symptoms: cc Present Absent Comment   Fever/Chills   x    Fatigue   x    Muscle Aches   x    Eye Irritation   x    Sneezing   x    Nasal Anthony/Drg   x    Sinus Pressure/Pain   x    Loss of smell   x    Dental pain   x    Sore Throat   x    Swollen Glands  x     Ear Pain/Fullness  x  fullness   Cough   x    Wheeze   x    Chest Pain   x    Shortness of breath   x    Rash   x    Other   x Denies GI sx     Symptom duration:   Since yesterday ear problems   Symptom severity: mild   Treatments tried:  benadryl last night, did not help. Usually takes a zyrtec every other day to every 2 days   Contacts:  none         Over the last month about 4 times a day feel like may vomit, has vomited. Comes in waves, for 30-50 seconds   Not associated with eating   Allergies may have acted up about 4 weeks ago, but went away after a couple of days   Worse today   Has had vertigo here or there, but not anything concerning       Review of Systems  Review of Systems  Constitutional, HEENT, cardiovascular, pulmonary, gi and gu systems are negative, except as otherwise noted.    Objective   /80   Pulse 107   Temp 99.1  F (37.3  C)   Resp 24   Ht 1.689 m (5' 6.5\")   Wt 88.5 kg (195 lb)   SpO2 100%   BMI 31.00 kg/m    Body mass index is 31 kg/m .    Physical Exam  GENERAL APPEARANCE: healthy, alert and no distress  EYES: Eyes grossly normal to inspection, PERRL, conjunctivae and sclerae normal and nystagmus negative   HENT: left ear canal normal and TM with serous fluid, right ear canal erythematous and boggy and TM with effusion and mild erythema, nose and mouth without ulcers or lesions and nasal mucosa edematous without rhinorrhea  NECK: no adenopathy, no asymmetry, " masses, or scars and thyroid normal to palpation  RESP: lungs clear to auscultation - no rales, rhonchi or wheezes  CV: regular rates and rhythm, normal S1 S2, no S3 or S4 and no murmur, click or rub  NEURO: Normal strength and tone, mentation intact, speech normal, gait normal including heel/toe/tandem walking, cranial nerves 2-12 intact and Romberg negative    Diagnostic Test Results:  none      Chart documentation with Dragon Voice recognition Software. Although reviewed after completion, some words and grammatical errors may remain.

## 2021-08-04 ASSESSMENT — ANXIETY QUESTIONNAIRES: GAD7 TOTAL SCORE: 4

## 2021-08-19 ENCOUNTER — OFFICE VISIT (OUTPATIENT)
Dept: OBGYN | Facility: CLINIC | Age: 26
End: 2021-08-19
Payer: COMMERCIAL

## 2021-08-19 VITALS
TEMPERATURE: 99.3 F | HEIGHT: 66 IN | BODY MASS INDEX: 30.37 KG/M2 | HEART RATE: 112 BPM | SYSTOLIC BLOOD PRESSURE: 134 MMHG | RESPIRATION RATE: 10 BRPM | DIASTOLIC BLOOD PRESSURE: 87 MMHG | WEIGHT: 189 LBS

## 2021-08-19 DIAGNOSIS — N92.1 BREAKTHROUGH BLEEDING ASSOCIATED WITH INTRAUTERINE DEVICE (IUD): ICD-10-CM

## 2021-08-19 DIAGNOSIS — N80.9 ENDOMETRIOSIS: ICD-10-CM

## 2021-08-19 DIAGNOSIS — N90.89 LESION OF VULVA: Primary | ICD-10-CM

## 2021-08-19 DIAGNOSIS — N95.1 MENOPAUSAL SYNDROME (HOT FLASHES): ICD-10-CM

## 2021-08-19 DIAGNOSIS — Z97.5 BREAKTHROUGH BLEEDING ASSOCIATED WITH INTRAUTERINE DEVICE (IUD): ICD-10-CM

## 2021-08-19 LAB
ESTRADIOL SERPL-MCNC: 44 PG/ML
FSH SERPL-ACNC: 5 IU/L
LH SERPL-ACNC: 7.1 IU/L
PROLACTIN SERPL-MCNC: 8 UG/L (ref 3–27)
T4 FREE SERPL-MCNC: 0.71 NG/DL (ref 0.76–1.46)
TSH SERPL DL<=0.005 MIU/L-ACNC: 1.27 MU/L (ref 0.4–4)

## 2021-08-19 PROCEDURE — 84443 ASSAY THYROID STIM HORMONE: CPT | Performed by: OBSTETRICS & GYNECOLOGY

## 2021-08-19 PROCEDURE — 99214 OFFICE O/P EST MOD 30 MIN: CPT | Mod: 25 | Performed by: OBSTETRICS & GYNECOLOGY

## 2021-08-19 PROCEDURE — 84146 ASSAY OF PROLACTIN: CPT | Performed by: OBSTETRICS & GYNECOLOGY

## 2021-08-19 PROCEDURE — 83002 ASSAY OF GONADOTROPIN (LH): CPT | Performed by: OBSTETRICS & GYNECOLOGY

## 2021-08-19 PROCEDURE — 56605 BIOPSY OF VULVA/PERINEUM: CPT | Performed by: OBSTETRICS & GYNECOLOGY

## 2021-08-19 PROCEDURE — 82670 ASSAY OF TOTAL ESTRADIOL: CPT | Performed by: OBSTETRICS & GYNECOLOGY

## 2021-08-19 PROCEDURE — 83001 ASSAY OF GONADOTROPIN (FSH): CPT | Performed by: OBSTETRICS & GYNECOLOGY

## 2021-08-19 PROCEDURE — 36415 COLL VENOUS BLD VENIPUNCTURE: CPT | Performed by: OBSTETRICS & GYNECOLOGY

## 2021-08-19 PROCEDURE — 88305 TISSUE EXAM BY PATHOLOGIST: CPT | Performed by: PATHOLOGY

## 2021-08-19 PROCEDURE — 84439 ASSAY OF FREE THYROXINE: CPT | Performed by: OBSTETRICS & GYNECOLOGY

## 2021-08-19 RX ORDER — NORELGESTROMIN AND ETHINYL ESTRADIOL 35; 150 UG/MG; UG/MG
PATCH TRANSDERMAL
Qty: 9 PATCH | Refills: 4 | Status: SHIPPED | OUTPATIENT
Start: 2021-08-19 | End: 2022-05-10

## 2021-08-19 ASSESSMENT — MIFFLIN-ST. JEOR: SCORE: 1619.05
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
## 2021-08-19 NOTE — NURSING NOTE
"Initial /87 (BP Location: Right arm, Patient Position: Sitting, Cuff Size: Adult Large)   Pulse 112   Temp 99.3  F (37.4  C) (Tympanic)   Resp 10   Ht 1.676 m (5' 6\")   Wt 85.7 kg (189 lb)   BMI 30.51 kg/m   Estimated body mass index is 30.51 kg/m  as calculated from the following:    Height as of this encounter: 1.676 m (5' 6\").    Weight as of this encounter: 85.7 kg (189 lb). .      "

## 2021-08-23 DIAGNOSIS — L90.0 LICHEN SCLEROSUS: Primary | ICD-10-CM

## 2021-08-23 LAB
PATH REPORT.COMMENTS IMP SPEC: NORMAL
PATH REPORT.COMMENTS IMP SPEC: NORMAL
PATH REPORT.FINAL DX SPEC: NORMAL
PATH REPORT.GROSS SPEC: NORMAL
PATH REPORT.MICROSCOPIC SPEC OTHER STN: NORMAL
PATH REPORT.RELEVANT HX SPEC: NORMAL
PHOTO IMAGE: NORMAL

## 2021-08-23 RX ORDER — CLOBETASOL PROPIONATE 0.5 MG/G
OINTMENT TOPICAL 2 TIMES DAILY
Qty: 30 G | Refills: 1 | Status: SHIPPED | OUTPATIENT
Start: 2021-08-23 | End: 2023-09-08

## 2021-09-02 ENCOUNTER — HOSPITAL ENCOUNTER (OUTPATIENT)
Dept: ULTRASOUND IMAGING | Facility: CLINIC | Age: 26
Discharge: HOME | End: 2021-09-02
Attending: OBSTETRICS & GYNECOLOGY | Admitting: OBSTETRICS & GYNECOLOGY
Payer: COMMERCIAL

## 2021-09-02 DIAGNOSIS — N92.1 BREAKTHROUGH BLEEDING ASSOCIATED WITH INTRAUTERINE DEVICE (IUD): ICD-10-CM

## 2021-09-02 DIAGNOSIS — Z97.5 BREAKTHROUGH BLEEDING ASSOCIATED WITH INTRAUTERINE DEVICE (IUD): ICD-10-CM

## 2021-09-02 PROCEDURE — 76830 TRANSVAGINAL US NON-OB: CPT

## 2021-09-18 ENCOUNTER — HEALTH MAINTENANCE LETTER (OUTPATIENT)
Age: 26
End: 2021-09-18

## 2021-10-01 ENCOUNTER — APPOINTMENT (OUTPATIENT)
Dept: URGENT CARE | Facility: CLINIC | Age: 26
End: 2021-10-01
Payer: COMMERCIAL

## 2021-10-10 DIAGNOSIS — J31.0 CHRONIC RHINITIS: ICD-10-CM

## 2021-10-11 RX ORDER — FLUTICASONE PROPIONATE 50 MCG
SPRAY, SUSPENSION (ML) NASAL
Qty: 16 G | Refills: 11 | Status: SHIPPED | OUTPATIENT
Start: 2021-10-11

## 2022-01-04 DIAGNOSIS — J45.20 MILD INTERMITTENT ASTHMA WITHOUT COMPLICATION: ICD-10-CM

## 2022-01-11 RX ORDER — ALBUTEROL SULFATE 90 UG/1
AEROSOL, METERED RESPIRATORY (INHALATION)
Qty: 54 G | Refills: 0 | Status: SHIPPED | OUTPATIENT
Start: 2022-01-11 | End: 2024-10-02

## 2022-04-08 ENCOUNTER — TELEPHONE (OUTPATIENT)
Dept: FAMILY MEDICINE | Facility: CLINIC | Age: 27
End: 2022-04-08
Payer: COMMERCIAL

## 2022-04-08 NOTE — TELEPHONE ENCOUNTER
Please call pt I have not seen her for many years PCP is in [place]   Not sure why she needs these labs     Needs visit etc

## 2022-04-08 NOTE — TELEPHONE ENCOUNTER
Patient wants lab orders put in her chart for:  full panel, especially thyroid, Vitamin D and iron.  Wants to go to [place] location for lab.    Please call when done or if you have any questions.  Would like to come in Monday, 4-11-22.  OK to LM on VM

## 2022-04-08 NOTE — LETTER
[facility]  [address] [place] [id number]  [phone]          April 12, 2022    Zacarias Adam                                                                                                                     [address] [facility] [address]            Dear Zacarias,    We received a message that you were wanting to have some labs done. Dr Sauceda says she has not seen you for many years so you will need an appointment to be seen by her or another provider before we can order labs.  We have attempted to call you back several times and are now getting a message that says your phone number [phone] has been changed, disconnected or is no longer in service.  You can call to schedule in [place] at [phone] or [place] [phone].        Sincerely,         Yudi Sauceda MD/dixie

## 2022-04-12 NOTE — TELEPHONE ENCOUNTER
[employer] message [phone] (H) this number has changed, been disconnected or is no longer in service    Letter sent

## 2022-04-24 ENCOUNTER — HEALTH MAINTENANCE LETTER (OUTPATIENT)
Age: 27
End: 2022-04-24

## 2022-04-27 ENCOUNTER — TELEPHONE (OUTPATIENT)
Dept: FAMILY MEDICINE | Facility: CLINIC | Age: 27
End: 2022-04-27
Payer: COMMERCIAL

## 2022-04-27 NOTE — TELEPHONE ENCOUNTER
Patient Quality Outreach    Patient is due for the following:   Asthma  -  ACT needed    NEXT STEPS:   Patient was scheduled for an appointment.  Patient has an upcoming appointment, will get then  Type of outreach:    Chart review performed, no outreach needed.    Next Steps:  Reach out within 90 days via [employer].    Max number of attempts reached: No. Will try again in 90 days if patient still on fail list.    Questions for provider review:    None     Amada Gale, [facility]  Chart routed to Care Team.

## 2022-05-10 ENCOUNTER — OFFICE VISIT (OUTPATIENT)
Dept: FAMILY MEDICINE | Facility: CLINIC | Age: 27
End: 2022-05-10
Payer: COMMERCIAL

## 2022-05-10 VITALS
SYSTOLIC BLOOD PRESSURE: 138 MMHG | WEIGHT: 219.2 LBS | HEART RATE: 109 BPM | HEIGHT: 68 IN | RESPIRATION RATE: 16 BRPM | OXYGEN SATURATION: 99 % | BODY MASS INDEX: 33.22 KG/M2 | DIASTOLIC BLOOD PRESSURE: 78 MMHG

## 2022-05-10 DIAGNOSIS — R63.5 WEIGHT GAIN: ICD-10-CM

## 2022-05-10 DIAGNOSIS — N80.9 ENDOMETRIOSIS: ICD-10-CM

## 2022-05-10 DIAGNOSIS — E03.9 ACQUIRED HYPOTHYROIDISM: ICD-10-CM

## 2022-05-10 DIAGNOSIS — Z30.432 ENCOUNTER FOR IUD REMOVAL: Primary | ICD-10-CM

## 2022-05-10 DIAGNOSIS — E03.9 ACQUIRED HYPOTHYROIDISM: Primary | ICD-10-CM

## 2022-05-10 DIAGNOSIS — Z13.6 CARDIOVASCULAR SCREENING; LDL GOAL LESS THAN 130: ICD-10-CM

## 2022-05-10 DIAGNOSIS — K58.1 IRRITABLE BOWEL SYNDROME WITH CONSTIPATION: ICD-10-CM

## 2022-05-10 PROBLEM — Z30.430 ENCOUNTER FOR INSERTION OF MIRENA IUD: Status: RESOLVED | Noted: 2021-02-05 | Resolved: 2022-05-10

## 2022-05-10 PROBLEM — Z34.80 PRENATAL CARE, SUBSEQUENT PREGNANCY: Status: RESOLVED | Noted: 2018-04-05 | Resolved: 2022-05-10

## 2022-05-10 PROBLEM — K62.89 RECTAL PAIN: Status: RESOLVED | Noted: 2020-12-09 | Resolved: 2022-05-10

## 2022-05-10 PROBLEM — R55 VASOVAGAL EPISODE: Status: RESOLVED | Noted: 2018-08-27 | Resolved: 2022-05-10

## 2022-05-10 PROBLEM — Z98.891 S/P PRIMARY LOW TRANSVERSE C-SECTION: Status: RESOLVED | Noted: 2018-11-13 | Resolved: 2022-05-10

## 2022-05-10 PROBLEM — O13.3 GESTATIONAL HYPERTENSION W/O SIGNIFICANT PROTEINURIA IN 3RD TRIMESTER: Status: RESOLVED | Noted: 2018-11-13 | Resolved: 2022-05-10

## 2022-05-10 LAB
ALBUMIN SERPL-MCNC: 3.8 G/DL (ref 3.4–5)
ALP SERPL-CCNC: 106 U/L (ref 40–150)
ALT SERPL W P-5'-P-CCNC: 27 U/L (ref 0–50)
ANION GAP SERPL CALCULATED.3IONS-SCNC: 3 MMOL/L (ref 3–14)
AST SERPL W P-5'-P-CCNC: 15 U/L (ref 0–45)
BILIRUB SERPL-MCNC: 0.2 MG/DL (ref 0.2–1.3)
BUN SERPL-MCNC: 11 MG/DL (ref 7–30)
CALCIUM SERPL-MCNC: 9 MG/DL (ref 8.5–10.1)
CHLORIDE BLD-SCNC: 104 MMOL/L (ref 94–109)
CHOLEST SERPL-MCNC: 158 MG/DL
CO2 SERPL-SCNC: 30 MMOL/L (ref 20–32)
CREAT SERPL-MCNC: 0.81 MG/DL (ref 0.52–1.04)
FASTING STATUS PATIENT QL REPORTED: YES
GFR SERPL CREATININE-BSD FRML MDRD: >90 ML/MIN/1.73M2
GLUCOSE BLD-MCNC: 96 MG/DL (ref 70–99)
HBA1C MFR BLD: 5.3 % (ref 0–5.6)
HDLC SERPL-MCNC: 49 MG/DL
INSULIN SERPL-ACNC: 14.8 MU/L (ref 3–25)
LDLC SERPL CALC-MCNC: 91 MG/DL
NONHDLC SERPL-MCNC: 109 MG/DL
POTASSIUM BLD-SCNC: 4.2 MMOL/L (ref 3.4–5.3)
PROT SERPL-MCNC: 7.4 G/DL (ref 6.8–8.8)
SODIUM SERPL-SCNC: 137 MMOL/L (ref 133–144)
T4 FREE SERPL-MCNC: 0.64 NG/DL (ref 0.76–1.46)
TRIGL SERPL-MCNC: 88 MG/DL
TSH SERPL DL<=0.005 MIU/L-ACNC: 15.55 MU/L (ref 0.4–4)

## 2022-05-10 PROCEDURE — 80061 LIPID PANEL: CPT | Performed by: NURSE PRACTITIONER

## 2022-05-10 PROCEDURE — 36415 COLL VENOUS BLD VENIPUNCTURE: CPT | Performed by: NURSE PRACTITIONER

## 2022-05-10 PROCEDURE — 80053 COMPREHEN METABOLIC PANEL: CPT | Performed by: NURSE PRACTITIONER

## 2022-05-10 PROCEDURE — 83525 ASSAY OF INSULIN: CPT | Performed by: NURSE PRACTITIONER

## 2022-05-10 PROCEDURE — 83036 HEMOGLOBIN GLYCOSYLATED A1C: CPT | Performed by: NURSE PRACTITIONER

## 2022-05-10 PROCEDURE — 84443 ASSAY THYROID STIM HORMONE: CPT | Performed by: NURSE PRACTITIONER

## 2022-05-10 PROCEDURE — 84439 ASSAY OF FREE THYROXINE: CPT | Performed by: NURSE PRACTITIONER

## 2022-05-10 PROCEDURE — 99215 OFFICE O/P EST HI 40 MIN: CPT | Mod: 25 | Performed by: NURSE PRACTITIONER

## 2022-05-10 PROCEDURE — 58301 REMOVE INTRAUTERINE DEVICE: CPT | Performed by: NURSE PRACTITIONER

## 2022-05-10 RX ORDER — NALTREXONE HYDROCHLORIDE 50 MG/1
TABLET, FILM COATED ORAL
COMMUNITY
Start: 2021-02-17 | End: 2022-08-09

## 2022-05-10 RX ORDER — PROPRANOLOL HYDROCHLORIDE 40 MG/1
1 TABLET ORAL
COMMUNITY
Start: 2020-11-30 | End: 2022-08-09

## 2022-05-10 RX ORDER — LEVOTHYROXINE SODIUM 50 UG/1
50 TABLET ORAL DAILY
Qty: 90 TABLET | Refills: 0 | Status: SHIPPED | OUTPATIENT
Start: 2022-05-10 | End: 2022-07-12

## 2022-05-10 RX ORDER — DULOXETIN HYDROCHLORIDE 60 MG/1
CAPSULE, DELAYED RELEASE ORAL
COMMUNITY
Start: 2022-04-26 | End: 2022-08-09

## 2022-05-10 RX ORDER — ONDANSETRON 4 MG/1
TABLET, FILM COATED ORAL
COMMUNITY
Start: 2021-07-02 | End: 2022-05-10

## 2022-05-10 RX ORDER — TRAZODONE HYDROCHLORIDE 100 MG/1
TABLET ORAL
COMMUNITY
Start: 2022-04-11 | End: 2022-08-09

## 2022-05-10 RX ORDER — LAMOTRIGINE 25 MG/1
TABLET ORAL
COMMUNITY
Start: 2022-04-11 | End: 2022-05-10

## 2022-05-10 RX ORDER — POLYETHYLENE GLYCOL 3350 17 G/17G
1 POWDER, FOR SOLUTION ORAL DAILY
Qty: 578 G | Refills: 3 | Status: SHIPPED | OUTPATIENT
Start: 2022-05-10 | End: 2023-11-06

## 2022-05-10 RX ORDER — ASCORBIC ACID 500 MG
500 TABLET ORAL
COMMUNITY

## 2022-05-10 RX ORDER — PRAZOSIN HYDROCHLORIDE 2 MG/1
CAPSULE ORAL
COMMUNITY
Start: 2022-05-08 | End: 2022-11-04

## 2022-05-10 RX ORDER — HYDROXYZINE HYDROCHLORIDE 50 MG/1
TABLET, FILM COATED ORAL
COMMUNITY
Start: 2022-04-11 | End: 2023-12-12

## 2022-05-10 ASSESSMENT — PAIN SCALES - GENERAL: PAINLEVEL: NO PAIN (0)

## 2022-05-10 ASSESSMENT — ASTHMA QUESTIONNAIRES: ACT_TOTALSCORE: 20

## 2022-05-10 NOTE — PROGRESS NOTES
"  Assessment & Plan     Encounter for IUD removal  Successful removal.    Endometriosis  Stable - follow up with OB/GYn    Acquired hypothyroidism  Recheck TSH - stopped Levothyroxine.    BMI 33.0-33.9,adult  Discussed weight loss.  Follow up with Psychiatry related to medications.  Diet,exercise.    - Insulin level  - TSH with free T4 reflex  - Hemoglobin A1c  - Comprehensive metabolic panel (BMP + Alb, Alk Phos, ALT, AST, Total. Bili, TP)  - T4 free    Weight gain     - Insulin level  - TSH with free T4 reflex  - Hemoglobin A1c  - Comprehensive metabolic panel (BMP + Alb, Alk Phos, ALT, AST, Total. Bili, TP)  - T4 free    Irritable bowel syndrome with constipation  FODMAP discussed.    - Hemoglobin A1c  - Comprehensive metabolic panel (BMP + Alb, Alk Phos, ALT, AST, Total. Bili, TP)  - polyethylene glycol (MIRALAX) 17 GM/Dose powder  Dispense: 578 g; Refill: 3    CARDIOVASCULAR SCREENING; LDL GOAL LESS THAN 130     - Lipid panel reflex to direct LDL Fasting       40 minutes spent on the date of the encounter doing chart review, history and exam, documentation and further activities per the note        BMI:   Estimated body mass index is 33.8 kg/m  as calculated from the following:    Height as of this encounter: 1.715 m (5' 7.52\").    Weight as of this encounter: 99.4 kg (219 lb 3.2 oz).   Weight management plan: Discussed healthy diet and exercise guidelines    See Patient Instructions    No follow-ups on file.    Crissy Irwin NP  [facility] FARHAD Adames is a 26 year old who presents for the following health issues     History of Present Illness       Reason for visit:  IUD removal, weight management  Symptom onset:  More than a month  Symptoms include:  Weight gain, irregular periods  Symptom intensity:  Severe  Symptom progression:  Staying the same  Had these symptoms before:  Yes  Has tried/received treatment for these symptoms:  Yes  Previous treatment was successful:  " "No    She eats 2-3 servings of fruits and vegetables daily.She consumes 0 sweetened beverage(s) daily.She exercises with enough effort to increase her heart rate 20 to 29 minutes per day.  She exercises with enough effort to increase her heart rate 4 days per week.   She is taking medications regularly.     Had IUD 9 months ago to help endometriosis.  Periods have been irregular with IUD - still getting periods and sometimes heavy.  Not liking it and periods haven't stopped.  Not getting pain from endometriosis daily or often.    Wanting to have another baby with partner.  Has 2 children now with partner - 3 and 5 years old.      Report struggling with weight loss.  Working on weight loss - but struggling.  Has issues with constipation - has food allergies.    Hypothyroidism Follow-up      Since last visit, patient describes the following symptoms: Weight stable, no hair loss, no skin changes, no constipation, no loose stools   Stopped her Levothyroxine.   TSH   Date Value Ref Range Status   08/19/2021 1.27 0.40 - 4.00 mU/L Final   01/27/2021 4.26 (H) 0.40 - 4.00 mU/L Final     Seeing Psychiatry.  Taking Duloxetine for anxiety.  Taking Lamotrigine for anxiety/bipolar.  Taking Trazodone for insomnia and hydroxyzine.  Taking Prazosin 2 mg.  Taking Clomipramine capsules    Review of Systems   Constitutional, HEENT, cardiovascular, pulmonary, GI, , musculoskeletal, neuro, skin, endocrine and psych systems are negative, except as otherwise noted.      Objective    /78 (BP Location: Left arm, Patient Position: Sitting, Cuff Size: Adult Large)   Pulse 109   Resp 16   Ht 1.715 m (5' 7.52\")   Wt 99.4 kg (219 lb 3.2 oz)   SpO2 99%   BMI 33.80 kg/m    Body mass index is 33.8 kg/m .  Physical Exam   GENERAL: healthy, alert and no distress  NECK: no adenopathy, no asymmetry, masses, or scars and thyroid normal to palpation  RESP: lungs clear to auscultation - no rales, rhonchi or wheezes  CV: regular rate and " rhythm, normal S1 S2, no S3 or S4, no murmur, click or rub, no peripheral edema and peripheral pulses strong  ABDOMEN: soft, nontender, no hepatosplenomegaly, no masses and bowel sounds normal   (female): normal female external genitalia, normal urethral meatus , vaginal mucosa pink, moist, well rugated, normal cervix, adnexae, and uterus without masses. and IUD strings visualized.    MS: no gross musculoskeletal defects noted, no edema        IUD Removal:  SUBJECTIVE:    Is a pregnancy test required: No.  Was a consent obtained?  Yes    Zacarias Adam is a 26 year old female,, No LMP recorded. (Menstrual status: IUD). who presents today for IUD removal. Her current IUD was placed 9 months ago. She has had problems including weight gain, irregular periods with the IUD. She requests removal of the IUD because of problems stated above    Today's PHQ-2 Score:   PHQ-2 (  [employer]) 5/10/2022   Q1: Little interest or pleasure in doing things 1   Q2: Feeling down, depressed or hopeless 0   PHQ-2 Score 1   PHQ-2 Total Score (12-17 Years)- Positive if 3 or more points; Administer PHQ-A if positive -   Q1: Little interest or pleasure in doing things Several days   Q2: Feeling down, depressed or hopeless Not at all   PHQ-2 Score 1       PROCEDURE:    A speculum exam was performed and the cervix was visualized. The IUD string was visualized. Using ring forceps, the string  was grasped and the IUD removed intact.    POST PROCEDURE:    The patient tolerated the procedure well. Patient was discharged in stable condition.    Call if bleeding, pain or fever occur. and Birth control counseling given.    Crissy Irwin NP

## 2022-05-11 ENCOUNTER — MYC MEDICAL ADVICE (OUTPATIENT)
Dept: FAMILY MEDICINE | Facility: CLINIC | Age: 27
End: 2022-05-11
Payer: COMMERCIAL

## 2022-06-22 ENCOUNTER — MYC MEDICAL ADVICE (OUTPATIENT)
Dept: FAMILY MEDICINE | Facility: CLINIC | Age: 27
End: 2022-06-22

## 2022-07-09 ENCOUNTER — LAB (OUTPATIENT)
Dept: LAB | Facility: CLINIC | Age: 27
End: 2022-07-09
Payer: COMMERCIAL

## 2022-07-09 DIAGNOSIS — E03.9 ACQUIRED HYPOTHYROIDISM: ICD-10-CM

## 2022-07-09 PROCEDURE — 84443 ASSAY THYROID STIM HORMONE: CPT

## 2022-07-09 PROCEDURE — 36415 COLL VENOUS BLD VENIPUNCTURE: CPT

## 2022-07-09 PROCEDURE — 84439 ASSAY OF FREE THYROXINE: CPT

## 2022-07-11 LAB
T4 FREE SERPL-MCNC: 0.73 NG/DL (ref 0.76–1.46)
TSH SERPL DL<=0.005 MIU/L-ACNC: 6.41 MU/L (ref 0.4–4)

## 2022-07-12 DIAGNOSIS — E03.9 ACQUIRED HYPOTHYROIDISM: Primary | ICD-10-CM

## 2022-07-12 RX ORDER — LEVOTHYROXINE SODIUM 75 UG/1
75 TABLET ORAL DAILY
Qty: 30 TABLET | Refills: 1 | Status: SHIPPED | OUTPATIENT
Start: 2022-07-12 | End: 2022-08-24

## 2022-08-05 ENCOUNTER — E-VISIT (OUTPATIENT)
Dept: FAMILY MEDICINE | Facility: CLINIC | Age: 27
End: 2022-08-05
Payer: COMMERCIAL

## 2022-08-05 DIAGNOSIS — R63.5 WEIGHT GAIN: Primary | ICD-10-CM

## 2022-08-05 PROCEDURE — 99207 PR NON-BILLABLE SERV PER CHARTING: CPT | Performed by: FAMILY MEDICINE

## 2022-08-08 ENCOUNTER — TELEPHONE (OUTPATIENT)
Dept: FAMILY MEDICINE | Facility: CLINIC | Age: 27
End: 2022-08-08

## 2022-08-08 ENCOUNTER — MYC MEDICAL ADVICE (OUTPATIENT)
Dept: FAMILY MEDICINE | Facility: CLINIC | Age: 27
End: 2022-08-08

## 2022-08-08 NOTE — TELEPHONE ENCOUNTER
Forwarding to PCP as FYI in case patient or form returns.     Patient presents to clinic with her female partner with form from [employer], essentially needing any provider signature for patient to be able to have sperm sample mailed to her home for insemination.  She reiterates that she needs this today, as she is ovulating and already paid a lot of money to do this.  RN did advise patient that we generally request 3 business days for form completion and will try to accommodate her, but can't guarantee a provider will sign in PCP's absence.     RN huddled with provider on-site, who did a bit of research and is unwilling to sign the form not knowing patient's history, etc. It doesn't appear this has ever been discussed with a provider.    RN informed patient, who states she and her partner did their own research on this.  She is in tears and states she needs the form signed today, has had two children here with OB/GYN and she is going upstairs to see if a provider there will sign the form.  RN advised will also forward this to PCP as FYI.     Suzanne Jerez RN  [facility]

## 2022-08-08 NOTE — TELEPHONE ENCOUNTER
Patient called asking for us to call them again and have them email the form to her. The patient is going to bring it to us.    Rajani Mac PSC on 8/8/2022 at 4:07 PM

## 2022-08-08 NOTE — TELEPHONE ENCOUNTER
Please sign form for sperm donation today. [employer] is faxing a form that needs to be signed and faxed right back today please  iT HAS TO BE SIGNED BY AN MD TODAY AND FAXED TODAY...  IT IS TIMED WITH HER OVULATION AND IS REALLY IMPORTANT THAT IT GETS FAXED BACK TODAY......  WILL YOU PLEASE SEND MESSAGE IN Per[employer] WHEN THIS IS COMPLETED OR CALL ME  [phone]  THANK YOU SO VERY VERY MUCH    Olivia Zaidi on 8/8/2022 at 10:44 AM

## 2022-08-08 NOTE — TELEPHONE ENCOUNTER
I called the patient and told her that I sat on hold 25min. She said the nurse before told her that she can e-mail. I talked with my manager and he said no we can not e-mail it. I sat and explained it to her how to do Mychart and attach a file. I told her that forms take time that we try to complete them and that this is last min and providers are seeing patients also. She got upset and said that she only has a certain time frame because of ovulation.   She Only Wants To Talk To Suzanne GIL.    Rajani Mac PSC on 8/8/2022 at 2:44 PM

## 2022-08-08 NOTE — TELEPHONE ENCOUNTER
Called again to tell us that they are faxing the forms.    Rajani Mac PSC on 8/8/2022 at 11:58 AM     O2 not in use.

## 2022-08-08 NOTE — TELEPHONE ENCOUNTER
Pt called and stated that [employer] has attempted to send a fax through several times that needs to be sent over today for a sperm donor. Pt stated it needs to be signed by a MD today for them to be able to have this work. Writer checked and fax has not cam through yet, and also let her know if she can upload the form through [employer] this may be a better way of getting to the provider quicker.     Maria M Joya RN

## 2022-08-08 NOTE — TELEPHONE ENCOUNTER
Flagging for PSC, and RN will huddle with them.  Patient calls again to f/u to her request.  RN advised on last communication per this thread and that will f/u with our PSC to see if a fax has arrived yet. If not, PSC will need to f/u with [facility] to have the forms re-faxed. RN did double-check number being sent, and it's correct:  [phone].  Patient also states she tried to send via [employer], but RN sees no recent [employer] correspondence.   Patient states when we receive the form, and it's signed by provider, it can be faxed back to [facility] at [phone]. [facility]'s phone number is [phone], then can follow prompts for provider line. Note-it's an at-home semination, so kit will be mailed to her home-address on file.   Patient would like a call whenever there is any progress with this.      Suzanne Jerez RN  [facility]     1 person assist/verbal cues

## 2022-08-08 NOTE — TELEPHONE ENCOUNTER
Reason for Call:  Form, our goal is to have forms completed with 72 hours, however, some forms may require a visit or additional information.    Type of letter, form or note:  Form for authorization of sperm home delivery     Who is the form from?: [employer]    Where did the form come from: Patient or family brought in       What clinic location was the form placed at?:   [place]     Where the form was placed: Attempted to be faxed    What number is listed as a contact on the form?: Self      Additional comments: patient has been trying to get this form filled out since yesterday and it is very time sensitive     Call taken on 8/8/2022 at 6:04 PM by Jose todd

## 2022-08-08 NOTE — TELEPHONE ENCOUNTER
I called [employer] waiting on hold for 25 min and they said they will fax it again. She said anyone can sign it. They work remotely but she said that it should be faxed in the next 10 min. Waiting on the forms.    Rajani Mac PSC on 8/8/2022 at 2:10 PM

## 2022-08-08 NOTE — TELEPHONE ENCOUNTER
Patient would like to be called after fax is signed and faxed back. Virginia Lombardi on 8/8/2022 at 11:48 AM

## 2022-08-09 NOTE — TELEPHONE ENCOUNTER
Signed by CLEMENT Irwin, contacted patient to  at . Copy placed in file cabinet. Virginia Lombardi on 8/9/2022 at 8:10 AM

## 2022-08-09 NOTE — TELEPHONE ENCOUNTER
"Chief Complaint   Patient presents with     [facility] F/U     [facility]        Initial BP 94/82 (BP Location: Left arm, Patient Position: Chair, Cuff Size: Adult Large)  Pulse 82  Temp 98  F (36.7  C) (Tympanic)  Resp 22  Wt 268 lb 6.4 oz (121.7 kg)  SpO2 96%  BMI 36.39 kg/m2 Estimated body mass index is 36.39 kg/(m^2) as calculated from the following:    Height as of 9/20/17: 6' 0.01\" (1.829 m).    Weight as of this encounter: 268 lb 6.4 oz (121.7 kg).  Medication Reconciliation: complete   Health Maintenance Due   Topic Date Due     HEPATITIS C SCREENING  11/19/1980     INFLUENZA VACCINE (SYSTEM ASSIGNED)  09/01/2017     Radha Schaeffer MA     9/22/2017      " [employer] form printed, prepared, and routed to Crissy Irwin

## 2022-08-09 NOTE — TELEPHONE ENCOUNTER
Form placed on . [facility] desk marked Urgent. Crissy signed form, copied form and placed in file cabinet, and contacted patient. Left form up at  for patient to , notified patient. Virginia Lombardi on 8/9/2022 at 8:07 AM

## 2022-08-16 ENCOUNTER — MYC MEDICAL ADVICE (OUTPATIENT)
Dept: FAMILY MEDICINE | Facility: CLINIC | Age: 27
End: 2022-08-16

## 2022-08-20 ENCOUNTER — LAB (OUTPATIENT)
Dept: LAB | Facility: CLINIC | Age: 27
End: 2022-08-20
Payer: COMMERCIAL

## 2022-08-20 DIAGNOSIS — E03.9 ACQUIRED HYPOTHYROIDISM: ICD-10-CM

## 2022-08-20 PROCEDURE — 36415 COLL VENOUS BLD VENIPUNCTURE: CPT

## 2022-08-20 PROCEDURE — 84443 ASSAY THYROID STIM HORMONE: CPT

## 2022-08-22 LAB — TSH SERPL DL<=0.005 MIU/L-ACNC: 3.32 MU/L (ref 0.4–4)

## 2022-08-24 DIAGNOSIS — E03.9 ACQUIRED HYPOTHYROIDISM: ICD-10-CM

## 2022-08-24 RX ORDER — LEVOTHYROXINE SODIUM 75 UG/1
75 TABLET ORAL DAILY
Qty: 90 TABLET | Refills: 1 | Status: SHIPPED | OUTPATIENT
Start: 2022-08-24 | End: 2022-10-25

## 2022-09-04 ENCOUNTER — E-VISIT (OUTPATIENT)
Dept: URGENT CARE | Facility: CLINIC | Age: 27
End: 2022-09-04
Payer: COMMERCIAL

## 2022-09-04 DIAGNOSIS — L30.9 DERMATITIS: Primary | ICD-10-CM

## 2022-09-04 PROCEDURE — 99421 OL DIG E/M SVC 5-10 MIN: CPT | Performed by: PHYSICIAN ASSISTANT

## 2022-09-04 RX ORDER — PREDNISONE 20 MG/1
40 TABLET ORAL DAILY
Qty: 10 TABLET | Refills: 0 | Status: SHIPPED | OUTPATIENT
Start: 2022-09-04 | End: 2022-09-09

## 2022-09-04 RX ORDER — TRIAMCINOLONE ACETONIDE 1 MG/G
OINTMENT TOPICAL 2 TIMES DAILY
Qty: 80 G | Refills: 1 | Status: SHIPPED | OUTPATIENT
Start: 2022-09-04

## 2022-09-05 NOTE — PATIENT INSTRUCTIONS
Dear Zacarias Adam    After reviewing your responses, I've been able to diagnose you with dermatitis, which is a common skin condition that causes small fluid-filled blisters or bumps to appear on your skin. The exact cause is unknown but your risk may increase if you have allergies, smoke, or have other skin conditions. Some foods such as mushrooms, chocolate and coffee also are known potential triggers.     Based on your responses, I have prescribed prednisone (oral steroid) and steroid cream to treat this. Please follow the instructions on the medication. I also recommend that you take Benadryl or another antihistamine. If you experience irritation of your skin, new rash, or any other new symptoms, you should stop using this medication and contact your primary care provider.     If this treatment does not work for you or you will run out of refills, please plan to follow- up with your primary care provider to set refills for a longer period of time or to try other options.     Things you can do to help prevent this:     Do not scratch your rash.Bacteria from your fingernails may enter your open sores during scratching and cause an infection.     Use moisturizes or emollients, such as petroleum jelly.These help relieve itching and help prevent bacteria from getting in your sores. If you have a doctor's order for medicated cream, apply that first. Then apply the moisturizer or emollient on top.    Thanks for choosing us as your health care partner,    Rock Cloud PA-C

## 2022-09-26 ENCOUNTER — MYC MEDICAL ADVICE (OUTPATIENT)
Dept: FAMILY MEDICINE | Facility: CLINIC | Age: 27
End: 2022-09-26

## 2022-09-26 DIAGNOSIS — Z32.00 ENCOUNTER FOR PREGNANCY TEST, RESULT UNKNOWN: Primary | ICD-10-CM

## 2022-10-21 ENCOUNTER — DOCUMENTATION ONLY (OUTPATIENT)
Dept: LAB | Facility: CLINIC | Age: 27
End: 2022-10-21

## 2022-10-21 DIAGNOSIS — E03.9 ACQUIRED HYPOTHYROIDISM: Primary | ICD-10-CM

## 2022-10-21 NOTE — PROGRESS NOTES
Zacarias Adam has an upcoming lab appointment:    Future Appointments   Date Time Provider Department Center   10/24/2022  5:30 PM LAB FIRST FLOOR [facility]   11/4/2022  8:00 AM Renee Hopper MD [facility]     Patient is scheduled for the following lab(s): appointment notes for patient on Monday indicate she's coming for a thyroid level per Dr. Irwin. There are no orders at the moment, please place necessary orders.      Thank you,     Nhi Win

## 2022-10-24 ENCOUNTER — LAB (OUTPATIENT)
Dept: LAB | Facility: CLINIC | Age: 27
End: 2022-10-24
Payer: COMMERCIAL

## 2022-10-24 DIAGNOSIS — Z32.00 ENCOUNTER FOR PREGNANCY TEST, RESULT UNKNOWN: ICD-10-CM

## 2022-10-24 DIAGNOSIS — E03.9 ACQUIRED HYPOTHYROIDISM: ICD-10-CM

## 2022-10-24 LAB
HCG SERPL QL: NEGATIVE
T4 FREE SERPL-MCNC: 0.7 NG/DL (ref 0.76–1.46)
TSH SERPL DL<=0.005 MIU/L-ACNC: 4.18 MU/L (ref 0.4–4)

## 2022-10-24 PROCEDURE — 84443 ASSAY THYROID STIM HORMONE: CPT

## 2022-10-24 PROCEDURE — 36415 COLL VENOUS BLD VENIPUNCTURE: CPT

## 2022-10-24 PROCEDURE — 84703 CHORIONIC GONADOTROPIN ASSAY: CPT

## 2022-10-24 PROCEDURE — 84439 ASSAY OF FREE THYROXINE: CPT

## 2022-10-25 ENCOUNTER — MYC MEDICAL ADVICE (OUTPATIENT)
Dept: FAMILY MEDICINE | Facility: CLINIC | Age: 27
End: 2022-10-25

## 2022-10-25 DIAGNOSIS — E03.9 ACQUIRED HYPOTHYROIDISM: Primary | ICD-10-CM

## 2022-10-25 DIAGNOSIS — N97.9 FEMALE INFERTILITY: Primary | ICD-10-CM

## 2022-10-25 RX ORDER — LEVOTHYROXINE SODIUM 88 UG/1
88 TABLET ORAL DAILY
Qty: 90 TABLET | Refills: 0 | Status: SHIPPED | OUTPATIENT
Start: 2022-10-25 | End: 2023-01-24

## 2022-11-04 ENCOUNTER — TELEPHONE (OUTPATIENT)
Dept: FAMILY MEDICINE | Facility: CLINIC | Age: 27
End: 2022-11-04

## 2022-11-04 ENCOUNTER — OFFICE VISIT (OUTPATIENT)
Dept: OBGYN | Facility: CLINIC | Age: 27
End: 2022-11-04
Payer: COMMERCIAL

## 2022-11-04 VITALS
HEIGHT: 68 IN | DIASTOLIC BLOOD PRESSURE: 89 MMHG | HEART RATE: 122 BPM | TEMPERATURE: 98.1 F | WEIGHT: 235.6 LBS | SYSTOLIC BLOOD PRESSURE: 140 MMHG | BODY MASS INDEX: 35.71 KG/M2 | RESPIRATION RATE: 18 BRPM

## 2022-11-04 DIAGNOSIS — N76.0 BV (BACTERIAL VAGINOSIS): Primary | ICD-10-CM

## 2022-11-04 DIAGNOSIS — B96.89 BV (BACTERIAL VAGINOSIS): Primary | ICD-10-CM

## 2022-11-04 DIAGNOSIS — Z31.41 FERTILITY TESTING: Primary | ICD-10-CM

## 2022-11-04 LAB
CLUE CELLS: PRESENT
TRICHOMONAS, WET PREP: ABNORMAL
WBC'S/HIGH POWER FIELD, WET PREP: ABNORMAL
YEAST, WET PREP: ABNORMAL

## 2022-11-04 PROCEDURE — 87491 CHLMYD TRACH DNA AMP PROBE: CPT | Performed by: OBSTETRICS & GYNECOLOGY

## 2022-11-04 PROCEDURE — 87210 SMEAR WET MOUNT SALINE/INK: CPT | Performed by: OBSTETRICS & GYNECOLOGY

## 2022-11-04 PROCEDURE — 87591 N.GONORRHOEAE DNA AMP PROB: CPT | Performed by: OBSTETRICS & GYNECOLOGY

## 2022-11-04 PROCEDURE — 99214 OFFICE O/P EST MOD 30 MIN: CPT | Performed by: OBSTETRICS & GYNECOLOGY

## 2022-11-04 RX ORDER — CLOMIPHENE CITRATE 50 MG/1
50 TABLET ORAL DAILY
Qty: 5 TABLET | Refills: 0 | Status: SHIPPED | OUTPATIENT
Start: 2022-11-04 | End: 2022-12-28

## 2022-11-04 RX ORDER — LAMOTRIGINE 100 MG/1
200 TABLET ORAL DAILY
Refills: 0 | COMMUNITY
Start: 2022-11-04

## 2022-11-04 RX ORDER — METRONIDAZOLE 500 MG/1
500 TABLET ORAL 2 TIMES DAILY
Qty: 14 TABLET | Refills: 0 | Status: SHIPPED | OUTPATIENT
Start: 2022-11-04 | End: 2022-11-11

## 2022-11-04 ASSESSMENT — ANXIETY QUESTIONNAIRES
GAD7 TOTAL SCORE: 5
7. FEELING AFRAID AS IF SOMETHING AWFUL MIGHT HAPPEN: NOT AT ALL
5. BEING SO RESTLESS THAT IT IS HARD TO SIT STILL: NOT AT ALL
2. NOT BEING ABLE TO STOP OR CONTROL WORRYING: SEVERAL DAYS
IF YOU CHECKED OFF ANY PROBLEMS ON THIS QUESTIONNAIRE, HOW DIFFICULT HAVE THESE PROBLEMS MADE IT FOR YOU TO DO YOUR WORK, TAKE CARE OF THINGS AT HOME, OR GET ALONG WITH OTHER PEOPLE: NOT DIFFICULT AT ALL
3. WORRYING TOO MUCH ABOUT DIFFERENT THINGS: MORE THAN HALF THE DAYS
GAD7 TOTAL SCORE: 5
1. FEELING NERVOUS, ANXIOUS, OR ON EDGE: SEVERAL DAYS
6. BECOMING EASILY ANNOYED OR IRRITABLE: NOT AT ALL

## 2022-11-04 ASSESSMENT — PATIENT HEALTH QUESTIONNAIRE - PHQ9
SUM OF ALL RESPONSES TO PHQ QUESTIONS 1-9: 3
5. POOR APPETITE OR OVEREATING: SEVERAL DAYS
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
## 2022-11-04 NOTE — PATIENT INSTRUCTIONS
We discussed options for workup including labwork, ultrasound, hysterosalpingogram.  Discussed clomid and letrozole.      You can have your blood drawn on Day #3 and Day #21 of your next cycle.    An ultrasound was ordered.  You can do this at any time if you wish. If you want to do that after trying clomid, that is OK.    Clomid will be sent to your pharmacy for next month.    If you do not get pregnant after 3 trials of clomid, please complete the ultrasound and hysterosalpingogram (to see if your fallopian tube is open) and then you can see Dr. Burger or Dr. Linares for possible letrozole.         We did vaginal swabs today. [facility]    Plan for work-up with day #3 labs including estradiol, LH, FSH, TSH, prolactin, AMH, and Vit D, then day #21 progesterone.  Will also obtain rubella and varicella (to assess for immunity and offer vaccination prior to pregnancy).        [facility]  at [phone] (to schedule an ultrasound if you choose)

## 2022-11-04 NOTE — NURSING NOTE
"Initial BP (!) 140/89 (BP Location: Right arm, Patient Position: Chair, Cuff Size: Adult Regular)   Pulse (!) 122   Temp 98.1  F (36.7  C) (Tympanic)   Resp 18   Ht 1.715 m (5' 7.52\")   Wt 106.9 kg (235 lb 9.6 oz)   LMP 10/30/2022   BMI 36.33 kg/m   Estimated body mass index is 36.33 kg/m  as calculated from the following:    Height as of this encounter: 1.715 m (5' 7.52\").    Weight as of this encounter: 106.9 kg (235 lb 9.6 oz). .    Sasha Conley, [facility]    "

## 2022-11-04 NOTE — TELEPHONE ENCOUNTER
________________________________________________________________    ~ It was our pleasure to care for you today. Thank you for choosing [facility]. We hope you will be feeling better soon.~    Thank you,  The Nursing Staff~    Hours:   Monday - Friday 8:00 am - 8:00 pm  Saturday & Sunday 8:00 am - 4:00 pm  ~~~~~~~~~~~~~~~~~~~~~~~~~~~~~~~~~~~~~~~~~~~~~~~~~~~~~~~~~~~~~    You may be receiving a patient satisfaction survey in the mail following your visit. Please take the time to complete this, as your feedback is very important to us! We strive to make your experience exceptional and your comments help us with that goal. We look forward to hearing from you!     If your provider has ordered additional testing as part of your ongoing plan of care, please allow 5-7 business days (from the day of your visit) for the testing to be resulted and reviewed by your provider. You will be contacted via telephone if your results are abnormal or changes need to be made with your current plan. If you have any questions regarding your testing, please contact the nurse at   [phone].       Patient Education     Sinusitis (Antibiotic Treatment)    The sinuses are air-filled spaces within the bones of the face. They connect to the inside of the nose. Sinusitis is an inflammation of the tissue that lines the sinuses. Sinusitis can occur during a cold. It can also happen due to allergies to pollens and other particles in the air. Sinusitis can cause symptoms of sinus congestion and a feeling of fullness. A sinus infection causes fever, headache, and facial pain. There is often green or yellow fluid draining from the nose or into the back of the throat (post-nasal drip). You have been given antibiotics to treat this condition.  Home care  · Take the full course of antibiotics as instructed. Do not stop taking them, even when you feel better.  · Drink plenty of water, hot tea, and other liquids. This may help  Reason for call:  Other   Patient called regarding (reason for call): prescription  Additional comments: PT had labs done today and one of the results came back irregular, needs to have an antibiotic placed. Oral antibiotic, bacterial vaginosis. Please contact pt with any questions. Would like it sent in before the weekend. Sees Sincere Duran.     Phone number to reach patient:  Cell number on file:    Telephone Information:   Mobile [phone]       Best Time:  Anytime    Can we leave a detailed message on this number?  YES    Travel screening: Not Applicable     thin nasal mucus. It also may help your sinuses drain fluids.  · Heat may help soothe painful areas of your face. Use a towel soaked in hot water. Or,  the shower and direct the warm spray onto your face. Using a vaporizer along with a menthol rub at night may also help soothe symptoms.   · An expectorant with guaifenesin may help thin nasal mucus and help your sinuses drain fluids.  · You can use an over-the-counter decongestant, unless a similar medicine was prescribed to you. Nasal sprays work the fastest. Use one that contains phenylephrine or oxymetazoline. First blow your nose gently. Then use the spray. Do not use these medicines more often than directed on the label. If you do, your symptoms may get worse. You may also take pills that contain pseudoephedrine. Don’t use products that combine multiple medicines. This is because side effects may be increased. Read labels. You can also ask the pharmacist for help. (People with high blood pressure should not use decongestants. They can raise blood pressure.)  · Over-the-counter antihistamines may help if allergies contributed to your sinusitis.    · Do not use nasal rinses or irrigation during an acute sinus infection, unless your healthcare provider tells you to. Rinsing may spread the infection to other areas in your sinuses.  · Use acetaminophen or ibuprofen to control pain, unless another pain medicine was prescribed to you. If you have chronic liver or kidney disease or ever had a stomach ulcer, talk with your healthcare provider before using these medicines. (Aspirin should never be taken by anyone under age 18 who is ill with a fever. It may cause severe liver damage.)  · Don't smoke. This can make symptoms worse.  Follow-up care  Follow up with your healthcare provider or our staff if you are better in 1 week.  When to seek medical advice  Call your healthcare provider if any of these occur:  · Facial pain or headache that gets worse  · Stiff  neck  · Unusual drowsiness or confusion  · Swelling of your forehead or eyelids  · Vision problems, such as blurred or double vision  · Fever of 100.4ºF (38ºC) or higher, or as directed by your healthcare provider  · Seizure  · Breathing problems  · Symptoms don't go away in 10 days  Prevention  Here are steps you can take to help prevent an infection:  · Keep good hand washing habits.  · Don’t have close contact with people who have sore throats, colds, or other upper respiratory infections.  · Don’t smoke, and stay away from secondhand smoke.  · Stay up to date with of your vaccines.  Date Last Reviewed: 11/1/2017  © 8456-5294 [employer]. [address] [place] [address], [place] [address]. All rights reserved. This information is not intended as a substitute for professional medical care. Always follow your healthcare professional's instructions.

## 2022-11-05 LAB
C TRACH DNA SPEC QL PROBE+SIG AMP: NEGATIVE
N GONORRHOEA DNA SPEC QL NAA+PROBE: NEGATIVE

## 2022-11-07 ENCOUNTER — TELEPHONE (OUTPATIENT)
Dept: OBGYN | Facility: CLINIC | Age: 27
End: 2022-11-07

## 2022-11-08 ENCOUNTER — E-VISIT (OUTPATIENT)
Dept: URGENT CARE | Facility: CLINIC | Age: 27
End: 2022-11-08
Payer: COMMERCIAL

## 2022-11-08 DIAGNOSIS — R21 RASH AND NONSPECIFIC SKIN ERUPTION: Primary | ICD-10-CM

## 2022-11-08 PROCEDURE — 99207 PR NON-BILLABLE SERV PER CHARTING: CPT | Performed by: EMERGENCY MEDICINE

## 2022-11-08 NOTE — PATIENT INSTRUCTIONS
Dear Zacarias Adam,    This is concerning and should be seen today!      We are sorry you are not feeling well. Based on the responses you provided, it is recommended that you be seen in-person in urgent care so we can better evaluate your symptoms. Please click here to find the nearest urgent care location to you.   You will not be charged for this Visit. Thank you for trusting us with your care.    Deangelo Magallanes MD

## 2022-11-09 NOTE — TELEPHONE ENCOUNTER
PRIOR AUTHORIZATION DENIED    Medication: clomiPHENE (CLOMID) 50 MG tablet    Denial Date: 11/9/2022    Denial Rationale: Medication is excluded from coverage.            Appeal Information: N/A- no PA or appeal can be done.

## 2022-11-10 NOTE — TELEPHONE ENCOUNTER
Pt called back - she was aware that this is not covered.  She can check with pharmacy on cost.    -Yudy SHEEHAN [facility]  [facility] [place]

## 2022-11-25 NOTE — TELEPHONE ENCOUNTER
Received a fax requesting information from Dr. Christine. For prescription clomiPHENE (CLOMID) 50 mg tablet  Gave to Dr. Christine to advice.     Ashley Flanders   [facility] [place]   [facility]  [phone]

## 2022-11-28 ENCOUNTER — MYC MEDICAL ADVICE (OUTPATIENT)
Dept: OBGYN | Facility: CLINIC | Age: 27
End: 2022-11-28
Payer: COMMERCIAL

## 2022-11-28 DIAGNOSIS — N89.8 VAGINAL DISCHARGE: Primary | ICD-10-CM

## 2022-11-28 NOTE — TELEPHONE ENCOUNTER
Patient would like to check for BV with self collect wet prep.  Patient reports some itching and being positive last month after insemination.    Please review and advise.  Thank you.    Johnna Fairbanks   Ob/Gyn Clinic  RN

## 2022-11-29 ENCOUNTER — LAB (OUTPATIENT)
Dept: LAB | Facility: CLINIC | Age: 27
End: 2022-11-29
Payer: COMMERCIAL

## 2022-11-29 DIAGNOSIS — Z31.41 FERTILITY TESTING: Primary | ICD-10-CM

## 2022-11-29 LAB
CLUE CELLS: NORMAL
TRICHOMONAS, WET PREP: NORMAL
WBC'S/HIGH POWER FIELD, WET PREP: NORMAL
YEAST, WET PREP: NORMAL

## 2022-11-29 PROCEDURE — 82306 VITAMIN D 25 HYDROXY: CPT

## 2022-11-29 PROCEDURE — 36415 COLL VENOUS BLD VENIPUNCTURE: CPT

## 2022-11-29 PROCEDURE — 84443 ASSAY THYROID STIM HORMONE: CPT

## 2022-11-29 PROCEDURE — 87210 SMEAR WET MOUNT SALINE/INK: CPT | Performed by: OBSTETRICS & GYNECOLOGY

## 2022-11-30 LAB
DEPRECATED CALCIDIOL+CALCIFEROL SERPL-MC: 62 UG/L (ref 20–75)
TSH SERPL DL<=0.005 MIU/L-ACNC: 2.32 MU/L (ref 0.4–4)

## 2022-11-30 NOTE — RESULT ENCOUNTER NOTE
The attached results were normal. Please follow any recommendations discussed in clinic.    Renee Hopper MD          11/30/2022 10:52 AM

## 2022-12-01 NOTE — RESULT ENCOUNTER NOTE
The attached results were normal. Please follow any recommendations discussed in clinic.    Renee Hopper MD          12/1/2022 12:13 PM

## 2022-12-02 ENCOUNTER — LAB (OUTPATIENT)
Dept: LAB | Facility: CLINIC | Age: 27
End: 2022-12-02
Payer: COMMERCIAL

## 2022-12-02 DIAGNOSIS — E03.9 ACQUIRED HYPOTHYROIDISM: ICD-10-CM

## 2022-12-02 DIAGNOSIS — Z31.41 FERTILITY TESTING: ICD-10-CM

## 2022-12-02 PROCEDURE — 84146 ASSAY OF PROLACTIN: CPT

## 2022-12-02 PROCEDURE — 36415 COLL VENOUS BLD VENIPUNCTURE: CPT

## 2022-12-02 PROCEDURE — 83002 ASSAY OF GONADOTROPIN (LH): CPT | Mod: 90

## 2022-12-02 PROCEDURE — 83520 IMMUNOASSAY QUANT NOS NONAB: CPT

## 2022-12-02 PROCEDURE — 83001 ASSAY OF GONADOTROPIN (FSH): CPT

## 2022-12-02 PROCEDURE — 84144 ASSAY OF PROGESTERONE: CPT

## 2022-12-02 PROCEDURE — 99000 SPECIMEN HANDLING OFFICE-LAB: CPT

## 2022-12-02 PROCEDURE — 84443 ASSAY THYROID STIM HORMONE: CPT

## 2022-12-02 PROCEDURE — 82670 ASSAY OF TOTAL ESTRADIOL: CPT

## 2022-12-03 LAB
ESTRADIOL SERPL-MCNC: 42 PG/ML
FSH SERPL IRP2-ACNC: 4.7 MIU/ML
MIS SERPL-MCNC: 2.26 NG/ML (ref 0.89–9.9)
PROGEST SERPL-MCNC: 0.3 NG/ML
PROLACTIN SERPL 3RD IS-MCNC: 8 NG/ML (ref 5–23)
TSH SERPL DL<=0.005 MIU/L-ACNC: 1.52 UIU/ML (ref 0.3–4.2)

## 2022-12-05 DIAGNOSIS — N97.9 FEMALE INFERTILITY: Primary | ICD-10-CM

## 2022-12-15 LAB — LUTROPIN (EXTERNAL): 2.6 MIU/ML

## 2022-12-21 ENCOUNTER — LAB (OUTPATIENT)
Dept: LAB | Facility: CLINIC | Age: 27
End: 2022-12-21
Payer: COMMERCIAL

## 2022-12-21 DIAGNOSIS — N97.9 FEMALE INFERTILITY: ICD-10-CM

## 2022-12-21 LAB — PROGEST SERPL-MCNC: 16.8 NG/ML

## 2022-12-21 PROCEDURE — 36415 COLL VENOUS BLD VENIPUNCTURE: CPT

## 2022-12-21 PROCEDURE — 84144 ASSAY OF PROGESTERONE: CPT

## 2022-12-22 NOTE — RESULT ENCOUNTER NOTE
The attached results were normal. Please follow any recommendations discussed in clinic.    Renee Hopper MD          12/22/2022 8:34 AM

## 2022-12-28 ENCOUNTER — MYC REFILL (OUTPATIENT)
Dept: OBGYN | Facility: CLINIC | Age: 27
End: 2022-12-28

## 2022-12-28 DIAGNOSIS — Z31.41 FERTILITY TESTING: ICD-10-CM

## 2022-12-28 RX ORDER — CLOMIPHENE CITRATE 50 MG/1
50 TABLET ORAL DAILY
Qty: 5 TABLET | Refills: 0 | Status: SHIPPED | OUTPATIENT
Start: 2022-12-28 | End: 2023-07-13

## 2022-12-28 NOTE — TELEPHONE ENCOUNTER
Last Written Prescription Date:11/4/2022    Last Fill Quantity: 5# refills: 0   Last office visit: 11/4/2022 with prescribing provider: Dr. Christine   Future Office Visit:   Next 5 appointments (look out 90 days)    Feb 13, 2023 11:15 AM  Office Visit with Awilda Burger MD  [facility] [place] [facility] [address] [address]  [phone]         Requested Prescriptions   Pending Prescriptions Disp Refills     clomiPHENE (CLOMID) 50 MG tablet 5 tablet 0     Sig: Take 1 tablet (50 mg) by mouth daily Start on day 4 of your cycle and finish on day 8.  Check a progesterone level on day 21 of your cycle to help determine if ovulation occurred.  Call for a refill if you do not get pregnant.       There is no refill protocol information for this order          Component      Latest Ref Rng & Units 12/21/2022   Progesterone      ng/mL 16.8     Please review and advise in Dr. Christine absence.    Thank you.    Johnna Fairbanks   Ob/Gyn Clinic  RN

## 2023-01-22 DIAGNOSIS — E03.9 ACQUIRED HYPOTHYROIDISM: ICD-10-CM

## 2023-01-24 RX ORDER — LEVOTHYROXINE SODIUM 88 UG/1
TABLET ORAL
Qty: 90 TABLET | Refills: 0 | Status: SHIPPED | OUTPATIENT
Start: 2023-01-24 | End: 2023-06-27

## 2023-01-30 ENCOUNTER — E-VISIT (OUTPATIENT)
Dept: FAMILY MEDICINE | Facility: CLINIC | Age: 28
End: 2023-01-30
Payer: COMMERCIAL

## 2023-01-30 DIAGNOSIS — J02.9 SORE THROAT: Primary | ICD-10-CM

## 2023-01-30 PROCEDURE — 99421 OL DIG E/M SVC 5-10 MIN: CPT | Performed by: NURSE PRACTITIONER

## 2023-01-31 NOTE — PATIENT INSTRUCTIONS
Thank you for choosing us for your care. Given your symptoms, I would like you to do a lab-only visit to determine what is causing them.  I have placed the orders.  Please schedule an appointment with the lab right here in [place], or call [phone].  I will let you know when the results are back and next steps to take.

## 2023-03-20 ENCOUNTER — E-VISIT (OUTPATIENT)
Dept: URGENT CARE | Facility: CLINIC | Age: 28
End: 2023-03-20
Payer: COMMERCIAL

## 2023-03-20 DIAGNOSIS — H57.12 EYE PAIN, LEFT: Primary | ICD-10-CM

## 2023-03-20 PROCEDURE — 99207 PR NON-BILLABLE SERV PER CHARTING: CPT | Performed by: NURSE PRACTITIONER

## 2023-03-20 NOTE — PATIENT INSTRUCTIONS
Dear Zacarias Adam,    We are sorry you are not feeling well. Based on the responses you provided, it is recommended that you be seen in-person in urgent care so we can better evaluate your symptoms. Please click here to find the nearest urgent care location to you.   You will not be charged for this Visit. Thank you for trusting us with your care.    Sumi Santillan NP

## 2023-05-21 NOTE — MR AVS SNAPSHOT
After Visit Summary   7/26/2018    Zacarias Adam    MRN: [id number]           Patient Information     Date Of Birth          1995        Visit Information        Provider Department      7/26/2018 1:15 PM Shaista Bedolla MD [facility]        Today's Diagnoses     Prenatal care, subsequent pregnancy in second trimester    -  1       Follow-ups after your visit        Additional Services     MAT FETAL MED CTR REFERRAL-PREGNANCY       Body mass index is 27.57 kg/(m^2).    >> Patient may proceed with recommendations for further testing as directed by the Maternal Fetal Medicine Specialist >>    >> If requesting Fetal Echo: MFM will determine appropriate location for exam due to indication.    >> If requesting Lung Maturity Amnio:  If results indicate fetal lung maturity, induction or C/S is recommended within 36 hours.  Please schedule accordingly.     Dear Patient:   Please be aware that coverage of these services is subject to the terms and limitations of your health insurance plan.  Call member services at your health plan with any benefit or coverage questions.      Please bring the following to your appointment:    >>  Any x-rays, CTs or MRIs which have been performed.  Contact the facility where they were done to arrange for  prior to your scheduled appointment.  Any new CT, MRI or other procedures ordered by your specialist must be performed at a [place] facility or coordinated by your clinic's referral office.  >>  List of current medications   >>  This referral request   >>  Any documents/labs given to you for this referral                  Future tests that were ordered for you today     Open Future Orders        Priority Expected Expires Ordered    Treponema Abs w Reflex to RPR and Titer Routine  7/26/2019 7/26/2018    Glucose Challenge Test (GCT)1 Hour, (Future) Routine  9/20/2018 7/26/2018    OB hemoglobin: FUTURE Routine  1/22/2019 7/26/2018            Who  to contact     If you have questions or need follow up information about today's clinic visit or your schedule please contact [facility] directly at [phone].  Normal or non-critical lab and imaging results will be communicated to you by MyChart, letter or phone within 4 business days after the clinic has received the results. If you do not hear from us within 7 days, please contact the clinic through MyChart or phone. If you have a critical or abnormal lab result, we will notify you by phone as soon as possible.  Submit refill requests through Cerecor or call your pharmacy and they will forward the refill request to us. Please allow 3 business days for your refill to be completed.          Additional Information About Your Visit        Care EveryWhere ID     This is your Care EveryWhere ID. This could be used by other organizations to access your [facility] medical records  [id number]        Your Vitals Were     Pulse Temperature Last Period Breastfeeding? BMI (Body Mass Index)       89 98.3  F (36.8  C) (Tympanic) 02/25/2018 No 27.57 kg/m2        Blood Pressure from Last 3 Encounters:   07/26/18 136/76   06/19/18 119/66   05/21/18 133/71    Weight from Last 3 Encounters:   07/26/18 176 lb (79.8 kg)   06/19/18 172 lb (78 kg)   05/21/18 175 lb (79.4 kg)              We Performed the Following     MAT FETAL MED CTR REFERRAL-PREGNANCY        Primary Care Provider Office Phone # Fax #    Yudi Sauceda [phone] [address] [place], [place] [address]        Equal Access to Services     TARA LOPEZ : Hadii alton cid hadjannetho Solaila, waaxda luqadaha, qaybta kaalmada flora he. So [facility] [phone].    ATENCIÓN: Si habla español, tiene a aleman disposición servicios gratuitos de asistencia lingüística. Llame al [phone].    We comply with applicable federal civil rights laws and [place] laws. We do not discriminate on the basis of  race, color, national origin, age, disability, sex, sexual orientation, or gender identity.            Thank you!     Thank you for choosing [facility]  for your care. Our goal is always to provide you with excellent care. Hearing back from our patients is one way we can continue to improve our services. Please take a few minutes to complete the written survey that you may receive in the mail after your visit with us. Thank you!             Your Updated Medication List - Protect others around you: Learn how to safely use, store and throw away your medicines at www.disposemymeds.org.          This list is accurate as of 7/26/18  2:19 PM.  Always use your most recent med list.                   Brand Name Dispense Instructions for use Diagnosis    prenatal multivitamin plus iron 27-0.8 MG Tabs per tablet      Take 1 tablet by mouth daily           medical evaluation

## 2023-05-30 ENCOUNTER — TELEPHONE (OUTPATIENT)
Dept: FAMILY MEDICINE | Facility: CLINIC | Age: 28
End: 2023-05-30
Payer: COMMERCIAL

## 2023-05-30 NOTE — LETTER
June 7, 2023      aZcarias Adam  [address] [address]        Dear Zacarias,       Your team at [facility] cares about your health. We have reviewed your chart and based on our findings; we are making the following recommendations to better manage your health.     You are in particular need of attention regarding the following:     OTHER FOLLOW UP: Update of ACT- Asthma Control Test   Your [facility] Team works hard to make sure that you  receive exceptional care. Enclosed you will find a copy of the Asthma Control Test (ACT) that our clinic uses to monitor and manage your asthma. This test is an assessment tool that we can use to determine how well your asthma is controlled. Please fill out and mail back the enclosed ACT form. Enclosed is a stamped addressed envelope for you to mail the ACT back to the clinic in. Thank You     If you have already completed these items, please contact the clinic via phone or   [employer] so your care team can review and update your records. Thank you for   choosing [facility] for your healthcare needs. For any questions,   concerns, or to schedule an appointment please contact our clinic.      Sincerely,        Crissy Irwin NP

## 2023-05-30 NOTE — TELEPHONE ENCOUNTER
Patient Quality Outreach    Patient is due for the following:   Asthma  -  ACT needed    Next Steps:   Patient was assigned appropriate questionnaire to complete    Type of outreach:    Sent Kizoom message. Will postpone x 1 week, if not viewed or completed please mail ACT.         Questions for provider review:    None           Jenna Pereyra, CMA

## 2023-06-01 ENCOUNTER — HEALTH MAINTENANCE LETTER (OUTPATIENT)
Age: 28
End: 2023-06-01

## 2023-06-07 NOTE — TELEPHONE ENCOUNTER
Patient Quality Outreach    Patient is due for the following:   Asthma  -  ACT needed    Next Steps:   ACT - Asthma Control Test    Type of outreach:    Copy of ACT mailed to patient.      Questions for provider review:    None           Misael Leonard, CMA

## 2023-07-13 ENCOUNTER — MYC MEDICAL ADVICE (OUTPATIENT)
Dept: FAMILY MEDICINE | Facility: CLINIC | Age: 28
End: 2023-07-13

## 2023-07-13 ENCOUNTER — VIRTUAL VISIT (OUTPATIENT)
Dept: FAMILY MEDICINE | Facility: CLINIC | Age: 28
End: 2023-07-13
Payer: COMMERCIAL

## 2023-07-13 ENCOUNTER — TELEPHONE (OUTPATIENT)
Dept: FAMILY MEDICINE | Facility: CLINIC | Age: 28
End: 2023-07-13

## 2023-07-13 DIAGNOSIS — R61 EXCESSIVE SWEATING: Primary | ICD-10-CM

## 2023-07-13 DIAGNOSIS — E03.9 ACQUIRED HYPOTHYROIDISM: ICD-10-CM

## 2023-07-13 DIAGNOSIS — N97.9 FEMALE INFERTILITY: ICD-10-CM

## 2023-07-13 DIAGNOSIS — R63.5 WEIGHT GAIN: ICD-10-CM

## 2023-07-13 DIAGNOSIS — K58.1 IRRITABLE BOWEL SYNDROME WITH CONSTIPATION: ICD-10-CM

## 2023-07-13 PROCEDURE — 99214 OFFICE O/P EST MOD 30 MIN: CPT | Mod: VID | Performed by: PHYSICIAN ASSISTANT

## 2023-07-13 ASSESSMENT — PATIENT HEALTH QUESTIONNAIRE - PHQ9
SUM OF ALL RESPONSES TO PHQ QUESTIONS 1-9: 5
10. IF YOU CHECKED OFF ANY PROBLEMS, HOW DIFFICULT HAVE THESE PROBLEMS MADE IT FOR YOU TO DO YOUR WORK, TAKE CARE OF THINGS AT HOME, OR GET ALONG WITH OTHER PEOPLE: SOMEWHAT DIFFICULT
SUM OF ALL RESPONSES TO PHQ QUESTIONS 1-9: 5

## 2023-07-13 ASSESSMENT — ASTHMA QUESTIONNAIRES: ACT_TOTALSCORE: 24

## 2023-07-13 NOTE — PROGRESS NOTES
Zacarias is a 27 year old who is being evaluated via a billable video visit.      How would you like to obtain your AVS? MyChart  If the video visit is dropped, the invitation should be resent by: Text to cell phone: [phone]  Will anyone else be joining your video visit? No        Assessment & Plan     Excessive sweating  Has been treated with higher dose of thyroid medication through fertility clinic, no recent thyroid level checked.  Symptoms likely due to hyperthyroidism, will check labs.  She was on 112 mcg through the fertility clinic, however decreased to 88 mcg in the past 10 days.      May be secondary to weight gain.     May be secondary to a different endocrine cause (such as Cushings or pheochromocytoma).  Will consider further workup if symptoms persist.   Will have her f/u in office for a recheck to ensure HR and BP have come down.  (assess status of headaches for possible pheo)    - TSH; Future  - Testosterone total; Future  - DHEA sulfate; Future  - CBC with platelets and differential; Future    Weight gain  Will check labs.  Offered referral to nutritionist and weight loss specialist, she may consider these down the road.    May be secondary to some of her psych meds, she has reviewed this with her psychiatrist.     She has been following a healthy diet and feels very limited on what she can eat (given her IBS).     Discussed GLP1 agonist class medications with patient   -Serious reactions including pancreatitis, anaphylaxis, papillary thyroid cancer, thyroid cell and medullary thyroid carcinoma risk discussed. Patient does not have any personal or family history of thyroid malignancies.     - TSH; Future  - liraglutide - Weight Management (SAXENDA) 18 MG/3ML pen; Inject 0.6 mg Subcutaneous daily for 7 days, THEN 1.2 mg daily for 7 days, THEN 1.8 mg daily for 7 days, THEN 2.4 mg daily for 7 days, THEN 3 mg daily for 62 days.  - Testosterone total; Future  - DHEA sulfate; Future  - CBC with  "platelets and differential; Future    Female infertility  No longer with fertility treatment and she has decided to put a pause on this.  She was evaluated for PCOS through this clinic and she states they said she did not have it.     Irritable bowel syndrome with constipation  Limits diet.     Acquired hypothyroidism  Will recheck levels and go from there.  Dosing recently changed 10 days ago  - TSH; Future      30 minutes spent by me on the date of the encounter doing chart review, history and exam, documentation and further activities per the note       BMI:   Estimated body mass index is 36.33 kg/m  as calculated from the following:    Height as of 11/4/22: 1.715 m (5' 7.52\").    Weight as of 11/4/22: 106.9 kg (235 lb 9.6 oz).   Weight management plan: Discussed healthy diet and exercise guidelines        Chari Sullivan PA-C  [facility]    Pheochromocytoma differential    Subjective   Zacarias is a 27 year old, presenting for the following health issues:  Thyroid Problem         No data to display              Patient with history of hypothyroidism and asthma arrived for medication follow-up. She would also like to discuss weight loss options after having gained 50lbs over the last year.     History of Present Illness       Hypothyroidism:     Since last visit, patient describes the following symptoms::  Anxiety, Constipation and Weight gain    Weight gain::  >20 lbs.    Reason for visit:  Thyroid and weightless    She eats 4 or more servings of fruits and vegetables daily.She consumes 0 sweetened beverage(s) daily.She exercises with enough effort to increase her heart rate 30 to 60 minutes per day.  She exercises with enough effort to increase her heart rate 4 days per week.   She is taking medications regularly.    Today's PHQ-9         PHQ-9 Total Score: 5    PHQ-9 Q9 Thoughts of better off dead/self-harm past 2 weeks :   Not at all    How difficult have these problems made it for you " to do your work, take care of things at home, or get along with other people: Somewhat difficult     She feels she is sweating excessively for the past 4-5 months. She c/o excessive fatigue.   She has tried losing weight and has tried various diets (low FODMAP diet/keto/low carb).  She feels very uncomfortable    She does have issues with constipation and has h/o IBS.      She was going through fertility treatment and she is no longer working with them.  She is currently on 88 mcg    Lamictal and clomipramine (managed by psychiatrist).   She has brought this up with her psychiatrist and she was not concerned it is a medication side effect.     On propranolol for fast heart rate.     Was on prazosin in the past (to help with sleep), however this was not helping.  No longer on trazodone either.     Had testing for PCOS through fertility clinic and was negative.          Asthma Follow-Up    Was ACT completed today?  Yes        7/13/2023     1:25 PM   ACT Total Scores   ACT TOTAL SCORE (Goal Greater than or Equal to 20) 24   In the past 12 months, how many times did you visit the emergency room for your asthma without being admitted to the hospital? 0   In the past 12 months, how many times were you hospitalized overnight because of your asthma? 0         Review of Systems   Constitutional, HEENT, cardiovascular, pulmonary, gi and gu systems are negative, except as otherwise noted.      Objective           Vitals:  No vitals were obtained today due to virtual visit.    Physical Exam   GENERAL: Healthy, alert and no distress  EYES: Eyes grossly normal to inspection.  No discharge or erythema, or obvious scleral/conjunctival abnormalities.  RESP: No audible wheeze, cough, or visible cyanosis.  No visible retractions or increased work of breathing.    SKIN: Visible skin clear. No significant rash, abnormal pigmentation or lesions.  NEURO: Cranial nerves grossly intact.  Mentation and speech appropriate for age.  PSYCH:  Mentation appears normal, affect normal/bright, judgement and insight intact, normal speech and appearance well-groomed.                Video-Visit Details    Type of service:  Video Visit   Video Start Time: 2:08 PM  Video End Time:2:30 PM    Originating Location (pt. Location): Home  Distant Location (provider location):  On-site  Platform used for Video Visit: Florecita

## 2023-07-13 NOTE — PATIENT INSTRUCTIONS
"GLP-1 agonist medications are prescribed to help manage diabetes and also for weight loss.      Serious (but uncommon) reactions with this medication include pancreatitis, anaphylaxis, medullary thyroid carcinoma (MTC) or multiple endocrine neoplasia syndrome type 2 (MEN 2).  If you or a family member have a history of MTC or MEN2, this medication is not advisable.     Most people do quite well with this class of medication and more common side effects are typically GI (upset stomach, diarrhea, nausea).  We taper you up onto the medication slowly to reduce these side effects.     Insurance does not always cover this class of medications if it is used for weight loss only.  I encourage you to reach out to your insurance company to see if there is a preferred medication in this class (examples include ozempic, victoza, bydureon, byetta, trulicity).      You should also check to see if there are any prescription savings cards to help with the cost.       From wegovy's website:  \"Pay as little as $0 for a 28-day supply of Wegovy  for up to 12 fills*  When you get Wegovy  savings, you can also enroll in free [employer]  support.\"      Or try GoodRx or NiceRx.      Reach out if you have any questions or concerns.     "

## 2023-07-13 NOTE — TELEPHONE ENCOUNTER
Patient Quality Outreach    Patient is due for the following:   Asthma  -  ACT needed    Next Steps:   Patient was assigned appropriate questionnaire to complete    Type of outreach:    Sent Skemaz message.      Questions for provider review:    None           Bri Fonseca MA  Chart routed to Provider.

## 2023-07-13 NOTE — TELEPHONE ENCOUNTER
Prior Authorization Approval    Medication: LIRAGLUTIDE -WEIGHT MANAGEMENT 18 MG/3ML SC SOPN  Authorization Effective Date: 6/13/2023  Authorization Expiration Date: 1/9/2024  Approved Dose/Quantity:   Reference #:     Insurance Company: [employer] - Phone [phone] Fax [phone]  Expected CoPay:       CoPay Card Available:      Financial Assistance Needed:   Which Pharmacy is filling the prescription: [facility] [address], [place] - [address]  Pharmacy Notified: Yes  Patient Notified: Yes

## 2023-07-14 ENCOUNTER — LAB (OUTPATIENT)
Dept: LAB | Facility: CLINIC | Age: 28
End: 2023-07-14
Payer: COMMERCIAL

## 2023-07-14 DIAGNOSIS — E03.9 ACQUIRED HYPOTHYROIDISM: ICD-10-CM

## 2023-07-14 DIAGNOSIS — R61 EXCESSIVE SWEATING: ICD-10-CM

## 2023-07-14 DIAGNOSIS — R63.5 WEIGHT GAIN: ICD-10-CM

## 2023-07-14 LAB
BASOPHILS # BLD AUTO: 0 10E3/UL (ref 0–0.2)
BASOPHILS NFR BLD AUTO: 0 %
EOSINOPHIL # BLD AUTO: 0.2 10E3/UL (ref 0–0.7)
EOSINOPHIL NFR BLD AUTO: 2 %
ERYTHROCYTE [DISTWIDTH] IN BLOOD BY AUTOMATED COUNT: 12.8 % (ref 10–15)
HCT VFR BLD AUTO: 41.1 % (ref 35–47)
HGB BLD-MCNC: 13.4 G/DL (ref 11.7–15.7)
IMM GRANULOCYTES # BLD: 0.1 10E3/UL
IMM GRANULOCYTES NFR BLD: 1 %
LYMPHOCYTES # BLD AUTO: 3.8 10E3/UL (ref 0.8–5.3)
LYMPHOCYTES NFR BLD AUTO: 40 %
MCH RBC QN AUTO: 27.2 PG (ref 26.5–33)
MCHC RBC AUTO-ENTMCNC: 32.6 G/DL (ref 31.5–36.5)
MCV RBC AUTO: 84 FL (ref 78–100)
MONOCYTES # BLD AUTO: 0.6 10E3/UL (ref 0–1.3)
MONOCYTES NFR BLD AUTO: 6 %
NEUTROPHILS # BLD AUTO: 4.9 10E3/UL (ref 1.6–8.3)
NEUTROPHILS NFR BLD AUTO: 51 %
PLATELET # BLD AUTO: 383 10E3/UL (ref 150–450)
RBC # BLD AUTO: 4.92 10E6/UL (ref 3.8–5.2)
TSH SERPL DL<=0.005 MIU/L-ACNC: 2.02 UIU/ML (ref 0.3–4.2)
WBC # BLD AUTO: 9.6 10E3/UL (ref 4–11)

## 2023-07-14 PROCEDURE — 84443 ASSAY THYROID STIM HORMONE: CPT

## 2023-07-14 PROCEDURE — 84403 ASSAY OF TOTAL TESTOSTERONE: CPT

## 2023-07-14 PROCEDURE — 36415 COLL VENOUS BLD VENIPUNCTURE: CPT

## 2023-07-14 PROCEDURE — 85025 COMPLETE CBC W/AUTO DIFF WBC: CPT

## 2023-07-14 PROCEDURE — 82627 DEHYDROEPIANDROSTERONE: CPT

## 2023-07-17 LAB — DHEA-S SERPL-MCNC: 297 UG/DL (ref 35–430)

## 2023-07-20 LAB — TESTOST SERPL-MCNC: 23 NG/DL (ref 8–60)

## 2023-07-27 ENCOUNTER — E-VISIT (OUTPATIENT)
Dept: FAMILY MEDICINE | Facility: CLINIC | Age: 28
End: 2023-07-27
Payer: COMMERCIAL

## 2023-07-27 ENCOUNTER — MYC MEDICAL ADVICE (OUTPATIENT)
Dept: FAMILY MEDICINE | Facility: CLINIC | Age: 28
End: 2023-07-27
Payer: COMMERCIAL

## 2023-07-27 DIAGNOSIS — R63.5 WEIGHT GAIN: ICD-10-CM

## 2023-07-27 DIAGNOSIS — R11.0 NAUSEA: Primary | ICD-10-CM

## 2023-07-27 PROCEDURE — 99207 PR NO CHARGE LOS: CPT | Performed by: PHYSICIAN ASSISTANT

## 2023-07-31 ENCOUNTER — MYC REFILL (OUTPATIENT)
Dept: FAMILY MEDICINE | Facility: CLINIC | Age: 28
End: 2023-07-31
Payer: COMMERCIAL

## 2023-07-31 DIAGNOSIS — E03.9 ACQUIRED HYPOTHYROIDISM: ICD-10-CM

## 2023-08-01 DIAGNOSIS — E03.9 ACQUIRED HYPOTHYROIDISM: ICD-10-CM

## 2023-08-01 RX ORDER — LEVOTHYROXINE SODIUM 88 UG/1
88 TABLET ORAL DAILY
Qty: 90 TABLET | Refills: 3 | Status: SHIPPED | OUTPATIENT
Start: 2023-08-01

## 2023-08-01 NOTE — TELEPHONE ENCOUNTER
Pending Prescriptions:                       Disp   Refills    levothyroxine (SYNTHROID/LEVOTHROID) 88 MC*30 tab*0        Sig: Take 1 tablet (88 mcg) by mouth daily    Routing refill request to provider for review/approval because:  Pt was last seen for thyroid on 7/13/23 by Barrera joel, will route to this provider/care team for further review.    Lenore Garcia RN  [facility]

## 2023-08-02 RX ORDER — LEVOTHYROXINE SODIUM 88 UG/1
88 TABLET ORAL DAILY
Qty: 30 TABLET | Refills: 0 | OUTPATIENT
Start: 2023-08-02

## 2023-08-09 ENCOUNTER — VIRTUAL VISIT (OUTPATIENT)
Dept: FAMILY MEDICINE | Facility: CLINIC | Age: 28
End: 2023-08-09
Payer: COMMERCIAL

## 2023-08-09 DIAGNOSIS — K21.9 GASTROESOPHAGEAL REFLUX DISEASE, UNSPECIFIED WHETHER ESOPHAGITIS PRESENT: Primary | ICD-10-CM

## 2023-08-09 DIAGNOSIS — E03.9 HYPOTHYROIDISM, UNSPECIFIED TYPE: ICD-10-CM

## 2023-08-09 DIAGNOSIS — R63.5 WEIGHT GAIN: ICD-10-CM

## 2023-08-09 PROCEDURE — 99214 OFFICE O/P EST MOD 30 MIN: CPT | Mod: VID | Performed by: FAMILY MEDICINE

## 2023-08-09 RX ORDER — FAMOTIDINE 20 MG/1
20 TABLET, FILM COATED ORAL DAILY PRN
Qty: 30 TABLET | Refills: 11 | Status: SHIPPED | OUTPATIENT
Start: 2023-08-09

## 2023-08-09 RX ORDER — ONDANSETRON 4 MG/1
4 TABLET, FILM COATED ORAL EVERY 8 HOURS PRN
Qty: 30 TABLET | Refills: 1 | Status: SHIPPED | OUTPATIENT
Start: 2023-08-09 | End: 2024-03-11

## 2023-08-09 NOTE — PROGRESS NOTES
Zacarias is a 28 year old who is being evaluated via a billable video visit.      How would you like to obtain your AVS? MyChart  If the video visit is dropped, the invitation should be resent by: Text to cell phone: [phone]  Will anyone else be joining your video visit? No          Assessment & Plan     Gastroesophageal reflux disease, unspecified whether esophagitis present  - famotidine (PEPCID) 20 MG tablet; Take 1 tablet (20 mg) by mouth daily as needed (acid reflux)    Weight gain  - semaglutide (OZEMPIC) 2 MG/3ML pen; Inject 0.25 mg Subcutaneous every 7 days  - Adult Comprehensive Weight Management  Referral; Future  - ondansetron (ZOFRAN) 4 MG tablet; Take 1 tablet (4 mg) by mouth every 8 hours as needed for nausea    Hypothyroidism, unspecified type  - semaglutide (OZEMPIC) 2 MG/3ML pen; Inject 0.25 mg Subcutaneous every 7 days     [facility]    Subjective   Zacarias is a 28 year old, presenting for the following health issues:  weight management      HPI     Weight management   Trying to lose weight for a while  Has done lots of labs and they are all pretty normal   Saxenda in the past   Made her really nauseated   Stopped sexenda and it got better   Then restarted and started vomiting again   Was on it for about 6 weeks     Has worked on diet, exercise     No binge eating disorder         Objective           Vitals:  No vitals were obtained today due to virtual visit.    Physical Exam   GENERAL: Healthy, alert and no distress  EYES: Eyes grossly normal to inspection.  No discharge or erythema, or obvious scleral/conjunctival abnormalities.  RESP: No audible wheeze, cough, or visible cyanosis.  No visible retractions or increased work of breathing.    SKIN: Visible skin clear. No significant rash, abnormal pigmentation or lesions.  NEURO: Cranial nerves grossly intact.  Mentation and speech appropriate for age.  PSYCH: Mentation appears normal, affect  normal/bright, judgement and insight intact, normal speech and appearance well-groomed.     Video-Visit Details    Type of service:  Video Visit   Video Start Time: 5:52 PM  Video End Time: 6:05 pm    Originating Location (pt. Location): Home    Distant Location (provider location):  On-site  Platform used for Video Visit: Florecita

## 2023-08-21 ENCOUNTER — MYC MEDICAL ADVICE (OUTPATIENT)
Dept: FAMILY MEDICINE | Facility: CLINIC | Age: 28
End: 2023-08-21
Payer: COMMERCIAL

## 2023-08-21 DIAGNOSIS — R63.5 WEIGHT GAIN: ICD-10-CM

## 2023-08-23 ENCOUNTER — E-VISIT (OUTPATIENT)
Dept: FAMILY MEDICINE | Facility: CLINIC | Age: 28
End: 2023-08-23
Payer: COMMERCIAL

## 2023-08-23 ENCOUNTER — MYC MEDICAL ADVICE (OUTPATIENT)
Dept: FAMILY MEDICINE | Facility: CLINIC | Age: 28
End: 2023-08-23

## 2023-08-23 DIAGNOSIS — N80.9 ENDOMETRIOSIS: ICD-10-CM

## 2023-08-23 DIAGNOSIS — Z30.011 OCP (ORAL CONTRACEPTIVE PILLS) INITIATION: Primary | ICD-10-CM

## 2023-08-23 PROCEDURE — 99207 PR NO CHARGE LOS: CPT | Performed by: NURSE PRACTITIONER

## 2023-08-23 NOTE — PATIENT INSTRUCTIONS
Thank you for choosing us for your care. I have placed an order for a prescription so that you can start treatment. View your full visit summary for details by clicking on the link below. Your pharmacist will able to address any questions you may have about the medication.     If you're not feeling better within 5-7 days, please schedule an appointment.  You can schedule an appointment right here in [facility], or call [phone]  If the visit is for the same symptoms as your eVisit, we'll refund the cost of your eVisit if seen within seven days.

## 2023-09-07 ENCOUNTER — MYC MEDICAL ADVICE (OUTPATIENT)
Dept: FAMILY MEDICINE | Facility: CLINIC | Age: 28
End: 2023-09-07
Payer: COMMERCIAL

## 2023-09-11 ENCOUNTER — OFFICE VISIT (OUTPATIENT)
Dept: FAMILY MEDICINE | Facility: CLINIC | Age: 28
End: 2023-09-11
Payer: COMMERCIAL

## 2023-09-11 ENCOUNTER — ANCILLARY PROCEDURE (OUTPATIENT)
Dept: GENERAL RADIOLOGY | Facility: CLINIC | Age: 28
End: 2023-09-11
Attending: FAMILY MEDICINE
Payer: COMMERCIAL

## 2023-09-11 VITALS
TEMPERATURE: 97.4 F | OXYGEN SATURATION: 99 % | SYSTOLIC BLOOD PRESSURE: 122 MMHG | BODY MASS INDEX: 37.45 KG/M2 | HEIGHT: 67 IN | WEIGHT: 238.6 LBS | DIASTOLIC BLOOD PRESSURE: 70 MMHG | RESPIRATION RATE: 20 BRPM | HEART RATE: 98 BPM

## 2023-09-11 DIAGNOSIS — M79.645 PAIN OF FINGER OF LEFT HAND: ICD-10-CM

## 2023-09-11 DIAGNOSIS — M79.645 PAIN OF FINGER OF LEFT HAND: Primary | ICD-10-CM

## 2023-09-11 PROCEDURE — 73140 X-RAY EXAM OF FINGER(S): CPT | Mod: TC | Performed by: RADIOLOGY

## 2023-09-11 PROCEDURE — 99214 OFFICE O/P EST MOD 30 MIN: CPT | Performed by: FAMILY MEDICINE

## 2023-09-11 RX ORDER — NAPROXEN 500 MG/1
500 TABLET ORAL 2 TIMES DAILY PRN
Qty: 30 TABLET | Refills: 0 | Status: SHIPPED | OUTPATIENT
Start: 2023-09-11

## 2023-09-11 ASSESSMENT — PAIN SCALES - GENERAL: PAINLEVEL: EXTREME PAIN (8)

## 2023-09-11 NOTE — PROGRESS NOTES
Assessment & Plan       Pain of finger of left hand  - xray per my read showed no fracture; official read pending   - XR Finger Left G/E 2 Views; Future  - naproxen (NAPROSYN) 500 MG tablet; Take 1 tablet (500 mg) by mouth 2 times daily as needed for moderate pain  Dispense: 30 tablet; Refill: 0        Patient Instructions   Stop over the counter Advil, Aleve or Aspirin  Ok to take tylenol   Naproxen 500 mg twice daily as needed for pain, please take with food  Ice two to three times daily for 15 minutes  Using finger splint and taking off when taking a shower   Finger pain should improve within 1-2 weeks  Follow if symptoms worsen or fail to improve.     Delilah Viera MD  [facility]    Alejandro Adames is a 28 year old, presenting for the following health issues:  Pain (Middle finger left hand hurting for a week)      9/11/2023     8:43 AM   Additional Questions   Roomed by Lizet   Accompanied by alone         9/11/2023     8:43 AM   Patient Reported Additional Medications   Patient reports taking the following new medications none       Pain    History of Present Illness       Reason for visit:  Left middle finger pain  Symptom onset:  3-7 days ago  Symptoms include:  Pain,hard to bend or touch  Symptom intensity:  Severe  Symptom progression:  Worsening  Had these symptoms before:  No  What makes it worse:  No  What makes it better:  No    She eats 2-3 servings of fruits and vegetables daily.She consumes 0 sweetened beverage(s) daily.She exercises with enough effort to increase her heart rate 20 to 29 minutes per day.  She exercises with enough effort to increase her heart rate 3 or less days per week.   She is taking medications regularly.         She bent middle finger and it cracked, this occurred within the last week. She had decreased ROM and unable to bend completely. Her knuckle is super swollen and really sensitive to the touch. Pain and swelling have gotten worse.  "No redness. She does note bluish around the knuckle. She has tried tylenol/ibuprofen which hasn't helped. She is right handed.       Review of Systems         Objective    /70 (BP Location: Right arm, Patient Position: Sitting, Cuff Size: Adult Regular)   Pulse 98   Temp 97.4  F (36.3  C) (Temporal)   Resp 20   Ht 1.69 m (5' 6.54\")   Wt 108.2 kg (238 lb 9.6 oz)   LMP  (LMP Unknown)   SpO2 99%   BMI 37.89 kg/m    Body mass index is 37.89 kg/m .  Physical Exam                       Answers submitted by the patient for this visit:  General Questionnaire (Submitted on 9/11/2023)  Chief Complaint: Chronic problems general questions HPI Form  How many servings of fruits and vegetables do you eat daily?: 2-3  On average, how many sweetened beverages do you drink each day (Examples: soda, juice, sweet tea, etc.  Do NOT count diet or artificially sweetened beverages)?: 0  How many minutes a day do you exercise enough to make your heart beat faster?: 20 to 29  How many days a week do you exercise enough to make your heart beat faster?: 3 or less  How many days per week do you miss taking your medication?: 0  General Concern (Submitted on 9/11/2023)  Chief Complaint: Chronic problems general questions HPI Form  What is the reason for your visit today?: left middle finger pain  When did your symptoms begin?: 3-7 days ago  What are your symptoms?: pain,hard to bend or touch  How would you describe these symptoms?: Severe  Are your symptoms:: Worsening  Have you had these symptoms before?: No  Is there anything that makes you feel worse?: no  Is there anything that makes you feel better?: no    "

## 2023-09-11 NOTE — PATIENT INSTRUCTIONS
Stop over the counter Advil, Aleve or Aspirin  Ok to take tylenol   Naproxen 500 mg twice daily as needed for pain, please take with food  Ice two to three times daily for 15 minutes  Using finger splint and taking off when taking a shower   Finger pain should improve within 1-2 weeks  Follow if symptoms worsen or fail to improve.

## 2023-09-26 DIAGNOSIS — R00.2 PALPITATIONS: Primary | ICD-10-CM

## 2023-09-26 DIAGNOSIS — R00.0 SINUS TACHYCARDIA: ICD-10-CM

## 2023-10-02 ENCOUNTER — MYC REFILL (OUTPATIENT)
Dept: FAMILY MEDICINE | Facility: CLINIC | Age: 28
End: 2023-10-02
Payer: COMMERCIAL

## 2023-10-02 DIAGNOSIS — J45.20 MILD INTERMITTENT ASTHMA WITHOUT COMPLICATION: ICD-10-CM

## 2023-10-02 RX ORDER — ALBUTEROL SULFATE 90 UG/1
2 AEROSOL, METERED RESPIRATORY (INHALATION) EVERY 6 HOURS PRN
Qty: 54 G | Refills: 0 | Status: CANCELLED | OUTPATIENT
Start: 2023-10-02

## 2023-11-06 ENCOUNTER — MYC REFILL (OUTPATIENT)
Dept: FAMILY MEDICINE | Facility: CLINIC | Age: 28
End: 2023-11-06
Payer: COMMERCIAL

## 2023-11-06 DIAGNOSIS — K58.1 IRRITABLE BOWEL SYNDROME WITH CONSTIPATION: ICD-10-CM

## 2023-11-06 RX ORDER — POLYETHYLENE GLYCOL 3350 17 G/17G
17 POWDER, FOR SOLUTION ORAL DAILY
Qty: 578 G | Refills: 0 | Status: SHIPPED | OUTPATIENT
Start: 2023-11-06

## 2023-11-21 DIAGNOSIS — Z30.011 OCP (ORAL CONTRACEPTIVE PILLS) INITIATION: ICD-10-CM

## 2023-11-21 DIAGNOSIS — N80.9 ENDOMETRIOSIS: ICD-10-CM

## 2023-11-21 RX ORDER — NORGESTREL AND ETHINYL ESTRADIOL 0.3-0.03MG
KIT ORAL
Qty: 84 TABLET | Refills: 0 | Status: SHIPPED | OUTPATIENT
Start: 2023-11-21 | End: 2023-12-14

## 2023-12-12 ENCOUNTER — OFFICE VISIT (OUTPATIENT)
Dept: FAMILY MEDICINE | Facility: CLINIC | Age: 28
End: 2023-12-12
Payer: COMMERCIAL

## 2023-12-12 VITALS
HEIGHT: 67 IN | RESPIRATION RATE: 19 BRPM | SYSTOLIC BLOOD PRESSURE: 115 MMHG | WEIGHT: 235 LBS | BODY MASS INDEX: 36.88 KG/M2 | OXYGEN SATURATION: 96 % | DIASTOLIC BLOOD PRESSURE: 72 MMHG | TEMPERATURE: 98.7 F | HEART RATE: 81 BPM

## 2023-12-12 DIAGNOSIS — R42 DIZZINESS: ICD-10-CM

## 2023-12-12 DIAGNOSIS — H93.8X2 SENSATION OF FULLNESS IN LEFT EAR: Primary | ICD-10-CM

## 2023-12-12 LAB
BASOPHILS # BLD AUTO: 0 10E3/UL (ref 0–0.2)
BASOPHILS NFR BLD AUTO: 0 %
EOSINOPHIL # BLD AUTO: 0.3 10E3/UL (ref 0–0.7)
EOSINOPHIL NFR BLD AUTO: 3 %
ERYTHROCYTE [DISTWIDTH] IN BLOOD BY AUTOMATED COUNT: 13.5 % (ref 10–15)
HBA1C MFR BLD: 5.6 % (ref 0–5.6)
HCT VFR BLD AUTO: 40.9 % (ref 35–47)
HGB BLD-MCNC: 13.5 G/DL (ref 11.7–15.7)
IMM GRANULOCYTES # BLD: 0.1 10E3/UL
IMM GRANULOCYTES NFR BLD: 1 %
LYMPHOCYTES # BLD AUTO: 4.4 10E3/UL (ref 0.8–5.3)
LYMPHOCYTES NFR BLD AUTO: 39 %
MCH RBC QN AUTO: 28 PG (ref 26.5–33)
MCHC RBC AUTO-ENTMCNC: 33 G/DL (ref 31.5–36.5)
MCV RBC AUTO: 85 FL (ref 78–100)
MONOCYTES # BLD AUTO: 0.6 10E3/UL (ref 0–1.3)
MONOCYTES NFR BLD AUTO: 5 %
NEUTROPHILS # BLD AUTO: 5.7 10E3/UL (ref 1.6–8.3)
NEUTROPHILS NFR BLD AUTO: 52 %
PLATELET # BLD AUTO: 411 10E3/UL (ref 150–450)
RBC # BLD AUTO: 4.82 10E6/UL (ref 3.8–5.2)
WBC # BLD AUTO: 11.1 10E3/UL (ref 4–11)

## 2023-12-12 PROCEDURE — 83036 HEMOGLOBIN GLYCOSYLATED A1C: CPT | Performed by: PHYSICIAN ASSISTANT

## 2023-12-12 PROCEDURE — 99214 OFFICE O/P EST MOD 30 MIN: CPT | Performed by: PHYSICIAN ASSISTANT

## 2023-12-12 PROCEDURE — 36415 COLL VENOUS BLD VENIPUNCTURE: CPT | Performed by: PHYSICIAN ASSISTANT

## 2023-12-12 PROCEDURE — 85025 COMPLETE CBC W/AUTO DIFF WBC: CPT | Performed by: PHYSICIAN ASSISTANT

## 2023-12-12 PROCEDURE — 80053 COMPREHEN METABOLIC PANEL: CPT | Performed by: PHYSICIAN ASSISTANT

## 2023-12-12 RX ORDER — PROPRANOLOL HYDROCHLORIDE 120 MG/1
120 CAPSULE, EXTENDED RELEASE ORAL DAILY
COMMUNITY
Start: 2023-12-08

## 2023-12-12 NOTE — PROGRESS NOTES
"  Assessment & Plan     Sensation of fullness in left ear  Suspect ETD.  Discussed symptomatic treatment and follow up as needed.  Labs done to r/o other causes of dizziness  - CBC with platelets and differential; Future  - Comprehensive metabolic panel (BMP + Alb, Alk Phos, ALT, AST, Total. Bili, TP); Future  - Hemoglobin A1c; Future  - CBC with platelets and differential  - Comprehensive metabolic panel (BMP + Alb, Alk Phos, ALT, AST, Total. Bili, TP)  - Hemoglobin A1c    Dizziness  As above  - CBC with platelets and differential; Future  - Comprehensive metabolic panel (BMP + Alb, Alk Phos, ALT, AST, Total. Bili, TP); Future  - Hemoglobin A1c; Future  - CBC with platelets and differential  - Comprehensive metabolic panel (BMP + Alb, Alk Phos, ALT, AST, Total. Bili, TP)  - Hemoglobin A1c                 Deanna Mcgrath PA-C  [facility] MELANIE Adames is a 28 year old, presenting for the following health issues:  Pain (ear)        12/12/2023     4:32 PM   Additional Questions   Roomed by Sarah   Accompanied by self       History of Present Illness       Reason for visit:  Left ear feels plugged with sligt pain  Symptom onset:  1-3 days ago    She eats 2-3 servings of fruits and vegetables daily.She consumes 0 sweetened beverage(s) daily.She exercises with enough effort to increase her heart rate 20 to 29 minutes per day.  She exercises with enough effort to increase her heart rate 3 or less days per week.   She is taking medications regularly.   Woke up yesterday with it feeling clogged.  Now feeling dizzy.    Meclizine didn't help.  Has hx of vertigo.    No recurrent ear problems.    No ringing in ears  Also has noticed lately she feels dizzy with hungry.  No recent travel or swimming               Review of Systems   As above      Objective    /72   Pulse 81   Temp 98.7  F (37.1  C) (Oral)   Resp 19   Ht 1.689 m (5' 6.5\")   Wt 106.6 kg (235 lb)   LMP  (LMP Unknown) "   SpO2 96%   BMI 37.36 kg/m    Body mass index is 37.36 kg/m .  Physical Exam  Constitutional:       General: She is not in acute distress.     Appearance: She is obese.   HENT:      Right Ear: Tympanic membrane, ear canal and external ear normal.      Left Ear: Tympanic membrane, ear canal and external ear normal.      Mouth/Throat:      Mouth: Mucous membranes are moist.      Pharynx: No oropharyngeal exudate or posterior oropharyngeal erythema.   Eyes:      Extraocular Movements: Extraocular movements intact.      Pupils: Pupils are equal, round, and reactive to light.   Neck:      Vascular: No carotid bruit.   Cardiovascular:      Rate and Rhythm: Normal rate and regular rhythm.   Pulmonary:      Effort: Pulmonary effort is normal.      Breath sounds: Normal breath sounds.   Lymphadenopathy:      Cervical: No cervical adenopathy.   Neurological:      Mental Status: She is alert.      Motor: No weakness.      Coordination: Coordination is intact.      Deep Tendon Reflexes:      Reflex Scores:       Patellar reflexes are 1+ on the right side and 1+ on the left side.

## 2023-12-13 ENCOUNTER — MYC REFILL (OUTPATIENT)
Dept: FAMILY MEDICINE | Facility: CLINIC | Age: 28
End: 2023-12-13
Payer: COMMERCIAL

## 2023-12-13 DIAGNOSIS — Z30.011 OCP (ORAL CONTRACEPTIVE PILLS) INITIATION: ICD-10-CM

## 2023-12-13 DIAGNOSIS — N80.9 ENDOMETRIOSIS: ICD-10-CM

## 2023-12-13 LAB
ALBUMIN SERPL BCG-MCNC: 4.6 G/DL (ref 3.5–5.2)
ALP SERPL-CCNC: 79 U/L (ref 40–150)
ALT SERPL W P-5'-P-CCNC: 22 U/L (ref 0–50)
ANION GAP SERPL CALCULATED.3IONS-SCNC: 14 MMOL/L (ref 7–15)
AST SERPL W P-5'-P-CCNC: 25 U/L (ref 0–45)
BILIRUB SERPL-MCNC: 0.2 MG/DL
BUN SERPL-MCNC: 12.1 MG/DL (ref 6–20)
CALCIUM SERPL-MCNC: 9.8 MG/DL (ref 8.6–10)
CHLORIDE SERPL-SCNC: 100 MMOL/L (ref 98–107)
CREAT SERPL-MCNC: 0.8 MG/DL (ref 0.51–0.95)
DEPRECATED HCO3 PLAS-SCNC: 24 MMOL/L (ref 22–29)
EGFRCR SERPLBLD CKD-EPI 2021: >90 ML/MIN/1.73M2
GLUCOSE SERPL-MCNC: 90 MG/DL (ref 70–99)
POTASSIUM SERPL-SCNC: 4.5 MMOL/L (ref 3.4–5.3)
PROT SERPL-MCNC: 7.4 G/DL (ref 6.4–8.3)
SODIUM SERPL-SCNC: 138 MMOL/L (ref 135–145)

## 2023-12-14 RX ORDER — NORGESTREL AND ETHINYL ESTRADIOL 0.3-0.03MG
KIT ORAL
Qty: 84 TABLET | Refills: 0 | OUTPATIENT
Start: 2023-12-14

## 2023-12-14 RX ORDER — NORGESTREL AND ETHINYL ESTRADIOL 0.3-0.03MG
KIT ORAL
Qty: 84 TABLET | Refills: 1 | Status: SHIPPED | OUTPATIENT
Start: 2023-12-14

## 2023-12-14 NOTE — TELEPHONE ENCOUNTER
Overdue for needed care. Please call to schedule in clinic appointment due for physical exam. Once appt is scheduled, route back to the pool.     Julie Behrendt RN
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
## 2024-02-08 ENCOUNTER — TELEPHONE (OUTPATIENT)
Dept: FAMILY MEDICINE | Facility: CLINIC | Age: 29
End: 2024-02-08
Payer: COMMERCIAL

## 2024-02-08 NOTE — TELEPHONE ENCOUNTER
Patient Quality Outreach    Patient is due for the following:   Cervical Cancer Screening - PAP Needed  Physical Preventive Adult Physical    Next Steps:   Schedule a Adult Preventative    Type of outreach:    Sent [facility] message.      Questions for provider review:    None           Misael Leonard, CMA

## 2024-02-27 ENCOUNTER — OFFICE VISIT (OUTPATIENT)
Dept: FAMILY MEDICINE | Facility: CLINIC | Age: 29
End: 2024-02-27
Payer: OTHER MISCELLANEOUS

## 2024-02-27 VITALS
HEIGHT: 66 IN | HEART RATE: 98 BPM | WEIGHT: 223.8 LBS | TEMPERATURE: 98.6 F | BODY MASS INDEX: 35.97 KG/M2 | SYSTOLIC BLOOD PRESSURE: 118 MMHG | DIASTOLIC BLOOD PRESSURE: 62 MMHG | OXYGEN SATURATION: 99 % | RESPIRATION RATE: 18 BRPM

## 2024-02-27 DIAGNOSIS — S06.9X0D MILD TRAUMATIC BRAIN INJURY, WITHOUT LOSS OF CONSCIOUSNESS, SUBSEQUENT ENCOUNTER: ICD-10-CM

## 2024-02-27 DIAGNOSIS — E66.812 CLASS 2 SEVERE OBESITY DUE TO EXCESS CALORIES WITH SERIOUS COMORBIDITY AND BODY MASS INDEX (BMI) OF 35.0 TO 35.9 IN ADULT (H): ICD-10-CM

## 2024-02-27 DIAGNOSIS — E66.01 CLASS 2 SEVERE OBESITY DUE TO EXCESS CALORIES WITH SERIOUS COMORBIDITY AND BODY MASS INDEX (BMI) OF 35.0 TO 35.9 IN ADULT (H): ICD-10-CM
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
## 2024-02-27 DIAGNOSIS — M25.532 LEFT WRIST PAIN: ICD-10-CM

## 2024-02-27 DIAGNOSIS — T30.0 BURN: ICD-10-CM

## 2024-02-27 PROBLEM — R00.0 SINUS TACHYCARDIA: Status: ACTIVE | Noted: 2024-02-27

## 2024-02-27 PROCEDURE — 99214 OFFICE O/P EST MOD 30 MIN: CPT | Performed by: PHYSICIAN ASSISTANT

## 2024-02-27 RX ORDER — GINSENG 100 MG
CAPSULE ORAL
COMMUNITY
Start: 2024-02-26 | End: 2024-03-04

## 2024-02-27 RX ORDER — OXYCODONE AND ACETAMINOPHEN 5; 325 MG/1; MG/1
1 TABLET ORAL EVERY 6 HOURS PRN
COMMUNITY
Start: 2024-02-26 | End: 2024-03-11

## 2024-02-27 ASSESSMENT — ASTHMA QUESTIONNAIRES
ACT_TOTALSCORE: 20
QUESTION_2 LAST FOUR WEEKS HOW OFTEN HAVE YOU HAD SHORTNESS OF BREATH: ONCE OR TWICE A WEEK
QUESTION_4 LAST FOUR WEEKS HOW OFTEN HAVE YOU USED YOUR RESCUE INHALER OR NEBULIZER MEDICATION (SUCH AS ALBUTEROL): TWO OR THREE TIMES PER WEEK
ACT_TOTALSCORE: 20
QUESTION_3 LAST FOUR WEEKS HOW OFTEN DID YOUR ASTHMA SYMPTOMS (WHEEZING, COUGHING, SHORTNESS OF BREATH, CHEST TIGHTNESS OR PAIN) WAKE YOU UP AT NIGHT OR EARLIER THAN USUAL IN THE MORNING: NOT AT ALL
QUESTION_5 LAST FOUR WEEKS HOW WOULD YOU RATE YOUR ASTHMA CONTROL: WELL CONTROLLED
QUESTION_1 LAST FOUR WEEKS HOW MUCH OF THE TIME DID YOUR ASTHMA KEEP YOU FROM GETTING AS MUCH DONE AT WORK, SCHOOL OR AT HOME: A LITTLE OF THE TIME

## 2024-02-27 ASSESSMENT — PATIENT HEALTH QUESTIONNAIRE - PHQ9
10. IF YOU CHECKED OFF ANY PROBLEMS, HOW DIFFICULT HAVE THESE PROBLEMS MADE IT FOR YOU TO DO YOUR WORK, TAKE CARE OF THINGS AT HOME, OR GET ALONG WITH OTHER PEOPLE: NOT DIFFICULT AT ALL
SUM OF ALL RESPONSES TO PHQ QUESTIONS 1-9: 2
SUM OF ALL RESPONSES TO PHQ QUESTIONS 1-9: 2

## 2024-02-27 ASSESSMENT — PAIN SCALES - GENERAL: PAINLEVEL: EXTREME PAIN (8)
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
## 2024-03-01 ENCOUNTER — MYC REFILL (OUTPATIENT)
Dept: FAMILY MEDICINE | Facility: CLINIC | Age: 29
End: 2024-03-01
Payer: COMMERCIAL

## 2024-03-01 ENCOUNTER — TELEPHONE (OUTPATIENT)
Dept: FAMILY MEDICINE | Facility: CLINIC | Age: 29
End: 2024-03-01
Payer: COMMERCIAL
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
## 2024-03-04 RX ORDER — OXYCODONE AND ACETAMINOPHEN 5; 325 MG/1; MG/1
1 TABLET ORAL EVERY 6 HOURS PRN
OUTPATIENT
Start: 2024-03-04

## 2024-03-04 NOTE — TELEPHONE ENCOUNTER
Attempt #1 to call patient.     RN left voicemail and requested return call to [facility] at [phone].     Kathy Clark RN, BSN  [facility]: [place]

## 2024-03-04 NOTE — TELEPHONE ENCOUNTER
Pt returned call to clinic      Gave message from provider regarding pain Rx.  Pt was ok with this      Liz RN    Triage Nurse  [facility]

## 2024-03-11 ENCOUNTER — OFFICE VISIT (OUTPATIENT)
Dept: FAMILY MEDICINE | Facility: CLINIC | Age: 29
End: 2024-03-11
Payer: OTHER MISCELLANEOUS

## 2024-03-11 VITALS
TEMPERATURE: 97.9 F | HEIGHT: 66 IN | SYSTOLIC BLOOD PRESSURE: 122 MMHG | HEART RATE: 97 BPM | RESPIRATION RATE: 18 BRPM | OXYGEN SATURATION: 100 % | DIASTOLIC BLOOD PRESSURE: 86 MMHG | WEIGHT: 218.4 LBS | BODY MASS INDEX: 35.1 KG/M2

## 2024-03-11 DIAGNOSIS — H81.10 BENIGN PAROXYSMAL POSITIONAL VERTIGO, UNSPECIFIED LATERALITY: ICD-10-CM

## 2024-03-11 DIAGNOSIS — S06.0X0D CONCUSSION WITHOUT LOSS OF CONSCIOUSNESS, SUBSEQUENT ENCOUNTER: Primary | ICD-10-CM

## 2024-03-11 DIAGNOSIS — G43.109 MIGRAINE AURA WITHOUT HEADACHE (MIGRAINE EQUIVALENTS): ICD-10-CM

## 2024-03-11 PROCEDURE — 99213 OFFICE O/P EST LOW 20 MIN: CPT | Performed by: PHYSICIAN ASSISTANT

## 2024-03-11 RX ORDER — MECLIZINE HYDROCHLORIDE 25 MG/1
25 TABLET ORAL 3 TIMES DAILY PRN
Qty: 20 TABLET | Refills: 0 | Status: SHIPPED | OUTPATIENT
Start: 2024-03-11

## 2024-03-11 RX ORDER — ONDANSETRON 4 MG/1
4 TABLET, FILM COATED ORAL EVERY 8 HOURS PRN
Qty: 30 TABLET | Refills: 1 | Status: SHIPPED | OUTPATIENT
Start: 2024-03-11

## 2024-03-11 ASSESSMENT — PAIN SCALES - GENERAL: PAINLEVEL: EXTREME PAIN (9)
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
## 2024-03-13 ENCOUNTER — VIRTUAL VISIT (OUTPATIENT)
Dept: FAMILY MEDICINE | Facility: CLINIC | Age: 29
End: 2024-03-13
Payer: COMMERCIAL

## 2024-03-13 ENCOUNTER — TELEPHONE (OUTPATIENT)
Dept: FAMILY MEDICINE | Facility: CLINIC | Age: 29
End: 2024-03-13

## 2024-03-13 DIAGNOSIS — G44.309 POST-CONCUSSION HEADACHE: ICD-10-CM

## 2024-03-13 DIAGNOSIS — S06.0X0D CONCUSSION WITHOUT LOSS OF CONSCIOUSNESS, SUBSEQUENT ENCOUNTER: Primary | ICD-10-CM

## 2024-03-13 DIAGNOSIS — R11.0 NAUSEA: ICD-10-CM

## 2024-03-13 PROCEDURE — 99213 OFFICE O/P EST LOW 20 MIN: CPT | Mod: 95 | Performed by: PHYSICIAN ASSISTANT

## 2024-03-13 RX ORDER — LAMOTRIGINE 200 MG/1
1 TABLET ORAL
COMMUNITY
Start: 2024-01-12

## 2024-03-13 NOTE — TELEPHONE ENCOUNTER
Forms received from [facility] HR Team/ Medical Provider Certification for ARCHANA Crocker.  Forms placed in provider 'sign me' folder.  Please fax forms to [phone] after completion.    ADOLFO Barbosa  [facility]
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
## 2024-03-14 ENCOUNTER — ANCILLARY PROCEDURE (OUTPATIENT)
Dept: CT IMAGING | Facility: CLINIC | Age: 29
End: 2024-03-14
Attending: PHYSICIAN ASSISTANT

## 2024-03-14 ENCOUNTER — MYC MEDICAL ADVICE (OUTPATIENT)
Dept: FAMILY MEDICINE | Facility: CLINIC | Age: 29
End: 2024-03-14
Payer: COMMERCIAL

## 2024-03-14 DIAGNOSIS — S06.0X0D CONCUSSION WITHOUT LOSS OF CONSCIOUSNESS, SUBSEQUENT ENCOUNTER: ICD-10-CM

## 2024-03-14 DIAGNOSIS — G43.109 MIGRAINE AURA WITHOUT HEADACHE (MIGRAINE EQUIVALENTS): ICD-10-CM

## 2024-03-14 PROCEDURE — 70450 CT HEAD/BRAIN W/O DYE: CPT | Mod: TC | Performed by: RADIOLOGY

## 2024-03-14 NOTE — TELEPHONE ENCOUNTER
Yes, I can add that to the document.   Could someone please print the FMLA form I wrote and placed in my inbox for me to update? Then I can have it fax out again.      Thank you,  Sunita Smith PA-C

## 2024-03-14 NOTE — TELEPHONE ENCOUNTER
Form in media is not a good copy to print    Printed a new form placed in providers sign me folder       Annabella Busch    [facility]

## 2024-03-14 NOTE — TELEPHONE ENCOUNTER
Routing [employer] message to Sunita    Looks like patient had a virtual visit on 3/13     Kathy Clark RN

## 2024-03-15 NOTE — TELEPHONE ENCOUNTER
"Patient calling.  She is expected to go back to work on Monday 3/18/2024 and needs her work letter/FMLA updated before the weekend.    She had a virtual visit with VAIBHAV Crocker on 3/13/2024 and provider filled out FMLA paperwork with patient during that visit.   Per patient, provider recommended reducing her work duties to \"half\" of her caseload per day.  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

## 2024-03-15 NOTE — TELEPHONE ENCOUNTER
Yes, I understand we had a virtual visit going over all this. At that time patient did not request specific number. Now per this encounter she needs more detail. I am okay writing more details, she doesn't need another visit for that.  However, Unfortunately, I am not in the office until Monday. I can fill it out then.       Thank you,  Sunita Smith PA-C

## 2024-03-15 NOTE — TELEPHONE ENCOUNTER
RN replied to patient via [employer]. See message for details.     Harman Roberson RN, BSN, PHN  [facility]: [place]

## 2024-03-20 NOTE — TELEPHONE ENCOUNTER
Form completed and faxed     Copy made for chart     Tc called to make aware         Annabella Busch    [facility]

## 2024-03-20 NOTE — TELEPHONE ENCOUNTER
Form completed and faxed     TC called pt to make aware       Annabella Busch    [facility]

## 2024-04-02 ENCOUNTER — TELEPHONE (OUTPATIENT)
Dept: FAMILY MEDICINE | Facility: CLINIC | Age: 29
End: 2024-04-02
Payer: COMMERCIAL

## 2024-04-02 NOTE — TELEPHONE ENCOUNTER
Forms received from [facility]/ [employer] for duty certification for ARCHANA Crocker.  Forms placed in provider 'sign me' folder.  Please fax forms to [phone] after completion.    ADOLFO Barbosa  [facility]

## 2024-04-03 ENCOUNTER — TELEPHONE (OUTPATIENT)
Dept: FAMILY MEDICINE | Facility: CLINIC | Age: 29
End: 2024-04-03
Payer: COMMERCIAL

## 2024-04-03 NOTE — TELEPHONE ENCOUNTER
[facility]     Please fax once completed to [phone]    Placed forms in providers sign me folder to be completed         Annabella Busch    [facility]

## 2024-04-17 NOTE — TELEPHONE ENCOUNTER
Tyson Johnson is calling to check on the status of this form.  Please check with provider and call Tyson back.    ADOLFO Barbosa  [facility]

## 2024-04-18 NOTE — TELEPHONE ENCOUNTER
Called Tyson and left a detailed voicemail message that form has been completed and I will be faxing the form today.    ADOLFO Barbosa  [facility]

## 2024-04-18 NOTE — TELEPHONE ENCOUNTER
Faxed form to [place] ATTN; Tyson [facility] at fax [phone].    Placed in stat scanning.    ADOLFO Barbosa  [facility]

## 2024-04-25 NOTE — TELEPHONE ENCOUNTER
"Received form back from [facility].      Called [facility] to see why we received form back.  Sunita Smith had answered #9 on form as \"Yes\".  Because she answered #9 yes she will need to answer #10 as a \"yes or no\" and also put in a percent of permanent partial disability.  She can put 0% permanent partial disability if she thinks there is no permanent partial disability.    Huddled with Sunita Sarah and explained the above information.    Sunita amended the form and dated and initialed it.    I faxed the form back to [facility].    Placed in stat scanning.    Hellen Chi [facility]    "

## 2024-05-28 ENCOUNTER — TELEPHONE (OUTPATIENT)
Dept: FAMILY MEDICINE | Facility: CLINIC | Age: 29
End: 2024-05-28
Payer: COMMERCIAL

## 2024-05-28 NOTE — TELEPHONE ENCOUNTER
Patient Quality Outreach    Patient is due for the following:   Hypertension -  Hypertension follow-up visit  Cervical Cancer Screening - PAP Needed  Physical Preventive Adult Physical    Next Steps:   Schedule a Adult Preventative    Type of outreach:    Sent [employer] message.      Questions for provider review:    None           Arlet Zhang CMA  Chart routed to Care Team.

## 2024-06-16 ENCOUNTER — HEALTH MAINTENANCE LETTER (OUTPATIENT)
Age: 29
End: 2024-06-16

## 2024-10-02 ENCOUNTER — OFFICE VISIT (OUTPATIENT)
Dept: FAMILY MEDICINE | Facility: CLINIC | Age: 29
End: 2024-10-02
Payer: COMMERCIAL

## 2024-10-02 VITALS
HEIGHT: 66 IN | DIASTOLIC BLOOD PRESSURE: 77 MMHG | RESPIRATION RATE: 16 BRPM | TEMPERATURE: 98.4 F | HEART RATE: 79 BPM | SYSTOLIC BLOOD PRESSURE: 115 MMHG | BODY MASS INDEX: 27.72 KG/M2 | WEIGHT: 172.5 LBS | OXYGEN SATURATION: 100 %

## 2024-10-02 DIAGNOSIS — R63.4 ABNORMAL WEIGHT LOSS: ICD-10-CM

## 2024-10-02 DIAGNOSIS — M79.7 FIBROMYALGIA: ICD-10-CM

## 2024-10-02 DIAGNOSIS — G89.29 CHRONIC LOW BACK PAIN, UNSPECIFIED BACK PAIN LATERALITY, UNSPECIFIED WHETHER SCIATICA PRESENT: ICD-10-CM

## 2024-10-02 DIAGNOSIS — J30.89 SEASONAL ALLERGIC RHINITIS DUE TO OTHER ALLERGIC TRIGGER: ICD-10-CM

## 2024-10-02 DIAGNOSIS — R79.82 ELEVATED C-REACTIVE PROTEIN (CRP): ICD-10-CM

## 2024-10-02 DIAGNOSIS — G43.109 MIGRAINE AURA WITHOUT HEADACHE (MIGRAINE EQUIVALENTS): ICD-10-CM

## 2024-10-02 DIAGNOSIS — J45.40 MODERATE PERSISTENT ASTHMA WITHOUT COMPLICATION: ICD-10-CM

## 2024-10-02 DIAGNOSIS — M79.10 MYALGIA: ICD-10-CM

## 2024-10-02 DIAGNOSIS — E03.9 ACQUIRED HYPOTHYROIDISM: Primary | ICD-10-CM

## 2024-10-02 DIAGNOSIS — M54.50 CHRONIC LOW BACK PAIN, UNSPECIFIED BACK PAIN LATERALITY, UNSPECIFIED WHETHER SCIATICA PRESENT: ICD-10-CM

## 2024-10-02 LAB
BASOPHILS # BLD AUTO: 0 10E3/UL (ref 0–0.2)
BASOPHILS NFR BLD AUTO: 0 %
EOSINOPHIL # BLD AUTO: 0.2 10E3/UL (ref 0–0.7)
EOSINOPHIL NFR BLD AUTO: 2 %
ERYTHROCYTE [DISTWIDTH] IN BLOOD BY AUTOMATED COUNT: 13.1 % (ref 10–15)
HCT VFR BLD AUTO: 42.5 % (ref 35–47)
HGB BLD-MCNC: 14 G/DL (ref 11.7–15.7)
IMM GRANULOCYTES # BLD: 0 10E3/UL
IMM GRANULOCYTES NFR BLD: 0 %
LYMPHOCYTES # BLD AUTO: 3.8 10E3/UL (ref 0.8–5.3)
LYMPHOCYTES NFR BLD AUTO: 37 %
MCH RBC QN AUTO: 28.6 PG (ref 26.5–33)
MCHC RBC AUTO-ENTMCNC: 32.9 G/DL (ref 31.5–36.5)
MCV RBC AUTO: 87 FL (ref 78–100)
MONOCYTES # BLD AUTO: 0.4 10E3/UL (ref 0–1.3)
MONOCYTES NFR BLD AUTO: 4 %
NEUTROPHILS # BLD AUTO: 5.9 10E3/UL (ref 1.6–8.3)
NEUTROPHILS NFR BLD AUTO: 57 %
PLATELET # BLD AUTO: 349 10E3/UL (ref 150–450)
RBC # BLD AUTO: 4.9 10E6/UL (ref 3.8–5.2)
WBC # BLD AUTO: 10.3 10E3/UL (ref 4–11)

## 2024-10-02 PROCEDURE — 84439 ASSAY OF FREE THYROXINE: CPT | Performed by: FAMILY MEDICINE

## 2024-10-02 PROCEDURE — 99214 OFFICE O/P EST MOD 30 MIN: CPT | Performed by: FAMILY MEDICINE

## 2024-10-02 PROCEDURE — 84443 ASSAY THYROID STIM HORMONE: CPT | Performed by: FAMILY MEDICINE

## 2024-10-02 PROCEDURE — 86140 C-REACTIVE PROTEIN: CPT | Performed by: FAMILY MEDICINE

## 2024-10-02 PROCEDURE — 85025 COMPLETE CBC W/AUTO DIFF WBC: CPT | Mod: QW | Performed by: FAMILY MEDICINE

## 2024-10-02 PROCEDURE — 80053 COMPREHEN METABOLIC PANEL: CPT | Performed by: FAMILY MEDICINE

## 2024-10-02 PROCEDURE — 82306 VITAMIN D 25 HYDROXY: CPT | Performed by: FAMILY MEDICINE

## 2024-10-02 PROCEDURE — 86200 CCP ANTIBODY: CPT | Performed by: FAMILY MEDICINE

## 2024-10-02 PROCEDURE — 86431 RHEUMATOID FACTOR QUANT: CPT | Performed by: FAMILY MEDICINE

## 2024-10-02 PROCEDURE — 36415 COLL VENOUS BLD VENIPUNCTURE: CPT | Performed by: FAMILY MEDICINE

## 2024-10-02 RX ORDER — BUDESONIDE AND FORMOTEROL FUMARATE DIHYDRATE 80; 4.5 UG/1; UG/1
2 AEROSOL RESPIRATORY (INHALATION) 2 TIMES DAILY
Qty: 30.6 G | Refills: 3 | Status: SHIPPED | OUTPATIENT
Start: 2024-10-02

## 2024-10-02 RX ORDER — ALBUTEROL SULFATE 90 UG/1
2 INHALANT RESPIRATORY (INHALATION) EVERY 6 HOURS PRN
Qty: 54 G | Refills: 0 | Status: SHIPPED | OUTPATIENT
Start: 2024-10-02 | End: 2024-10-03

## 2024-10-02 ASSESSMENT — ASTHMA QUESTIONNAIRES
QUESTION_1 LAST FOUR WEEKS HOW MUCH OF THE TIME DID YOUR ASTHMA KEEP YOU FROM GETTING AS MUCH DONE AT WORK, SCHOOL OR AT HOME: NONE OF THE TIME
QUESTION_3 LAST FOUR WEEKS HOW OFTEN DID YOUR ASTHMA SYMPTOMS (WHEEZING, COUGHING, SHORTNESS OF BREATH, CHEST TIGHTNESS OR PAIN) WAKE YOU UP AT NIGHT OR EARLIER THAN USUAL IN THE MORNING: ONCE OR TWICE
ACT_TOTALSCORE: 21
ACT_TOTALSCORE: 21
QUESTION_5 LAST FOUR WEEKS HOW WOULD YOU RATE YOUR ASTHMA CONTROL: WELL CONTROLLED
QUESTION_2 LAST FOUR WEEKS HOW OFTEN HAVE YOU HAD SHORTNESS OF BREATH: ONCE OR TWICE A WEEK
QUESTION_4 LAST FOUR WEEKS HOW OFTEN HAVE YOU USED YOUR RESCUE INHALER OR NEBULIZER MEDICATION (SUCH AS ALBUTEROL): ONCE A WEEK OR LESS

## 2024-10-02 ASSESSMENT — PATIENT HEALTH QUESTIONNAIRE - PHQ9
SUM OF ALL RESPONSES TO PHQ QUESTIONS 1-9: 8
10. IF YOU CHECKED OFF ANY PROBLEMS, HOW DIFFICULT HAVE THESE PROBLEMS MADE IT FOR YOU TO DO YOUR WORK, TAKE CARE OF THINGS AT HOME, OR GET ALONG WITH OTHER PEOPLE: SOMEWHAT DIFFICULT
SUM OF ALL RESPONSES TO PHQ QUESTIONS 1-9: 8

## 2024-10-02 NOTE — PATIENT INSTRUCTIONS
It was a pleasure to see you today.  Below are a summary of my recommendations as discussed during your visit:  Will evaluate comprehensive labs to rule out other causes for weight loss.  Meanwhile taken nutritional supplementation at least twice a day.  Based on the lab results we may recommend additional imaging of the abdomen and pelvis, additional labs and treatment        Don't hesitate to contact us if you have any questions    Dr Arguello (Coy Jurado MD)
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
## 2024-10-02 NOTE — ASSESSMENT & PLAN NOTE
The patient attends the chiropractor for chronic back pain, she reports an x-ray indicating some DJD around the thoracic spine and some other issues in the lower spine.  I do not have reports from that, she indicates this was about a year ago.  There is a CT scan all within normal limits of the lumbar spine from 2016.
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
## 2024-10-02 NOTE — ASSESSMENT & PLAN NOTE
I suspect the discontinuation of the lamotrigine could have played a role, the patient is not far from the weight she had priorly, then on lamotrigine she increased her weight significantly over 245 pounds, after she discontinued this her weight has been coming down as well as her appetite.  Nonetheless the patient does not want to continue losing weight and she feels she is not getting enough nutrients.  We did do a full battery of lab tests as reported in orders.  I did encourage the patient to start nutritional supplementation.  She declined using any vitamins because it upsets her stomach.

## 2024-10-03 PROBLEM — R79.82 ELEVATED C-REACTIVE PROTEIN (CRP): Status: ACTIVE | Noted: 2024-10-03

## 2024-10-03 PROBLEM — M79.10 MYALGIA: Status: ACTIVE | Noted: 2024-10-03

## 2024-10-03 LAB
ALBUMIN SERPL BCG-MCNC: 4.6 G/DL (ref 3.5–5.2)
ALP SERPL-CCNC: 93 U/L (ref 40–150)
ALT SERPL W P-5'-P-CCNC: 21 U/L (ref 0–50)
ANION GAP SERPL CALCULATED.3IONS-SCNC: 13 MMOL/L (ref 7–15)
AST SERPL W P-5'-P-CCNC: 21 U/L (ref 0–45)
BILIRUB SERPL-MCNC: 0.3 MG/DL
BUN SERPL-MCNC: 9.1 MG/DL (ref 6–20)
CALCIUM SERPL-MCNC: 9.5 MG/DL (ref 8.8–10.4)
CHLORIDE SERPL-SCNC: 101 MMOL/L (ref 98–107)
CREAT SERPL-MCNC: 0.76 MG/DL (ref 0.51–0.95)
CRP SERPL-MCNC: 6.84 MG/L
EGFRCR SERPLBLD CKD-EPI 2021: >90 ML/MIN/1.73M2
GLUCOSE SERPL-MCNC: 85 MG/DL (ref 70–99)
HCO3 SERPL-SCNC: 24 MMOL/L (ref 22–29)
POTASSIUM SERPL-SCNC: 4.2 MMOL/L (ref 3.4–5.3)
PROT SERPL-MCNC: 7.2 G/DL (ref 6.4–8.3)
RHEUMATOID FACT SERPL-ACNC: 17 IU/ML
SODIUM SERPL-SCNC: 138 MMOL/L (ref 135–145)
T4 FREE SERPL-MCNC: 0.89 NG/DL (ref 0.9–1.7)
TSH SERPL DL<=0.005 MIU/L-ACNC: 4.3 UIU/ML (ref 0.3–4.2)
VIT D+METAB SERPL-MCNC: 42 NG/ML (ref 20–50)

## 2024-10-03 NOTE — ASSESSMENT & PLAN NOTE
Whether related to fibromyalgia or another reason, however her CRP was elevated therefore I am adding a rheumatoid factor anti-citrullinated peptide antibodies and KAMILAH with reflex to further testing

## 2024-10-07 RX ORDER — PREDNISONE 20 MG/1
TABLET ORAL
Qty: 25 TABLET | Refills: 0 | Status: SHIPPED | OUTPATIENT
Start: 2024-10-07 | End: 2024-10-28

## 2024-10-08 LAB — CCP AB SER IA-ACNC: 0.4 U/ML

## 2024-10-15 ENCOUNTER — HOSPITAL ENCOUNTER (EMERGENCY)
Facility: CLINIC | Age: 29
Discharge: HOME | End: 2024-10-15
Payer: COMMERCIAL

## 2024-10-15 ENCOUNTER — APPOINTMENT (OUTPATIENT)
Dept: GENERAL RADIOLOGY | Facility: CLINIC | Age: 29
End: 2024-10-15
Attending: EMERGENCY MEDICINE
Payer: COMMERCIAL

## 2024-10-15 VITALS
DIASTOLIC BLOOD PRESSURE: 88 MMHG | RESPIRATION RATE: 18 BRPM | HEART RATE: 72 BPM | WEIGHT: 171 LBS | OXYGEN SATURATION: 100 % | BODY MASS INDEX: 27.33 KG/M2 | SYSTOLIC BLOOD PRESSURE: 138 MMHG | TEMPERATURE: 97.3 F

## 2024-10-15 DIAGNOSIS — S60.10XA SUBUNGUAL HEMATOMA OF DIGIT OF HAND, INITIAL ENCOUNTER: ICD-10-CM

## 2024-10-15 DIAGNOSIS — S67.195A CRUSHING INJURY OF LEFT RING FINGER, INITIAL ENCOUNTER: ICD-10-CM

## 2024-10-15 PROCEDURE — 99283 EMERGENCY DEPT VISIT LOW MDM: CPT | Mod: 25

## 2024-10-15 PROCEDURE — 73130 X-RAY EXAM OF HAND: CPT | Mod: LT

## 2024-10-15 PROCEDURE — 11730 AVULSION NAIL PLATE SIMPLE 1: CPT | Mod: F3

## 2024-10-15 PROCEDURE — 250N000013 HC RX MED GY IP 250 OP 250 PS 637

## 2024-10-15 PROCEDURE — 29130 APPL FINGER SPLINT STATIC: CPT

## 2024-10-15 RX ORDER — ACETAMINOPHEN 500 MG
1000 TABLET ORAL ONCE
Status: COMPLETED | OUTPATIENT
Start: 2024-10-15 | End: 2024-10-15

## 2024-10-15 RX ORDER — OXYCODONE HYDROCHLORIDE 5 MG/1
5 TABLET ORAL EVERY 6 HOURS PRN
Qty: 4 TABLET | Refills: 0 | Status: SHIPPED | OUTPATIENT
Start: 2024-10-15 | End: 2024-10-18

## 2024-10-15 RX ADMIN — ACETAMINOPHEN 1000 MG: 500 TABLET ORAL at 21:59

## 2024-10-15 ASSESSMENT — COLUMBIA-SUICIDE SEVERITY RATING SCALE - C-SSRS
2. HAVE YOU ACTUALLY HAD ANY THOUGHTS OF KILLING YOURSELF IN THE PAST MONTH?: NO
1. IN THE PAST MONTH, HAVE YOU WISHED YOU WERE DEAD OR WISHED YOU COULD GO TO SLEEP AND NOT WAKE UP?: NO
6. HAVE YOU EVER DONE ANYTHING, STARTED TO DO ANYTHING, OR PREPARED TO DO ANYTHING TO END YOUR LIFE?: NO

## 2024-10-15 ASSESSMENT — ACTIVITIES OF DAILY LIVING (ADL): ADLS_ACUITY_SCORE: 36

## 2024-10-16 NOTE — DISCHARGE INSTRUCTIONS
Do not drive while taking oxycodone, Tylenol, ibuprofen as needed for pain  Ice, elevate wear splint as needed for support  Follow-up with orthopedics for reevaluation with any ongoing pain   Return to the ER for increased pain, numbness, weakness, redness, or any new concerns
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
## 2024-10-16 NOTE — ED TRIAGE NOTES
29 year old female arriving by private c/o left hand pain. Patient states that she smashed her fingers and the pain has increased. Patient states that she took some ibuprofen and used ice with no pain relief. Patient states that she last took ibuprofen at 2100.

## 2024-12-18 ENCOUNTER — OFFICE VISIT (OUTPATIENT)
Dept: FAMILY MEDICINE | Facility: CLINIC | Age: 29
End: 2024-12-18
Payer: COMMERCIAL

## 2024-12-18 VITALS
RESPIRATION RATE: 16 BRPM | OXYGEN SATURATION: 98 % | WEIGHT: 159.9 LBS | BODY MASS INDEX: 25.7 KG/M2 | HEART RATE: 71 BPM | DIASTOLIC BLOOD PRESSURE: 87 MMHG | SYSTOLIC BLOOD PRESSURE: 126 MMHG | TEMPERATURE: 98.5 F | HEIGHT: 66 IN

## 2024-12-18 DIAGNOSIS — R76.8 RHEUMATOID FACTOR POSITIVE: ICD-10-CM

## 2024-12-18 DIAGNOSIS — M79.10 MYALGIA: ICD-10-CM

## 2024-12-18 DIAGNOSIS — R79.82 ELEVATED C-REACTIVE PROTEIN (CRP): ICD-10-CM

## 2024-12-18 DIAGNOSIS — R10.84 ABDOMINAL PAIN, GENERALIZED: Primary | ICD-10-CM

## 2024-12-18 DIAGNOSIS — R11.2 INTRACTABLE NAUSEA AND VOMITING: ICD-10-CM

## 2024-12-18 LAB
ERYTHROCYTE [DISTWIDTH] IN BLOOD BY AUTOMATED COUNT: 13 % (ref 10–15)
HCT VFR BLD AUTO: 43.1 % (ref 35–47)
HGB BLD-MCNC: 14.2 G/DL (ref 11.7–15.7)
MCH RBC QN AUTO: 29 PG (ref 26.5–33)
MCHC RBC AUTO-ENTMCNC: 32.9 G/DL (ref 31.5–36.5)
MCV RBC AUTO: 88 FL (ref 78–100)
PLATELET # BLD AUTO: 315 10E3/UL (ref 150–450)
RBC # BLD AUTO: 4.89 10E6/UL (ref 3.8–5.2)
WBC # BLD AUTO: 9.6 10E3/UL (ref 4–11)

## 2024-12-18 PROCEDURE — 84443 ASSAY THYROID STIM HORMONE: CPT | Performed by: FAMILY MEDICINE

## 2024-12-18 PROCEDURE — 84702 CHORIONIC GONADOTROPIN TEST: CPT | Performed by: FAMILY MEDICINE

## 2024-12-18 PROCEDURE — 83735 ASSAY OF MAGNESIUM: CPT | Performed by: FAMILY MEDICINE

## 2024-12-18 PROCEDURE — 82306 VITAMIN D 25 HYDROXY: CPT | Performed by: FAMILY MEDICINE

## 2024-12-18 PROCEDURE — 86200 CCP ANTIBODY: CPT | Performed by: FAMILY MEDICINE

## 2024-12-18 PROCEDURE — 86038 ANTINUCLEAR ANTIBODIES: CPT | Performed by: FAMILY MEDICINE

## 2024-12-18 PROCEDURE — 86140 C-REACTIVE PROTEIN: CPT | Performed by: FAMILY MEDICINE

## 2024-12-18 PROCEDURE — 80053 COMPREHEN METABOLIC PANEL: CPT | Performed by: FAMILY MEDICINE

## 2024-12-18 PROCEDURE — 85027 COMPLETE CBC AUTOMATED: CPT | Performed by: FAMILY MEDICINE

## 2024-12-18 PROCEDURE — 99215 OFFICE O/P EST HI 40 MIN: CPT | Performed by: FAMILY MEDICINE

## 2024-12-18 PROCEDURE — 36415 COLL VENOUS BLD VENIPUNCTURE: CPT | Performed by: FAMILY MEDICINE

## 2024-12-18 RX ORDER — ONDANSETRON 4 MG/1
4 TABLET, ORALLY DISINTEGRATING ORAL EVERY 6 HOURS PRN
Qty: 30 TABLET | Refills: 0 | Status: SHIPPED | OUTPATIENT
Start: 2024-12-18

## 2024-12-18 ASSESSMENT — PATIENT HEALTH QUESTIONNAIRE - PHQ9
SUM OF ALL RESPONSES TO PHQ QUESTIONS 1-9: 5
SUM OF ALL RESPONSES TO PHQ QUESTIONS 1-9: 5
10. IF YOU CHECKED OFF ANY PROBLEMS, HOW DIFFICULT HAVE THESE PROBLEMS MADE IT FOR YOU TO DO YOUR WORK, TAKE CARE OF THINGS AT HOME, OR GET ALONG WITH OTHER PEOPLE: SOMEWHAT DIFFICULT

## 2024-12-18 NOTE — PROGRESS NOTES
Assessment & Plan   Problem List Items Addressed This Visit       Elevated C-reactive protein (CRP)    Myalgia    Intractable nausea and vomiting    Relevant Medications    ondansetron (ZOFRAN ODT) 4 MG ODT tab    Other Relevant Orders    CT Abdomen wo & w & Pelvis w Contrast    Adult GI  Referral - Consult Only    CBC with platelets (Completed)    Comprehensive metabolic panel (BMP + Alb, Alk Phos, ALT, AST, Total. Bili, TP)    HCG quantitative pregnancy    Magnesium    TSH with free T4 reflex    CRP, inflammation    Vitamin D Deficiency    Abdominal pain, generalized - Primary    Relevant Medications    ondansetron (ZOFRAN ODT) 4 MG ODT tab    Other Relevant Orders    CT Abdomen wo & w & Pelvis w Contrast    Adult GI  Referral - Consult Only    CBC with platelets (Completed)    Comprehensive metabolic panel (BMP + Alb, Alk Phos, ALT, AST, Total. Bili, TP)    HCG quantitative pregnancy    Magnesium    TSH with free T4 reflex    CRP, inflammation    Vitamin D Deficiency      At this point I am recommending repeating labs, she had a mild abnormality in her TSH last time and we are going to get a second test.  She also reports history of severe vitamin D deficiency which we will check.  For her ongoing symptoms she is also going to have a CMP, CBC, magnesium and CRP.  I also ordered an hCG quant although she reports she could not possibly be pregnant.  She also reports using cannabis from time to time although she does this most often to help control some of the nausea, she says she does not use that frequently and the nausea and vomiting actually gets worse not better when she has not used that for a while.  Aside from the labs I ordered a CT scan of the abdomen and pelvis with and without contrast and I referred her to gastroenterology for further evaluation.    For this visit , I reviewed previous records from external sources and ,  previous multiple  lab results.  This includes notes  back to   "2014, including multiple OB/GYN notes and some imaging and lab studies, there is also report that she was tested for tissue transglutaminase IgA and IgG for celiac disease in 2015 which were negative, I could not find appropriate documentation whether she was in a gluten rich diet prior to the diagnosis but I do not see any evidence that she has had a colonoscopy or gastroenterology evaluation.        BMI  Estimated body mass index is 25.55 kg/m  as calculated from the following:    Height as of this encounter: 1.685 m (5' 6.33\").    Weight as of this encounter: 72.5 kg (159 lb 14.4 oz).             Alejandro Adames is a 29 year old, presenting for the following health issues:  Weight Loss (Continued weight loss, chills, nausea )  The patient is following up regarding continued weight loss, she states that she is nauseated and with lack of appetite chronically, she says that even eating small portions sometimes makes her gag.  She has vomited several times in the past as well.  She can get abdominal pain generalized and association with the symptoms.  I had seen this patient before at the time she had discontinued the Lamictal earlier in the year and I suspected that the weight gain that she gained with the Lamictal was being lost however it has been a while and she continues with her downward trend.  She indicates she was tested for celiac when she was younger and this was negative, however she says she does have an allergy to wheat and rye.    Associated symptoms include fatigue, occasional headaches, occasional joint aches.  She denies changes in her bowel movements, blood in the stools, melena.  She denies any urinary symptoms.  She denies any rashes.    Reviewing previous records back in 2023 she was seen in the ER for lower abdominal pain, she has history of endometriosis , she underwent IVF with embryo transfer on 5/4/23.     I spent a total of 40 minutes including review of multiple notes, multiple lab " "results, specialist, FP and and ER visits as well as the evaluation of the patient and the coordination of the plan of care.        12/18/2024    11:45 AM   Additional Questions   Roomed by MONICA Rankin     History of Present Illness       Reason for visit:  Nausea and no longer have an apitite    She eats 0-1 servings of fruits and vegetables daily.She consumes 1 sweetened beverage(s) daily.She exercises with enough effort to increase her heart rate 30 to 60 minutes per day.  She exercises with enough effort to increase her heart rate 5 days per week.   She is taking medications regularly.                     Objective    /87   Pulse 71   Temp 98.5  F (36.9  C) (Oral)   Resp 16   Ht 1.685 m (5' 6.33\")   Wt 72.5 kg (159 lb 14.4 oz)   LMP 12/07/2024 (Approximate)   SpO2 98%   BMI 25.55 kg/m    Body mass index is 25.55 kg/m .  Physical Exam   On exam she appears in no acute distress, she looks a little tired.  Since I last saw this patient back in October she has lost additional 10 pounds but overall since February she has lost about 60 pounds.  Pupils equally reactive to light, oropharynx is clear and moist.  Neck supple no thyromegaly no lymphadenopathy.  Heart regular rate and rhythm without audible murmurs.  Lungs clear to auscultation bilaterally without adventitious sounds.  Abdomen there is generalized tenderness to palpation even when I palpate lightly she seems to be having discomfort.  Bowel sounds appear to be normal.  Lower extremities without edema.  Skin examination of the visible areas of arms and legs without obvious rashes.    Results for orders placed or performed in visit on 12/18/24 (from the past 24 hours)   CBC with platelets   Result Value Ref Range    WBC Count 9.6 4.0 - 11.0 10e3/uL    RBC Count 4.89 3.80 - 5.20 10e6/uL    Hemoglobin 14.2 11.7 - 15.7 g/dL    Hematocrit 43.1 35.0 - 47.0 %    MCV 88 78 - 100 fL    MCH 29.0 26.5 - 33.0 pg    MCHC 32.9 31.5 - 36.5 g/dL    RDW 13.0 " 10.0 - 15.0 %    Platelet Count 315 150 - 450 10e3/uL           Signed Electronically by: Coy Jurado MD

## 2024-12-18 NOTE — PATIENT INSTRUCTIONS
Thank you for visiting us today.    Below are a summary of my recommendations as discussed during your visit:  Reorder labs including a few new ones, no great concern if you see something assess pregnancy test, hCG can also be present in other conditions and it is important to measure that as part of the evaluation.  It might be pertinent for you to get checked for celiac disease as well which is associated with chronic stomach symptoms and weight loss, however with your lack of appetite and eating that can affect reliable testing so I will leave it up to gastroenterology when you see them.  A CT scan of the abdomen and pelvis was ordered.  A referral to gastroenterology was ordered.  I also sent additional ondansetron  I will send a notification with test results when available, some of the test may take a little longer.  I hope you are doing better.        Don't hesitate to contact us if you have any questions    Dr Arguello (Coy Jurado MD)

## 2024-12-19 ENCOUNTER — TELEPHONE (OUTPATIENT)
Dept: GASTROENTEROLOGY | Facility: CLINIC | Age: 29
End: 2024-12-19
Payer: COMMERCIAL

## 2024-12-19 LAB
ALBUMIN SERPL BCG-MCNC: 4.7 G/DL (ref 3.5–5.2)
ALP SERPL-CCNC: 86 U/L (ref 40–150)
ALT SERPL W P-5'-P-CCNC: 13 U/L (ref 0–50)
ANA SER QL IF: NEGATIVE
ANION GAP SERPL CALCULATED.3IONS-SCNC: 14 MMOL/L (ref 7–15)
AST SERPL W P-5'-P-CCNC: 15 U/L (ref 0–45)
BILIRUB SERPL-MCNC: 0.4 MG/DL
BUN SERPL-MCNC: 9.1 MG/DL (ref 6–20)
CALCIUM SERPL-MCNC: 9.7 MG/DL (ref 8.8–10.4)
CCP AB SER IA-ACNC: 0.8 U/ML
CHLORIDE SERPL-SCNC: 103 MMOL/L (ref 98–107)
CREAT SERPL-MCNC: 0.71 MG/DL (ref 0.51–0.95)
CRP SERPL-MCNC: 6.36 MG/L
EGFRCR SERPLBLD CKD-EPI 2021: >90 ML/MIN/1.73M2
GLUCOSE SERPL-MCNC: 86 MG/DL (ref 70–99)
HCG INTACT+B SERPL-ACNC: <1 MIU/ML
HCO3 SERPL-SCNC: 24 MMOL/L (ref 22–29)
MAGNESIUM SERPL-MCNC: 2.2 MG/DL (ref 1.7–2.3)
POTASSIUM SERPL-SCNC: 4.2 MMOL/L (ref 3.4–5.3)
PROT SERPL-MCNC: 7.1 G/DL (ref 6.4–8.3)
SODIUM SERPL-SCNC: 141 MMOL/L (ref 135–145)
TSH SERPL DL<=0.005 MIU/L-ACNC: 2.28 UIU/ML (ref 0.3–4.2)
VIT D+METAB SERPL-MCNC: 33 NG/ML (ref 20–50)

## 2024-12-19 NOTE — TELEPHONE ENCOUNTER
[facility] Call Center    Phone Message    May a detailed message be left on voicemail: Yes    Reason for Call: Other: Patient is currently scheduled on 2/17, as visit type New GI Urgent. This is outside the expected timeline for this referral. Patient has been added to the waitlist.      Action Taken: Message routed to:  Other: GI REFERRAL TRIAGE POOL     Travel Screening: Not Applicable

## 2024-12-24 ENCOUNTER — TELEPHONE (OUTPATIENT)
Dept: FAMILY MEDICINE | Facility: CLINIC | Age: 29
End: 2024-12-24
Payer: COMMERCIAL

## 2024-12-31 NOTE — TELEPHONE ENCOUNTER
Clinic Navigator sent a [employer] message to inform the Pt that Pt is on the wait list for a sooner appointment. Clinic Navigator will reach out to the Pt via phone call or [employer] when a sooner appointment becomes available.

## 2025-01-09 NOTE — TELEPHONE ENCOUNTER
REFERRAL INFORMATION:  Referring Provider:  Dr. Coy Jurado MD  Referring Clinic: [facility]   Reason for Visit/Diagnosis: Intractable nausea and vomiting; Abdominal pain, generalized    FUTURE VISIT INFORMATION:  Appointment Date: 25  Appointment Time: 1:45 PM     NOTES STATUS DETAILS   OFFICE NOTE from Referring Provider [facility]:  24, 10/2/24- OV w/ Dr. Coy Jurado MD    OFFICE NOTE from Other Specialist [facility]:  23- Virtual visit w/ Dr. Juan Garrison DO for weight management and GERD   HOSPITAL DISCHARGE SUMMARY/  ED VISITS Care Everywhere; [facility]:  23- ED visit w/ Dr. Bryce Omalley MD for lower abdominal pain     [facility]:  20- ED visit w/ Dr. Sudhir Odell MD for hemorroids    OPERATIVE REPORT [facility]:  20- Exam Under Anesthesia, rectum, hemmoroidectomy x3  4/10/14- Right salpingo oopherectomy with appendectomy w/ Dr. Sol Wooten MD   MEDICATION LIST Internal         PERTINENT LABS [facility]:  24- TSH- CRP Inflammation, CBC, CMP    IMAGING (CT, MRI, EGD, MRCP, Small Bowel Follow Through/SBT, MR/CT Enterography) [facility]:  25- CT Abd/Pelvis

## 2025-01-14 NOTE — TELEPHONE ENCOUNTER
Writer called to help get Pt scheduled for a sooner GI appointment. Pt accepted a virtual visit with Dr. Nikkie Dempsey on 1/15/2025 at 1:30 PM for a virtual visit.

## 2025-01-15 ENCOUNTER — VIRTUAL VISIT (OUTPATIENT)
Dept: GASTROENTEROLOGY | Facility: CLINIC | Age: 30
End: 2025-01-15
Attending: FAMILY MEDICINE
Payer: COMMERCIAL

## 2025-01-15 VITALS — WEIGHT: 154 LBS | BODY MASS INDEX: 24.75 KG/M2 | HEIGHT: 66 IN

## 2025-01-15 DIAGNOSIS — R10.84 ABDOMINAL PAIN, GENERALIZED: ICD-10-CM

## 2025-01-15 DIAGNOSIS — R11.2 INTRACTABLE NAUSEA AND VOMITING: ICD-10-CM

## 2025-01-15 DIAGNOSIS — K59.00 CONSTIPATION, UNSPECIFIED CONSTIPATION TYPE: ICD-10-CM

## 2025-01-15 DIAGNOSIS — R63.4 WEIGHT LOSS: Primary | ICD-10-CM

## 2025-01-15 ASSESSMENT — PAIN SCALES - GENERAL: PAINLEVEL_OUTOF10: MODERATE PAIN (4)

## 2025-01-15 NOTE — NURSING NOTE
Current patient location: [address] [place] [address]    Is the patient currently in the state of [place]? YES    Visit mode: VIDEO    If the visit is dropped, the patient can be reconnected by:VIDEO VISIT: Text to cell phone:   Telephone Information:   Mobile [phone]       Will anyone else be joining the visit? NO  (If patient encounters technical issues they should call [phone] :[phone])    Are changes needed to the allergy or medication list? No    Are refills needed on medications prescribed by this physician? YES    Rooming Documentation:  Questionnaire(s) completed    Reason for visit: Consult    Ruben DESIR

## 2025-01-15 NOTE — PROGRESS NOTES
"Virtual Visit Details    Type of service:  Video Visit   Video Start Time: {video visit start/end time for provider to select:712697}  Video End Time:{video visit start/end time for provider to select:901174}    Originating Location (pt. Location): {video visit patient location:798103::\"Home\"}  {PROVIDER LOCATION On-site should be selected for visits conducted from your clinic location or adjoining [facility] hospital, academic office, or other nearby [facility] building. Off-site should be selected for all other provider locations, including home:079643}  Distant Location (provider location):  {virtual location provider:238362}  Platform used for Video Visit: {Virtual Visit Platforms:762355::\"Medipacs\"}    "

## 2025-01-15 NOTE — PATIENT INSTRUCTIONS
It was a pleasure meeting with you today and discussing your healthcare plan. Below is a summary of what we covered:    Please have a gastric emptying scan  Please schedule an EGD and colonoscopy  Please see a nutritionist  Please start miralax once a day      Please see below for any additional questions and scheduling guidelines.    Sign up for TUNJI: TUNJI patient portal serves as a secure platform for accessing your medical records from the [facility]. Additionally, TUNJI facilitates easy, timely, and secure messaging with your care team. If you have not signed up, you may do so by using the provided code or calling [phone].    Coordinating your care after your visit:  There are multiple options for scheduling your follow-up care based on your provider's recommendation.    How do I schedule a follow-up clinic appointment:   After your appointment, you may receive scheduling assistance with the Clinic Coordinators by having a seat in the waiting room and a Clinic Coordinator will call you up to schedule.  Virtual visits or after you leave the clinic:  Your provider has placed a follow-up order in the TUNJI portal for scheduling your return appointment. A member of the scheduling team will contact you to schedule.  [employer] Scheduling: Timely scheduling through TUNJI is advised to ensure appointment availability.   Call to schedule: You may schedule your follow-up appointment(s) by calling [phone], option 1.    How do I schedule my endoscopy or colonoscopy procedure:  If a procedure, such as a colonoscopy or upper endoscopy was ordered by your provider, the scheduling team will contact you to schedule this procedure. Or you may choose to call to schedule at   [phone], option 2.  Please allow 20-30 minutes when scheduling a procedure.    How do I get my blood work done? To get your blood work done, you need to schedule a lab appointment at an [facility]. There  are multiple ways to schedule:   At the clinic: The Clinic Coordinator you meet after your visit can help you schedule a lab appointment.   Mobile365 (fka InphoMatch) scheduling: Mobile365 (fka InphoMatch) offers online lab scheduling at all [facility] laboratory locations.   Call to schedule: You can call [phone] to schedule your lab appointment.    How do I schedule my imaging study: To schedule imaging studies, such as CT scans, ultrasounds, MRIs, or X-rays, contact Imaging Services at [phone].    How do I schedule a referral to another doctor: If your provider recommended a referral to another specialist(s), the referral order was placed by your provider. You will receive a phone call to schedule this referral, or you may choose to call the number attached to the referral to self-schedule.    For Post-Visit Question(s):  For any inquiries following today's visit:  Please utilize Mobile365 (fka InphoMatch) messaging and allow 48 hours for reply or contact the Call Center during normal business hours at [phone], option 3.  For Emergent After-hours questions, contact the On-Call GI Fellow through the [facility]  at [phone].  In addition, you may contact your Nurse directly using the provided contact information.    Test Results:  Test results will be accessible via Mobile365 (fka InphoMatch) in compliance with the 21st Century Cures Act. This means that your results will be available to you at the same time as your provider. Often you may see your results before your provider does. Results are reviewed by staff within two weeks with communication follow-up. Results may be released in the patient portal prior to your care team review.    Prescription Refill(s):  Medication prescribed by your provider will be addressed during your visit. For future refills, please coordinate with your pharmacy. If you have not had a recent clinic visit or routine labs, for your safety, your provider may not be able to refill your prescription.             1.51
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
## 2025-01-21 ENCOUNTER — OFFICE VISIT (OUTPATIENT)
Dept: EDUCATION SERVICES | Facility: CLINIC | Age: 30
End: 2025-01-21
Payer: COMMERCIAL

## 2025-01-21 VITALS — HEIGHT: 66 IN | BODY MASS INDEX: 25.18 KG/M2 | WEIGHT: 156.7 LBS

## 2025-01-21 DIAGNOSIS — R11.2 INTRACTABLE NAUSEA AND VOMITING: ICD-10-CM

## 2025-01-21 DIAGNOSIS — R63.4 WEIGHT LOSS: Primary | ICD-10-CM

## 2025-01-21 DIAGNOSIS — R10.84 ABDOMINAL PAIN, GENERALIZED: ICD-10-CM

## 2025-01-21 DIAGNOSIS — K59.00 CONSTIPATION, UNSPECIFIED CONSTIPATION TYPE: ICD-10-CM

## 2025-01-21 PROCEDURE — 99207 PR DROP WITH A PROCEDURE: CPT | Performed by: DIETITIAN, REGISTERED

## 2025-01-21 PROCEDURE — 97802 MEDICAL NUTRITION INDIV IN: CPT | Performed by: DIETITIAN, REGISTERED

## 2025-01-21 NOTE — LETTER
"    1/21/2025         RE: Zacarias Adam  [address]  [place] [address]        Dear Colleague,    Thank you for referring your patient, Zacarias Adam, to the [facility]. Please see a copy of my visit note below.    Medical Nutrition Therapy  Visit Type:Initial assessment and intervention    Zacarias Adam presents today for MNT and education related to    Intractable nausea and vomiting   Abdominal pain, generalized   Weight loss   Constipation, unspecified constipation type   .   She is accompanied by self.     ASSESSMENT:   Patient comments/concerns relating to nutrition: Patient has had a significant unintentional 10% weight loss over the last 3 months and ~30% weight loss over the past year.  Patient has food allergies/intolerances since childhood including wheat, rye, soy, chicken and turkey and more recently lactose is causing GI distress.    Patient has been having significant issues with \"food almost\" anything is causing nausea, bloating, gas has been more frequent vomiting but not as much recently.  Bowel movements vary from constipation to diarrhea.    Patient here today to learn nutritional needs and dietary recommendations improved overall nutrition status, will being and increased energy level.    Wt Readings from Last 10 Encounters:   01/21/25 71.1 kg (156 lb 11.2 oz)   01/15/25 69.9 kg (154 lb)   12/18/24 72.5 kg (159 lb 14.4 oz)   10/15/24 77.6 kg (171 lb)   10/02/24 78.2 kg (172 lb 8 oz)   03/11/24 99.1 kg (218 lb 6.4 oz)   02/27/24 101.5 kg (223 lb 12.8 oz)   12/12/23 106.6 kg (235 lb)   09/11/23 108.2 kg (238 lb 9.6 oz)   11/04/22 106.9 kg (235 lb 9.6 oz)       NUTRITION HISTORY:  Yesterday patient reports having chicken and a bread stick even though both of those are on her food allergies/tolerance list.  Patient regrets consuming it because she \"felt miserable\" primarily very bloated and constipated today  Nutrition:    Have you ever had a nutrition " consultation? No   Do you currently follow a special diet or nutritional program? No   What do you feel are the biggest barriers getting in the way of achieving you nutritional goals? Other   Do you have any food allergies, sensitivities or intolerances? Yes   Specific food(s) causing adverse reactions Gluten    Dairy    Soy    Wheat    Grains   Check all the nutritional factors that apply to you. Get irritable/shaky or lightheaded between meals    Eat too little under stress   Digestion:    Do you experience stomach pains/cramping? Daily   Do you experience bloating? After eating   Do you experience gas? 2-3 times per week   Do you experience heartburn/acid reflux and or indigestion? Rarely   How often do you have a bowel movement? Other   What is a typical bowel movement like for you? Hard to pass    Varies a lot    Alternates between loose and hard   Food Access:    Who does the grocery shopping? Self   How often is grocery shopping done? Weekly   Where do you usually receive your groceries from? Select all that apply: [employer]    [employer]    [employer]   Do you read food labels? Yes   What do you look at? Gluten Free    Dairy free    Wheat Free    Protein   Who does the cooking? Self   How many meals do you eat out per week? 0 to 1   What restaurants do you typically choose? [employer] ([employer], [employer], etc.)   Daily Patterns:    How many days per week do you have breakfast?             0   How many days per week do you have lunch? 1   How many days per week do you have dinner? 3   How many days per week do you have snacks? 3   Protein Intake:    How many times per day do you typically consume a protein source(s)? 1   What types of protein do you currently eat? Ground Beef    Hamburgers    Beef Roast    [place]eralta/Bristol Peralta    Deli Ham    [place]    Shrimp    Eggs   Fat Intake:    How many times per day do you typically consume healthy fat(s)? 1   What types of health fats do you currently eat? [place]    Butter    Salad  dressings    Coconut oil    Avocado oil    Avocado   Fruit Intake:    How many times per day do you typically consume fruits? 1   What types of fruit do you currently eat? Select all that apply: Other   Vegetable Intake:    How many times per day do you typically consume vegetables? 0   What types of vegetables do currently eat? Artichoke    Corn    Potato (baked, boiled, mashed, French fries)    Squash, winter (acorn, butternut)   Grain Intake:    How many times per day do you typically consume grains? 1   What types of grains do currently eat? Bagel (gluten free)    Pancakes/waffles (gluten free)   Dairy Intake:    How many times per day do you typically consume dairy? 1   What types of dairy do currently eat? Milk    Yogurt   Non-Dairy Alternative Intake:    How many times per day do you typically consume non-dairy alternatives? 0   What types of non-dairy alternatives do currently eat? Coconut milk   Sweets Intake:    How many times per day do you typically consume sweets? 0   Beverage Intake:    What types of beverages do currently drink? Coffee    Water   How many 8 oz cups of water do you typically consume per day? 2 to 3   How many 8 oz cups of caffeine do you typically consume per day? 1 to 2   How many 8 oz cups of caffeine do you typically consume per day? 1 to 2   How many drinks of alcohol do you typically consume per week (1 drink = 5 oz wine, 12 oz beer, 1.5 oz spirits)?   0     Lifestyle Recall for Typical Day:    What time did you wake up? 6 AM   What time did you go to sleep? 12 AM   What time did you have breakfast? No breakfast   What time did you have a morning snack? No snack   What time did you have lunch? 1-2 PM   Where did you have lunch? Car   What time did you have an afternoon snack? No snack   What time did you have dinner? No dinner   What time did you have an evening snack? No Snack   What time of day did you exercise? No Exercise   Women's Health Assessment:    Are you officially in  menopause? (no period for one year or longer) No   I have had a hysterectomy. Yes   How many ovaries were removed? One ovary removed   I am breastfeeding. No     Lifestyle:    Do you feel your life is stressful right now? Yes   What is the cause(s) of stress in your life? Finances    Multi-tasking and multiple deadlines to meet    Taking care of parents, children or other family member   Are you currently implementing any strategies to help manage stress? Yes   What are you doing to manage stress? Breathing exercises    Affirmations    Take time for self    Reading   Exercise:    Does your occupation require extended periods of sitting? No   Does your occupation require extended periods of repetitive movements (ex: walking or lifting)? Yes   Do you currently participate in any forms of exercise? No   Rate your level of motivation for including exercise in your routine (0=none, 10=high): 4   Do you have any medical conditions, pain, injuries, surgeries etc. restricting you from exercise? Yes   SLEEP:    How many hours (on average) do you sleep per night? 7-9   What time do you turn off the lights? 10 PM   How long does it take for you to fall asleep? 15-20 mins   What time do you stop using electronic devices? 10 PM   What time do you wake up? 6 AM   When do you eat your first meal? 6 PM   Do you feel well-rested during the day? No   Do you take naps? No   Do you have a comfortable bedroom environment (cool, quiet, dark, etc)? Yes   Do you have a sleep routine/ ritual that you do before bed? Yes   How many hours do you spend per day looking at a screen (TV, computer, tablet and phone)? 3 to 4   Select all factors that apply to your current sleep habits: Not hungry in the morning    Feel tired/sluggish/fatigued during the day    Leg twitching at night     MEDICATIONS:  Current Outpatient Medications   Medication Sig Dispense Refill     albuterol (PROAIR HFA/PROVENTIL HFA/VENTOLIN HFA) 108 (90 Base) MCG/ACT inhaler  INHALE 2 PUFFS INTO THE LUNGS EVERY 6 HOURS AS NEEDED FOR SHORTNESS OF BREATH 54 g 3     budesonide-formoterol (SYMBICORT) 80-4.5 MCG/ACT Inhaler Inhale 2 puffs into the lungs 2 times daily. 30.6 g 3     clomiPRAMINE (ANAFRANIL) 50 MG capsule Take 1 capsule by mouth every evening (Patient not taking: Reported on 12/18/2024)       DOCUSATE SODIUM PO Take 1 tablet by mouth daily as needed.       famotidine (PEPCID) 20 MG tablet Take 1 tablet (20 mg) by mouth daily as needed (acid reflux) 30 tablet 11     fluconazole (DIFLUCAN) 150 MG tablet Take once a day one time  and may repeat if needed for up to 3 days (Patient not taking: Reported on 12/18/2024) 3 tablet 1     fluticasone (FLONASE) 50 MCG/ACT nasal spray SHAKE LIQUID AND USE 2 SPRAYS IN EACH NOSTRIL DAILY 16 g 11     lamoTRIgine (LAMICTAL) 100 MG tablet Take 2 tablets (200 mg) by mouth daily (Patient not taking: Reported on 12/18/2024)  0     lamoTRIgine (LAMICTAL) 200 MG tablet Take 1 tablet by mouth daily at 2 pm (Patient not taking: Reported on 12/18/2024)       levothyroxine (SYNTHROID/LEVOTHROID) 88 MCG tablet Take 1 tablet (88 mcg) by mouth daily (Patient not taking: Reported on 12/18/2024) 90 tablet 3     meclizine (ANTIVERT) 25 MG tablet Take 1 tablet (25 mg) by mouth 3 times daily as needed for dizziness (Patient not taking: Reported on 12/18/2024) 20 tablet 0     naproxen (NAPROSYN) 500 MG tablet Take 1 tablet (500 mg) by mouth 2 times daily as needed for moderate pain 30 tablet 0     norgestrel-ethinyl estradiol (LOW-OGESTREL) 0.3-30 MG-MCG tablet Take 1 tablet by mouth daily. Take continously without taking the placebo week. (Patient not taking: Reported on 12/18/2024) 84 tablet 1     ondansetron (ZOFRAN ODT) 4 MG ODT tab Take 1 tablet (4 mg) by mouth every 6 hours as needed for nausea. 30 tablet 0     ondansetron (ZOFRAN) 4 MG tablet Take 1 tablet (4 mg) by mouth every 8 hours as needed for nausea (Patient not taking: Reported on 12/18/2024) 30  tablet 1     polyethylene glycol (MIRALAX) 17 GM/Dose powder Take 17 g by mouth daily (Patient not taking: Reported on 12/18/2024) 578 g 0     propranolol ER (INDERAL LA) 120 MG 24 hr capsule Take 120 mg by mouth daily (Patient not taking: Reported on 12/18/2024)       triamcinolone (KENALOG) 0.1 % external ointment Apply topically 2 times daily (Patient not taking: Reported on 12/18/2024) 80 g 1     vitamin C (ASCORBIC ACID) 500 MG tablet Take 500 mg by mouth.       Vitamin D, Cholecalciferol, 1000 units TABS Take 2,000 Units by mouth daily.       No current facility-administered medications for this visit.       LABS:  Lab Results   Component Value Date     12/18/2024     11/24/2020      Lab Results   Component Value Date    POTASSIUM 4.2 12/18/2024    POTASSIUM 4.2 05/10/2022    POTASSIUM 3.6 11/24/2020     Lab Results   Component Value Date    CHLORIDE 103 12/18/2024    CHLORIDE 104 05/10/2022    CHLORIDE 108 11/24/2020     Lab Results   Component Value Date    MARIYA 9.7 12/18/2024    MARIYA 8.3 11/24/2020     Lab Results   Component Value Date    CO2 24 12/18/2024    CO2 30 05/10/2022    CO2 26 11/24/2020     Lab Results   Component Value Date    BUN 9.1 12/18/2024    BUN 11 05/10/2022    BUN 10 11/24/2020     Lab Results   Component Value Date    CR 0.71 12/18/2024    CR 0.76 11/24/2020     Lab Results   Component Value Date    GLC 86 12/18/2024    GLC 96 05/10/2022    GLC 89 02/05/2021     Lab Results   Component Value Date    LDL 91 05/10/2022     02/05/2021     HDL Cholesterol   Date Value Ref Range Status   02/05/2021 54 >49 mg/dL Final     Direct Measure HDL   Date Value Ref Range Status   05/10/2022 49 (L) >=50 mg/dL Final   ]  GFR Estimate   Date Value Ref Range Status   12/18/2024 >90 >60 mL/min/1.73m2 Final     Comment:     eGFR calculated using 2021 CKD-EPI equation.   11/24/2020 >90 >60 mL/min/[1.73_m2] Final     Comment:     Non  GFR Calc  Starting 12/18/2018, serum  "creatinine based estimated GFR (eGFR) will be   calculated using the Chronic Kidney Disease Epidemiology Collaboration   (CKD-EPI) equation.       Lab Results   Component Value Date    CR 0.71 12/18/2024    CR 0.76 11/24/2020     No results found for: \"MICROALBUMIN\"    ANTHROPOMETRICS:  Vitals: Ht 1.685 m (5' 6.33\")   Wt 71.1 kg (156 lb 11.2 oz)   LMP 12/07/2024 (Approximate)   BMI 25.04 kg/m    Body mass index is 25.04 kg/m .      Wt Readings from Last 5 Encounters:   01/21/25 71.1 kg (156 lb 11.2 oz)   01/15/25 69.9 kg (154 lb)   12/18/24 72.5 kg (159 lb 14.4 oz)   10/15/24 77.6 kg (171 lb)   10/02/24 78.2 kg (172 lb 8 oz)       ESTIMATED KCAL REQUIREMENTS:  1500 - 1800 kcal per day    NUTRITION DIAGNOSIS: Inadequate energy intake related to poor appetite, nausea/vomiting as evidenced by unintentional 10% weight loss over the last 3 months and ~30% weight loss over the past year    NUTRITION INTERVENTION:  Discussed adequate calorie and protein needs.  Researched nutrition supplements that would be appropriate given patient's food allergies that would be high in protein.  Discussed additional meal and snack ideas that patient may be willing to incorporate along with small more frequent intake.    MNT education completed for FODMAP elimination diet.  Explanation about what are FODMAPs and why foods high in FODMAPs trigger symptoms.    Low - FODMAP Nutrition Therapy   Tips: How to keep a food record and symptoms:   Elimination of High - FODMAP sources for 6 weeks with slow reintroduction of a high-FODMAP food of 2 per week with a couple days of rest in-between the days.  Continue with the diary of food and symptoms.    Food/ fluid Recommended  Food/ fluid to avoid   Sample menus - cookbook and recipes available online  Low FODMAP smoothies, power bowls, and salad dressing recipes provided  Grocery shopping list to follow   Pt was able verbalize understanding.  Anticipate excellent compliance.      Plan:   Follow " FODMAP elimination diet x 6 weeks  After 6 weeks start with slow reintroduction of a high-FODMAP food of 2 choices only per week with a couple days of rest in-between the days.    Keep a diary of food and symptoms.        PATIENT'S BEHAVIOR CHANGE GOALS:   See Patient Instructions for patient stated behavior change goals. AVS was printed and given to patient at today's appointment.    MONITOR / EVALUATE:  RD will monitor/evaluate:  Food / Beverage / Nutrient intake   Pertinent Labs  Weight change    FOLLOW-UP:  Follow up with RD as needed.    Time spent in minutes: 45  Encounter: Individual

## 2025-01-21 NOTE — PROGRESS NOTES
"Medical Nutrition Therapy  Visit Type:Initial assessment and intervention    Zacarias Adam presents today for MNT and education related to    Intractable nausea and vomiting   Abdominal pain, generalized   Weight loss   Constipation, unspecified constipation type   .   She is accompanied by self.     ASSESSMENT:   Patient comments/concerns relating to nutrition: Patient has had a significant unintentional 10% weight loss over the last 3 months and ~30% weight loss over the past year.  Patient has food allergies/intolerances since childhood including wheat, rye, soy, chicken and turkey and more recently lactose is causing GI distress.    Patient has been having significant issues with \"food almost\" anything is causing nausea, bloating, gas has been more frequent vomiting but not as much recently.  Bowel movements vary from constipation to diarrhea.    Patient here today to learn nutritional needs and dietary recommendations improved overall nutrition status, will being and increased energy level.    Wt Readings from Last 10 Encounters:   01/21/25 71.1 kg (156 lb 11.2 oz)   01/15/25 69.9 kg (154 lb)   12/18/24 72.5 kg (159 lb 14.4 oz)   10/15/24 77.6 kg (171 lb)   10/02/24 78.2 kg (172 lb 8 oz)   03/11/24 99.1 kg (218 lb 6.4 oz)   02/27/24 101.5 kg (223 lb 12.8 oz)   12/12/23 106.6 kg (235 lb)   09/11/23 108.2 kg (238 lb 9.6 oz)   11/04/22 106.9 kg (235 lb 9.6 oz)       NUTRITION HISTORY:  Yesterday patient reports having chicken and a bread stick even though both of those are on her food allergies/tolerance list.  Patient regrets consuming it because she \"felt miserable\" primarily very bloated and constipated today  Nutrition:    Have you ever had a nutrition consultation? No   Do you currently follow a special diet or nutritional program? No   What do you feel are the biggest barriers getting in the way of achieving you nutritional goals? Other   Do you have any food allergies, sensitivities or intolerances? Yes "   Specific food(s) causing adverse reactions Gluten    Dairy    Soy    Wheat    Grains   Check all the nutritional factors that apply to you. Get irritable/shaky or lightheaded between meals    Eat too little under stress   Digestion:    Do you experience stomach pains/cramping? Daily   Do you experience bloating? After eating   Do you experience gas? 2-3 times per week   Do you experience heartburn/acid reflux and or indigestion? Rarely   How often do you have a bowel movement? Other   What is a typical bowel movement like for you? Hard to pass    Varies a lot    Alternates between loose and hard   Food Access:    Who does the grocery shopping? Self   How often is grocery shopping done? Weekly   Where do you usually receive your groceries from? Select all that apply: [employer]    [employer]    [employer]   Do you read food labels? Yes   What do you look at? Gluten Free    Dairy free    Wheat Free    Protein   Who does the cooking? Self   How many meals do you eat out per week? 0 to 1   What restaurants do you typically choose? [employer] ([employer], [employer], etc.)   Daily Patterns:    How many days per week do you have breakfast?             0   How many days per week do you have lunch? 1   How many days per week do you have dinner? 3   How many days per week do you have snacks? 3   Protein Intake:    How many times per day do you typically consume a protein source(s)? 1   What types of protein do you currently eat? Ground Beef    Hamburgers    Beef Roast    [place] Peralta    Deli Ham    Ruston    Shrimp    Eggs   Fat Intake:    How many times per day do you typically consume healthy fat(s)? 1   What types of health fats do you currently eat? [place]    Butter    Salad dressings    Coconut oil    Avocado oil    Avocado   Fruit Intake:    How many times per day do you typically consume fruits? 1   What types of fruit do you currently eat? Select all that apply: Other   Vegetable Intake:    How many times per day do you  typically consume vegetables? 0   What types of vegetables do currently eat? Artichoke    Corn    Potato (baked, boiled, mashed, French fries)    Squash, winter (acorn, butternut)   Grain Intake:    How many times per day do you typically consume grains? 1   What types of grains do currently eat? Bagel (gluten free)    Pancakes/waffles (gluten free)   Dairy Intake:    How many times per day do you typically consume dairy? 1   What types of dairy do currently eat? Milk    Yogurt   Non-Dairy Alternative Intake:    How many times per day do you typically consume non-dairy alternatives? 0   What types of non-dairy alternatives do currently eat? Coconut milk   Sweets Intake:    How many times per day do you typically consume sweets? 0   Beverage Intake:    What types of beverages do currently drink? Coffee    Water   How many 8 oz cups of water do you typically consume per day? 2 to 3   How many 8 oz cups of caffeine do you typically consume per day? 1 to 2   How many 8 oz cups of caffeine do you typically consume per day? 1 to 2   How many drinks of alcohol do you typically consume per week (1 drink = 5 oz wine, 12 oz beer, 1.5 oz spirits)?   0     Lifestyle Recall for Typical Day:    What time did you wake up? 6 AM   What time did you go to sleep? 12 AM   What time did you have breakfast? No breakfast   What time did you have a morning snack? No snack   What time did you have lunch? 1-2 PM   Where did you have lunch? Car   What time did you have an afternoon snack? No snack   What time did you have dinner? No dinner   What time did you have an evening snack? No Snack   What time of day did you exercise? No Exercise   Women's Health Assessment:    Are you officially in menopause? (no period for one year or longer) No   I have had a hysterectomy. Yes   How many ovaries were removed? One ovary removed   I am breastfeeding. No     Lifestyle:    Do you feel your life is stressful right now? Yes   What is the cause(s) of  stress in your life? Finances    Multi-tasking and multiple deadlines to meet    Taking care of parents, children or other family member   Are you currently implementing any strategies to help manage stress? Yes   What are you doing to manage stress? Breathing exercises    Affirmations    Take time for self    Reading   Exercise:    Does your occupation require extended periods of sitting? No   Does your occupation require extended periods of repetitive movements (ex: walking or lifting)? Yes   Do you currently participate in any forms of exercise? No   Rate your level of motivation for including exercise in your routine (0=none, 10=high): 4   Do you have any medical conditions, pain, injuries, surgeries etc. restricting you from exercise? Yes   SLEEP:    How many hours (on average) do you sleep per night? 7-9   What time do you turn off the lights? 10 PM   How long does it take for you to fall asleep? 15-20 mins   What time do you stop using electronic devices? 10 PM   What time do you wake up? 6 AM   When do you eat your first meal? 6 PM   Do you feel well-rested during the day? No   Do you take naps? No   Do you have a comfortable bedroom environment (cool, quiet, dark, etc)? Yes   Do you have a sleep routine/ ritual that you do before bed? Yes   How many hours do you spend per day looking at a screen (TV, computer, tablet and phone)? 3 to 4   Select all factors that apply to your current sleep habits: Not hungry in the morning    Feel tired/sluggish/fatigued during the day    Leg twitching at night     MEDICATIONS:  Current Outpatient Medications   Medication Sig Dispense Refill    albuterol (PROAIR HFA/PROVENTIL HFA/VENTOLIN HFA) 108 (90 Base) MCG/ACT inhaler INHALE 2 PUFFS INTO THE LUNGS EVERY 6 HOURS AS NEEDED FOR SHORTNESS OF BREATH 54 g 3    budesonide-formoterol (SYMBICORT) 80-4.5 MCG/ACT Inhaler Inhale 2 puffs into the lungs 2 times daily. 30.6 g 3    clomiPRAMINE (ANAFRANIL) 50 MG capsule Take 1 capsule  by mouth every evening (Patient not taking: Reported on 12/18/2024)      DOCUSATE SODIUM PO Take 1 tablet by mouth daily as needed.      famotidine (PEPCID) 20 MG tablet Take 1 tablet (20 mg) by mouth daily as needed (acid reflux) 30 tablet 11    fluconazole (DIFLUCAN) 150 MG tablet Take once a day one time  and may repeat if needed for up to 3 days (Patient not taking: Reported on 12/18/2024) 3 tablet 1    fluticasone (FLONASE) 50 MCG/ACT nasal spray SHAKE LIQUID AND USE 2 SPRAYS IN EACH NOSTRIL DAILY 16 g 11    lamoTRIgine (LAMICTAL) 100 MG tablet Take 2 tablets (200 mg) by mouth daily (Patient not taking: Reported on 12/18/2024)  0    lamoTRIgine (LAMICTAL) 200 MG tablet Take 1 tablet by mouth daily at 2 pm (Patient not taking: Reported on 12/18/2024)      levothyroxine (SYNTHROID/LEVOTHROID) 88 MCG tablet Take 1 tablet (88 mcg) by mouth daily (Patient not taking: Reported on 12/18/2024) 90 tablet 3    meclizine (ANTIVERT) 25 MG tablet Take 1 tablet (25 mg) by mouth 3 times daily as needed for dizziness (Patient not taking: Reported on 12/18/2024) 20 tablet 0    naproxen (NAPROSYN) 500 MG tablet Take 1 tablet (500 mg) by mouth 2 times daily as needed for moderate pain 30 tablet 0    norgestrel-ethinyl estradiol (LOW-OGESTREL) 0.3-30 MG-MCG tablet Take 1 tablet by mouth daily. Take continously without taking the placebo week. (Patient not taking: Reported on 12/18/2024) 84 tablet 1    ondansetron (ZOFRAN ODT) 4 MG ODT tab Take 1 tablet (4 mg) by mouth every 6 hours as needed for nausea. 30 tablet 0    ondansetron (ZOFRAN) 4 MG tablet Take 1 tablet (4 mg) by mouth every 8 hours as needed for nausea (Patient not taking: Reported on 12/18/2024) 30 tablet 1    polyethylene glycol (MIRALAX) 17 GM/Dose powder Take 17 g by mouth daily (Patient not taking: Reported on 12/18/2024) 578 g 0    propranolol ER (INDERAL LA) 120 MG 24 hr capsule Take 120 mg by mouth daily (Patient not taking: Reported on 12/18/2024)       triamcinolone (KENALOG) 0.1 % external ointment Apply topically 2 times daily (Patient not taking: Reported on 12/18/2024) 80 g 1    vitamin C (ASCORBIC ACID) 500 MG tablet Take 500 mg by mouth.      Vitamin D, Cholecalciferol, 1000 units TABS Take 2,000 Units by mouth daily.       No current facility-administered medications for this visit.       LABS:  Lab Results   Component Value Date     12/18/2024     11/24/2020      Lab Results   Component Value Date    POTASSIUM 4.2 12/18/2024    POTASSIUM 4.2 05/10/2022    POTASSIUM 3.6 11/24/2020     Lab Results   Component Value Date    CHLORIDE 103 12/18/2024    CHLORIDE 104 05/10/2022    CHLORIDE 108 11/24/2020     Lab Results   Component Value Date    MARIYA 9.7 12/18/2024    MARIYA 8.3 11/24/2020     Lab Results   Component Value Date    CO2 24 12/18/2024    CO2 30 05/10/2022    CO2 26 11/24/2020     Lab Results   Component Value Date    BUN 9.1 12/18/2024    BUN 11 05/10/2022    BUN 10 11/24/2020     Lab Results   Component Value Date    CR 0.71 12/18/2024    CR 0.76 11/24/2020     Lab Results   Component Value Date    GLC 86 12/18/2024    GLC 96 05/10/2022    GLC 89 02/05/2021     Lab Results   Component Value Date    LDL 91 05/10/2022     02/05/2021     HDL Cholesterol   Date Value Ref Range Status   02/05/2021 54 >49 mg/dL Final     Direct Measure HDL   Date Value Ref Range Status   05/10/2022 49 (L) >=50 mg/dL Final   ]  GFR Estimate   Date Value Ref Range Status   12/18/2024 >90 >60 mL/min/1.73m2 Final     Comment:     eGFR calculated using 2021 CKD-EPI equation.   11/24/2020 >90 >60 mL/min/[1.73_m2] Final     Comment:     Non  GFR Calc  Starting 12/18/2018, serum creatinine based estimated GFR (eGFR) will be   calculated using the Chronic Kidney Disease Epidemiology Collaboration   (CKD-EPI) equation.       Lab Results   Component Value Date    CR 0.71 12/18/2024    CR 0.76 11/24/2020     No results found for:  "\"MICROALBUMIN\"    ANTHROPOMETRICS:  Vitals: Ht 1.685 m (5' 6.33\")   Wt 71.1 kg (156 lb 11.2 oz)   LMP 12/07/2024 (Approximate)   BMI 25.04 kg/m    Body mass index is 25.04 kg/m .      Wt Readings from Last 5 Encounters:   01/21/25 71.1 kg (156 lb 11.2 oz)   01/15/25 69.9 kg (154 lb)   12/18/24 72.5 kg (159 lb 14.4 oz)   10/15/24 77.6 kg (171 lb)   10/02/24 78.2 kg (172 lb 8 oz)       ESTIMATED KCAL REQUIREMENTS:  1500 - 1800 kcal per day    NUTRITION DIAGNOSIS: Inadequate energy intake related to poor appetite, nausea/vomiting as evidenced by unintentional 10% weight loss over the last 3 months and ~30% weight loss over the past year    NUTRITION INTERVENTION:  Discussed adequate calorie and protein needs.  Researched nutrition supplements that would be appropriate given patient's food allergies that would be high in protein.  Discussed additional meal and snack ideas that patient may be willing to incorporate along with small more frequent intake.    MNT education completed for FODMAP elimination diet.  Explanation about what are FODMAPs and why foods high in FODMAPs trigger symptoms.    Low - FODMAP Nutrition Therapy   Tips: How to keep a food record and symptoms:   Elimination of High - FODMAP sources for 6 weeks with slow reintroduction of a high-FODMAP food of 2 per week with a couple days of rest in-between the days.  Continue with the diary of food and symptoms.    Food/ fluid Recommended  Food/ fluid to avoid   Sample menus - cookbook and recipes available online  Low FODMAP smoothies, power bowls, and salad dressing recipes provided  Grocery shopping list to follow   Pt was able verbalize understanding.  Anticipate excellent compliance.      Plan:   Follow FODMAP elimination diet x 6 weeks  After 6 weeks start with slow reintroduction of a high-FODMAP food of 2 choices only per week with a couple days of rest in-between the days.    Keep a diary of food and symptoms.        PATIENT'S BEHAVIOR CHANGE " GOALS:   See Patient Instructions for patient stated behavior change goals. AVS was printed and given to patient at today's appointment.    MONITOR / EVALUATE:  RD will monitor/evaluate:  Food / Beverage / Nutrient intake   Pertinent Labs  Weight change    FOLLOW-UP:  Follow up with RD as needed.    Time spent in minutes: 45  Encounter: Individual

## 2025-01-21 NOTE — PATIENT INSTRUCTIONS
Tuna  [place] packets  Rice or rice cakes   Corn tortilla       Avocado     1500 calories  60 gm protein

## 2025-01-22 ENCOUNTER — HOSPITAL ENCOUNTER (OUTPATIENT)
Dept: NUCLEAR MEDICINE | Facility: HOSPITAL | Age: 30
Discharge: HOME | End: 2025-01-22
Attending: INTERNAL MEDICINE
Payer: COMMERCIAL

## 2025-01-22 ENCOUNTER — MYC MEDICAL ADVICE (OUTPATIENT)
Dept: GASTROENTEROLOGY | Facility: CLINIC | Age: 30
End: 2025-01-22
Payer: COMMERCIAL

## 2025-01-22 DIAGNOSIS — R11.2 INTRACTABLE NAUSEA AND VOMITING: ICD-10-CM

## 2025-01-22 DIAGNOSIS — R10.84 ABDOMINAL PAIN, GENERALIZED: ICD-10-CM

## 2025-01-22 DIAGNOSIS — R63.4 WEIGHT LOSS: ICD-10-CM

## 2025-01-22 PROCEDURE — 78264 GASTRIC EMPTYING IMG STUDY: CPT

## 2025-01-22 PROCEDURE — 343N000001 HC RX 343 MED OP 636: Performed by: INTERNAL MEDICINE

## 2025-01-22 PROCEDURE — A9541 TC99M SULFUR COLLOID: HCPCS | Performed by: INTERNAL MEDICINE

## 2025-01-22 RX ADMIN — Medication 1.1 MILLICURIE: at 07:57

## 2025-01-31 NOTE — TELEPHONE ENCOUNTER
Reason for call:  Patient reporting a symptom    Symptom or request: pt is having continued symptoms from her OV on 12/12 and would like to be seen    Duration (how long have symptoms been present): 6 weeks    Have you been treated for this before? Yes    Additional comments: pt states cough is getting worse and     Phone Number patient can be reached at:  Home number on file [phone] (home)    Best Time:  any    Can we leave a detailed message on this number:  YES    Call taken on 12/26/2018 at 11:27 AM by Sol Costa     [Negative] : Heme/Lymph [[id number]] : Right elbow

## 2025-02-17 ENCOUNTER — PRE VISIT (OUTPATIENT)
Dept: GASTROENTEROLOGY | Facility: CLINIC | Age: 30
End: 2025-02-17

## 2025-04-15 ENCOUNTER — TELEPHONE (OUTPATIENT)
Dept: GASTROENTEROLOGY | Facility: CLINIC | Age: 30
End: 2025-04-15

## 2025-04-15 NOTE — TELEPHONE ENCOUNTER
I let the patient know that the provider had a family emergency and we had to reschedule appt. Patient had a lapse in insurance so hadn't done her colonoscopy yet but will now schedule the procedure and then schedule a follow up appt.  Cami Mcarthur

## 2025-06-21 ENCOUNTER — HEALTH MAINTENANCE LETTER (OUTPATIENT)
Age: 30
End: 2025-06-21

## (undated) DEVICE — GLOVE PROTEXIS BLUE W/NEU-THERA 8.0  2D73EB80

## (undated) DEVICE — PREP SKIN SCRUB TRAY 4461A

## (undated) DEVICE — GLOVE PROTEXIS W/NEU-THERA 6.5  2D73TE65

## (undated) DEVICE — GOWN LG DISP 9515

## (undated) DEVICE — SYR 20ML LL W/O NDL

## (undated) DEVICE — GLOVE PROTEXIS W/NEU-THERA 7.5  2D73TE75

## (undated) DEVICE — PACK C-SECTION LF PL15OTA83B

## (undated) DEVICE — CATH TRAY FOLEY SURESTEP 16FR W/URINE MTR STATLK LF A303416A

## (undated) DEVICE — DRSG GAUZE 4X4" 3033

## (undated) DEVICE — Device

## (undated) DEVICE — STOCKING SLEEVE COMPRESSION CALF MED

## (undated) DEVICE — SU CHROMIC 0 CT 27" 802H

## (undated) DEVICE — SU VICRYL 0 CT-1 36" J946H

## (undated) DEVICE — LUBRICATING JELLY 4.25OZ

## (undated) DEVICE — TAPE CLOTH ADHESIVE 3" ZONAS

## (undated) DEVICE — TUBING SUCTION 12"X1/4" N612

## (undated) DEVICE — GOWN XLG DISP 9545

## (undated) DEVICE — SU WND CLOSURE VLOC 90 ABS 4-0 18" P-12 VLOCM0023

## (undated) DEVICE — LINEN BABY BLANKET 5434

## (undated) DEVICE — BLADE KNIFE SURG 15 371115

## (undated) DEVICE — DRSG KERLIX FLUFFS X5

## (undated) DEVICE — SU SECURESEAL SKIN ADHESIVE 0.5ML CHSS-05

## (undated) DEVICE — SPONGE LAP 12X12" 23250-409

## (undated) DEVICE — LABEL MEDICATION SYSTEM  3304

## (undated) DEVICE — SU CHROMIC 3-0 SH 27" G122H

## (undated) DEVICE — SOL WATER IRRIG 1000ML BOTTLE 07139-09

## (undated) DEVICE — GLOVE PROTEXIS W/NEU-THERA 8.0  2D73TE80

## (undated) DEVICE — GLOVE PROTEXIS MICRO 7.0  2D73PM70

## (undated) DEVICE — SUCTION TIP YANKAUER STR K87

## (undated) DEVICE — PREP BALL KERLIX ROUND LATEX

## (undated) DEVICE — PACK LAPAROTOMY CUSTOM LAKES

## (undated) RX ORDER — MEPERIDINE HYDROCHLORIDE 50 MG/ML
INJECTION INTRAMUSCULAR; INTRAVENOUS; SUBCUTANEOUS
Status: DISPENSED
Start: 2020-12-18

## (undated) RX ORDER — EPHEDRINE SULFATE 50 MG/ML
INJECTION, SOLUTION INTRAMUSCULAR; INTRAVENOUS; SUBCUTANEOUS
Status: DISPENSED
Start: 2018-11-13

## (undated) RX ORDER — MORPHINE SULFATE 1 MG/ML
INJECTION, SOLUTION EPIDURAL; INTRATHECAL; INTRAVENOUS
Status: DISPENSED
Start: 2018-11-13

## (undated) RX ORDER — KETOROLAC TROMETHAMINE 30 MG/ML
INJECTION, SOLUTION INTRAMUSCULAR; INTRAVENOUS
Status: DISPENSED
Start: 2020-12-18

## (undated) RX ORDER — GLYCOPYRROLATE 0.2 MG/ML
INJECTION, SOLUTION INTRAMUSCULAR; INTRAVENOUS
Status: DISPENSED
Start: 2018-11-13

## (undated) RX ORDER — ACETAMINOPHEN 325 MG/1
TABLET ORAL
Status: DISPENSED
Start: 2020-12-18

## (undated) RX ORDER — FENTANYL CITRATE 50 UG/ML
INJECTION, SOLUTION INTRAMUSCULAR; INTRAVENOUS
Status: DISPENSED
Start: 2017-03-27

## (undated) RX ORDER — CEFAZOLIN SODIUM 2 G/100ML
INJECTION, SOLUTION INTRAVENOUS
Status: DISPENSED
Start: 2020-12-18

## (undated) RX ORDER — OXYTOCIN/0.9 % SODIUM CHLORIDE 30/500 ML
PLASTIC BAG, INJECTION (ML) INTRAVENOUS
Status: DISPENSED
Start: 2018-11-13

## (undated) RX ORDER — EPHEDRINE SULFATE 50 MG/ML
INJECTION, SOLUTION INTRAVENOUS
Status: DISPENSED
Start: 2018-11-13

## (undated) RX ORDER — PROPOFOL 10 MG/ML
INJECTION, EMULSION INTRAVENOUS
Status: DISPENSED
Start: 2020-12-18

## (undated) RX ORDER — FENTANYL CITRATE 50 UG/ML
INJECTION, SOLUTION INTRAMUSCULAR; INTRAVENOUS
Status: DISPENSED
Start: 2018-11-13

## (undated) RX ORDER — BUPIVACAINE HYDROCHLORIDE 7.5 MG/ML
INJECTION, SOLUTION INTRASPINAL
Status: DISPENSED
Start: 2018-11-13

## (undated) RX ORDER — ALBUTEROL SULFATE 0.83 MG/ML
SOLUTION RESPIRATORY (INHALATION)
Status: DISPENSED
Start: 2020-12-18

## (undated) RX ORDER — LIDOCAINE HYDROCHLORIDE 10 MG/ML
INJECTION, SOLUTION EPIDURAL; INFILTRATION; INTRACAUDAL; PERINEURAL
Status: DISPENSED
Start: 2020-12-18

## (undated) RX ORDER — ONDANSETRON 2 MG/ML
INJECTION INTRAMUSCULAR; INTRAVENOUS
Status: DISPENSED
Start: 2020-12-18

## (undated) RX ORDER — DEXAMETHASONE SODIUM PHOSPHATE 4 MG/ML
INJECTION, SOLUTION INTRA-ARTICULAR; INTRALESIONAL; INTRAMUSCULAR; INTRAVENOUS; SOFT TISSUE
Status: DISPENSED
Start: 2020-12-18

## (undated) RX ORDER — FENTANYL CITRATE 50 UG/ML
INJECTION, SOLUTION INTRAMUSCULAR; INTRAVENOUS
Status: DISPENSED
Start: 2020-12-18